# Patient Record
Sex: MALE | Race: WHITE | NOT HISPANIC OR LATINO | Employment: OTHER | URBAN - METROPOLITAN AREA
[De-identification: names, ages, dates, MRNs, and addresses within clinical notes are randomized per-mention and may not be internally consistent; named-entity substitution may affect disease eponyms.]

---

## 2023-10-28 RX ORDER — OMEGA-3-ACID ETHYL ESTERS 1 G/1
1 CAPSULE, LIQUID FILLED ORAL 2 TIMES DAILY
Status: ON HOLD | COMMUNITY
End: 2023-11-30 | Stop reason: ALTCHOICE

## 2023-10-28 RX ORDER — CYCLOBENZAPRINE HCL 10 MG
10 TABLET ORAL 3 TIMES DAILY PRN
COMMUNITY

## 2023-10-28 RX ORDER — ALBUTEROL SULFATE 90 UG/1
2 AEROSOL, METERED RESPIRATORY (INHALATION) EVERY 4 HOURS PRN
COMMUNITY

## 2023-10-28 RX ORDER — METOPROLOL TARTRATE 50 MG/1
2 TABLET ORAL 2 TIMES DAILY
COMMUNITY

## 2023-10-28 RX ORDER — CLOPIDOGREL BISULFATE 75 MG/1
1 TABLET ORAL DAILY
COMMUNITY

## 2023-10-28 RX ORDER — AZELASTINE 1 MG/ML
1 SPRAY, METERED NASAL 2 TIMES DAILY
COMMUNITY

## 2023-10-28 RX ORDER — AMLODIPINE BESYLATE 10 MG/1
1 TABLET ORAL DAILY
Status: ON HOLD | COMMUNITY
End: 2023-11-30 | Stop reason: ALTCHOICE

## 2023-10-28 RX ORDER — METFORMIN HYDROCHLORIDE 1000 MG/1
1 TABLET ORAL 2 TIMES DAILY
Status: ON HOLD | COMMUNITY
End: 2023-11-30 | Stop reason: ALTCHOICE

## 2023-10-28 RX ORDER — RANOLAZINE 1000 MG/1
1 TABLET, EXTENDED RELEASE ORAL EVERY 12 HOURS
COMMUNITY

## 2023-10-28 RX ORDER — HYDROXYZINE HYDROCHLORIDE 25 MG/1
25 TABLET, FILM COATED ORAL 4 TIMES DAILY PRN
Status: ON HOLD | COMMUNITY
End: 2023-11-30 | Stop reason: ALTCHOICE

## 2023-10-28 RX ORDER — DULOXETIN HYDROCHLORIDE 60 MG/1
1 CAPSULE, DELAYED RELEASE ORAL DAILY
COMMUNITY

## 2023-10-28 RX ORDER — ASPIRIN 325 MG
1 TABLET ORAL 2 TIMES DAILY
Status: ON HOLD | COMMUNITY
End: 2023-11-30 | Stop reason: ALTCHOICE

## 2023-10-28 RX ORDER — LISINOPRIL 40 MG/1
1 TABLET ORAL DAILY
Status: ON HOLD | COMMUNITY
End: 2023-11-30 | Stop reason: ALTCHOICE

## 2023-10-28 RX ORDER — METFORMIN HYDROCHLORIDE 500 MG/1
TABLET ORAL 2 TIMES DAILY
Status: ON HOLD | COMMUNITY
End: 2023-11-30 | Stop reason: ALTCHOICE

## 2023-10-28 RX ORDER — NITROGLYCERIN 0.4 MG/1
TABLET SUBLINGUAL
COMMUNITY

## 2023-10-28 RX ORDER — LORATADINE 10 MG/1
1 TABLET ORAL DAILY
COMMUNITY

## 2023-10-28 RX ORDER — POLYETHYLENE GLYCOL 3350 17 G/17G
POWDER, FOR SOLUTION ORAL
COMMUNITY

## 2023-10-30 ENCOUNTER — HOSPITAL ENCOUNTER (OUTPATIENT)
Dept: RADIOLOGY | Facility: HOSPITAL | Age: 58
Discharge: HOME | End: 2023-10-30
Payer: MEDICARE

## 2023-10-30 ENCOUNTER — APPOINTMENT (OUTPATIENT)
Dept: RADIOLOGY | Facility: HOSPITAL | Age: 58
End: 2023-10-30
Payer: MEDICARE

## 2023-10-30 ENCOUNTER — OFFICE VISIT (OUTPATIENT)
Dept: ORTHOPEDIC SURGERY | Facility: CLINIC | Age: 58
End: 2023-10-30
Payer: MEDICARE

## 2023-10-30 VITALS — BODY MASS INDEX: 37.56 KG/M2 | HEIGHT: 64 IN | WEIGHT: 220 LBS

## 2023-10-30 DIAGNOSIS — M25.551 RIGHT HIP PAIN: ICD-10-CM

## 2023-10-30 DIAGNOSIS — M54.41 ACUTE RIGHT-SIDED LOW BACK PAIN WITH RIGHT-SIDED SCIATICA: Primary | ICD-10-CM

## 2023-10-30 DIAGNOSIS — M25.551 PAIN IN RIGHT HIP: ICD-10-CM

## 2023-10-30 PROCEDURE — 72170 X-RAY EXAM OF PELVIS: CPT | Performed by: RADIOLOGY

## 2023-10-30 PROCEDURE — 99204 OFFICE O/P NEW MOD 45 MIN: CPT | Performed by: ORTHOPAEDIC SURGERY

## 2023-10-30 PROCEDURE — 72170 X-RAY EXAM OF PELVIS: CPT | Mod: FY

## 2023-10-30 RX ORDER — METHYLPREDNISOLONE 4 MG/1
TABLET ORAL
Qty: 21 TABLET | Refills: 0 | Status: SHIPPED | OUTPATIENT
Start: 2023-10-30 | End: 2023-12-11 | Stop reason: HOSPADM

## 2023-10-30 ASSESSMENT — PAIN SCALES - GENERAL: PAINLEVEL_OUTOF10: 3

## 2023-10-30 ASSESSMENT — PAIN - FUNCTIONAL ASSESSMENT: PAIN_FUNCTIONAL_ASSESSMENT: 0-10

## 2023-10-30 NOTE — PROGRESS NOTES
PRIMARY CARE PHYSICIAN: Ivy Pollock MD  REFERRING PROVIDER: Ivy Pollock MD     CONSULT ORTHOPAEDIC: Hip Evaluation        ASSESSMENT & PLAN    IMPRESSION:  1.  Right-sided low back pain with radiculopathy  2.  Normal right hip exam    PLAN:  Discussed with patient findings above.  Reviewed x-rays with him in the office today.  He has minimal arthritic changes on his right hip and most of symptoms seem to be related to a problem with his low back.  He does have some radicular symptoms and pain on examination that could potentially be related to low back pathology.  Recommended that he follow-up with one of our nonoperative spine physicians and pain management for additional evaluation and treatment recommendations.  We additionally discussed that given that he cannot take anti-inflammatories due to being on Eliquis trying Tylenol for pain control we will additionally start him on a Medrol Dosepak to see if this helps alleviate some of his current symptom presentation.      SUBJECTIVE  CHIEF COMPLAINT:   Chief Complaint   Patient presents with    Right Hip - Pain        HPI: Philip Barfield is a 58 y.o. patient. Philip Barfield has had progressive problems with the right hip over the past 3 months. They do report any trauma. He fell when he was trying to push a riding mower and landed on his right side.  They report any constant or progressive numbness or tingling in their legs on the right side of the thigh. Their symptoms are interfering with activities which include pain that starts in his butt and lateral side of his hip and down to his knee.     FUNCTIONAL STATUS: occasionally limited.  AMBULATORY STATUS: Independent community ambulation with canes/crutches  PREVIOUS TREATMENTS: None, unable to take anti-inflammatories due to being on Eliquis  HISTORY OF SURGERY ON AFFECTED HIP(S): No  He did have a prior right knee arthroscopic surgery in 1982  BACK PAIN REPORTED: Yes       REVIEW OF  SYSTEMS  Constitutional: See HPI for pain assessment, No significant weight loss, recent trauma  Cardiovascular: No chest pain, shortness of breath  Respiratory: No difficulty breathing, cough  Gastrointestinal: No nausea, vomiting, diarrhea, constipation  Musculoskeletal: Noted in HPI, positive for pain, restricted motion, stiffness  Integumentary: No rashes, easy bruising, redness   Neurological: no numbness or tingling in extremities, no gait disturbances   Psychiatric: No mood changes, memory changes, social issues  Heme/Lymph: no excessive swelling, easy bruising, excessive bleeding  ENT: No hearing changes  Eyes: No vision changes    Past Medical History:   Diagnosis Date    Old myocardial infarction     History of myocardial infarction    Personal history of other diseases of the circulatory system     History of hypertension    Personal history of other diseases of the musculoskeletal system and connective tissue     History of gout    Personal history of other diseases of the nervous system and sense organs     History of sleep apnea    Personal history of other endocrine, nutritional and metabolic disease     History of diabetes mellitus    Personal history of other endocrine, nutritional and metabolic disease     History of hyperlipidemia        Allergies   Allergen Reactions    Penicillins Rash and Shortness of breath    Tetanus Toxoid Rash and Shortness of breath    Hydrocodone-Acetaminophen Unknown    Niacin Unknown    Sulfa (Sulfonamide Antibiotics) Unknown        Past Surgical History:   Procedure Laterality Date    OTHER SURGICAL HISTORY  12/11/2018    Tonsillectomy    OTHER SURGICAL HISTORY  12/11/2018    Knee arthroscopy    OTHER SURGICAL HISTORY  12/11/2018    Cardiac catheterization    OTHER SURGICAL HISTORY  12/11/2018    Coronary artery bypass graft    OTHER SURGICAL HISTORY  12/11/2018    Nasal septoplasty    OTHER SURGICAL HISTORY  12/11/2018    Arterial stent placement        Family History    Problem Relation Name Age of Onset    Hypertension Mother      Cancer Father      Diabetes Brother      Diabetes Maternal Grandmother      Colon cancer Maternal Grandfather          Social History     Socioeconomic History    Marital status:      Spouse name: Not on file    Number of children: Not on file    Years of education: Not on file    Highest education level: Not on file   Occupational History    Not on file   Tobacco Use    Smoking status: Not on file    Smokeless tobacco: Not on file   Substance and Sexual Activity    Alcohol use: Not on file    Drug use: Not on file    Sexual activity: Not on file   Other Topics Concern    Not on file   Social History Narrative    Not on file     Social Determinants of Health     Financial Resource Strain: Not on file   Food Insecurity: Not on file   Transportation Needs: Not on file   Physical Activity: Not on file   Stress: Not on file   Social Connections: Not on file   Intimate Partner Violence: Not on file   Housing Stability: Not on file        CURRENT MEDICATIONS:   Current Outpatient Medications   Medication Sig Dispense Refill    albuterol 90 mcg/actuation inhaler Inhale 2 puffs every 4 hours if needed.      amLODIPine (Norvasc) 10 mg tablet Take 1 tablet (10 mg) by mouth once daily.      aspirin 325 mg tablet Take 1 tablet (325 mg) by mouth 2 times a day.      azelastine (Astelin) 137 mcg (0.1 %) nasal spray Administer 1 spray into affected nostril(s) 2 times a day.      clopidogrel (Plavix) 75 mg tablet Take 1 tablet (75 mg) by mouth once daily.      cyclobenzaprine (Flexeril) 10 mg tablet Take by mouth.      DULoxetine (Cymbalta) 60 mg DR capsule Take 1 capsule (60 mg) by mouth once daily.      hydrOXYzine HCL (Atarax) 25 mg tablet Take 1 tablet (25 mg) by mouth 4 times a day as needed for allergies.      lisinopril 40 mg tablet Take 1 tablet (40 mg) by mouth once daily.      loratadine (Claritin) 10 mg tablet Take 1 tablet (10 mg) by mouth once  daily.      metFORMIN (Glucophage) 1,000 mg tablet Take 1 tablet (1,000 mg) by mouth 2 times a day.      metFORMIN (Glucophage) 500 mg tablet Take by mouth twice a day.      metoprolol tartrate (Lopressor) 50 mg tablet Take 1 tablet by mouth 2 times a day.      nitroglycerin (Nitrostat) 0.4 mg SL tablet Place under the tongue.  TAKE AS DIRECTED.      omega-3 acid ethyl esters (Lovaza) 1 gram capsule Take 1 capsule (1 g) by mouth 2 times a day.      polyethylene glycol (Miralax) 17 gram/dose powder MIX 1 CAPFUL IN 8 OUNCES OF WATER AND DRINK DAILY AS DIRECTED.      ranolazine (Ranexa) 500 mg 12 hr tablet Take 1 tablet (500 mg) by mouth every 12 hours.       No current facility-administered medications for this visit.        OBJECTIVE    PHYSICAL EXAM       No data to display               Body mass index is 37.76 kg/m².    GENERAL: A/Ox3, NAD. Appears healthy, well nourished  PSYCHIATRIC: Mood stable, appropriate memory recall  EYES: EOM intact, no scleral icterus  CARDIAC: regular rate  LUNGS: Breathing non-labored  SKIN: no erythema, rashes, or ecchymoses     MUSCULOSKELETAL:  Laterality: right Hip Exam  - ROM, Extension: Full, no flexion contracture  - Strength: Abduction 5/5, Flexion 5/5  - Palpation: Nontender along hip, does have tenderness noted along his right-sided paraspinal musculature in the lumbar spine and right-sided SI joint  - Log roll/IR exam: Good motion, nonpainful  - Straight leg raise: Does reproduce some of his pain and cause aggravation of his symptoms with a seated straight leg localizes to the right-sided buttock area and lumbar spine  - EHL/PF/DF motor intact  - Gait: Using no assistive device  - Special Tests: None performed    NEUROVASCULAR:  - Neurovascular Status: sensation intact to light touch distally  - Capillary refill brisk at extremities, Bilateral dorsalis pedis pulse 2+      IMAGING:  Multiple views of the affected right hip(s) demonstrate: No acute fracture, dislocation,  deformity.  Minimal arthritic changes appreciated..   X-rays were personally reviewed and interpreted by me.  Radiology reports were reviewed by me as well, if readily available at the time.        Karon Mcleod DO  Attending Surgeon  [facility] Adult [facility]ive Surgery  [place], [place]

## 2023-11-28 ENCOUNTER — HOSPITAL ENCOUNTER (EMERGENCY)
Facility: HOSPITAL | Age: 58
Discharge: OTHER INSTITUTION | End: 2023-11-29
Attending: STUDENT IN AN ORGANIZED HEALTH CARE EDUCATION/TRAINING PROGRAM
Payer: MEDICARE

## 2023-11-28 ENCOUNTER — APPOINTMENT (OUTPATIENT)
Dept: RADIOLOGY | Facility: HOSPITAL | Age: 58
End: 2023-11-28
Payer: MEDICARE

## 2023-11-28 DIAGNOSIS — M48.02 CERVICAL STENOSIS OF SPINAL CANAL: ICD-10-CM

## 2023-11-28 DIAGNOSIS — R29.898 WEAKNESS OF BOTH LOWER EXTREMITIES: Primary | ICD-10-CM

## 2023-11-28 PROCEDURE — 99285 EMERGENCY DEPT VISIT HI MDM: CPT | Performed by: STUDENT IN AN ORGANIZED HEALTH CARE EDUCATION/TRAINING PROGRAM

## 2023-11-28 PROCEDURE — 70450 CT HEAD/BRAIN W/O DYE: CPT

## 2023-11-28 PROCEDURE — 94760 N-INVAS EAR/PLS OXIMETRY 1: CPT

## 2023-11-28 PROCEDURE — 72131 CT LUMBAR SPINE W/O DYE: CPT

## 2023-11-28 ASSESSMENT — PAIN SCALES - GENERAL: PAINLEVEL_OUTOF10: 0 - NO PAIN

## 2023-11-28 ASSESSMENT — PAIN - FUNCTIONAL ASSESSMENT: PAIN_FUNCTIONAL_ASSESSMENT: 0-10

## 2023-11-28 ASSESSMENT — COLUMBIA-SUICIDE SEVERITY RATING SCALE - C-SSRS
1. IN THE PAST MONTH, HAVE YOU WISHED YOU WERE DEAD OR WISHED YOU COULD GO TO SLEEP AND NOT WAKE UP?: NO
2. HAVE YOU ACTUALLY HAD ANY THOUGHTS OF KILLING YOURSELF?: NO
6. HAVE YOU EVER DONE ANYTHING, STARTED TO DO ANYTHING, OR PREPARED TO DO ANYTHING TO END YOUR LIFE?: NO

## 2023-11-29 ENCOUNTER — APPOINTMENT (OUTPATIENT)
Dept: RADIOLOGY | Facility: HOSPITAL | Age: 58
End: 2023-11-29
Payer: MEDICARE

## 2023-11-29 ENCOUNTER — HOSPITAL ENCOUNTER (INPATIENT)
Facility: HOSPITAL | Age: 58
LOS: 12 days | Discharge: HOME HEALTH | DRG: 471 | End: 2023-12-11
Attending: ORTHOPAEDIC SURGERY | Admitting: ORTHOPAEDIC SURGERY
Payer: MEDICARE

## 2023-11-29 ENCOUNTER — APPOINTMENT (OUTPATIENT)
Dept: RADIOLOGY | Facility: HOSPITAL | Age: 58
DRG: 471 | End: 2023-11-29
Payer: MEDICARE

## 2023-11-29 VITALS
OXYGEN SATURATION: 98 % | WEIGHT: 220 LBS | DIASTOLIC BLOOD PRESSURE: 87 MMHG | RESPIRATION RATE: 14 BRPM | HEART RATE: 70 BPM | HEIGHT: 64 IN | SYSTOLIC BLOOD PRESSURE: 146 MMHG | TEMPERATURE: 98.4 F | BODY MASS INDEX: 37.56 KG/M2

## 2023-11-29 DIAGNOSIS — N40.1 BENIGN PROSTATIC HYPERPLASIA WITH LOWER URINARY TRACT SYMPTOMS, SYMPTOM DETAILS UNSPECIFIED: ICD-10-CM

## 2023-11-29 DIAGNOSIS — G47.00 INSOMNIA, UNSPECIFIED TYPE: ICD-10-CM

## 2023-11-29 DIAGNOSIS — I25.112 CORONARY ARTERY DISEASE INVOLVING NATIVE CORONARY ARTERY OF NATIVE HEART WITH REFRACTORY ANGINA PECTORIS (CMS-HCC): ICD-10-CM

## 2023-11-29 DIAGNOSIS — I24.0 CORONARY ARTERY THROMBOSIS (MULTI): ICD-10-CM

## 2023-11-29 DIAGNOSIS — I21.4 NON-ST ELEVATION (NSTEMI) MYOCARDIAL INFARCTION (MULTI): ICD-10-CM

## 2023-11-29 DIAGNOSIS — E03.9 HYPOTHYROIDISM, UNSPECIFIED TYPE: ICD-10-CM

## 2023-11-29 DIAGNOSIS — K21.9 GASTROESOPHAGEAL REFLUX DISEASE WITHOUT ESOPHAGITIS: ICD-10-CM

## 2023-11-29 DIAGNOSIS — G89.18 POSTOPERATIVE PAIN: ICD-10-CM

## 2023-11-29 DIAGNOSIS — G95.20 CERVICAL SPINAL CORD COMPRESSION (MULTI): Primary | ICD-10-CM

## 2023-11-29 DIAGNOSIS — Z95.5 S/P DRUG ELUTING CORONARY STENT PLACEMENT: ICD-10-CM

## 2023-11-29 DIAGNOSIS — Z95.5 STENTED CORONARY ARTERY: ICD-10-CM

## 2023-11-29 LAB
ALBUMIN SERPL BCP-MCNC: 4.2 G/DL (ref 3.4–5)
ALP SERPL-CCNC: 107 U/L (ref 33–120)
ALT SERPL W P-5'-P-CCNC: 17 U/L (ref 10–52)
ANION GAP SERPL CALC-SCNC: 12 MMOL/L (ref 10–20)
APPEARANCE UR: CLEAR
AST SERPL W P-5'-P-CCNC: 21 U/L (ref 9–39)
BASOPHILS # BLD AUTO: 0.08 X10*3/UL (ref 0–0.1)
BASOPHILS NFR BLD AUTO: 0.7 %
BILIRUB SERPL-MCNC: 0.4 MG/DL (ref 0–1.2)
BILIRUB UR STRIP.AUTO-MCNC: NEGATIVE MG/DL
BUN SERPL-MCNC: 14 MG/DL (ref 6–23)
CA-I BLD-SCNC: 1.16 MMOL/L (ref 1.1–1.33)
CALCIUM SERPL-MCNC: 9.3 MG/DL (ref 8.6–10.3)
CARDIAC TROPONIN I PNL SERPL HS: 7 NG/L (ref 0–20)
CHLORIDE SERPL-SCNC: 103 MMOL/L (ref 98–107)
CO2 SERPL-SCNC: 27 MMOL/L (ref 21–32)
COLOR UR: YELLOW
CREAT SERPL-MCNC: 0.89 MG/DL (ref 0.5–1.3)
EOSINOPHIL # BLD AUTO: 0.65 X10*3/UL (ref 0–0.7)
EOSINOPHIL NFR BLD AUTO: 5.8 %
ERYTHROCYTE [DISTWIDTH] IN BLOOD BY AUTOMATED COUNT: 13.3 % (ref 11.5–14.5)
FLUAV RNA RESP QL NAA+PROBE: NOT DETECTED
FLUBV RNA RESP QL NAA+PROBE: NOT DETECTED
GFR SERPL CREATININE-BSD FRML MDRD: >90 ML/MIN/1.73M*2
GLUCOSE SERPL-MCNC: 110 MG/DL (ref 74–99)
GLUCOSE UR STRIP.AUTO-MCNC: NEGATIVE MG/DL
HCT VFR BLD AUTO: 42.2 % (ref 41–52)
HGB BLD-MCNC: 14 G/DL (ref 13.5–17.5)
IMM GRANULOCYTES # BLD AUTO: 0.08 X10*3/UL (ref 0–0.7)
IMM GRANULOCYTES NFR BLD AUTO: 0.7 % (ref 0–0.9)
KETONES UR STRIP.AUTO-MCNC: NEGATIVE MG/DL
LACTATE SERPL-SCNC: 0.9 MMOL/L (ref 0.4–2)
LEUKOCYTE ESTERASE UR QL STRIP.AUTO: NEGATIVE
LYMPHOCYTES # BLD AUTO: 2.69 X10*3/UL (ref 1.2–4.8)
LYMPHOCYTES NFR BLD AUTO: 24.1 %
MAGNESIUM SERPL-MCNC: 1.62 MG/DL (ref 1.6–2.4)
MCH RBC QN AUTO: 28.9 PG (ref 26–34)
MCHC RBC AUTO-ENTMCNC: 33.2 G/DL (ref 32–36)
MCV RBC AUTO: 87 FL (ref 80–100)
MONOCYTES # BLD AUTO: 0.94 X10*3/UL (ref 0.1–1)
MONOCYTES NFR BLD AUTO: 8.4 %
NEUTROPHILS # BLD AUTO: 6.72 X10*3/UL (ref 1.2–7.7)
NEUTROPHILS NFR BLD AUTO: 60.3 %
NITRITE UR QL STRIP.AUTO: NEGATIVE
NRBC BLD-RTO: 0 /100 WBCS (ref 0–0)
PH UR STRIP.AUTO: 5 [PH]
PHOSPHATE SERPL-MCNC: 4.2 MG/DL (ref 2.5–4.9)
PLATELET # BLD AUTO: 390 X10*3/UL (ref 150–450)
POTASSIUM SERPL-SCNC: 4.3 MMOL/L (ref 3.5–5.3)
PROT SERPL-MCNC: 7.1 G/DL (ref 6.4–8.2)
PROT UR STRIP.AUTO-MCNC: NEGATIVE MG/DL
RBC # BLD AUTO: 4.84 X10*6/UL (ref 4.5–5.9)
RBC # UR STRIP.AUTO: NEGATIVE /UL
SARS-COV-2 RNA RESP QL NAA+PROBE: NOT DETECTED
SODIUM SERPL-SCNC: 138 MMOL/L (ref 136–145)
SP GR UR STRIP.AUTO: 1.02
T4 FREE SERPL-MCNC: 1.63 NG/DL (ref 0.61–1.12)
TSH SERPL-ACNC: <0.01 MIU/L (ref 0.44–3.98)
UROBILINOGEN UR STRIP.AUTO-MCNC: <2 MG/DL
WBC # BLD AUTO: 11.2 X10*3/UL (ref 4.4–11.3)

## 2023-11-29 PROCEDURE — 94760 N-INVAS EAR/PLS OXIMETRY 1: CPT

## 2023-11-29 PROCEDURE — 80053 COMPREHEN METABOLIC PANEL: CPT | Performed by: STUDENT IN AN ORGANIZED HEALTH CARE EDUCATION/TRAINING PROGRAM

## 2023-11-29 PROCEDURE — 72131 CT LUMBAR SPINE W/O DYE: CPT | Performed by: STUDENT IN AN ORGANIZED HEALTH CARE EDUCATION/TRAINING PROGRAM

## 2023-11-29 PROCEDURE — 84439 ASSAY OF FREE THYROXINE: CPT | Performed by: STUDENT IN AN ORGANIZED HEALTH CARE EDUCATION/TRAINING PROGRAM

## 2023-11-29 PROCEDURE — 71045 X-RAY EXAM CHEST 1 VIEW: CPT | Performed by: RADIOLOGY

## 2023-11-29 PROCEDURE — 84100 ASSAY OF PHOSPHORUS: CPT | Performed by: STUDENT IN AN ORGANIZED HEALTH CARE EDUCATION/TRAINING PROGRAM

## 2023-11-29 PROCEDURE — 2500000004 HC RX 250 GENERAL PHARMACY W/ HCPCS (ALT 636 FOR OP/ED): Performed by: STUDENT IN AN ORGANIZED HEALTH CARE EDUCATION/TRAINING PROGRAM

## 2023-11-29 PROCEDURE — 36415 COLL VENOUS BLD VENIPUNCTURE: CPT | Performed by: STUDENT IN AN ORGANIZED HEALTH CARE EDUCATION/TRAINING PROGRAM

## 2023-11-29 PROCEDURE — 70450 CT HEAD/BRAIN W/O DYE: CPT | Performed by: STUDENT IN AN ORGANIZED HEALTH CARE EDUCATION/TRAINING PROGRAM

## 2023-11-29 PROCEDURE — 85025 COMPLETE CBC W/AUTO DIFF WBC: CPT | Performed by: STUDENT IN AN ORGANIZED HEALTH CARE EDUCATION/TRAINING PROGRAM

## 2023-11-29 PROCEDURE — 72157 MRI CHEST SPINE W/O & W/DYE: CPT

## 2023-11-29 PROCEDURE — 84443 ASSAY THYROID STIM HORMONE: CPT | Performed by: STUDENT IN AN ORGANIZED HEALTH CARE EDUCATION/TRAINING PROGRAM

## 2023-11-29 PROCEDURE — 62270 DX LMBR SPI PNXR: CPT | Performed by: STUDENT IN AN ORGANIZED HEALTH CARE EDUCATION/TRAINING PROGRAM

## 2023-11-29 PROCEDURE — A9575 INJ GADOTERATE MEGLUMI 0.1ML: HCPCS | Performed by: EMERGENCY MEDICINE

## 2023-11-29 PROCEDURE — 99223 1ST HOSP IP/OBS HIGH 75: CPT | Performed by: PSYCHIATRY & NEUROLOGY

## 2023-11-29 PROCEDURE — 87636 SARSCOV2 & INF A&B AMP PRB: CPT | Performed by: STUDENT IN AN ORGANIZED HEALTH CARE EDUCATION/TRAINING PROGRAM

## 2023-11-29 PROCEDURE — 72148 MRI LUMBAR SPINE W/O DYE: CPT | Performed by: STUDENT IN AN ORGANIZED HEALTH CARE EDUCATION/TRAINING PROGRAM

## 2023-11-29 PROCEDURE — 83735 ASSAY OF MAGNESIUM: CPT | Performed by: STUDENT IN AN ORGANIZED HEALTH CARE EDUCATION/TRAINING PROGRAM

## 2023-11-29 PROCEDURE — 84484 ASSAY OF TROPONIN QUANT: CPT | Performed by: STUDENT IN AN ORGANIZED HEALTH CARE EDUCATION/TRAINING PROGRAM

## 2023-11-29 PROCEDURE — 1100000001 HC PRIVATE ROOM DAILY

## 2023-11-29 PROCEDURE — 82330 ASSAY OF CALCIUM: CPT | Performed by: STUDENT IN AN ORGANIZED HEALTH CARE EDUCATION/TRAINING PROGRAM

## 2023-11-29 PROCEDURE — 2500000001 HC RX 250 WO HCPCS SELF ADMINISTERED DRUGS (ALT 637 FOR MEDICARE OP)

## 2023-11-29 PROCEDURE — 71045 X-RAY EXAM CHEST 1 VIEW: CPT

## 2023-11-29 PROCEDURE — 83605 ASSAY OF LACTIC ACID: CPT | Performed by: STUDENT IN AN ORGANIZED HEALTH CARE EDUCATION/TRAINING PROGRAM

## 2023-11-29 PROCEDURE — 70553 MRI BRAIN STEM W/O & W/DYE: CPT | Performed by: STUDENT IN AN ORGANIZED HEALTH CARE EDUCATION/TRAINING PROGRAM

## 2023-11-29 PROCEDURE — 70553 MRI BRAIN STEM W/O & W/DYE: CPT

## 2023-11-29 PROCEDURE — 96374 THER/PROPH/DIAG INJ IV PUSH: CPT

## 2023-11-29 PROCEDURE — 2500000004 HC RX 250 GENERAL PHARMACY W/ HCPCS (ALT 636 FOR OP/ED)

## 2023-11-29 PROCEDURE — 81003 URINALYSIS AUTO W/O SCOPE: CPT | Performed by: STUDENT IN AN ORGANIZED HEALTH CARE EDUCATION/TRAINING PROGRAM

## 2023-11-29 PROCEDURE — 2550000001 HC RX 255 CONTRASTS: Performed by: EMERGENCY MEDICINE

## 2023-11-29 PROCEDURE — 72156 MRI NECK SPINE W/O & W/DYE: CPT | Performed by: STUDENT IN AN ORGANIZED HEALTH CARE EDUCATION/TRAINING PROGRAM

## 2023-11-29 PROCEDURE — 96375 TX/PRO/DX INJ NEW DRUG ADDON: CPT

## 2023-11-29 PROCEDURE — 72157 MRI CHEST SPINE W/O & W/DYE: CPT | Performed by: STUDENT IN AN ORGANIZED HEALTH CARE EDUCATION/TRAINING PROGRAM

## 2023-11-29 PROCEDURE — 72148 MRI LUMBAR SPINE W/O DYE: CPT

## 2023-11-29 PROCEDURE — 72156 MRI NECK SPINE W/O & W/DYE: CPT

## 2023-11-29 RX ORDER — POLYETHYLENE GLYCOL 3350 17 G/17G
17 POWDER, FOR SOLUTION ORAL DAILY
Status: DISCONTINUED | OUTPATIENT
Start: 2023-11-30 | End: 2023-12-06

## 2023-11-29 RX ORDER — NALOXONE HYDROCHLORIDE 0.4 MG/ML
0.2 INJECTION, SOLUTION INTRAMUSCULAR; INTRAVENOUS; SUBCUTANEOUS EVERY 5 MIN PRN
Status: DISCONTINUED | OUTPATIENT
Start: 2023-11-29 | End: 2023-11-29

## 2023-11-29 RX ORDER — NALOXONE HYDROCHLORIDE 0.4 MG/ML
0.2 INJECTION, SOLUTION INTRAMUSCULAR; INTRAVENOUS; SUBCUTANEOUS EVERY 5 MIN PRN
Status: DISCONTINUED | OUTPATIENT
Start: 2023-11-29 | End: 2023-12-06

## 2023-11-29 RX ORDER — ONDANSETRON 4 MG/1
4 TABLET, FILM COATED ORAL EVERY 8 HOURS PRN
Status: DISCONTINUED | OUTPATIENT
Start: 2023-11-29 | End: 2023-12-06

## 2023-11-29 RX ORDER — MORPHINE SULFATE 4 MG/ML
4 INJECTION, SOLUTION INTRAMUSCULAR; INTRAVENOUS ONCE
Status: COMPLETED | OUTPATIENT
Start: 2023-11-29 | End: 2023-11-29

## 2023-11-29 RX ORDER — SODIUM CHLORIDE, SODIUM LACTATE, POTASSIUM CHLORIDE, CALCIUM CHLORIDE 600; 310; 30; 20 MG/100ML; MG/100ML; MG/100ML; MG/100ML
125 INJECTION, SOLUTION INTRAVENOUS CONTINUOUS
Status: DISCONTINUED | OUTPATIENT
Start: 2023-11-29 | End: 2023-12-06

## 2023-11-29 RX ORDER — ADHESIVE BANDAGE
30 BANDAGE TOPICAL DAILY PRN
Status: DISCONTINUED | OUTPATIENT
Start: 2023-11-29 | End: 2023-12-06

## 2023-11-29 RX ORDER — PROCHLORPERAZINE 25 MG/1
25 SUPPOSITORY RECTAL EVERY 12 HOURS PRN
Status: DISCONTINUED | OUTPATIENT
Start: 2023-11-29 | End: 2023-12-06

## 2023-11-29 RX ORDER — GADOTERATE MEGLUMINE 376.9 MG/ML
0.2 INJECTION INTRAVENOUS
Status: COMPLETED | OUTPATIENT
Start: 2023-11-29 | End: 2023-11-29

## 2023-11-29 RX ORDER — AMOXICILLIN 250 MG
2 CAPSULE ORAL 2 TIMES DAILY
Status: DISCONTINUED | OUTPATIENT
Start: 2023-11-29 | End: 2023-12-06

## 2023-11-29 RX ORDER — OXYCODONE HYDROCHLORIDE 5 MG/1
5 TABLET ORAL EVERY 4 HOURS PRN
Status: DISCONTINUED | OUTPATIENT
Start: 2023-11-29 | End: 2023-12-06

## 2023-11-29 RX ORDER — BISACODYL 10 MG/1
10 SUPPOSITORY RECTAL DAILY PRN
Status: DISCONTINUED | OUTPATIENT
Start: 2023-11-29 | End: 2023-12-06

## 2023-11-29 RX ORDER — MIDAZOLAM HYDROCHLORIDE 1 MG/ML
2 INJECTION, SOLUTION INTRAMUSCULAR; INTRAVENOUS ONCE
Status: COMPLETED | OUTPATIENT
Start: 2023-11-29 | End: 2023-11-29

## 2023-11-29 RX ORDER — OXYCODONE HYDROCHLORIDE 5 MG/1
10 TABLET ORAL EVERY 4 HOURS PRN
Status: DISCONTINUED | OUTPATIENT
Start: 2023-11-29 | End: 2023-12-06

## 2023-11-29 RX ORDER — ONDANSETRON HYDROCHLORIDE 2 MG/ML
4 INJECTION, SOLUTION INTRAVENOUS EVERY 8 HOURS PRN
Status: DISCONTINUED | OUTPATIENT
Start: 2023-11-29 | End: 2023-12-06

## 2023-11-29 RX ORDER — CYCLOBENZAPRINE HCL 10 MG
10 TABLET ORAL 3 TIMES DAILY PRN
Status: DISCONTINUED | OUTPATIENT
Start: 2023-11-29 | End: 2023-12-05

## 2023-11-29 RX ORDER — PROCHLORPERAZINE MALEATE 10 MG
10 TABLET ORAL EVERY 6 HOURS PRN
Status: DISCONTINUED | OUTPATIENT
Start: 2023-11-29 | End: 2023-12-06

## 2023-11-29 RX ORDER — MIDAZOLAM HYDROCHLORIDE 1 MG/ML
INJECTION, SOLUTION INTRAMUSCULAR; INTRAVENOUS
Status: COMPLETED
Start: 2023-11-29 | End: 2023-11-29

## 2023-11-29 RX ORDER — HYDROMORPHONE HYDROCHLORIDE 1 MG/ML
0.5 INJECTION, SOLUTION INTRAMUSCULAR; INTRAVENOUS; SUBCUTANEOUS EVERY 4 HOURS PRN
Status: DISCONTINUED | OUTPATIENT
Start: 2023-11-29 | End: 2023-12-05

## 2023-11-29 RX ORDER — ACETAMINOPHEN 325 MG/1
650 TABLET ORAL EVERY 6 HOURS SCHEDULED
Status: DISCONTINUED | OUTPATIENT
Start: 2023-11-30 | End: 2023-12-06

## 2023-11-29 RX ORDER — PROCHLORPERAZINE EDISYLATE 5 MG/ML
10 INJECTION INTRAMUSCULAR; INTRAVENOUS EVERY 6 HOURS PRN
Status: DISCONTINUED | OUTPATIENT
Start: 2023-11-29 | End: 2023-12-06

## 2023-11-29 RX ADMIN — MIDAZOLAM 2 MG: 1 INJECTION INTRAMUSCULAR; INTRAVENOUS at 04:11

## 2023-11-29 RX ADMIN — MIDAZOLAM HYDROCHLORIDE 2 MG: 1 INJECTION, SOLUTION INTRAMUSCULAR; INTRAVENOUS at 04:11

## 2023-11-29 RX ADMIN — MIDAZOLAM 2 MG: 1 INJECTION INTRAMUSCULAR; INTRAVENOUS at 03:46

## 2023-11-29 RX ADMIN — Medication: at 22:45

## 2023-11-29 RX ADMIN — MORPHINE SULFATE 4 MG: 4 INJECTION, SOLUTION INTRAMUSCULAR; INTRAVENOUS at 17:29

## 2023-11-29 RX ADMIN — GADOTERATE MEGLUMINE 20 ML: 376.9 INJECTION INTRAVENOUS at 16:08

## 2023-11-29 SDOH — SOCIAL STABILITY: SOCIAL INSECURITY: ARE THERE ANY APPARENT SIGNS OF INJURIES/BEHAVIORS THAT COULD BE RELATED TO ABUSE/NEGLECT?: NO

## 2023-11-29 SDOH — ECONOMIC STABILITY: HOUSING INSECURITY
IN THE LAST 12 MONTHS, WAS THERE A TIME WHEN YOU DID NOT HAVE A STEADY PLACE TO SLEEP OR SLEPT IN A SHELTER (INCLUDING NOW)?: NO

## 2023-11-29 SDOH — ECONOMIC STABILITY: HOUSING INSECURITY: IN THE LAST 12 MONTHS, HOW MANY PLACES HAVE YOU LIVED?: 1

## 2023-11-29 SDOH — SOCIAL STABILITY: SOCIAL INSECURITY: WERE YOU ABLE TO COMPLETE ALL THE BEHAVIORAL HEALTH SCREENINGS?: YES

## 2023-11-29 SDOH — SOCIAL STABILITY: SOCIAL INSECURITY: DO YOU FEEL ANYONE HAS EXPLOITED OR TAKEN ADVANTAGE OF YOU FINANCIALLY OR OF YOUR PERSONAL PROPERTY?: NO

## 2023-11-29 SDOH — ECONOMIC STABILITY: TRANSPORTATION INSECURITY
IN THE PAST 12 MONTHS, HAS THE LACK OF TRANSPORTATION KEPT YOU FROM MEDICAL APPOINTMENTS OR FROM GETTING MEDICATIONS?: NO

## 2023-11-29 SDOH — ECONOMIC STABILITY: INCOME INSECURITY: HOW HARD IS IT FOR YOU TO PAY FOR THE VERY BASICS LIKE FOOD, HOUSING, MEDICAL CARE, AND HEATING?: NOT HARD AT ALL

## 2023-11-29 SDOH — SOCIAL STABILITY: SOCIAL INSECURITY: DOES ANYONE TRY TO KEEP YOU FROM HAVING/CONTACTING OTHER FRIENDS OR DOING THINGS OUTSIDE YOUR HOME?: NO

## 2023-11-29 SDOH — ECONOMIC STABILITY: INCOME INSECURITY: IN THE LAST 12 MONTHS, WAS THERE A TIME WHEN YOU WERE NOT ABLE TO PAY THE MORTGAGE OR RENT ON TIME?: NO

## 2023-11-29 SDOH — SOCIAL STABILITY: SOCIAL INSECURITY: HAS ANYONE EVER THREATENED TO HURT YOUR FAMILY OR YOUR PETS?: NO

## 2023-11-29 SDOH — SOCIAL STABILITY: SOCIAL INSECURITY: DO YOU FEEL UNSAFE GOING BACK TO THE PLACE WHERE YOU ARE LIVING?: NO

## 2023-11-29 SDOH — SOCIAL STABILITY: SOCIAL INSECURITY: ARE YOU OR HAVE YOU BEEN THREATENED OR ABUSED PHYSICALLY, EMOTIONALLY, OR SEXUALLY BY ANYONE?: NO

## 2023-11-29 SDOH — SOCIAL STABILITY: SOCIAL INSECURITY: HAVE YOU HAD THOUGHTS OF HARMING ANYONE ELSE?: YES

## 2023-11-29 SDOH — ECONOMIC STABILITY: TRANSPORTATION INSECURITY
IN THE PAST 12 MONTHS, HAS LACK OF TRANSPORTATION KEPT YOU FROM MEETINGS, WORK, OR FROM GETTING THINGS NEEDED FOR DAILY LIVING?: NO

## 2023-11-29 SDOH — SOCIAL STABILITY: SOCIAL INSECURITY: ABUSE: ADULT

## 2023-11-29 ASSESSMENT — ACTIVITIES OF DAILY LIVING (ADL)
FEEDING YOURSELF: INDEPENDENT
BATHING: INDEPENDENT
ASSISTIVE_DEVICE: WHEELCHAIR
HEARING - LEFT EAR: FUNCTIONAL
DRESSING YOURSELF: INDEPENDENT
TOILETING: INDEPENDENT
BATHING: INDEPENDENT
FEEDING YOURSELF: INDEPENDENT
WALKS IN HOME: INDEPENDENT
PATIENT'S MEMORY ADEQUATE TO SAFELY COMPLETE DAILY ACTIVITIES?: YES
GROOMING: INDEPENDENT
WALKS IN HOME: NEEDS ASSISTANCE
DRESSING YOURSELF: INDEPENDENT
HEARING - RIGHT EAR: FUNCTIONAL
HEARING - RIGHT EAR: FUNCTIONAL
GROOMING: INDEPENDENT
TOILETING: INDEPENDENT
HEARING - LEFT EAR: FUNCTIONAL
PATIENT'S MEMORY ADEQUATE TO SAFELY COMPLETE DAILY ACTIVITIES?: YES
JUDGMENT_ADEQUATE_SAFELY_COMPLETE_DAILY_ACTIVITIES: YES
JUDGMENT_ADEQUATE_SAFELY_COMPLETE_DAILY_ACTIVITIES: YES
ADEQUATE_TO_COMPLETE_ADL: YES
ADEQUATE_TO_COMPLETE_ADL: YES

## 2023-11-29 ASSESSMENT — LIFESTYLE VARIABLES
HAVE PEOPLE ANNOYED YOU BY CRITICIZING YOUR DRINKING: NO
HAVE YOU EVER FELT YOU SHOULD CUT DOWN ON YOUR DRINKING: NO
AUDIT-C TOTAL SCORE: 0
HOW OFTEN DO YOU HAVE A DRINK CONTAINING ALCOHOL: NEVER
HOW OFTEN DO YOU HAVE 6 OR MORE DRINKS ON ONE OCCASION: NEVER
REASON UNABLE TO ASSESS: NO
EVER HAD A DRINK FIRST THING IN THE MORNING TO STEADY YOUR NERVES TO GET RID OF A HANGOVER: NO
EVER FELT BAD OR GUILTY ABOUT YOUR DRINKING: NO
AUDIT-C TOTAL SCORE: 0
SKIP TO QUESTIONS 9-10: 1
HOW MANY STANDARD DRINKS CONTAINING ALCOHOL DO YOU HAVE ON A TYPICAL DAY: PATIENT DOES NOT DRINK

## 2023-11-29 ASSESSMENT — COGNITIVE AND FUNCTIONAL STATUS - GENERAL
DAILY ACTIVITIY SCORE: 24
PATIENT BASELINE BEDBOUND: NO
WALKING IN HOSPITAL ROOM: A LITTLE
CLIMB 3 TO 5 STEPS WITH RAILING: A LOT
MOBILITY SCORE: 21

## 2023-11-29 ASSESSMENT — PAIN - FUNCTIONAL ASSESSMENT
PAIN_FUNCTIONAL_ASSESSMENT: 0-10
PAIN_FUNCTIONAL_ASSESSMENT: 0-10

## 2023-11-29 ASSESSMENT — VISUAL ACUITY: VA_NORMAL: 1

## 2023-11-29 ASSESSMENT — PATIENT HEALTH QUESTIONNAIRE - PHQ9
SUM OF ALL RESPONSES TO PHQ9 QUESTIONS 1 & 2: 0
1. LITTLE INTEREST OR PLEASURE IN DOING THINGS: NOT AT ALL
2. FEELING DOWN, DEPRESSED OR HOPELESS: NOT AT ALL

## 2023-11-29 ASSESSMENT — COLUMBIA-SUICIDE SEVERITY RATING SCALE - C-SSRS
6. HAVE YOU EVER DONE ANYTHING, STARTED TO DO ANYTHING, OR PREPARED TO DO ANYTHING TO END YOUR LIFE?: NO
2. HAVE YOU ACTUALLY HAD ANY THOUGHTS OF KILLING YOURSELF?: NO
1. IN THE PAST MONTH, HAVE YOU WISHED YOU WERE DEAD OR WISHED YOU COULD GO TO SLEEP AND NOT WAKE UP?: NO

## 2023-11-29 ASSESSMENT — ENCOUNTER SYMPTOMS
NUMBNESS: 1
BACK PAIN: 1
WEAKNESS: 1

## 2023-11-29 ASSESSMENT — PAIN SCALES - GENERAL
PAINLEVEL_OUTOF10: 2
PAINLEVEL_OUTOF10: 8

## 2023-11-29 ASSESSMENT — PAIN DESCRIPTION - LOCATION: LOCATION: BACK

## 2023-11-29 NOTE — Clinical Note
Angioplasty of the middle right coronary artery lesion. Inflation 1: Pressure = 20 bud; Duration = 60 sec.

## 2023-11-29 NOTE — ED PROCEDURE NOTE
Procedure  Lumbar Puncture    Performed by: Juliette Crisostomo MD  Authorized by: Juliette Crisostomo MD    Consent:     Consent obtained:  Written    Consent given by:  Patient    Risks discussed:  Bleeding, infection, pain, headache, nerve damage and repeat procedure  Universal protocol:     Procedure explained and questions answered to patient or proxy's satisfaction: yes      Patient identity confirmed:  Verbally with patient  Pre-procedure details:     Procedure purpose:  Diagnostic    Preparation: Patient was prepped and draped in usual sterile fashion    Sedation:     Sedation type:  Anxiolysis  Anesthesia:     Anesthesia method:  None  Procedure details:     Lumbar space:  L4-L5 interspace    Patient position:  R lateral decubitus    Needle gauge:  22    Needle length (in):  3.5    Ultrasound guidance: no      Number of attempts:  3  Post-procedure details:     Puncture site:  Adhesive bandage applied    Procedure completion:  Tolerated well, no immediate complications  Comments:      Unable to obtain any CSF               Juliette Crisostomo MD  11/29/23 0515

## 2023-11-29 NOTE — Clinical Note
Angioplasty of the distal right coronary artery lesion. Inflation 1: Pressure = 12 bud; Duration = 30 sec. Inflation 2: Pressure = 18 bud; Duration = 20 sec. Inflation 3: Pressure = 18 bud; Duration = 15 sec.

## 2023-11-29 NOTE — ED PROVIDER NOTES
HPI   Chief Complaint   Patient presents with    Numbness    Weakness, Gen     Patient states that he has numbness in all extremities with shooting pain to the lower extremities.       This is a 58-year-old male with past medical history of coronary artery disease status post CABG x 3, multiple PCI's, paroxysmal atrial fibrillation, diabetes, hyperlipidemia, hypertension, ARISTEO who presents to the emergency department for acute onset of bilateral leg weakness and numbness.  He states that his legs have been grossly getting worse over the past 2 months but today he noticed that he could not feel his legs and was having more issues with walking.  He states he has numbness all over his body.  He is complaining of pain in his right lower back.  Intermittently he will get burning into his legs bilaterally.  No issues with urination or bowel movements.  Denies saddle anesthesia.                          Gloria Coma Scale Score: 15                  Patient History   Past Medical History:   Diagnosis Date    Old myocardial infarction     History of myocardial infarction    Personal history of other diseases of the circulatory system     History of hypertension    Personal history of other diseases of the musculoskeletal system and connective tissue     History of gout    Personal history of other diseases of the nervous system and sense organs     History of sleep apnea    Personal history of other endocrine, nutritional and metabolic disease     History of diabetes mellitus    Personal history of other endocrine, nutritional and metabolic disease     History of hyperlipidemia     Past Surgical History:   Procedure Laterality Date    OTHER SURGICAL HISTORY  12/11/2018    Tonsillectomy    OTHER SURGICAL HISTORY  12/11/2018    Knee arthroscopy    OTHER SURGICAL HISTORY  12/11/2018    Cardiac catheterization    OTHER SURGICAL HISTORY  12/11/2018    Coronary artery bypass graft    OTHER SURGICAL HISTORY  12/11/2018    Nasal  septoplasty    OTHER SURGICAL HISTORY  12/11/2018    Arterial stent placement     Family History   Problem Relation Name Age of Onset    Hypertension Mother      Cancer Father      Diabetes Brother      Diabetes Maternal Grandmother      Colon cancer Maternal Grandfather       Social History     Tobacco Use    Smoking status: Not on file    Smokeless tobacco: Not on file   Substance Use Topics    Alcohol use: Not on file    Drug use: Not on file       Physical Exam   ED Triage Vitals [11/28/23 2234]   Temp Heart Rate Resp BP   36.9 °C (98.4 °F) 56 18 114/75      SpO2 Temp Source Heart Rate Source Patient Position   98 % Tympanic Monitor Sitting      BP Location FiO2 (%)     Left arm --       Physical Exam  Constitutional:       General: He is not in acute distress.  HENT:      Head: Normocephalic and atraumatic.      Right Ear: Tympanic membrane normal.      Left Ear: Tympanic membrane normal.      Mouth/Throat:      Mouth: Mucous membranes are moist.   Eyes:      Extraocular Movements: Extraocular movements intact.      Conjunctiva/sclera: Conjunctivae normal.      Pupils: Pupils are equal, round, and reactive to light.   Cardiovascular:      Rate and Rhythm: Normal rate and regular rhythm.      Heart sounds: No murmur heard.  Pulmonary:      Effort: Pulmonary effort is normal. No respiratory distress.      Breath sounds: Normal breath sounds. No stridor. No wheezing or rales.   Abdominal:      General: Bowel sounds are normal. There is no distension.      Tenderness: There is no abdominal tenderness. There is no guarding or rebound.   Musculoskeletal:         General: No swelling, tenderness or deformity. Normal range of motion.   Skin:     General: Skin is warm and dry.      Coloration: Skin is not jaundiced.      Findings: No bruising or lesion.   Neurological:      Mental Status: He is alert and oriented to person, place, and time.      Cranial Nerves: No cranial nerve deficit.      Comments: Numbness on  bilateral legs.  2+ distal pulses in all extremities.  Patient has mild weakness on exam on knee flexion bilaterally.  When ambulating patient does have a shuffle and has issues with balance.   Psychiatric:         Mood and Affect: Mood normal.       Labs Reviewed - No data to display  MR lumbar spine wo IV contrast    (Results Pending)   CT lumbar spine wo IV contrast    (Results Pending)   CT head wo IV contrast    (Results Pending)       ED Course & MDM   ED Course as of 12/19/23 0341 Wed Nov 29, 2023   0037 IMPRESSION:  1. No acute intracranial abnormality.      2. Chronic lacunar infarct involving the head of the right caudate  nucleus is adjacent periventricular white matter.      3. Mild generalized mucosal thickening of the maxillary and ethmoid  sinuses without evidence of acute sinusitis.       [CF]   0040 Multilevel lumbar spondylosis without high-grade central canal  stenosis. This is most pronounced at L3-L4, L4-L5 and L5-S1. Please  correlate these abnormalities, detailed above, to patient's  symptomatology.   [CF]      ED Course User Index  [CF] Fang Jane MD         Diagnoses as of 12/19/23 0341   Weakness of both lower extremities   Cervical stenosis of spinal canal       Medical Decision Making  58-year-old male with multiple cardiac risk factors presents emergency department for sudden numbness mostly in his legs and weakness in his legs that started today.  He does appear to have more weakness with walking and has a shuffle.  Will obtain a CT to assess for any intracranial normality.  Since MRI is currently not here will obtain a CT of his lumbar spine to assess for any obvious deformities.  Given patient's history and weakness will obtain EKG and troponin.  Will assess for any electrolyte derangements or any signs of anemia.  Also obtain COVID and flu.  He does not appear to have symptoms of Guillain-Barré given that most of his symptoms are numbness as opposed to paralysis.  CT  showed pronounced lesion in L5-L4.  I spoke with Dr. Henderson with radiology given concern for possible cauda equina and MRI was ordered stat and they are calling in the tech.  Patient signed out to Dr. Crisostomo pending MRI    EKG on my read shows sinus rhythm at a rate of 58 bpm no change or elevation nonischemic EKG.  There is a lot of wander but interpret otherwise normal intervals.    At this time patient is signed out to Dr. Crisostomo pending final evaluation        Procedure  Procedures     Fang Jane MD  11/29/23 0101       Fang Jane MD  12/19/23 0342

## 2023-11-29 NOTE — Clinical Note
Angioplasty of the proximal right coronary artery lesion. Inflation 1: Pressure = 20 bud; Duration = 15 sec.

## 2023-11-29 NOTE — PROGRESS NOTES
This patient was seen by the offgoing provider.  Please see their note for full history and physical exam.    Briefly, this is a 58 y.o. male presenting to the ED with progressive difficulty ambulating over the past few months as well as sudden onset paresthesias of the lower extremities that have now progressed to the upper extremities that started today.  Patient had lab work ordered that was unremarkable.  Prior team was concerned for possible cauda equina given his difficulty ambulating and pain radiating down both his legs and MRI of the L-spine was ordered.  At the time of signout, patient is pending MRI imaging.    On my evaluation, patient is resting comfortably and hemodynamically stable. MRI of the L-spine showed multilevel spondylitic changes due to advanced disc disease, facet arthropathy, epidural lipomatosis as well as compression of the left T11 nerve root.  It also showed mild bone marrow edema like signal at T11-T12 and periarticular bone marrow edema at the right L3-L4 facet.  These findings do not explain the patient's current symptoms.  I took an additional history and physical exam of the patient.  He does have significant difficulty ambulating with short steps and swaying left and right.  He did fall towards the left while trying to ambulate.  He has decreased sensation in his legs and his arms and his reflexes appear to be increased.  Patient's progressive difficulty ambulating and paresthesias could be concern for chronic inflammatory demyelinating polyneuropathy.  Decree suspicion for GBS given the prolonged symptoms however the paresthesias are new.  The unclear cause of patient's symptoms was discussed extensively with patient and patient family.  Patient would likely benefit from an LP which was discussed with the patient and he was agreeable.  Patient was consented and LP was attempted however was unsuccessful.  Patient tolerated the procedure well.  Neurology was consulted and   Parish from neurology recommended MRI imaging of the brain, C-spine, and T-spine which were ordered for the morning.  Patient remained otherwise stable.  His care was signed out to the oncoming provider in stable condition pending MRI imaging and neurology recommendations.      Juliette Crisostomo MD  Emergency Medicine

## 2023-11-29 NOTE — Clinical Note
Multiple views of the saphenous vein graft to the diagonal obtained using power injection. Radial graft to Diagonal

## 2023-11-29 NOTE — PROGRESS NOTES
I have accept care of this patient in signout.    In summary:  I received this patient in signout in summary this is a 58-year-old male with significant cardiac risk factors presents emergency department for numbness and weakness in his legs and intermittent numbness in his arms.  He did have a MRI of his lumbar spine which showed no abnormality.  Patient did undergo an LP that was not successful.  Neurology was then consulted for his symptoms and recommended MRI of his head, cervical and thoracic spine as well.  MRI did show a protrusion from her cervical spine given the patient is myelopathic neurology recommend the patient get transferred.  I spoke with Dr. Ruelas who accepted patient at [facility].  Patient has not taken any of his medications since 3 PM  on 11/28 including his Eliquis.      Labs Reviewed   COMPREHENSIVE METABOLIC PANEL - Abnormal       Result Value    Glucose 110 (*)     Sodium 138      Potassium 4.3      Chloride 103      Bicarbonate 27      Anion Gap 12      Urea Nitrogen 14      Creatinine 0.89      eGFR >90      Calcium 9.3      Albumin 4.2      Alkaline Phosphatase 107      Total Protein 7.1      AST 21      Bilirubin, Total 0.4      ALT 17     TSH WITH REFLEX TO FREE T4 IF ABNORMAL - Abnormal    Thyroid Stimulating Hormone <0.01 (*)     Narrative:     TSH testing is performed using different testing methodology at [facility] than at other system [facility]. Direct result comparisons should only be made within the same method.     THYROXINE, FREE - Abnormal    Thyroxine, Free 1.63 (*)     Narrative:     Thyroxine Free testing is performed using different testing methodology at [facility] than at other [facility]. Direct result comparisons should only be made within the same method.    Biotin can cause falsely elevated free T4 results. Patients taking a Biotin dose of up to 10 mg/day should refrain from taking Biotin for 24 hours before sample collection.  Patient taking a Biotin dose of >10 mg/day should consult with their physician or the laboratory before the blood draw.   MAGNESIUM - Normal    Magnesium 1.62     PHOSPHORUS - Normal    Phosphorus 4.2     LACTATE - Normal    Lactate 0.9      Narrative:     Venipuncture immediately after or during the administration of Metamizole may lead to falsely low results. Testing should be performed immediately  prior to Metamizole dosing.   INFLUENZA A AND B PCR - Normal    Flu A Result Not Detected      Flu B Result Not Detected      Narrative:     This assay is an in vitro diagnostic multiplex nucleic acid amplification test for the detection and discrimination of Influenza A & B from nasopharyngeal specimens, and has been validated for use at [facility]. Negative results do not preclude Influenza A/B infections, and should not be used as the sole basis for diagnosis, treatment, or other management decisions. If Influenza A/B and RSV PCR results are negative, testing for Parainfluenza virus, Adenovirus and Metapneumovirus is routinely performed for [facility] [place] inpatients, and is available on other patients by placing an add-on request.   SARS-COV-2 PCR, SYMPTOMATIC - Normal    Coronavirus 2019, PCR Not Detected      Narrative:     This assay has received FDA Emergency Use Authorization (EUA) and is only authorized for the duration of time that circumstances exist to justify the authorization of the emergency use of in vitro diagnostic tests for the detection of SARS-CoV-2 virus and/or diagnosis of COVID-19 infection under section 564(b)(1) of the Act, 21 U.S.C. 360bbb-3(b)(1). This assay is an in vitro diagnostic nucleic acid amplification test for the qualitative detection of SARS-CoV-2 from nasopharyngeal specimens and has been validated for use at [facility]. Negative results do not preclude COVID-19 infections and should not be used as the  sole basis for diagnosis, treatment, or other management decisions.     CALCIUM, IONIZED - Normal    POCT Calcium, Ionized 1.16     URINALYSIS WITH REFLEX MICROSCOPIC AND CULTURE - Normal    Color, Urine Yellow      Appearance, Urine Clear      Specific Gravity, Urine 1.018      pH, Urine 5.0      Protein, Urine NEGATIVE      Glucose, Urine NEGATIVE      Blood, Urine NEGATIVE      Ketones, Urine NEGATIVE      Bilirubin, Urine NEGATIVE      Urobilinogen, Urine <2.0      Nitrite, Urine NEGATIVE      Leukocyte Esterase, Urine NEGATIVE     TROPONIN I, HIGH SENSITIVITY - Normal    Troponin I, High Sensitivity 7      Narrative:     Less than 99th percentile of normal range cutoff-  Female and children under 18 years old <14 ng/L; Male <21 ng/L: Negative  Repeat testing should be performed if clinically indicated.     Female and children under 18 years old 14-50 ng/L; Male 21-50 ng/L:  Consistent with possible cardiac damage and possible increased clinical   risk. Serial measurements may help to assess extent of myocardial damage.     >50 ng/L: Consistent with cardiac damage, increased clinical risk and  myocardial infarction. Serial measurements may help assess extent of   myocardial damage.      NOTE: Children less than 1 year old may have higher baseline troponin   levels and results should be interpreted in conjunction with the overall   clinical context.     NOTE: Troponin I testing is performed using a different   testing methodology at [facility] than at other   [facility]. Direct result comparisons should only   be made within the same method.   CSF CULTURE/SMEAR   CBC WITH AUTO DIFFERENTIAL    WBC 11.2      nRBC 0.0      RBC 4.84      Hemoglobin 14.0      Hematocrit 42.2      MCV 87      MCH 28.9      MCHC 33.2      RDW 13.3      Platelets 390      Neutrophils % 60.3      Immature Granulocytes %, Automated 0.7      Lymphocytes % 24.1      Monocytes % 8.4      Eosinophils % 5.8      Basophils %  0.7      Neutrophils Absolute 6.72      Immature Granulocytes Absolute, Automated 0.08      Lymphocytes Absolute 2.69      Monocytes Absolute 0.94      Eosinophils Absolute 0.65      Basophils Absolute 0.08     URINALYSIS WITH REFLEX MICROSCOPIC AND CULTURE    Narrative:     The following orders were created for panel order Urinalysis with Reflex Microscopic and Culture.  Procedure                               Abnormality         Status                     ---------                               -----------         ------                     Urinalysis with Reflex M...[[id number]]  Normal              Final result               Extra Urine Gray Tube[[id number]]                                                         Please view results for these tests on the individual orders.   EXTRA URINE GRAY TUBE   GLUCOSE, CSF   PROTEIN, TOTAL CSF   CSF CELL COUNT WITH DIFFERENTIAL   CSF CELL COUNT WITH DIFFERENTIAL     MR brain w and wo IV contrast   Final Result   Brain:   No acute intracranial abnormality; no acute infarct intracranial   hemorrhage or extra-axial collection. No suspicious intracranial   mass, parenchymal or leptomeningeal enhancement. Chronic   microvascular ischemia involutional changes.        Cervical spine:   No acute fracture or traumatic malalignment.   Multilevel degenerative changes with up to severe canal stenosis and   moderate neural foraminal narrowing most prominent at C5-C6. No cord   signal abnormality, suspicious parenchymal or leptomeningeal   enhancement.        Thoracic spine:   No acute fracture or traumatic malalignment.   Degenerative changes without high-grade canal stenosis or neural   foraminal narrowing. No cord signal abnormality, suspicious   parenchymal or leptomeningeal enhancement.        Signed by: Terry Dueñas 11/29/2023 5:16 PM   Dictation workstation:   [id number]      MR thoracic spine w and wo IV contrast   Final Result   Brain:   No acute intracranial abnormality;  no acute infarct intracranial   hemorrhage or extra-axial collection. No suspicious intracranial   mass, parenchymal or leptomeningeal enhancement. Chronic   microvascular ischemia involutional changes.        Cervical spine:   No acute fracture or traumatic malalignment.   Multilevel degenerative changes with up to severe canal stenosis and   moderate neural foraminal narrowing most prominent at C5-C6. No cord   signal abnormality, suspicious parenchymal or leptomeningeal   enhancement.        Thoracic spine:   No acute fracture or traumatic malalignment.   Degenerative changes without high-grade canal stenosis or neural   foraminal narrowing. No cord signal abnormality, suspicious   parenchymal or leptomeningeal enhancement.        Signed by: Terry Dueñas 11/29/2023 5:16 PM   Dictation workstation:   [id number]      MR cervical spine w and wo IV contrast   Final Result   Brain:   No acute intracranial abnormality; no acute infarct intracranial   hemorrhage or extra-axial collection. No suspicious intracranial   mass, parenchymal or leptomeningeal enhancement. Chronic   microvascular ischemia involutional changes.        Cervical spine:   No acute fracture or traumatic malalignment.   Multilevel degenerative changes with up to severe canal stenosis and   moderate neural foraminal narrowing most prominent at C5-C6. No cord   signal abnormality, suspicious parenchymal or leptomeningeal   enhancement.        Thoracic spine:   No acute fracture or traumatic malalignment.   Degenerative changes without high-grade canal stenosis or neural   foraminal narrowing. No cord signal abnormality, suspicious   parenchymal or leptomeningeal enhancement.        Signed by: Terry Dueñas 11/29/2023 5:16 PM   Dictation workstation:   [id number]      MR lumbar spine wo IV contrast   Final Result   Multilevel spondylotic changes of the lumbar spine, most advanced at   T11-T12, L3-L4, L4-L5, and L5-S1, due to combination of  disc disease,   facet arthropathy, epidural lipomatosis. Please correlate the   radicular distribution of patient's symptoms based on the above   described locations of nerve root involvement. Notably, there is   compression of the left T11 nerve root, and potential for compression   of the intraforaminal bilateral L3 nerve roots, and bilateral L4   nerve roots (left > right).        Mild bone marrow edema-like signal at T11-T12 and periarticular bone   marrow edema at the right L3-L4 facet, which can contribute to   localized low back pain.             MACRO:   None.        Signed by: Maged Cortes 11/29/2023 2:50 AM   Dictation workstation:   [id number]      CT lumbar spine wo IV contrast   Final Result   No acute fracture or traumatic malalignment of the lumbar spine.        Multilevel lumbar spondylosis without high-grade central canal   stenosis. This is most pronounced at L3-L4, L4-L5 and L5-S1. Please   correlate these abnormalities, detailed above, to patient's   symptomatology.        MACRO:   None.        Signed by: Maged Cortes 11/29/2023 12:37 AM   Dictation workstation:   [id number]      CT head wo IV contrast   Final Result   1. No acute intracranial abnormality.        2. Chronic lacunar infarct involving the head of the right caudate   nucleus is adjacent periventricular white matter.        3. Mild generalized mucosal thickening of the maxillary and ethmoid   sinuses without evidence of acute sinusitis.        MACRO:   None.        Signed by: Maged Cortes 11/29/2023 12:29 AM   Dictation workstation:   [id number]

## 2023-11-29 NOTE — CONSULTS
Inpatient consult to Neurology  Consult performed by: Luly Mohan, ANTONIO-CNP  Consult ordered by: Juliette Crisostomo MD          History Of Present Illness  Philip Barfield is a 58 y.o. male whom neurology is consulted for difficulty ambulating, weakness and parasthesias.     Per H&P, PMH of coronary artery disease status post CABG x 3, multiple PCI's, paroxysmal atrial fibrillation, diabetes, hyperlipidemia, hypertension, and ARISTEO who presents to the emergency department for acute onset of bilateral leg weakness and numbness.  He states that his legs have been grossly getting worse over the past 2 months but today he noticed that he could not feel his legs and was having more issues with walking.  He states he has numbness all over his body.  He is complaining of pain in his right lower back.  Intermittently he will get burning into his legs bilaterally.  No issues with urination or bowel movements.  Denies saddle anesthesia.     On arrival, BP was 114/75 and he was afebrile.  LP was attempted in ED unsuccessfully.   HCT without acute findings, shows chronic lacunar infarct to right caudate head.   TSH very low.  CT L-spine with multilevel spondylosis without high-grade central canal stenosis, no acute fractures.   MRI L-spine with compression of left T11 nerve root and possible nerve root compression B L3 and B L4. Also concern for mild bone marrow edema at T11-T12 and periarticular bone marrow edema at R L3-L4 facet.     Pt states he has been doing PT for his neck since August. [facility] he had a significant fall where he was on a video call with a friend in [place] and he felt dizzy, next thing he remembers was waking up on his back on the floor looking at the ceiling. [facility] he has had progressing symptoms since then and that was in October. [facility] most recently fell again last week and had significantly worsened weakness and symptoms since then with difficulty walking as well.     Pt seen after finished with MRI  studies of Brain, C-Spine and T-spine. Pt able to ambulate with spastic gait. Pt c/o decreased sensation to B hands and burning parasthesias to B feet. Examination is myelopathic. MRI images reviewed briefly by myself and Dr. Lugo. Significant disc protrusion onto spinal cord in c-spine noted.     Past Medical History  Past Medical History:   Diagnosis Date    Old myocardial infarction     History of myocardial infarction    Personal history of other diseases of the circulatory system     History of hypertension    Personal history of other diseases of the musculoskeletal system and connective tissue     History of gout    Personal history of other diseases of the nervous system and sense organs     History of sleep apnea    Personal history of other endocrine, nutritional and metabolic disease     History of diabetes mellitus    Personal history of other endocrine, nutritional and metabolic disease     History of hyperlipidemia     Surgical History  Past Surgical History:   Procedure Laterality Date    OTHER SURGICAL HISTORY  12/11/2018    Tonsillectomy    OTHER SURGICAL HISTORY  12/11/2018    Knee arthroscopy    OTHER SURGICAL HISTORY  12/11/2018    Cardiac catheterization    OTHER SURGICAL HISTORY  12/11/2018    Coronary artery bypass graft    OTHER SURGICAL HISTORY  12/11/2018    Nasal septoplasty    OTHER SURGICAL HISTORY  12/11/2018    Arterial stent placement       Allergies  Penicillins, Tetanus toxoid, Hydrocodone-acetaminophen, Niacin, and Sulfa (sulfonamide antibiotics)  (Not in a hospital admission)      Review of Systems   Musculoskeletal:  Positive for back pain and gait problem.   Neurological:  Positive for weakness and numbness.   All other systems reviewed and are negative.    Neurological Exam  Mental Status  Awake, alert and oriented to person, place and time. Speech is normal. Language is fluent with no aphasia.    Cranial Nerves  CN II: Visual acuity is normal. Visual fields full to  confrontation.  CN III, IV, VI: Extraocular movements intact bilaterally. Normal lids and orbits bilaterally. Pupils equal round and reactive to light bilaterally.  CN V: Facial sensation is normal.  CN VII: Full and symmetric facial movement.  CN VIII: Hearing is normal.  CN IX, X: Palate elevates symmetrically. Normal gag reflex.  CN XI: Shoulder shrug strength is normal.  CN XII: Tongue midline without atrophy or fasciculations.    Motor  Normal muscle bulk throughout. No fasciculations present. Normal muscle tone. No abnormal involuntary movements.  Strength 5/5 to BUE and 5-/5 to BLE.    Sensory  Light touch is normal in upper and lower extremities.     Reflexes                                            Right                      Left  Brachioradialis                    4+                         4+  Biceps                                 4+                         4+  Triceps                                4+                         4+  Patellar                                4+                         4+  Achilles                                3+                         3+    Right pathological reflexes: Humera's present. Ankle clonus present. Non sustained.  Left pathological reflexes: Humera's present. Ankle clonus present. Non sustained.    Coordination  Right: Finger-to-nose normal.Left: Finger-to-nose normal.    Gait  Casual gait: Wide stance. Reduced stride length. Spastic gait.    Physical Exam  Eyes:      General: Lids are normal.      Extraocular Movements: Extraocular movements intact.      Pupils: Pupils are equal, round, and reactive to light.   Neurological:      Deep Tendon Reflexes:      Reflex Scores:       Tricep reflexes are 4+ on the right side and 4+ on the left side.       Bicep reflexes are 4+ on the right side and 4+ on the left side.       Brachioradialis reflexes are 4+ on the right side and 4+ on the left side.       Patellar reflexes are 4+ on the right side and 4+ on the left  "side.       Achilles reflexes are 3+ on the right side and 3+ on the left side.  Psychiatric:         Speech: Speech normal.         Last Recorded Vitals  Blood pressure 106/63, pulse 55, temperature 36.9 °C (98.4 °F), temperature source Tympanic, resp. rate 16, height 1.626 m (5' 4\"), weight 99.8 kg (220 lb), SpO2 94 %.    Relevant Results    Scheduled medications    Continuous medications    PRN medications    Results for orders placed or performed during the hospital encounter of 11/28/23 (from the past 24 hour(s))   CBC and Auto Differential   Result Value Ref Range    WBC 11.2 4.4 - 11.3 x10*3/uL    nRBC 0.0 0.0 - 0.0 /100 WBCs    RBC 4.84 4.50 - 5.90 x10*6/uL    Hemoglobin 14.0 13.5 - 17.5 g/dL    Hematocrit 42.2 41.0 - 52.0 %    MCV 87 80 - 100 fL    MCH 28.9 26.0 - 34.0 pg    MCHC 33.2 32.0 - 36.0 g/dL    RDW 13.3 11.5 - 14.5 %    Platelets 390 150 - 450 x10*3/uL    Neutrophils % 60.3 40.0 - 80.0 %    Immature Granulocytes %, Automated 0.7 0.0 - 0.9 %    Lymphocytes % 24.1 13.0 - 44.0 %    Monocytes % 8.4 2.0 - 10.0 %    Eosinophils % 5.8 0.0 - 6.0 %    Basophils % 0.7 0.0 - 2.0 %    Neutrophils Absolute 6.72 1.20 - 7.70 x10*3/uL    Immature Granulocytes Absolute, Automated 0.08 0.00 - 0.70 x10*3/uL    Lymphocytes Absolute 2.69 1.20 - 4.80 x10*3/uL    Monocytes Absolute 0.94 0.10 - 1.00 x10*3/uL    Eosinophils Absolute 0.65 0.00 - 0.70 x10*3/uL    Basophils Absolute 0.08 0.00 - 0.10 x10*3/uL   Comprehensive metabolic panel   Result Value Ref Range    Glucose 110 (H) 74 - 99 mg/dL    Sodium 138 136 - 145 mmol/L    Potassium 4.3 3.5 - 5.3 mmol/L    Chloride 103 98 - 107 mmol/L    Bicarbonate 27 21 - 32 mmol/L    Anion Gap 12 10 - 20 mmol/L    Urea Nitrogen 14 6 - 23 mg/dL    Creatinine 0.89 0.50 - 1.30 mg/dL    eGFR >90 >60 mL/min/1.73m*2    Calcium 9.3 8.6 - 10.3 mg/dL    Albumin 4.2 3.4 - 5.0 g/dL    Alkaline Phosphatase 107 33 - 120 U/L    Total Protein 7.1 6.4 - 8.2 g/dL    AST 21 9 - 39 U/L    Bilirubin, " Total 0.4 0.0 - 1.2 mg/dL    ALT 17 10 - 52 U/L   Magnesium   Result Value Ref Range    Magnesium 1.62 1.60 - 2.40 mg/dL   Phosphorus   Result Value Ref Range    Phosphorus 4.2 2.5 - 4.9 mg/dL   Lactate   Result Value Ref Range    Lactate 0.9 0.4 - 2.0 mmol/L   Influenza A, and B PCR   Result Value Ref Range    Flu A Result Not Detected Not Detected    Flu B Result Not Detected Not Detected   SARS-CoV-2 RT PCR   Result Value Ref Range    Coronavirus 2019, PCR Not Detected Not Detected   Troponin I, High Sensitivity   Result Value Ref Range    Troponin I, High Sensitivity 7 0 - 20 ng/L   TSH with reflex to Free T4 if abnormal   Result Value Ref Range    Thyroid Stimulating Hormone <0.01 (L) 0.44 - 3.98 mIU/L   Thyroxine, Free   Result Value Ref Range    Thyroxine, Free 1.63 (H) 0.61 - 1.12 ng/dL   Urinalysis with Reflex Microscopic and Culture   Result Value Ref Range    Color, Urine Yellow Straw, Yellow    Appearance, Urine Clear Clear    Specific Gravity, Urine 1.018 1.005 - 1.035    pH, Urine 5.0 5.0, 5.5, 6.0, 6.5, 7.0, 7.5, 8.0    Protein, Urine NEGATIVE NEGATIVE mg/dL    Glucose, Urine NEGATIVE NEGATIVE mg/dL    Blood, Urine NEGATIVE NEGATIVE    Ketones, Urine NEGATIVE NEGATIVE mg/dL    Bilirubin, Urine NEGATIVE NEGATIVE    Urobilinogen, Urine <2.0 <2.0 mg/dL    Nitrite, Urine NEGATIVE NEGATIVE    Leukocyte Esterase, Urine NEGATIVE NEGATIVE   Calcium, Ionized   Result Value Ref Range    POCT Calcium, Ionized 1.16 1.1 - 1.33 mmol/L                         [place] Coma Scale  Best Eye Response: Spontaneous  Best Verbal Response: Oriented  Best Motor Response: Follows commands  [place] Coma Scale Score: 15                 I have personally reviewed the following imaging results MR lumbar spine wo IV contrast    Result Date: 11/29/2023  Interpreted By:  Maged Cortes, STUDY: MR LUMBAR SPINE WO IV CONTRAST;  11/29/2023 2:25 am   INDICATION: Signs/Symptoms:weakness and numbness in both legs.   COMPARISON: CT  lumbar spine 11/28/2023.   ACCESSION NUMBER(S): [id number]   ORDERING CLINICIAN: ALOK KERR   TECHNIQUE: Multiplanar, multisequence images of the lumbar spine were obtained without contrast.   FINDINGS: VISUALIZED ABDOMEN: Mild aortic atherosclerosis   PARASPINAL SOFT TISSUES: No significant abnormality.   BONE MARROW/VERTEBRAL BODIES: There is mild Modic type 1 endplate signal changes at the right upper subendplate region T12 vertebral body. There is mild bone marrow edema-like signal within the right superior articulating process of L4, likely degenerative nature. Otherwise, the overall bone marrow signal is within normal limits. Vertebral body heights are maintained. No acute fracture.   ALIGNMENT: No spondylolisthesis.   SPINAL CORD/CONUS: The conus medullaris terminates at L1-L2.   SPINAL CANAL, INTERVERTEBRAL DISCS, AND NEURAL FORAMINA:   T11-T12: There is severe left and moderate right T11-T12 neural foraminal stenosis due to facet arthropathy, likely compressing the intraforaminal left T11 nerve root. There is no significant disc bulge or significant canal stenosis.   T12-L1: Mild disc height loss and a Schmorl's node. No significant disc bulge or canal stenosis. However, there is mild right neural foraminal stenosis due to facet arthropathy. There is mild dorsal epidural lipomatosis causing mild dorsal effacement of the thecal sac.   L1-L2: There is minimal, relative right foraminal stenosis compared to the left. There is no significant disc bulge or canal stenosis. There is mild degenerative facet hypertrophy. There is mild dorsal epidural lipomatosis causing mild effacement of the dorsal thecal sac.   L2-L3: There is mild degenerative facet hypertrophy. There is no significant disc bulge or significant canal stenosis. There is no significant neural foraminal stenosis.   L3-L4: There is a moderate diffuse disc bulge and dorsal epidural lipomatosis contributing to mild canal stenosis in  conjunction with mild ligamentum flavum thickening and moderate degenerative facet hypertrophy. There is no compression of descending cauda equina nerve roots; however, there is mild-moderate right foraminal stenosis and moderate left foraminal stenosis with slight abutment of the intraforaminal bilateral L3 nerve roots (left > right) by hypertrophic facets. There is periarticular bone marrow edema involving the right L3-L4 facet.   L4-L5: There is moderate-severe degenerative facet hypertrophy. There are bilateral intraforaminal disc protrusions (left > right). There is mild compression of the intraforaminal left L4 nerve root between disc spur complex and facet osteophyte (sagittal image 8/28, series 301). There is mild right foraminal stenosis with slight abutment of the intraforaminal right L4 nerve root by hypertrophic facet. Due to combination of disc disease, epidural lipomatosis, and moderate degenerative hypertrophy there is an overall mild circumferential effacement of the thecal sac.   L5-S1: Moderate degenerative facet hypertrophy. Small broad-based disc protrusion and annular fissure. There is mild left foraminal stenosis without significant canal stenosis or right foraminal stenosis.       Multilevel spondylotic changes of the lumbar spine, most advanced at T11-T12, L3-L4, L4-L5, and L5-S1, due to combination of disc disease, facet arthropathy, epidural lipomatosis. Please correlate the radicular distribution of patient's symptoms based on the above described locations of nerve root involvement. Notably, there is compression of the left T11 nerve root, and potential for compression of the intraforaminal bilateral L3 nerve roots, and bilateral L4 nerve roots (left > right).   Mild bone marrow edema-like signal at T11-T12 and periarticular bone marrow edema at the right L3-L4 facet, which can contribute to localized low back pain.     MACRO: None.   Signed by: Maged Cortes 11/29/2023 2:50 AM  Dictation workstation:   [id number]    CT lumbar spine wo IV contrast    Result Date: 11/29/2023  Interpreted By:  Maged Cortes, STUDY: CT LUMBAR SPINE WO IV CONTRAST;  11/29/2023 12:05 am   INDICATION: Signs/Symptoms:back pain.   COMPARISON: None.   ACCESSION NUMBER(S): [id number]   ORDERING CLINICIAN: ALOK KERR   TECHNIQUE: Axial noncontrast images of the lumbar spine with coronal and sagittal reconstructed images.   FINDINGS:     ALIGNMENT:  No traumatic spondylolisthesis or traumatic facet widening.   VERTEBRAE: Osteopenic bone. No acute fracture. Vertebral body heights are maintained.   SPINAL CANAL/INTERVERTEBRAL DISCS/NEURAL FORAMINA:   T11-T12: There is severe left and mild right osteophytic neural foraminal stenosis due to facet arthropathy and disc spur complex, likely sufficient to compress the intraforaminal left T11 nerve root (axial image 5/114, series 5; sagittal image 45/90, series 7). No significant canal stenosis.   T12-L1: Mild right neural foraminal stenosis. No significant canal stenosis.   L1-L2: No significant central canal stenosis or disc bulge. Mild right neural foraminal stenosis.   L2-L3: Minimal diffuse disc bulge. No canal stenosis or significant foraminal stenosis. Mild degenerative facet hypertrophy.   L3-L4: Mild is mild degenerative facet hypertrophy. Moderate diffuse disc bulge results in mild canal stenosis and contributes to mild bilateral foraminal stenosis (right > left). There is a possible inferior disc extrusion component that is difficult to characterize.   L4-L5: Moderate degenerative facet hypertrophy. 0.4 cm right subligamentous synovial cyst. Minimal diffuse disc bulge with asymmetric large left intraforaminal disc spur complex component that contributes to moderate foraminal stenosis and probable compression/mass effect on the distal intraforaminal/proximal extraforaminal left L2 nerve root. There is mild right neural foraminal stenosis.   L5-S1:  There is a diffuse disc bulge with asymmetric left paracentral disc protrusion/extrusion that may abut the descending left S1 nerve root. In conjunction with moderate hypertrophic facet arthropathy, there is mild right and mild-moderate left foraminal stenosis.   PARASPINAL SOFT TISSUES: No significant abnormality.   VISUALIZED ABDOMEN: Cholelithiasis. Mild aortic atherosclerosis without AAA.       No acute fracture or traumatic malalignment of the lumbar spine.   Multilevel lumbar spondylosis without high-grade central canal stenosis. This is most pronounced at L3-L4, L4-L5 and L5-S1. Please correlate these abnormalities, detailed above, to patient's symptomatology.   MACRO: None.   Signed by: Maged Cortes 11/29/2023 12:37 AM Dictation workstation:   [id number]    CT head wo IV contrast    Result Date: 11/29/2023  Interpreted By:  Maged Cortes, STUDY: CT HEAD WO IV CONTRAST;  11/29/2023 12:05 am   INDICATION: Signs/Symptoms:headache.   COMPARISON: None.   ACCESSION NUMBER(S): [id number]   ORDERING CLINICIAN: ALOK KERR   TECHNIQUE: Noncontrast axial CT images of head were obtained with coronal and sagittal reconstructed images.   FINDINGS: BRAIN PARENCHYMA: There is a chronic lacunar infarct involving the head of right caudate nucleus and adjacent periventricular white matter along the frontal horn of the right lateral ventricle. No acute intraparenchymal hemorrhage or parenchymal evidence of acute large territory ischemic infarct. No mass-effect. Gray-white matter distinction is preserved.   VENTRICLES and EXTRA-AXIAL SPACES:  No acute extra-axial or intraventricular hemorrhage. No effacement of cerebral sulci. Ventricles and sulci are age-concordant.   PARANASAL SINUSES/MASTOIDS: There is mild generalized mucosal thickening of the maxillary and ethmoid sinuses. The frontal sinuses are hypoplastic. There is minimal partial opacification of the bilateral mastoid air cells.   CALVARIUM/ORBITS:   No skull fracture.  The orbits and globes are intact to the extent visualized.   EXTRACRANIAL SOFT TISSUES: No discernible abnormality.       1. No acute intracranial abnormality.   2. Chronic lacunar infarct involving the head of the right caudate nucleus is adjacent periventricular white matter.   3. Mild generalized mucosal thickening of the maxillary and ethmoid sinuses without evidence of acute sinusitis.   MACRO: None.   Signed by: Maged Cortes 11/29/2023 12:29 AM Dictation workstation:   [id number]       Assessment/Plan   Active Problems:  There are no active Hospital Problems.      IMPRESSION:  58 year old male with history of CAD s/p CABG, a fib, DM, HLD, HTN and ARISTEO presenting for complaints of difficulty walking, weakness and numbness to BLE.  LP was attempted and unsuccessful in ED.   HCT without acute findings, shows chronic lacunar infarct to right caudate head.   TSH very low.  CT L-spine with multilevel spondylosis without high-grade central canal stenosis, no acute fractures.   MRI L-spine with compression of left T11 nerve root and possible nerve root compression B L3 and B L4. Also concern for mild bone marrow edema at T11-T12 and periarticular bone marrow edema at R L3-L4 facet.     Examination as above, consistent with myelopathy. + hyperreflexia and B hoffmans sign.     RECOMMENDATIONS:  MRI brain, C-spine and T-spine wwo contrast completed and official reports pending, reviewed images briefly in MRI department and concerning for c-spine disc protrusion on spinal cord anteriorly.  Defer LP.  Will need PT/OT evaluations  Pt needs evaluation by neuro surgery which is not available here. Discussed with ED physician that transfer center will need notified and pt will likely need transferred to [facility] for [place]surgery evaluation after reviewing his images. Pt updated on this as well.     Pt seen and case managed with Dr. Lugo.   Discussed with patient. All questions answered.   Discussed  with ED physician.        I spent >75 minutes in the professional and overall care of this patient including examination, patient/family discussion, result and image review, and treatment planning.      TERRY Miller    I agree with the above.  I saw and examined the patient with the NP and was present for the entirety of the clinical encounter.  The note above has been edited as appropriate.  The plan of care was mostly mine with input from the NP.    The patient is a 50-year-old male who has had progressive lower extremity weakness and difficulties with gait over the last 2 months.  He states that he has had multiple falls.  He also has some numbness in his hands bilaterally.  He is currently being treated for neck pain.  The patient was admitted through the ER and had a CT scan of the brain done that was negative for any acute process.    The patient's neurological examination is abnormal and shows bilateral Humera signs, increased reflexes in the lower extremities greater than upper extremities.  The patient has 4+ reflexes at the knees and 4+ reflexes at the ankles with bilateral nonsustained clonus at the ankles.  The patient's gait is unsteady and mildly spastic.    I did review the images of the MRI of the cervical and thoracic spines.    Impression: The patient is a 58-year-old male with progressive lower extremity weakness and difficulties with ambulation.  The MRI of the cervical spine shows severe cervical stenosis.    Plan: The patient needs to be transferred for a cervical decompression to [facility].  The patient is clearly at an increased risk for further neurological worsening in terms of upper and lower extremity weakness and difficulties with ambulation.  The patient should continue all of his medicines and stroke risk factor modification.  I discussed all these issues in detail with the patient and answered all of his questions.  I will sign off for now.  The patient can follow-up with the  neuro NP within 1 month of discharge from [facility].

## 2023-11-29 NOTE — PROGRESS NOTES
Emergency Medicine Transition of Care Note.    I received Philip Barfield in signout from Dr. Crisostomo.  Please see the previous ED provider note for all HPI, PE and MDM up to the time of signout at 0700. This is in addition to the primary record.    In brief Philip Barfield is an 58 y.o. male presenting for   Chief Complaint   Patient presents with    Numbness    Weakness, Gen     Patient states that he has numbness in all extremities with shooting pain to the lower extremities.     At the time of signout we were awaiting: MRI results.    ED Course as of 11/29/23 1541   Wed Nov 29, 2023   0037 IMPRESSION:  1. No acute intracranial abnormality.      2. Chronic lacunar infarct involving the head of the right caudate  nucleus is adjacent periventricular white matter.      3. Mild generalized mucosal thickening of the maxillary and ethmoid  sinuses without evidence of acute sinusitis.       [CF]   0040 Multilevel lumbar spondylosis without high-grade central canal  stenosis. This is most pronounced at L3-L4, L4-L5 and L5-S1. Please  correlate these abnormalities, detailed above, to patient's  symptomatology.   [CF]      ED Course User Index  [CF] Fang Jane MD         Diagnoses as of 11/29/23 1541   Weakness of both lower extremities       Medical Decision Making    Patient just went to MRI.  MRI results are pending.  Patient be signed out to the oncoming provider and disposition will be pending results.  Final diagnoses:   None           Procedure  Procedures    Celeste Garcia MD

## 2023-11-29 NOTE — Clinical Note
Angioplasty of the middle right coronary artery lesion. Inflation 1: Pressure = 20 bud; Duration = 20 sec. Inflation 2: Pressure = 20 bud; Duration = 20 sec.

## 2023-11-29 NOTE — Clinical Note
Angioplasty of the ostium right coronary artery lesion. Inflation 1: Pressure = 20 bud; Duration = 15 sec.

## 2023-11-30 ENCOUNTER — ANESTHESIA EVENT (OUTPATIENT)
Dept: OPERATING ROOM | Facility: HOSPITAL | Age: 58
End: 2023-11-30

## 2023-11-30 ENCOUNTER — ANESTHESIA (OUTPATIENT)
Dept: OPERATING ROOM | Facility: HOSPITAL | Age: 58
End: 2023-11-30

## 2023-11-30 LAB
ABO GROUP (TYPE) IN BLOOD: NORMAL
ANTIBODY SCREEN: NORMAL
APTT PPP: 38 SECONDS (ref 27–38)
APTT PPP: 41 SECONDS (ref 27–38)
INR PPP: 1.2 (ref 0.9–1.1)
INR PPP: 1.4 (ref 0.9–1.1)
PROTHROMBIN TIME: 13.3 SECONDS (ref 9.8–12.8)
PROTHROMBIN TIME: 16.2 SECONDS (ref 9.8–12.8)
RH FACTOR (ANTIGEN D): NORMAL

## 2023-11-30 PROCEDURE — 2500000004 HC RX 250 GENERAL PHARMACY W/ HCPCS (ALT 636 FOR OP/ED)

## 2023-11-30 PROCEDURE — 2500000001 HC RX 250 WO HCPCS SELF ADMINISTERED DRUGS (ALT 637 FOR MEDICARE OP)

## 2023-11-30 PROCEDURE — 86901 BLOOD TYPING SEROLOGIC RH(D): CPT | Performed by: ORTHOPAEDIC SURGERY

## 2023-11-30 PROCEDURE — 85610 PROTHROMBIN TIME: CPT

## 2023-11-30 PROCEDURE — 94660 CPAP INITIATION&MGMT: CPT

## 2023-11-30 PROCEDURE — 85610 PROTHROMBIN TIME: CPT | Performed by: STUDENT IN AN ORGANIZED HEALTH CARE EDUCATION/TRAINING PROGRAM

## 2023-11-30 PROCEDURE — 36415 COLL VENOUS BLD VENIPUNCTURE: CPT | Performed by: STUDENT IN AN ORGANIZED HEALTH CARE EDUCATION/TRAINING PROGRAM

## 2023-11-30 PROCEDURE — 1100000001 HC PRIVATE ROOM DAILY

## 2023-11-30 PROCEDURE — 2500000002 HC RX 250 W HCPCS SELF ADMINISTERED DRUGS (ALT 637 FOR MEDICARE OP, ALT 636 FOR OP/ED)

## 2023-11-30 PROCEDURE — 99222 1ST HOSP IP/OBS MODERATE 55: CPT | Performed by: ORTHOPAEDIC SURGERY

## 2023-11-30 PROCEDURE — 36415 COLL VENOUS BLD VENIPUNCTURE: CPT

## 2023-11-30 RX ORDER — AZELASTINE 1 MG/ML
1 SPRAY, METERED NASAL 2 TIMES DAILY
Status: DISCONTINUED | OUTPATIENT
Start: 2023-11-30 | End: 2023-12-06

## 2023-11-30 RX ORDER — PANTOPRAZOLE SODIUM 40 MG/10ML
40 INJECTION, POWDER, LYOPHILIZED, FOR SOLUTION INTRAVENOUS
Status: DISCONTINUED | OUTPATIENT
Start: 2023-12-01 | End: 2023-12-06

## 2023-11-30 RX ORDER — METOPROLOL TARTRATE 100 MG/1
100 TABLET ORAL 2 TIMES DAILY
Status: DISCONTINUED | OUTPATIENT
Start: 2023-11-30 | End: 2023-12-06

## 2023-11-30 RX ORDER — PANTOPRAZOLE SODIUM 40 MG/1
40 TABLET, DELAYED RELEASE ORAL
Status: DISCONTINUED | OUTPATIENT
Start: 2023-12-01 | End: 2023-12-06

## 2023-11-30 RX ORDER — ATORVASTATIN CALCIUM 80 MG/1
80 TABLET, FILM COATED ORAL DAILY
Status: ON HOLD | COMMUNITY
End: 2023-12-11 | Stop reason: SDUPTHER

## 2023-11-30 RX ORDER — ALBUTEROL SULFATE 90 UG/1
2 AEROSOL, METERED RESPIRATORY (INHALATION) EVERY 6 HOURS PRN
Status: DISCONTINUED | OUTPATIENT
Start: 2023-11-30 | End: 2023-12-04 | Stop reason: SDUPTHER

## 2023-11-30 RX ORDER — PANTOPRAZOLE SODIUM 40 MG/1
40 TABLET, DELAYED RELEASE ORAL
COMMUNITY

## 2023-11-30 RX ORDER — LEVOTHYROXINE SODIUM 200 UG/1
200 TABLET ORAL
Status: ON HOLD | COMMUNITY
End: 2023-12-11 | Stop reason: SDUPTHER

## 2023-11-30 RX ORDER — ATORVASTATIN CALCIUM 80 MG/1
80 TABLET, FILM COATED ORAL NIGHTLY
Status: DISCONTINUED | OUTPATIENT
Start: 2023-11-30 | End: 2023-12-06

## 2023-11-30 RX ORDER — FLUTICASONE FUROATE AND VILANTEROL 200; 25 UG/1; UG/1
1 POWDER RESPIRATORY (INHALATION) DAILY
COMMUNITY

## 2023-11-30 RX ORDER — LEVOTHYROXINE SODIUM 200 UG/1
200 TABLET ORAL EVERY MORNING
Status: DISCONTINUED | OUTPATIENT
Start: 2023-12-01 | End: 2023-12-06

## 2023-11-30 RX ORDER — LORATADINE 10 MG/1
10 TABLET ORAL DAILY
Status: DISCONTINUED | OUTPATIENT
Start: 2023-11-30 | End: 2023-12-06

## 2023-11-30 RX ORDER — GABAPENTIN 300 MG/1
300 CAPSULE ORAL ONCE
Status: CANCELLED | OUTPATIENT
Start: 2023-11-30 | End: 2023-11-30

## 2023-11-30 RX ORDER — DULOXETIN HYDROCHLORIDE 60 MG/1
60 CAPSULE, DELAYED RELEASE ORAL DAILY
Status: DISCONTINUED | OUTPATIENT
Start: 2023-11-30 | End: 2023-12-06

## 2023-11-30 RX ORDER — FLUTICASONE FUROATE AND VILANTEROL 200; 25 UG/1; UG/1
1 POWDER RESPIRATORY (INHALATION) DAILY
Status: DISCONTINUED | OUTPATIENT
Start: 2023-11-30 | End: 2023-12-06

## 2023-11-30 RX ORDER — RANOLAZINE 500 MG/1
1000 TABLET, EXTENDED RELEASE ORAL 2 TIMES DAILY
Status: DISCONTINUED | OUTPATIENT
Start: 2023-11-30 | End: 2023-12-06

## 2023-11-30 RX ADMIN — OXYCODONE HYDROCHLORIDE 10 MG: 5 TABLET ORAL at 06:27

## 2023-11-30 RX ADMIN — ACETAMINOPHEN 650 MG: 325 TABLET ORAL at 11:40

## 2023-11-30 RX ADMIN — SODIUM CHLORIDE, POTASSIUM CHLORIDE, SODIUM LACTATE AND CALCIUM CHLORIDE 125 ML/HR: 600; 310; 30; 20 INJECTION, SOLUTION INTRAVENOUS at 13:27

## 2023-11-30 RX ADMIN — LORATADINE 10 MG: 10 TABLET ORAL at 22:28

## 2023-11-30 RX ADMIN — ACETAMINOPHEN 650 MG: 325 TABLET ORAL at 18:31

## 2023-11-30 RX ADMIN — SODIUM CHLORIDE, POTASSIUM CHLORIDE, SODIUM LACTATE AND CALCIUM CHLORIDE 125 ML/HR: 600; 310; 30; 20 INJECTION, SOLUTION INTRAVENOUS at 06:28

## 2023-11-30 RX ADMIN — DULOXETINE HYDROCHLORIDE 60 MG: 60 CAPSULE, DELAYED RELEASE ORAL at 22:28

## 2023-11-30 RX ADMIN — ACETAMINOPHEN 650 MG: 325 TABLET ORAL at 06:26

## 2023-11-30 RX ADMIN — CYCLOBENZAPRINE 10 MG: 10 TABLET, FILM COATED ORAL at 22:29

## 2023-11-30 RX ADMIN — OXYCODONE HYDROCHLORIDE 10 MG: 5 TABLET ORAL at 14:35

## 2023-11-30 RX ADMIN — ATORVASTATIN CALCIUM 80 MG: 80 TABLET, FILM COATED ORAL at 22:29

## 2023-11-30 RX ADMIN — ACETAMINOPHEN 650 MG: 325 TABLET ORAL at 00:08

## 2023-11-30 RX ADMIN — SENNOSIDES AND DOCUSATE SODIUM 2 TABLET: 8.6; 5 TABLET ORAL at 20:00

## 2023-11-30 RX ADMIN — OXYCODONE HYDROCHLORIDE 10 MG: 5 TABLET ORAL at 22:30

## 2023-11-30 RX ADMIN — RANOLAZINE 1000 MG: 500 TABLET, EXTENDED RELEASE ORAL at 22:29

## 2023-11-30 RX ADMIN — OXYCODONE HYDROCHLORIDE 10 MG: 5 TABLET ORAL at 18:31

## 2023-11-30 RX ADMIN — METOPROLOL TARTRATE 100 MG: 50 TABLET, FILM COATED ORAL at 22:29

## 2023-11-30 RX ADMIN — OXYCODONE HYDROCHLORIDE 10 MG: 5 TABLET ORAL at 00:08

## 2023-11-30 RX ADMIN — OXYCODONE HYDROCHLORIDE 10 MG: 5 TABLET ORAL at 10:34

## 2023-11-30 ASSESSMENT — PAIN SCALES - GENERAL
PAINLEVEL_OUTOF10: 5 - MODERATE PAIN
PAINLEVEL_OUTOF10: 7
PAINLEVEL_OUTOF10: 6
PAINLEVEL_OUTOF10: 3
PAINLEVEL_OUTOF10: 7
PAINLEVEL_OUTOF10: 7
PAINLEVEL_OUTOF10: 8
PAINLEVEL_OUTOF10: 5 - MODERATE PAIN

## 2023-11-30 ASSESSMENT — PAIN DESCRIPTION - LOCATION
LOCATION: BACK

## 2023-11-30 ASSESSMENT — PAIN - FUNCTIONAL ASSESSMENT
PAIN_FUNCTIONAL_ASSESSMENT: 0-10

## 2023-11-30 ASSESSMENT — ACTIVITIES OF DAILY LIVING (ADL)
LACK_OF_TRANSPORTATION: NO
LACK_OF_TRANSPORTATION: NO

## 2023-11-30 ASSESSMENT — PAIN DESCRIPTION - ORIENTATION: ORIENTATION: RIGHT

## 2023-11-30 ASSESSMENT — PAIN SCALES - WONG BAKER: WONGBAKER_NUMERICALRESPONSE: HURTS WHOLE LOT

## 2023-11-30 NOTE — H&P
H&P reviewed. The patient was examined and there are no changes to the H&P. Patient electing to proceed with surgery. Patient marked and consented.      Clint Ross MD  PGY-2 Orthopedic Surgery  [facility]  [employer] Message Preferred  Pager: [phone]

## 2023-11-30 NOTE — DISCHARGE INSTRUCTIONS
**Please call Leda Ackerman RN (at [phone]) for any          postoperative questions or concerns.     Home care and physical and occupational therapy has been ordered    Keep neck incision clean and dry;  wear soft collar except when changing and bathing chin/around incision    Take medication as instructed.  Note that new medications  Ticagrelor and aspirin will only be taken through 1/3;   then will restart Clopidogrel.

## 2023-11-30 NOTE — PROGRESS NOTES
I met with Philip at the bedside regarding discharge planning and home going needs. Patient lives at home with his son and he is usually independent with ADL's he does have a walker in the home. Patient is not medically cleared for discharge pending surgery. I will continue to follow with a safe discharge plan.

## 2023-11-30 NOTE — CARE PLAN
The patient's goals for the shift include patient will have managed pain during shift    The clinical goals for the shift include patient will remain free from falls and injuries

## 2023-11-30 NOTE — H&P
Orthopaedic Surgery Consult H&P    HPI:   Orthopaedic Problems/Injuries: cervical cord compression w myelopathy  Other Injuries: None    58M (T2DM, multiple MIs s/p stents most recently Jan 2023 on plavix last dose 11/28, Afib on eliquis last dose 11/28, COPD (nonsmoker), asthma, TIAs, vestibular neuritis, vertigo, BMI 38 s/p recent 120 lbs weight loss) direct admit from [place] pw bilateral UE/LE weakness, numbness, and paresthesias starting in September after he sustained a syncopal fall striking his neck. Multiple nonsyncopal falls since then. Baseline ambulatory without assistive devices, currently minimally ambulatory. Denies B/B incontinence or saddle anesthesia.     PMH: per above/EMR  PSH: per above/EMR  SocHx:      -  Denies tobacco use, CPOD 2/2 secondhand smoke and work inhalation      -  Denies EtOH use      -  Denies other drug use  FamHx:  Non-contributory to this patient's acute orthopaedic problem.   Allergies: Reviewed in EMR  Meds: Reviewed in EMR    ROS      - 14 point ROS negative except as above    Physical Exam:  Gen: AOx3, NAD  HEENT: normocephalic atraumatic  Psych: appropriate mood and affect  Resp: nonlabored breathing  Cardiac: Extremities WWP, RRR to peripheral palpation  Neuro: CN 2-12 grossly intact  Skin: no rashes    Spine Exam:    C5: SILT   Deltoid 5/5 Left; 5/5 Right  C6: SILT   Wrist Ext: 5/5 Left; 5/5 Right  C7: SILT   Triceps: 5/5 Left; 5/5 Right  C8: SILT   Finger flexion: 4+/5 Left; 4+/5 Right  T1: SILT    Interossei: 4+/5 Left; 4+/5 Right    Bicep Reflex 3+   Bilaterally  Thomas: Positive bilaterally    L1: SILT       L2: SILT      Hip flexors 5/5 Left; 5/5 Right  L3: SILT      Knee extension 5/5 Left; 5/5 Right  L4: SILT      Tib Ant. (Dorsiflexion) 5/5 Left; 5/5 Right  L5: SILT      EHL 5/5 Left; 5/5 Right  S1: SILT      Plantarflexion 5/5 Left; 5/5 Right    Patellar reflex: 3+   Bilaterally    Babinkski: Intact  2-3 beats clonus  Diminished rectal tone  eh-anal  sensation intact    A full secondary exam was performed and all relevant findings discussed and noted above.    Results for orders placed or performed during the hospital encounter of 11/28/23 (from the past 24 hour(s))   Calcium, Ionized   Result Value Ref Range    POCT Calcium, Ionized 1.16 1.1 - 1.33 mmol/L       XR chest 1 view             Assessment:  Orthopaedic Problems/Injuries: cervical cord compression w myelopathy  Other Injuries: None    58M (T2DM, multiple MIs s/p stents most recently Jan 2023 on plavix last dose 11/28, Afib on eliquis last dose 11/28, COPD (nonsmoker), asthma, TIAs, vestibular neuritis, vertigo, BMI 38 s/p recent 120 lbs weight loss) direct admit from [place] pw bilateral UE/LE weakness, numbness, and paresthesias starting in September after he sustained a syncopal fall striking his neck. Multiple nonsyncopal falls since then. Baseline ambulatory without assistive devices, currently minimally ambulatory. Denies B/B incontinence or saddle anesthesia. On exam, TTP cervical and lumbar spine. 4+/5 bilateral handgrip and IO. Otherwise 5/5 UE/LE bilaterally. Decreased sensation to light touch in all extremities. +Thomas bilaterally, BR/WI 4+ bilaterally. 2-3 beats clonus, neg babinski. Hyperalgesic to touch distal LEs. Minimal rectal tone, intact perineal sensation. MRI C/T/L spine w severe C5/C6 canal stenosis, no T/L canal stenosis.    Plan:  - Consented and posted for C5/C6 ACDF w Dr Ruelas 11/30.   - WB: Bed rest  - Abx: periop ancef  - Diet: NPO since midnight, maintain for OR  - DVT: held home eliquis and plavix  - Please obtain all preop labs (CBC,BMP,EKG, CXR, Coags, Type and Screen)    - Dispo: OR tomorrow w Dr Jessenia Ross MD  On Call Resident - PGY-2 Orthopedic Surgery  [facility]  Epic Message Preferred  Pager: [phone]    This patient was seen and staffed within 30 minutes of initial  consult.  _________________________________________________________    This patient will be followed by the Ortho Spine Team while inpatient. See team members and contacts below:    First call: Jamie Harris, PGY-2   Second call: Laurent Abel, PGY-4    Orthopedic spine attending    I have personally evaluated the patient at bedside, taken a history, reviewed his imaging and medical records.    We have attempted to contact his son via telephone to engage him in the discussion but he is not available.    As noted, the patient presented to an outside emergency room with a several month history of numbness in both arms and both legs.  He notes weakness in his arms chronically.  He notes some difficulty with gait.    He had does recall remote evaluation of his cervical spine.    No recent treatment.    He does have significant medical issues including atrial fibrillation.  He is on both Brilinta and Plavix.    He has a history of TIAs, vestibular neuritis, coronary artery disease.    He is on Social Security disability he indicates because of his longstanding cardiac issues.    He recently saw his cardiologist several months ago and no intervention was necessary.    He lives at home with his family member.    As noted on exam above no change.    His cervical MRI shows severe spinal cord compression at C5-6 and C6-7.    He is admitted with a several month history of myeloradiculopathy.  He is not deteriorating neurologically.  However, given the severity of his spinal cord compression, transfer from an outside institution was recommended to evaluate and consider surgical intervention.  Given his recent anticoagulation use and his coronary disease, preoperative assessment will be necessary.  We will plan to keep him in the hospital to accomplish safe and appropriate preoperative evaluation and then we will proceed with surgery when appropriate and safe.    We discussed things quite at length.    He understands and agrees  with the plan.    ** Dictated with voice recognition software and not immediately reviewed for errors in grammar and/or spelling **

## 2023-11-30 NOTE — DISCHARGE INSTR - DIET
Resume a diabetic, carbohydrate counted diet.       -75g carbohydrate meal, 45g carbohydrate snack, 5752-6011 calories a day.     You may need to start of eating softer foods and advance to a regular consistency diet as        tolerated

## 2023-11-30 NOTE — PROGRESS NOTES
Orthopaedic Spine Surgery Progress Note    Subjective:  NAEON. Doing well. Continued numbness to BLUE    Objective:  Vitals:    11/30/23 1155   BP: 114/58   Pulse: 62   Resp: 12   Temp: 36.5 °C (97.7 °F)   SpO2: 90%       Physical Exam  - Constitutional: No acute distress, cooperative  - Eyes: anicteric  - Head/Neck: normocephalic atraumatic  - Respiratory/Thorax: NWOB  - Cardiovascular: RRR on peripheral palpation  - Gastrointestinal: Nondistended  - Psychological: Appropriate mood/behavior  - Skin: Warm and dry. Additional findings in musculoskeletal evaluation  - Musculoskeletal:  ---  Spine:  C5: SILT   Deltoid 5/5 Left; 5/5 Right  C6: SILT   Wrist Ext: 5/5 Left; 5/5 Right  C7: SILT   Triceps: 5/5 Left; 5/5 Right  C8: SILT   Finger flexion: 4+/5 Left; 4+/5 Right  T1: SILT    Interossei: 4+/5 Left; 4+/5 Right     Bicep Reflex 3+   Bilaterally  Thomas: Positive bilaterally     L1: SILT       L2: SILT      Hip flexors 5/5 Left; 5/5 Right  L3: SILT      Knee extension 5/5 Left; 5/5 Right  L4: SILT      Tib Ant. (Dorsiflexion) 5/5 Left; 5/5 Right  L5: SILT      EHL 5/5 Left; 5/5 Right  S1: SILT      Plantarflexion 5/5 Left; 5/5 Right     Patellar reflex: 3+   Bilaterally    Results for orders placed or performed during the hospital encounter of 11/29/23 (from the past 24 hour(s))   Type and Screen   Result Value Ref Range    ABO TYPE B     Rh TYPE POS     ANTIBODY SCREEN NEG    Coagulation Screen   Result Value Ref Range    Protime 16.2 (H) 9.8 - 12.8 seconds    INR 1.4 (H) 0.9 - 1.1    aPTT 41 (H) 27 - 38 seconds       XR chest 1 view   Final Result   No acute cardiopulmonary process        I personally reviewed the images/study and I agree with the findings   as stated above by resident physician, Dr. Dharmesh Potter. The   study was interpreted at [facility] in [facility].        Signed by: Arnaud Alarcon 11/30/2023 9:18 AM   Dictation workstation:   [id number]      [place]  less than 1 hour    (Results Pending)         Assessment:  58M with C5/6 cervical stenosis c/f myelopathy.     Detailed discussion regarding management options held with patient. Discussed surgery in the form of ACDF on an inpatient basis. Risks, benefits, and alternatives discussed. Patient electing to proceed with surgery.     Plan:  Plan:  - Weight bearing: WBAT, OOBAT  - DVT ppx: SCDs, holding home chemo ppx in setting of upcoming surgery  - Diet: Clear liquid diet. advanced as tolerated  - Pain: Tylenol, oxycodone 5/10, dilaudid PRN for pain management  - FEN: Continue NS at 100cc/hr; HLIV with good PO intake  - Bowel Regimen: Colace, senna, dulcolax   - PT/OT consult  - Pulm: Encourage IS  - cards consult for risk stratification  - C/P for ACDF with Dr. Ruelas pending cards workup and eliqios washout    Dispo: OR    Laurent Abel MD  Orthopedic Surgery, PGY4  Available on Epic Chat     This patient will be followed by the Orthopaedic Spine service. Please page or Epic Chat the corresponding residents below with questions or concerns.     Ortho Spine Service ([facility]AND[facility] Preferred)  First call: Jamie Harris MD PGY2  Second call: Laurent Abel MD PGY4    6pm-6am M-F, Holidays, and weekends page Ortho on-call @[phone] with urgent questions/concerns.

## 2023-11-30 NOTE — DISCHARGE INSTR - OTHER ORDERS
Wound Care  Neck (Anterior Cervical Spine) Incision  -Change your dressing daily until there is no drainage from your incision site for 24-48 hours.  The surgical site may be left open to air once drainage has stopped.   -Use a 4x4 gauze pad and paper tape for dressing changes  -If there are steri strips over your incision, do not remove it with your dressing changes.  The dressing will slowly lift away from the skin over time.   -No lotions, creams, or tub soaks.  -May use heat or ice to posterior neck and shoulders as needed for comfort.      -No NSAIDS (Advil, Ibuprofen, Aleve, etc.) for 3 months, they have a bad effect on the healing of fusions.

## 2023-11-30 NOTE — ED NOTES
Pharmacy Medication History Review    Philip Barfield is a 58 y.o. male admitted for Cervical spinal cord compression (CMS/[facility]). Pharmacy reviewed the patient's etxun-wt-qkafernir medications and allergies for accuracy.    The list below reflects the updated PTA list. Comments regarding how patient may be taking medications differently can be found in the Admit Orders Activity  Prior to Admission Medications   Prescriptions Last Dose Informant   DULoxetine (Cymbalta) 60 mg DR capsule     Sig: Take 1 capsule (60 mg) by mouth once daily.   albuterol 90 mcg/actuation inhaler     Sig: Inhale 2 puffs every 4 hours if needed.   apixaban (Eliquis) 5 mg tablet     Sig: Take 1 tablet (5 mg) by mouth 2 times a day.   atorvastatin (Lipitor) 80 mg tablet     Sig: Take 1 tablet (80 mg) by mouth once daily.   azelastine (Astelin) 137 mcg (0.1 %) nasal spray     Sig: Administer 1 spray into affected nostril(s) 2 times a day.   clopidogrel (Plavix) 75 mg tablet     Sig: Take 1 tablet (75 mg) by mouth once daily.   cyclobenzaprine (Flexeril) 10 mg tablet     Sig: Take 1 tablet (10 mg) by mouth 3 times a day as needed for muscle spasms.   evolocumab (Repatha SureClick) 140 mg/mL injection     Sig: Inject 1 mL (140 mg) under the skin every 14 (fourteen) days.   fluticasone furoate-vilanteroL (Breo Ellipta) 200-25 mcg/dose inhaler     Sig: Inhale 1 puff once daily.   levothyroxine (Synthroid, Levoxyl) 200 mcg tablet     Sig: Take 1 tablet (200 mcg) by mouth once daily in the morning. Take before meals.   loratadine (Claritin) 10 mg tablet     Sig: Take 1 tablet (10 mg) by mouth once daily.   methylPREDNISolone (Medrol Dospak) 4 mg tablets     Sig: Follow schedule on package instructions   metoprolol tartrate (Lopressor) 50 mg tablet     Sig: Take 2 tablets by mouth 2 times a day.   nitroglycerin (Nitrostat) 0.4 mg SL tablet     Sig: Place under the tongue.  TAKE AS DIRECTED.   pantoprazole (ProtoNix) 40 mg EC tablet     Sig: Take 1  tablet (40 mg) by mouth once daily in the morning. Take before meals. Do not crush, chew, or split.   polyethylene glycol (Miralax) 17 gram/dose powder     Sig: MIX 1 CAPFUL IN 8 OUNCES OF WATER AND DRINK DAILY AS DIRECTED.   ranolazine (Ranexa) 1,000 mg 12 hr tablet     Sig: Take 1 tablet (1,000 mg) by mouth every 12 hours.      Facility-Administered Medications: None        The list below reflects the updated allergy list. Please review each documented allergy for additional clarification and justification.  Allergies  Reviewed by Tammy Gallagher RN on 11/29/2023        Severity Reactions Comments    Penicillins High Rash, Shortness of breath     Tetanus Toxoid High Rash, Shortness of breath     Hydrocodone-acetaminophen Not Specified Unknown     Niacin Not Specified Unknown     Sulfa (sulfonamide Antibiotics) Not Specified Unknown             Patient accepts M2B at discharge. Pharmacy has been updated to [facility].    Sources used to complete the med history include medication dispense history, care everywhere, OARRS, [facility] visit from 9/25/23, and patient interview. Patient was a good historian of medications    Below are additional concerns with the patient's PTA list.  Patient reported stopping amlodipine and lisinopril.    Meek Garay PharmD   Meds PGY1 Pharmacy Resident    [facility]  Please reach out via [employer] Secure Chat for questions, or if no response call [employer] or [employer] “[facility]”

## 2023-11-30 NOTE — DISCHARGE INSTR - ACTIVITY
-Activity with assistance.  -Progressively walk 2 times a day with a wheeled walker.   -No pushing, pulling, or lifting objects greater than 10 pounds until follow up appointment.  -No heavy house or yard work.  -You may not drive until your follow up appointment, or while taking narcotic medication.  -You may not return to work until your follow appointment.  -Wear your cervical neck brace at all times. It may be taken off for dressing changes and hygiene/ showering. Weaning out of your neck brace will be discussed at your postoperative appointment.

## 2023-11-30 NOTE — H&P (VIEW-ONLY)
Inpatient Cardiology Consult Note    Reason for consult: Pre-Op Evaluation    HPI:   Philip Barfield is a 58 years old male with extensive CAD history with CABG x3 in 2003 with subsequent PCI (most recent) of distal LAD via LIMA graft (2 overlapped JACE) a couple years ago followed by balloon angioplasty of diffuse in-stent restenosis of distal LAD via LIMA graft in January 2023 in the setting of recurrent angina and small non-STEMI with concomitant new onset atrial fibrillation with rapid rates at that time. Shortly afterwards,  he was readmitted with recurrent chest discomfort and underwent repeat cardiac cath on 2/1/2023 which showed recently angioplasty distal LAD was widely patent without angiographically significant restenosis.   His other medical history includes paroxysmal Afib on eliquis, Diabetes, HLD, HTN, ARISTEO, TIA, COPD, vertigo, and vestibular neuritis. Patient recently had a syncopal fall in September striking his neck. Since then, he has been having progressing upper and lower extremity symptoms (numbness, paraesthesia). He has had multiple non syncopal falls due lower extremity symptoms. He presented to the ED for bilateral leg weakness and numbness. MRI showed C5/6 cervical stenosis c/f myelopathy and plans for anterior cervical discectomy and fusion (ACDF). Cardiology was consulted for preoperative evaluation.     Today patient was resting in bed. He was alert and cooperative. He reports no chest pain, shortness of breath, palpitation, dizziness and orthopnea. He is able to ambulate to the bathroom with assistance. He lives with his son and was functioning independently before his fall in September. He reports that since his last cath, he has been doing well and has not had angina or shortness of breath. He reports intentional 120 lbs weight loss.         All system reviewed and negative unless mentioned above.     Cardiac studies:     Echo: TTE (01/23/2023)  Interpretation Summary  -Left Ventricle:  Left ventricle size is normal. Normal wall thickness. Low normal left ventricular systolic function. The EF by visual approximation is 50%. Normal wall motion. Subtle wall motion abnormalities may not be recognized.  -Right Ventricle: Right ventricle size is normal. Normal systolic function.  -No significant valvular abnormalities.  -Technically difficult study. The valvular structures were poorly visualize   Stress: EXERCISE (2023)  Interpretation Summary  -Stress EC.0 mm of ST depression downsloping (II, III and aVF) were noted. Arrhythmias during recovery: frequent PVCs, non-sustained VT. Conclusion: The stress test is abnormal due to arrhythmia and ST depression.  -Stress Test: A Francisco protocol stress test was performed. Hemodynamics are inadequate for diagnosis. Blood pressure demonstrated a normal response and heart rate demonstrated a blunted response to stress. The patient reported chest pain during the stress test. Chest pain was characterized as sharp.  -Resting ECG: The ECG shows normal sinus rhythm. Resting ECG shows no ST-segment deviation.   Cath: (23)  -Stable diffuse multivessel CAD with no significant interval angiographic changes compared to cardiac cath on 2023.  -Widely patent LIMA graft to LAD with widely patent overlapped stents of the distal LAD beyond the graft which were angioplastied last week for diffuse in-stent restenosis. No angiographic evidence of recurrent ISR. No additional PCI needed.  Widely patent radial artery graft to diagonal branch with good runoff.  -Chronic total occlusion of ostial SVG to OM branch.  -Widely patent dominant RCA with multiple overlap stents throughout the course of the vessel with stable mild diffuse in-stent restenosis. ISR appears nonflow limiting based on IFR of 0.96, and IVUS examination showing no severe stenoses and well-expanded stents.  -Native left main not engaged on today's cath. Refer to report from 23 (severe diffuse  Sepsis disease noted).       Current Facility-Administered Medications:     acetaminophen (Tylenol) tablet 650 mg, 650 mg, oral, q6h SEBLE, Clint Rsos MD, 650 mg at 11/30/23 1140    bisacodyl (Dulcolax) suppository 10 mg, 10 mg, rectal, Daily PRN, Clint Ross MD    cyclobenzaprine (Flexeril) tablet 10 mg, 10 mg, oral, TID PRN, Clint Ross MD    HYDROmorphone (Dilaudid) injection 0.5 mg, 0.5 mg, intravenous, q4h PRN, Clint Ross MD    lactated Ringer's infusion, 125 mL/hr, intravenous, Continuous, Clint Ross MD, Last Rate: 125 mL/hr at 11/30/23 1327, 125 mL/hr at 11/30/23 1327    magnesium hydroxide (Milk of Magnesia) 400 mg/5 mL suspension 30 mL, 30 mL, oral, Daily PRN, Clint Ross MD    naloxone (Narcan) injection 0.2 mg, 0.2 mg, intravenous, q5 min PRN, Clint Ross MD    ondansetron (Zofran) tablet 4 mg, 4 mg, oral, q8h PRN **OR** ondansetron (Zofran) injection 4 mg, 4 mg, intravenous, q8h PRN, Clint Ross MD    oxyCODONE (Roxicodone) immediate release tablet 10 mg, 10 mg, oral, q4h PRN, Clint Ross MD, 10 mg at 11/30/23 1435    oxyCODONE (Roxicodone) immediate release tablet 5 mg, 5 mg, oral, q4h PRN, Clint Ross MD    oxygen (O2) therapy, 2 L/min, inhalation, Continuous, Clint Ross MD    polyethylene glycol (Glycolax, Miralax) packet 17 g, 17 g, oral, Daily, Clint Ross MD    prochlorperazine (Compazine) tablet 10 mg, 10 mg, oral, q6h PRN **OR** prochlorperazine (Compazine) injection 10 mg, 10 mg, intravenous, q6h PRN **OR** prochlorperazine (Compazine) suppository 25 mg, 25 mg, rectal, q12h PRN, Clint Ross MD    sennosides-docusate sodium (Poonam-Colace) 8.6-50 mg per tablet 2 tablet, 2 tablet, oral, BID, Clint Ross MD    Objective:    Vitals:    11/30/23 1313   BP:    Pulse:    Resp: 15   Temp:    SpO2:        Exam:  General - well appearing, not in acute distress  Neck - no JVD   Chest - CTAB   Cardiac - S1, S2, no murmur heard   Lower ext - No LE edema      Labs/Imaging:     Results from last 72 hours   Lab Units 11/29/23  0015   TROPHS ng/L 7   SODIUM mmol/L 138   POTASSIUM mmol/L 4.3   CO2 mmol/L 27   BUN mg/dL 14   CREATININE mg/dL 0.89   MAGNESIUM mg/dL 1.62   PHOSPHORUS mg/dL 4.2      Results from last 72 hours   Lab Units 11/29/23  0015   WBC AUTO x10*3/uL 11.2   HEMOGLOBIN g/dL 14.0   PLATELETS AUTO x10*3/uL 390        Assessment and Plan:   Philip Barfield is a 58 years old male with extensive CAD history with CABG x3 in 2003 with subsequent PCI (most recent) of distal LAD via LIMA graft (2 overlapped JACE) a couple years ago followed by balloon angioplasty of diffuse in-stent restenosis of distal LAD via LIMA graft in January 2023 in the setting of recurrent angina and small non-STEMI with concomitant new onset atrial fibrillation with rapid rates at that time. Shortly afterwards,  he was readmitted with recurrent chest discomfort and underwent repeat cardiac cath on 2/1/2023 which showed recently angioplasty distal LAD was widely patent without angiographically significant restenosis.   His other medical history includes paroxysmal Afib on eliquis, Diabetes, HLD, HTN, ARISTEO, TIA, COPD, vertigo, and vestibular neuritis. Patient recently had a syncopal fall in September striking his neck. Since then, he has been having progressing upper and lower extremity symptoms (numbness, paraesthesia). He has had multiple non syncopal falls due lower extremity symptoms. He presented to the ED for bilateral leg weakness and numbness. MRI showed C5/6 cervical stenosis c/f myelopathy and plans for anterior cervical discectomy and fusion (ACDF). Cardiology was consulted for preoperative evaluation.     #Pre-Op Evaluation  -Revised Cardiac Risk Index (RCRI)=2 points = Class III risk. This equates to 10.1% 30 days risk of death, MI, or cardiac arrest.   -It is difficult to assess the functional capacity of this patient at this time due to his injury. However, he was functioning  independently before his fall. Patient's most recent cardiac cath (2/1/2023) showed patent LAD and no flow limiting lesions. From cardiac perspective, he may proceed to surgery without further testing.       Recommendations are preliminary until note is co-signed by an attending.     Jelani Javier, MS4        Acute respiratory failure with hypoxia Sepsis Acute respiratory failure with hypoxia Acute respiratory failure with hypoxia Acute respiratory failure with hypoxia CAD (coronary artery disease) Sepsis Sepsis Anxiety Sepsis PNA (pneumonia) Sepsis

## 2023-11-30 NOTE — CONSULTS
Inpatient Cardiology Consult Note    Reason for consult: Pre-Op Evaluation    HPI:   Philip Barfield is a 58 years old male with extensive CAD history with CABG x3 in 2003 with subsequent PCI (most recent) of distal LAD via LIMA graft (2 overlapped JACE) a couple years ago followed by balloon angioplasty of diffuse in-stent restenosis of distal LAD via LIMA graft in January 2023 in the setting of recurrent angina and small non-STEMI with concomitant new onset atrial fibrillation with rapid rates at that time. Shortly afterwards,  he was readmitted with recurrent chest discomfort and underwent repeat cardiac cath on 2/1/2023 which showed recently angioplasty distal LAD was widely patent without angiographically significant restenosis.   His other medical history includes paroxysmal Afib on eliquis, Diabetes, HLD, HTN, ARISTEO, TIA, COPD, vertigo, and vestibular neuritis. Patient recently had a syncopal fall in September striking his neck. Since then, he has been having progressing upper and lower extremity symptoms (numbness, paraesthesia). He has had multiple non syncopal falls due lower extremity symptoms. He presented to the ED for bilateral leg weakness and numbness. MRI showed C5/6 cervical stenosis c/f myelopathy and plans for anterior cervical discectomy and fusion (ACDF). Cardiology was consulted for preoperative evaluation.     Today patient was resting in bed. He was alert and cooperative. He reports no chest pain, shortness of breath, palpitation, dizziness and orthopnea. He is able to ambulate to the bathroom with assistance. He lives with his son and was functioning independently before his fall in September. He reports that since his last cath, he has been doing well and has not had angina or shortness of breath. He reports intentional 120 lbs weight loss.         All system reviewed and negative unless mentioned above.     Cardiac studies:     Echo: TTE (01/23/2023)  Interpretation Summary  -Left Ventricle:  Left ventricle size is normal. Normal wall thickness. Low normal left ventricular systolic function. The EF by visual approximation is 50%. Normal wall motion. Subtle wall motion abnormalities may not be recognized.  -Right Ventricle: Right ventricle size is normal. Normal systolic function.  -No significant valvular abnormalities.  -Technically difficult study. The valvular structures were poorly visualize   Stress: EXERCISE (2023)  Interpretation Summary  -Stress EC.0 mm of ST depression downsloping (II, III and aVF) were noted. Arrhythmias during recovery: frequent PVCs, non-sustained VT. Conclusion: The stress test is abnormal due to arrhythmia and ST depression.  -Stress Test: A Francisco protocol stress test was performed. Hemodynamics are inadequate for diagnosis. Blood pressure demonstrated a normal response and heart rate demonstrated a blunted response to stress. The patient reported chest pain during the stress test. Chest pain was characterized as sharp.  -Resting ECG: The ECG shows normal sinus rhythm. Resting ECG shows no ST-segment deviation.   Cath: (23)  -Stable diffuse multivessel CAD with no significant interval angiographic changes compared to cardiac cath on 2023.  -Widely patent LIMA graft to LAD with widely patent overlapped stents of the distal LAD beyond the graft which were angioplastied last week for diffuse in-stent restenosis. No angiographic evidence of recurrent ISR. No additional PCI needed.  Widely patent radial artery graft to diagonal branch with good runoff.  -Chronic total occlusion of ostial SVG to OM branch.  -Widely patent dominant RCA with multiple overlap stents throughout the course of the vessel with stable mild diffuse in-stent restenosis. ISR appears nonflow limiting based on IFR of 0.96, and IVUS examination showing no severe stenoses and well-expanded stents.  -Native left main not engaged on today's cath. Refer to report from 23 (severe diffuse  disease noted).       Current Facility-Administered Medications:     acetaminophen (Tylenol) tablet 650 mg, 650 mg, oral, q6h SEBLE, Clint Ross MD, 650 mg at 11/30/23 1140    bisacodyl (Dulcolax) suppository 10 mg, 10 mg, rectal, Daily PRN, Clint Ross MD    cyclobenzaprine (Flexeril) tablet 10 mg, 10 mg, oral, TID PRN, Clint Ross MD    HYDROmorphone (Dilaudid) injection 0.5 mg, 0.5 mg, intravenous, q4h PRN, lCint Ross MD    lactated Ringer's infusion, 125 mL/hr, intravenous, Continuous, Clint Ross MD, Last Rate: 125 mL/hr at 11/30/23 1327, 125 mL/hr at 11/30/23 1327    magnesium hydroxide (Milk of Magnesia) 400 mg/5 mL suspension 30 mL, 30 mL, oral, Daily PRN, Clint Ross MD    naloxone (Narcan) injection 0.2 mg, 0.2 mg, intravenous, q5 min PRN, Clint Ross MD    ondansetron (Zofran) tablet 4 mg, 4 mg, oral, q8h PRN **OR** ondansetron (Zofran) injection 4 mg, 4 mg, intravenous, q8h PRN, Clint Ross MD    oxyCODONE (Roxicodone) immediate release tablet 10 mg, 10 mg, oral, q4h PRN, Clint Ross MD, 10 mg at 11/30/23 1435    oxyCODONE (Roxicodone) immediate release tablet 5 mg, 5 mg, oral, q4h PRN, Clint Ross MD    oxygen (O2) therapy, 2 L/min, inhalation, Continuous, Clint Ross MD    polyethylene glycol (Glycolax, Miralax) packet 17 g, 17 g, oral, Daily, Clint Ross MD    prochlorperazine (Compazine) tablet 10 mg, 10 mg, oral, q6h PRN **OR** prochlorperazine (Compazine) injection 10 mg, 10 mg, intravenous, q6h PRN **OR** prochlorperazine (Compazine) suppository 25 mg, 25 mg, rectal, q12h PRN, Clint Ross MD    sennosides-docusate sodium (Poonam-Colace) 8.6-50 mg per tablet 2 tablet, 2 tablet, oral, BID, Clint Ross MD    Objective:    Vitals:    11/30/23 1313   BP:    Pulse:    Resp: 15   Temp:    SpO2:        Exam:  General - well appearing, not in acute distress  Neck - no JVD   Chest - CTAB   Cardiac - S1, S2, no murmur heard   Lower ext - No LE edema      Labs/Imaging:     Results from last 72 hours   Lab Units 11/29/23  0015   TROPHS ng/L 7   SODIUM mmol/L 138   POTASSIUM mmol/L 4.3   CO2 mmol/L 27   BUN mg/dL 14   CREATININE mg/dL 0.89   MAGNESIUM mg/dL 1.62   PHOSPHORUS mg/dL 4.2      Results from last 72 hours   Lab Units 11/29/23  0015   WBC AUTO x10*3/uL 11.2   HEMOGLOBIN g/dL 14.0   PLATELETS AUTO x10*3/uL 390        Assessment and Plan:   Philip Barfield is a 58 years old male with extensive CAD history with CABG x3 in 2003 with subsequent PCI (most recent) of distal LAD via LIMA graft (2 overlapped JACE) a couple years ago followed by balloon angioplasty of diffuse in-stent restenosis of distal LAD via LIMA graft in January 2023 in the setting of recurrent angina and small non-STEMI with concomitant new onset atrial fibrillation with rapid rates at that time. Shortly afterwards,  he was readmitted with recurrent chest discomfort and underwent repeat cardiac cath on 2/1/2023 which showed recently angioplasty distal LAD was widely patent without angiographically significant restenosis.   His other medical history includes paroxysmal Afib on eliquis, Diabetes, HLD, HTN, ARISTEO, TIA, COPD, vertigo, and vestibular neuritis. Patient recently had a syncopal fall in September striking his neck. Since then, he has been having progressing upper and lower extremity symptoms (numbness, paraesthesia). He has had multiple non syncopal falls due lower extremity symptoms. He presented to the ED for bilateral leg weakness and numbness. MRI showed C5/6 cervical stenosis c/f myelopathy and plans for anterior cervical discectomy and fusion (ACDF). Cardiology was consulted for preoperative evaluation.     #Pre-Op Evaluation  -Revised Cardiac Risk Index (RCRI)=2 points = Class III risk. This equates to 10.1% 30 days risk of death, MI, or cardiac arrest.   -It is difficult to assess the functional capacity of this patient at this time due to his injury. However, he was functioning  independently before his fall. Patient's most recent cardiac cath (2/1/2023) showed patent LAD and no flow limiting lesions. From cardiac perspective, he may proceed to surgery without further testing.       Recommendations are preliminary until note is co-signed by an attending.     Jelani Javier, MS4

## 2023-11-30 NOTE — DISCHARGE INSTR - AVS FIRST PAGE
You are breathing faster than normal.  Fever of 100.4 F or higher.  Chills  Urinating less than 4 times per day.  Acting very sleepy and difficult to awaken.  Vomiting and not able to eat or drink for 12 hours  3 or more loose, watery bowel movements in 24 hours (Diarrhea)  Concerns over the appearance of your incision.    Return immediately to the ER:  Persistent bilateral arm weakness and or numbness >24 hours.  Short of breath.  Unable to swallow.

## 2023-12-01 PROCEDURE — 2500000001 HC RX 250 WO HCPCS SELF ADMINISTERED DRUGS (ALT 637 FOR MEDICARE OP)

## 2023-12-01 PROCEDURE — 2500000004 HC RX 250 GENERAL PHARMACY W/ HCPCS (ALT 636 FOR OP/ED)

## 2023-12-01 PROCEDURE — 2500000002 HC RX 250 W HCPCS SELF ADMINISTERED DRUGS (ALT 637 FOR MEDICARE OP, ALT 636 FOR OP/ED)

## 2023-12-01 PROCEDURE — 99233 SBSQ HOSP IP/OBS HIGH 50: CPT | Performed by: INTERNAL MEDICINE

## 2023-12-01 PROCEDURE — 1100000001 HC PRIVATE ROOM DAILY

## 2023-12-01 PROCEDURE — 94660 CPAP INITIATION&MGMT: CPT

## 2023-12-01 RX ADMIN — OXYCODONE HYDROCHLORIDE 10 MG: 5 TABLET ORAL at 18:46

## 2023-12-01 RX ADMIN — LEVOTHYROXINE SODIUM 200 MCG: 100 TABLET ORAL at 09:42

## 2023-12-01 RX ADMIN — OXYCODONE HYDROCHLORIDE 10 MG: 5 TABLET ORAL at 06:05

## 2023-12-01 RX ADMIN — OXYCODONE HYDROCHLORIDE 10 MG: 5 TABLET ORAL at 23:07

## 2023-12-01 RX ADMIN — DULOXETINE HYDROCHLORIDE 60 MG: 60 CAPSULE, DELAYED RELEASE ORAL at 09:42

## 2023-12-01 RX ADMIN — ATORVASTATIN CALCIUM 80 MG: 80 TABLET, FILM COATED ORAL at 20:14

## 2023-12-01 RX ADMIN — SENNOSIDES AND DOCUSATE SODIUM 2 TABLET: 8.6; 5 TABLET ORAL at 20:14

## 2023-12-01 RX ADMIN — PANTOPRAZOLE SODIUM 40 MG: 40 TABLET, DELAYED RELEASE ORAL at 06:04

## 2023-12-01 RX ADMIN — METOPROLOL TARTRATE 100 MG: 50 TABLET, FILM COATED ORAL at 09:42

## 2023-12-01 RX ADMIN — RANOLAZINE 1000 MG: 500 TABLET, EXTENDED RELEASE ORAL at 09:00

## 2023-12-01 RX ADMIN — OXYCODONE HYDROCHLORIDE 10 MG: 5 TABLET ORAL at 14:50

## 2023-12-01 RX ADMIN — RANOLAZINE 1000 MG: 500 TABLET, EXTENDED RELEASE ORAL at 20:14

## 2023-12-01 RX ADMIN — ACETAMINOPHEN 650 MG: 325 TABLET ORAL at 06:05

## 2023-12-01 RX ADMIN — OXYCODONE HYDROCHLORIDE 10 MG: 5 TABLET ORAL at 02:49

## 2023-12-01 RX ADMIN — ACETAMINOPHEN 650 MG: 325 TABLET ORAL at 23:07

## 2023-12-01 RX ADMIN — HYDROMORPHONE HYDROCHLORIDE 0.5 MG: 1 INJECTION, SOLUTION INTRAMUSCULAR; INTRAVENOUS; SUBCUTANEOUS at 21:57

## 2023-12-01 RX ADMIN — ACETAMINOPHEN 650 MG: 325 TABLET ORAL at 02:49

## 2023-12-01 RX ADMIN — OXYCODONE HYDROCHLORIDE 10 MG: 5 TABLET ORAL at 10:07

## 2023-12-01 RX ADMIN — ACETAMINOPHEN 650 MG: 325 TABLET ORAL at 18:46

## 2023-12-01 RX ADMIN — AZELASTINE HYDROCHLORIDE 1 SPRAY: 137 SPRAY, METERED NASAL at 20:14

## 2023-12-01 RX ADMIN — ACETAMINOPHEN 650 MG: 325 TABLET ORAL at 12:16

## 2023-12-01 ASSESSMENT — PAIN SCALES - GENERAL
PAINLEVEL_OUTOF10: 7
PAINLEVEL_OUTOF10: 5 - MODERATE PAIN
PAINLEVEL_OUTOF10: 7
PAINLEVEL_OUTOF10: 8
PAINLEVEL_OUTOF10: 5 - MODERATE PAIN
PAINLEVEL_OUTOF10: 8
PAINLEVEL_OUTOF10: 7
PAINLEVEL_OUTOF10: 6
PAINLEVEL_OUTOF10: 7
PAINLEVEL_OUTOF10: 3

## 2023-12-01 ASSESSMENT — PAIN - FUNCTIONAL ASSESSMENT
PAIN_FUNCTIONAL_ASSESSMENT: 0-10

## 2023-12-01 ASSESSMENT — PAIN DESCRIPTION - LOCATION
LOCATION: BACK
LOCATION: BACK

## 2023-12-01 NOTE — PROGRESS NOTES
Subjective Data:  No events overnight, patient feeling well, planning for OR Monday after eliquis washout     Overnight Events:         Objective Data:  Last Recorded Vitals:  Vitals:    11/30/23 2300 11/30/23 2350 12/01/23 0411 12/01/23 0801   BP: 117/75   111/70   BP Location: Right arm   Right arm   Patient Position:    Lying   Pulse: 60   66   Resp:  15 16 17   Temp: 36.1 °C (97 °F)   35.9 °C (96.6 °F)   TempSrc: Temporal   Temporal   SpO2: 92%   98%   Weight:       Height:           Last Labs:  CBC - 11/29/2023: 12:15 AM  11.2 14.0 390    42.2      CMP - 11/29/2023: 12:15 AM  9.3 7.1 21 --- 0.4   4.2 4.2 17 107      PTT - 11/30/2023:  7:29 PM  1.2   13.3 38     TROPHS   Date/Time Value Ref Range Status   11/29/2023 12:15 AM 7 0 - 20 ng/L Final     HGBA1C   Date/Time Value Ref Range Status   01/22/2023 01:20 AM 6.0 <5.7 % Final     Comment:     Normal less than 5.7%  Prediabetes 5.7% to 6.4%  Diabetes 6.5% or higher      --HgbA1C levels may not be accurate in patients who have renal disease, received recent blood transfusions, are anemic, or who have dyshemoglobinemia.   03/28/2022 11:49 AM 6.5 % Final     Comment:     Normal less than 5.7%  Prediabetes 5.7% to 6.4%  Diabetes 6.5% or higher      --HgbA1C levels may not be accurate in patients who have  renal disease, received recent blood transfusions, are anemic,  or who have dyshemoglobinemia.   07/22/2021 11:12 AM 6.8 % Final     Comment:          Diagnosis of Diabetes-Adults   Non-Diabetic: < or = 5.6%   Increased risk for developing diabetes: 5.7-6.4%   Diagnostic of diabetes: > or = 6.5%  .       Monitoring of Diabetes                Age (y)     Therapeutic Goal (%)   Adults:          >18           <7.0   Pediatrics:    13-18           <7.5                   7-12           <8.0                   0- 6            7.5-8.5   American Diabetes Association. Diabetes Care 33(S1), Jan 2010.     08/03/2018 07:20 AM 8.8 % Final     Comment:          Diagnosis of  "Diabetes-Adults   Non-Diabetic: < or = 5.6%   Increased risk for developing diabetes: 5.7-6.4%   Diagnostic of diabetes: > or = 6.5%  .       Monitoring of Diabetes                Age (y)     Therapeutic Goal (%)   Adults:          >18           <7.0   Pediatrics:    13-18           <7.5                   7-12           <8.0                   0- 6            7.5-8.5   American Diabetes Association. Diabetes Care 33(S1), Jan 2010.       VLDL   Date/Time Value Ref Range Status   07/22/2021 11:12 AM 49 0 - 40 mg/dL Final      Last I/O:  No intake/output data recorded.    Past Cardiology Tests (Last 3 Years):  EKG:  No results found for this or any previous visit from the past 1095 days.    Echo:  No results found for this or any previous visit from the past 1095 days.    Ejection Fractions:  No results found for: \"EF\"  Cath:  No results found for this or any previous visit from the past 1095 days.    Stress Test:  No results found for this or any previous visit from the past 1095 days.    Cardiac Imaging:  No results found for this or any previous visit from the past 1095 days.      Inpatient Medications:  Scheduled medications   Medication Dose Route Frequency    acetaminophen  650 mg oral q6h SEBLE    atorvastatin  80 mg oral Nightly    azelastine  1 spray Each Nostril BID    DULoxetine  60 mg oral Daily    fluticasone furoate-vilanteroL  1 puff inhalation Daily    levothyroxine  200 mcg oral q AM    loratadine  10 mg oral Daily    metoprolol tartrate  100 mg oral BID    pantoprazole  40 mg oral Daily before breakfast    Or    pantoprazole  40 mg intravenous Daily before breakfast    polyethylene glycol  17 g oral Daily    ranolazine  1,000 mg oral BID    sennosides-docusate sodium  2 tablet oral BID     PRN medications   Medication    albuterol    bisacodyl    cyclobenzaprine    HYDROmorphone    magnesium hydroxide    naloxone    ondansetron    Or    ondansetron    oxyCODONE    oxyCODONE    prochlorperazine    Or    " prochlorperazine    Or    prochlorperazine     Continuous Medications   Medication Dose Last Rate    lactated Ringer's  125 mL/hr 125 mL/hr (11/30/23 1327)    oxygen  2 L/min         Physical Exam:  Physical Exam  Constitutional:       Appearance: Normal appearance.   Cardiovascular:      Rate and Rhythm: Normal rate and regular rhythm.      Pulses: Normal pulses.   Pulmonary:      Effort: Pulmonary effort is normal.      Breath sounds: Normal breath sounds.   Abdominal:      General: Bowel sounds are normal.      Palpations: Abdomen is soft.   Musculoskeletal:         General: Normal range of motion.   Skin:     General: Skin is warm and dry.      Capillary Refill: Capillary refill takes less than 2 seconds.   Neurological:      Mental Status: He is alert and oriented to person, place, and time.   Psychiatric:         Mood and Affect: Mood normal.           Assessment/Plan   Philip Barfield is a 58 years old male with extensive CAD history with CABG x3 in 2003 with subsequent PCI (most recent) of distal LAD via LIMA graft (2 overlapped JACE) a couple years ago followed by balloon angioplasty of diffuse in-stent restenosis of distal LAD via LIMA graft in January 2023 in the setting of recurrent angina and small non-STEMI with concomitant new onset atrial fibrillation with rapid rates at that time. Shortly afterwards,  he was readmitted with recurrent chest discomfort and underwent repeat cardiac cath on 2/1/2023 which showed recently angioplasty distal LAD was widely patent without angiographically significant restenosis.   His other medical history includes paroxysmal Afib on eliquis, Diabetes, HLD, HTN, ARISTEO, TIA, COPD, vertigo, and vestibular neuritis. Patient recently had a syncopal fall in September striking his neck. Since then, he has been having progressing upper and lower extremity symptoms (numbness, paraesthesia). He has had multiple non syncopal falls due lower extremity symptoms. He presented to the ED for  bilateral leg weakness and numbness. MRI showed C5/6 cervical stenosis c/f myelopathy and plans for anterior cervical discectomy and fusion (ACDF). Cardiology was consulted for preoperative evaluation.      #Pre-Op Evaluation  -Revised Cardiac Risk Index (RCRI)=2 points = Class III risk. This equates to 10.1% 30 days risk of death, MI, or cardiac arrest.   -It is difficult to assess the functional capacity of this patient at this time due to his injury. However, he was functioning independently before his fall. Patient's most recent cardiac cath (2/1/2023) showed patent LAD and no flow limiting lesions. From cardiac perspective, he may proceed to surgery without further testing.   - C/P for ACDF/C6 corpectomy with Dr. Ruelas pending washout of eliquis, anticipate OR on 12/4   - Eliquis held prior to surgery will need to resume post op when appropriate    Cardiology will follow,    Discussed with Dr. Devon Soto [facility], APRN-CNP  Cardiology Consults    Please call with any questions  Pager [phone] M-F 8a-5p; Saturday 8a-2p  Pager [phone] all other times       Code Status:  Full Code    This is a shared visit. I have reviewed the Advanced Practice Provider's encounter note, approve the Advanced Practice Provider's documentation, and provide the following additional information from my personal encounter. - C/P for ACDF/C6 corpectomy with Dr. Ruelas pending washout of eliquis, anticipate OR on 12/4   - Eliquis held prior to surgery will need to resume post op when appropriate    Tamia Osorio MD MPH

## 2023-12-01 NOTE — PROGRESS NOTES
Orthopaedic Spine Surgery Progress Note    Subjective:  NAEON. Doing well. Evaluated by cards, cleared for surgery    Objective:  Vitals:    12/01/23 0801   BP: 111/70   Pulse: 66   Resp: 17   Temp: 35.9 °C (96.6 °F)   SpO2: 98%       Physical Exam  - Constitutional: No acute distress, cooperative  - Eyes: anicteric  - Head/Neck: normocephalic atraumatic  - Respiratory/Thorax: NWOB  - Cardiovascular: RRR on peripheral palpation  - Gastrointestinal: Nondistended  - Psychological: Appropriate mood/behavior  - Skin: Warm and dry. Additional findings in musculoskeletal evaluation  - Musculoskeletal:  ---  Spine:  C5: SILT   Deltoid 5/5 Left; 5/5 Right  C6: SILT   Wrist Ext: 5/5 Left; 5/5 Right  C7: SILT   Triceps: 5/5 Left; 5/5 Right  C8: SILT   Finger flexion: 4+/5 Left; 4+/5 Right  T1: SILT    Interossei: 4+/5 Left; 4+/5 Right     Bicep Reflex 3+   Bilaterally  Thomas: Positive bilaterally     L1: SILT       L2: SILT      Hip flexors 5/5 Left; 5/5 Right  L3: SILT      Knee extension 5/5 Left; 5/5 Right  L4: SILT      Tib Ant. (Dorsiflexion) 5/5 Left; 5/5 Right  L5: SILT      EHL 5/5 Left; 5/5 Right  S1: SILT      Plantarflexion 5/5 Left; 5/5 Right     Patellar reflex: 3+   Bilaterally    Results for orders placed or performed during the hospital encounter of 11/29/23 (from the past 24 hour(s))   Coagulation Screen   Result Value Ref Range    Protime 13.3 (H) 9.8 - 12.8 seconds    INR 1.2 (H) 0.9 - 1.1    aPTT 38 27 - 38 seconds       XR chest 1 view   Final Result   No acute cardiopulmonary process        I personally reviewed the images/study and I agree with the findings   as stated above by resident physician, Dr. Dharmesh Potter. The   study was interpreted at [facility] in [facility].        Signed by: Arnaud Alarcon 11/30/2023 9:18 AM   Dictation workstation:   [id number]      FL less than 1 hour    (Results Pending)         Assessment:  58M with C5/6 cervical  stenosis c/f myelopathy    Plan:  Plan:  - Weight bearing: WBAT, OOBAT  - DVT ppx: SCDs, holding home chemo ppx in setting of upcoming surgery  - Diet: Clear liquid diet. advanced as tolerated  - Pain: Tylenol, oxycodone 5/10, dilaudid PRN for pain management  - FEN: Continue NS at 100cc/hr; HLIV with good PO intake  - Bowel Regimen: Colace, senna, dulcolax   - PT/OT consult  - Pulm: Encourage IS  - cards consult for risk stratification  - C/P for ACDF/C6 corpectomy with Dr. Ruelas pending washout of eliquis, anticipate OR on 12/4  - appreciate cards recs    Dispo: OR Pending    Laurent Abel MD  Orthopedic Surgery, PGY4  Available on Epic Chat     This patient will be followed by the Orthopaedic Spine service. Please page or Epic Chat the corresponding residents below with questions or concerns.     Ortho Spine Service ([employer] Preferred)  First call: Jamie Harris MD PGY2  Second call: Laurent Abel MD PGY4    6pm-6am M-F, Holidays, and weekends page Ortho on-call @[phone] with urgent questions/concerns.

## 2023-12-02 ENCOUNTER — ANESTHESIA EVENT (OUTPATIENT)
Dept: OPERATING ROOM | Facility: HOSPITAL | Age: 58
DRG: 471 | End: 2023-12-02
Payer: MEDICARE

## 2023-12-02 LAB
ABO GROUP (TYPE) IN BLOOD: NORMAL
ANION GAP SERPL CALC-SCNC: 13 MMOL/L (ref 10–20)
ANTIBODY SCREEN: NORMAL
APTT PPP: 38 SECONDS (ref 27–38)
BUN SERPL-MCNC: 16 MG/DL (ref 6–23)
CALCIUM SERPL-MCNC: 9.3 MG/DL (ref 8.6–10.6)
CHLORIDE SERPL-SCNC: 102 MMOL/L (ref 98–107)
CO2 SERPL-SCNC: 28 MMOL/L (ref 21–32)
CREAT SERPL-MCNC: 1.02 MG/DL (ref 0.5–1.3)
ERYTHROCYTE [DISTWIDTH] IN BLOOD BY AUTOMATED COUNT: 12.8 % (ref 11.5–14.5)
GFR SERPL CREATININE-BSD FRML MDRD: 85 ML/MIN/1.73M*2
GLUCOSE SERPL-MCNC: 120 MG/DL (ref 74–99)
HCT VFR BLD AUTO: 42.4 % (ref 41–52)
HGB BLD-MCNC: 13.7 G/DL (ref 13.5–17.5)
INR PPP: 1.2 (ref 0.9–1.1)
MCH RBC QN AUTO: 28.2 PG (ref 26–34)
MCHC RBC AUTO-ENTMCNC: 32.3 G/DL (ref 32–36)
MCV RBC AUTO: 87 FL (ref 80–100)
NRBC BLD-RTO: 0 /100 WBCS (ref 0–0)
PLATELET # BLD AUTO: 319 X10*3/UL (ref 150–450)
POTASSIUM SERPL-SCNC: 4.5 MMOL/L (ref 3.5–5.3)
PROTHROMBIN TIME: 13 SECONDS (ref 9.8–12.8)
RBC # BLD AUTO: 4.85 X10*6/UL (ref 4.5–5.9)
RH FACTOR (ANTIGEN D): NORMAL
SODIUM SERPL-SCNC: 138 MMOL/L (ref 136–145)
WBC # BLD AUTO: 10 X10*3/UL (ref 4.4–11.3)

## 2023-12-02 PROCEDURE — 86901 BLOOD TYPING SEROLOGIC RH(D): CPT | Performed by: STUDENT IN AN ORGANIZED HEALTH CARE EDUCATION/TRAINING PROGRAM

## 2023-12-02 PROCEDURE — 36415 COLL VENOUS BLD VENIPUNCTURE: CPT | Performed by: STUDENT IN AN ORGANIZED HEALTH CARE EDUCATION/TRAINING PROGRAM

## 2023-12-02 PROCEDURE — 2500000004 HC RX 250 GENERAL PHARMACY W/ HCPCS (ALT 636 FOR OP/ED)

## 2023-12-02 PROCEDURE — 2500000002 HC RX 250 W HCPCS SELF ADMINISTERED DRUGS (ALT 637 FOR MEDICARE OP, ALT 636 FOR OP/ED)

## 2023-12-02 PROCEDURE — 80048 BASIC METABOLIC PNL TOTAL CA: CPT | Performed by: STUDENT IN AN ORGANIZED HEALTH CARE EDUCATION/TRAINING PROGRAM

## 2023-12-02 PROCEDURE — 85610 PROTHROMBIN TIME: CPT | Performed by: STUDENT IN AN ORGANIZED HEALTH CARE EDUCATION/TRAINING PROGRAM

## 2023-12-02 PROCEDURE — 86900 BLOOD TYPING SEROLOGIC ABO: CPT | Performed by: STUDENT IN AN ORGANIZED HEALTH CARE EDUCATION/TRAINING PROGRAM

## 2023-12-02 PROCEDURE — 1100000001 HC PRIVATE ROOM DAILY

## 2023-12-02 PROCEDURE — 94640 AIRWAY INHALATION TREATMENT: CPT

## 2023-12-02 PROCEDURE — 94660 CPAP INITIATION&MGMT: CPT

## 2023-12-02 PROCEDURE — 85027 COMPLETE CBC AUTOMATED: CPT | Performed by: STUDENT IN AN ORGANIZED HEALTH CARE EDUCATION/TRAINING PROGRAM

## 2023-12-02 PROCEDURE — 2500000001 HC RX 250 WO HCPCS SELF ADMINISTERED DRUGS (ALT 637 FOR MEDICARE OP)

## 2023-12-02 RX ADMIN — ACETAMINOPHEN 650 MG: 325 TABLET ORAL at 13:33

## 2023-12-02 RX ADMIN — SENNOSIDES AND DOCUSATE SODIUM 2 TABLET: 8.6; 5 TABLET ORAL at 09:26

## 2023-12-02 RX ADMIN — LEVOTHYROXINE SODIUM 200 MCG: 100 TABLET ORAL at 09:26

## 2023-12-02 RX ADMIN — AZELASTINE HYDROCHLORIDE 1 SPRAY: 137 SPRAY, METERED NASAL at 09:27

## 2023-12-02 RX ADMIN — RANOLAZINE 1000 MG: 500 TABLET, EXTENDED RELEASE ORAL at 20:13

## 2023-12-02 RX ADMIN — LORATADINE 10 MG: 10 TABLET ORAL at 09:26

## 2023-12-02 RX ADMIN — OXYCODONE HYDROCHLORIDE 10 MG: 5 TABLET ORAL at 09:26

## 2023-12-02 RX ADMIN — RANOLAZINE 1000 MG: 500 TABLET, EXTENDED RELEASE ORAL at 09:26

## 2023-12-02 RX ADMIN — PANTOPRAZOLE SODIUM 40 MG: 40 TABLET, DELAYED RELEASE ORAL at 06:04

## 2023-12-02 RX ADMIN — FLUTICASONE FUROATE AND VILANTEROL TRIFENATATE 1 PUFF: 200; 25 POWDER RESPIRATORY (INHALATION) at 08:29

## 2023-12-02 RX ADMIN — OXYCODONE HYDROCHLORIDE 10 MG: 5 TABLET ORAL at 13:33

## 2023-12-02 RX ADMIN — METOPROLOL TARTRATE 100 MG: 50 TABLET, FILM COATED ORAL at 20:13

## 2023-12-02 RX ADMIN — ATORVASTATIN CALCIUM 80 MG: 80 TABLET, FILM COATED ORAL at 20:12

## 2023-12-02 RX ADMIN — OXYCODONE HYDROCHLORIDE 10 MG: 5 TABLET ORAL at 17:39

## 2023-12-02 RX ADMIN — DULOXETINE HYDROCHLORIDE 60 MG: 60 CAPSULE, DELAYED RELEASE ORAL at 09:25

## 2023-12-02 RX ADMIN — HYDROMORPHONE HYDROCHLORIDE 0.5 MG: 1 INJECTION, SOLUTION INTRAMUSCULAR; INTRAVENOUS; SUBCUTANEOUS at 20:16

## 2023-12-02 RX ADMIN — ACETAMINOPHEN 650 MG: 325 TABLET ORAL at 17:39

## 2023-12-02 RX ADMIN — SENNOSIDES AND DOCUSATE SODIUM 2 TABLET: 8.6; 5 TABLET ORAL at 20:12

## 2023-12-02 RX ADMIN — OXYCODONE HYDROCHLORIDE 10 MG: 5 TABLET ORAL at 04:19

## 2023-12-02 RX ADMIN — OXYCODONE HYDROCHLORIDE 10 MG: 5 TABLET ORAL at 21:33

## 2023-12-02 RX ADMIN — ACETAMINOPHEN 650 MG: 325 TABLET ORAL at 06:04

## 2023-12-02 RX ADMIN — METOPROLOL TARTRATE 100 MG: 50 TABLET, FILM COATED ORAL at 09:26

## 2023-12-02 RX ADMIN — AZELASTINE HYDROCHLORIDE 1 SPRAY: 137 SPRAY, METERED NASAL at 21:37

## 2023-12-02 ASSESSMENT — PAIN - FUNCTIONAL ASSESSMENT
PAIN_FUNCTIONAL_ASSESSMENT: 0-10

## 2023-12-02 ASSESSMENT — PAIN DESCRIPTION - LOCATION
LOCATION: NECK

## 2023-12-02 ASSESSMENT — PAIN SCALES - GENERAL
PAINLEVEL_OUTOF10: 7
PAINLEVEL_OUTOF10: 9
PAINLEVEL_OUTOF10: 5 - MODERATE PAIN
PAINLEVEL_OUTOF10: 7
PAINLEVEL_OUTOF10: 6
PAINLEVEL_OUTOF10: 7
PAINLEVEL_OUTOF10: 7
PAINLEVEL_OUTOF10: 4
PAINLEVEL_OUTOF10: 3
PAINLEVEL_OUTOF10: 7
PAINLEVEL_OUTOF10: 5 - MODERATE PAIN
PAINLEVEL_OUTOF10: 5 - MODERATE PAIN
PAINLEVEL_OUTOF10: 7

## 2023-12-02 ASSESSMENT — PAIN DESCRIPTION - ORIENTATION: ORIENTATION: MID

## 2023-12-02 NOTE — CARE PLAN
The patient's goals for the shift include patient will have managed pain during shift    Problem: Fall/Injury  Goal: Not fall by end of shift  Outcome: Met  Goal: Be free from injury by end of the shift  Outcome: Met  Goal: Verbalize understanding of personal risk factors for fall in the hospital  Outcome: Met  Goal: Verbalize understanding of risk factor reduction measures to prevent injury from fall in the home  Outcome: Met  Goal: Use assistive devices by end of the shift  Outcome: Met  Goal: Pace activities to prevent fatigue by end of the shift  Outcome: Met       The clinical goals for the shift include pt will be able to rate pain of 5/10 or less by end of shift

## 2023-12-02 NOTE — PROGRESS NOTES
Orthopaedic Spine Surgery Progress Note    Subjective:  NAEON. Doing well.    Objective:  Vitals:    12/02/23 0751   BP: 111/75   Pulse: 65   Resp: 16   Temp: 36.4 °C (97.5 °F)   SpO2: 97%       Physical Exam  - Constitutional: No acute distress, cooperative  - Eyes: anicteric  - Head/Neck: normocephalic atraumatic  - Respiratory/Thorax: NWOB  - Cardiovascular: RRR on peripheral palpation  - Gastrointestinal: Nondistended  - Psychological: Appropriate mood/behavior  - Skin: Warm and dry. Additional findings in musculoskeletal evaluation  - Musculoskeletal:  ---  Spine:  C5: SILT   Deltoid 5/5 Left; 5/5 Right  C6: SILT   Wrist Ext: 5/5 Left; 5/5 Right  C7: SILT   Triceps: 5/5 Left; 5/5 Right  C8: SILT   Finger flexion: 4+/5 Left; 4+/5 Right  T1: SILT    Interossei: 4+/5 Left; 4+/5 Right     Bicep Reflex 3+   Bilaterally  Thomas: Positive bilaterally     L1: SILT       L2: SILT      Hip flexors 5/5 Left; 5/5 Right  L3: SILT      Knee extension 5/5 Left; 5/5 Right  L4: SILT      Tib Ant. (Dorsiflexion) 5/5 Left; 5/5 Right  L5: SILT      EHL 5/5 Left; 5/5 Right  S1: SILT      Plantarflexion 5/5 Left; 5/5 Right     Patellar reflex: 3+   Bilaterally    No results found for this or any previous visit (from the past 24 hour(s)).      XR chest 1 view   Final Result   No acute cardiopulmonary process        I personally reviewed the images/study and I agree with the findings   as stated above by resident physician, Dr. Dharmesh Potter. The   study was interpreted at [facility] in [facility].        Signed by: Arnaud Alarcon 11/30/2023 9:18 AM   Dictation workstation:   [id number]      FL less than 1 hour    (Results Pending)         Assessment:  58M with C5/6 cervical stenosis c/f myelopathy    Plan:  Plan:  - Weight bearing: WBAT, OOBAT  - DVT ppx: SCDs, holding home chemo ppx in setting of upcoming surgery  - Diet: Clear liquid diet. advanced as tolerated  - Pain: Tylenol,  oxycodone 5/10, dilaudid PRN for pain management  - FEN: Continue NS at 100cc/hr; HLIV with good PO intake  - Bowel Regimen: Colace, senna, dulcolax   - PT/OT consult  - Pulm: Encourage IS  - cards consult for risk stratification  - C/P for ACDF/C6 corpectomy with Dr. Ruelas pending washout of eliquis, anticipate OR on 12/4  - appreciate cards recs  - preop labs ordered    Dispo: OR Pending    Laurent Abel MD  Orthopedic Surgery, PGY4  Available on Epic Chat     This patient will be followed by the Orthopaedic Spine service. Please page or Epic Chat the corresponding residents below with questions or concerns.     Ortho Spine Service ([employer] Preferred)  First call: Jamie Harris MD PGY2  Second call: Laurent Abel MD PGY4    6pm-6am M-F, Holidays, and weekends page Ortho on-call [phone] with urgent questions/concerns.

## 2023-12-02 NOTE — CARE PLAN
The patient's goals for the shift include patient will have managed pain during shift    The clinical goals for the shift include pt will remain free from injury throughout entire shift      Problem: Fall/Injury  Goal: Not fall by end of shift  Outcome: Progressing  Goal: Be free from injury by end of the shift  Outcome: Progressing  Goal: Verbalize understanding of personal risk factors for fall in the hospital  Outcome: Progressing  Goal: Verbalize understanding of risk factor reduction measures to prevent injury from fall in the home  Outcome: Progressing  Goal: Use assistive devices by end of the shift  Outcome: Progressing  Goal: Pace activities to prevent fatigue by end of the shift  Outcome: Progressing

## 2023-12-03 PROCEDURE — 94640 AIRWAY INHALATION TREATMENT: CPT

## 2023-12-03 PROCEDURE — 2500000002 HC RX 250 W HCPCS SELF ADMINISTERED DRUGS (ALT 637 FOR MEDICARE OP, ALT 636 FOR OP/ED)

## 2023-12-03 PROCEDURE — 2500000004 HC RX 250 GENERAL PHARMACY W/ HCPCS (ALT 636 FOR OP/ED)

## 2023-12-03 PROCEDURE — 1100000001 HC PRIVATE ROOM DAILY

## 2023-12-03 PROCEDURE — 2500000001 HC RX 250 WO HCPCS SELF ADMINISTERED DRUGS (ALT 637 FOR MEDICARE OP)

## 2023-12-03 RX ADMIN — LORATADINE 10 MG: 10 TABLET ORAL at 08:17

## 2023-12-03 RX ADMIN — RANOLAZINE 1000 MG: 500 TABLET, EXTENDED RELEASE ORAL at 20:39

## 2023-12-03 RX ADMIN — OXYCODONE HYDROCHLORIDE 10 MG: 5 TABLET ORAL at 22:43

## 2023-12-03 RX ADMIN — OXYCODONE HYDROCHLORIDE 10 MG: 5 TABLET ORAL at 18:25

## 2023-12-03 RX ADMIN — SENNOSIDES AND DOCUSATE SODIUM 2 TABLET: 8.6; 5 TABLET ORAL at 20:38

## 2023-12-03 RX ADMIN — ACETAMINOPHEN 650 MG: 325 TABLET ORAL at 06:02

## 2023-12-03 RX ADMIN — POLYETHYLENE GLYCOL 3350 17 G: 17 POWDER, FOR SOLUTION ORAL at 08:17

## 2023-12-03 RX ADMIN — OXYCODONE HYDROCHLORIDE 10 MG: 5 TABLET ORAL at 09:59

## 2023-12-03 RX ADMIN — RANOLAZINE 1000 MG: 500 TABLET, EXTENDED RELEASE ORAL at 08:17

## 2023-12-03 RX ADMIN — METOPROLOL TARTRATE 100 MG: 50 TABLET, FILM COATED ORAL at 08:18

## 2023-12-03 RX ADMIN — FLUTICASONE FUROATE AND VILANTEROL TRIFENATATE 1 PUFF: 200; 25 POWDER RESPIRATORY (INHALATION) at 10:03

## 2023-12-03 RX ADMIN — OXYCODONE HYDROCHLORIDE 10 MG: 5 TABLET ORAL at 06:03

## 2023-12-03 RX ADMIN — LEVOTHYROXINE SODIUM 200 MCG: 100 TABLET ORAL at 08:18

## 2023-12-03 RX ADMIN — DULOXETINE HYDROCHLORIDE 60 MG: 60 CAPSULE, DELAYED RELEASE ORAL at 08:18

## 2023-12-03 RX ADMIN — HYDROMORPHONE HYDROCHLORIDE 0.5 MG: 1 INJECTION, SOLUTION INTRAMUSCULAR; INTRAVENOUS; SUBCUTANEOUS at 05:01

## 2023-12-03 RX ADMIN — HYDROMORPHONE HYDROCHLORIDE 0.5 MG: 1 INJECTION, SOLUTION INTRAMUSCULAR; INTRAVENOUS; SUBCUTANEOUS at 16:43

## 2023-12-03 RX ADMIN — OXYCODONE HYDROCHLORIDE 10 MG: 5 TABLET ORAL at 01:34

## 2023-12-03 RX ADMIN — ATORVASTATIN CALCIUM 80 MG: 80 TABLET, FILM COATED ORAL at 20:39

## 2023-12-03 RX ADMIN — SENNOSIDES AND DOCUSATE SODIUM 2 TABLET: 8.6; 5 TABLET ORAL at 08:18

## 2023-12-03 RX ADMIN — METOPROLOL TARTRATE 100 MG: 50 TABLET, FILM COATED ORAL at 20:39

## 2023-12-03 RX ADMIN — ACETAMINOPHEN 650 MG: 325 TABLET ORAL at 12:42

## 2023-12-03 RX ADMIN — ACETAMINOPHEN 650 MG: 325 TABLET ORAL at 18:25

## 2023-12-03 RX ADMIN — OXYCODONE HYDROCHLORIDE 10 MG: 5 TABLET ORAL at 14:22

## 2023-12-03 RX ADMIN — PANTOPRAZOLE SODIUM 40 MG: 40 TABLET, DELAYED RELEASE ORAL at 06:02

## 2023-12-03 RX ADMIN — ACETAMINOPHEN 650 MG: 325 TABLET ORAL at 01:34

## 2023-12-03 ASSESSMENT — PAIN SCALES - GENERAL
PAINLEVEL_OUTOF10: 9
PAINLEVEL_OUTOF10: 7
PAINLEVEL_OUTOF10: 8
PAINLEVEL_OUTOF10: 8
PAINLEVEL_OUTOF10: 7
PAINLEVEL_OUTOF10: 8
PAINLEVEL_OUTOF10: 4
PAINLEVEL_OUTOF10: 6
PAINLEVEL_OUTOF10: 5 - MODERATE PAIN
PAINLEVEL_OUTOF10: 8
PAINLEVEL_OUTOF10: 3
PAINLEVEL_OUTOF10: 8

## 2023-12-03 ASSESSMENT — COGNITIVE AND FUNCTIONAL STATUS - GENERAL
PERSONAL GROOMING: A LITTLE
DRESSING REGULAR UPPER BODY CLOTHING: A LITTLE
MOBILITY SCORE: 18
WALKING IN HOSPITAL ROOM: A LITTLE
STANDING UP FROM CHAIR USING ARMS: A LITTLE
HELP NEEDED FOR BATHING: A LITTLE
DAILY ACTIVITIY SCORE: 18
MOVING TO AND FROM BED TO CHAIR: A LITTLE
TOILETING: A LITTLE
MOVING FROM LYING ON BACK TO SITTING ON SIDE OF FLAT BED WITH BEDRAILS: A LITTLE
DRESSING REGULAR LOWER BODY CLOTHING: A LITTLE
CLIMB 3 TO 5 STEPS WITH RAILING: A LITTLE
EATING MEALS: A LITTLE
TURNING FROM BACK TO SIDE WHILE IN FLAT BAD: A LITTLE

## 2023-12-03 ASSESSMENT — PAIN - FUNCTIONAL ASSESSMENT
PAIN_FUNCTIONAL_ASSESSMENT: 0-10

## 2023-12-03 ASSESSMENT — PAIN DESCRIPTION - LOCATION
LOCATION: NECK

## 2023-12-03 NOTE — PROGRESS NOTES
Orthopaedic Spine Surgery Progress Note    Subjective:  NAEON. Doing well. Ready for OR tomorrow    Objective:  Vitals:    12/03/23 0827   BP: 107/59   Pulse: 67   Resp: 16   Temp: 36.2 °C (97.1 °F)   SpO2: 94%       Physical Exam  - Constitutional: No acute distress, cooperative  - Eyes: anicteric  - Head/Neck: normocephalic atraumatic  - Respiratory/Thorax: NWOB  - Cardiovascular: RRR on peripheral palpation  - Gastrointestinal: Nondistended  - Psychological: Appropriate mood/behavior  - Skin: Warm and dry. Additional findings in musculoskeletal evaluation  - Musculoskeletal:  ---  Spine:  C5: SILT   Deltoid 5/5 Left; 5/5 Right  C6: SILT   Wrist Ext: 5/5 Left; 5/5 Right  C7: SILT   Triceps: 5/5 Left; 5/5 Right  C8: SILT   Finger flexion: 4+/5 Left; 4+/5 Right  T1: SILT    Interossei: 4+/5 Left; 4+/5 Right     Bicep Reflex 3+   Bilaterally  Thomas: Positive bilaterally     L1: SILT       L2: SILT      Hip flexors 5/5 Left; 5/5 Right  L3: SILT      Knee extension 5/5 Left; 5/5 Right  L4: SILT      Tib Ant. (Dorsiflexion) 5/5 Left; 5/5 Right  L5: SILT      EHL 5/5 Left; 5/5 Right  S1: SILT      Plantarflexion 5/5 Left; 5/5 Right     Patellar reflex: 3+   Bilaterally    Results for orders placed or performed during the hospital encounter of 11/29/23 (from the past 24 hour(s))   CBC   Result Value Ref Range    WBC 10.0 4.4 - 11.3 x10*3/uL    nRBC 0.0 0.0 - 0.0 /100 WBCs    RBC 4.85 4.50 - 5.90 x10*6/uL    Hemoglobin 13.7 13.5 - 17.5 g/dL    Hematocrit 42.4 41.0 - 52.0 %    MCV 87 80 - 100 fL    MCH 28.2 26.0 - 34.0 pg    MCHC 32.3 32.0 - 36.0 g/dL    RDW 12.8 11.5 - 14.5 %    Platelets 319 150 - 450 x10*3/uL   Basic metabolic panel   Result Value Ref Range    Glucose 120 (H) 74 - 99 mg/dL    Sodium 138 136 - 145 mmol/L    Potassium 4.5 3.5 - 5.3 mmol/L    Chloride 102 98 - 107 mmol/L    Bicarbonate 28 21 - 32 mmol/L    Anion Gap 13 10 - 20 mmol/L    Urea Nitrogen 16 6 - 23 mg/dL    Creatinine 1.02 0.50 - 1.30 mg/dL     eGFR 85 >60 mL/min/1.73m*2    Calcium 9.3 8.6 - 10.6 mg/dL   Type and Screen   Result Value Ref Range    ABO TYPE B     Rh TYPE POS     ANTIBODY SCREEN NEG    Coagulation Screen   Result Value Ref Range    Protime 13.0 (H) 9.8 - 12.8 seconds    INR 1.2 (H) 0.9 - 1.1    aPTT 38 27 - 38 seconds         XR chest 1 view   Final Result   No acute cardiopulmonary process        I personally reviewed the images/study and I agree with the findings   as stated above by resident physician, Dr. Dharmesh Potter. The   study was interpreted at [facility] in [facility].        Signed by: Arnaud Alarcon 11/30/2023 9:18 AM   Dictation workstation:   [id number]      [place] less than 1 hour    (Results Pending)         Assessment:  58M with C5/6 cervical stenosis c/f myelopathy    Plan:  Plan:  - Weight bearing: WBAT, OOBAT  - DVT ppx: SCDs, holding home chemo ppx in setting of upcoming surgery  - Diet: NPO after MN for OR tomorrow  - Pain: Tylenol, oxycodone 5/10, dilaudid PRN for pain management  - FEN: Continue NS at 100cc/hr; HLIV with good PO intake  - Bowel Regimen: Colace, senna, dulcolax   - PT/OT consult  - Pulm: Encourage IS  - cards consult for risk stratification  - C/P for ACDF/C6 corpectomy with Dr. Ruelas pending washout of eliquis, anticipate OR on 12/4  - appreciate cards recs  - preop labs completed    Dispo: OR Pending    Laurent Abel MD  Orthopedic Surgery, PGY4  Available on Epic Chat     This patient will be followed by the Orthopaedic Spine service. Please page or Epic Chat the corresponding residents below with questions or concerns.     Ortho Spine Service ([employer] Chat Preferred)  First call: Jamie Harris MD PGY2  Second call: Laurent Abel MD PGY4    6pm-6am M-F, Holidays, and weekends page Ortho on-call [phone] with urgent questions/concerns.

## 2023-12-04 ENCOUNTER — ANESTHESIA (OUTPATIENT)
Dept: OPERATING ROOM | Facility: HOSPITAL | Age: 58
DRG: 471 | End: 2023-12-04
Payer: MEDICARE

## 2023-12-04 ENCOUNTER — APPOINTMENT (OUTPATIENT)
Dept: RADIOLOGY | Facility: HOSPITAL | Age: 58
DRG: 471 | End: 2023-12-04
Payer: MEDICARE

## 2023-12-04 ENCOUNTER — HOSPITAL ENCOUNTER (INPATIENT)
Dept: NEUROLOGY | Facility: HOSPITAL | Age: 58
Discharge: HOME | DRG: 471 | End: 2023-12-04
Payer: MEDICARE

## 2023-12-04 PROBLEM — J45.909 MILD ASTHMA (HHS-HCC): Status: ACTIVE | Noted: 2023-12-04

## 2023-12-04 PROBLEM — G47.33 OSA (OBSTRUCTIVE SLEEP APNEA): Status: ACTIVE | Noted: 2023-12-04

## 2023-12-04 PROBLEM — Z98.890 HISTORY OF GENERAL ANESTHESIA: Status: ACTIVE | Noted: 2023-12-04

## 2023-12-04 PROBLEM — E03.9 ACQUIRED HYPOTHYROIDISM: Status: ACTIVE | Noted: 2023-12-04

## 2023-12-04 PROBLEM — K21.9 GASTROESOPHAGEAL REFLUX DISEASE WITHOUT ESOPHAGITIS: Status: ACTIVE | Noted: 2023-12-04

## 2023-12-04 PROBLEM — I25.10 CORONARY ARTERY DISEASE INVOLVING NATIVE CORONARY ARTERY: Status: ACTIVE | Noted: 2023-12-04

## 2023-12-04 PROBLEM — E11.9 TYPE 2 DIABETES MELLITUS, WITHOUT LONG-TERM CURRENT USE OF INSULIN (MULTI): Status: ACTIVE | Noted: 2023-12-04

## 2023-12-04 PROBLEM — I48.91 ATRIAL FIBRILLATION (MULTI): Status: ACTIVE | Noted: 2023-12-04

## 2023-12-04 PROBLEM — Z95.5 STENTED CORONARY ARTERY: Status: ACTIVE | Noted: 2023-12-04

## 2023-12-04 PROBLEM — J42 CHRONIC BRONCHITIS (MULTI): Status: ACTIVE | Noted: 2023-12-04

## 2023-12-04 LAB
GLUCOSE BLD MANUAL STRIP-MCNC: 123 MG/DL (ref 74–99)
GLUCOSE BLD MANUAL STRIP-MCNC: 173 MG/DL (ref 74–99)

## 2023-12-04 PROCEDURE — 72020 X-RAY EXAM OF SPINE 1 VIEW: CPT | Mod: FY,RSC

## 2023-12-04 PROCEDURE — 3600000004 HC OR TIME - INITIAL BASE CHARGE - PROCEDURE LEVEL FOUR: Performed by: ORTHOPAEDIC SURGERY

## 2023-12-04 PROCEDURE — 0RG20K0 FUSION OF 2 OR MORE CERVICAL VERTEBRAL JOINTS WITH NONAUTOLOGOUS TISSUE SUBSTITUTE, ANTERIOR APPROACH, ANTERIOR COLUMN, OPEN APPROACH: ICD-10-PCS | Performed by: ORTHOPAEDIC SURGERY

## 2023-12-04 PROCEDURE — 2500000004 HC RX 250 GENERAL PHARMACY W/ HCPCS (ALT 636 FOR OP/ED): Performed by: STUDENT IN AN ORGANIZED HEALTH CARE EDUCATION/TRAINING PROGRAM

## 2023-12-04 PROCEDURE — 7100000001 HC RECOVERY ROOM TIME - INITIAL BASE CHARGE: Performed by: ORTHOPAEDIC SURGERY

## 2023-12-04 PROCEDURE — 2500000005 HC RX 250 GENERAL PHARMACY W/O HCPCS

## 2023-12-04 PROCEDURE — 63081 REMOVE VERT BODY DCMPRN CRVL: CPT | Performed by: ORTHOPAEDIC SURGERY

## 2023-12-04 PROCEDURE — 7100000002 HC RECOVERY ROOM TIME - EACH INCREMENTAL 1 MINUTE: Performed by: ORTHOPAEDIC SURGERY

## 2023-12-04 PROCEDURE — G0453 CONT INTRAOP NEURO MONITOR: HCPCS | Performed by: PSYCHIATRY & NEUROLOGY

## 2023-12-04 PROCEDURE — 2500000001 HC RX 250 WO HCPCS SELF ADMINISTERED DRUGS (ALT 637 FOR MEDICARE OP): Performed by: ORTHOPAEDIC SURGERY

## 2023-12-04 PROCEDURE — 95938 SOMATOSENSORY TESTING: CPT | Performed by: PSYCHIATRY & NEUROLOGY

## 2023-12-04 PROCEDURE — 2500000005 HC RX 250 GENERAL PHARMACY W/O HCPCS: Performed by: ORTHOPAEDIC SURGERY

## 2023-12-04 PROCEDURE — 22845 INSERT SPINE FIXATION DEVICE: CPT | Performed by: ORTHOPAEDIC SURGERY

## 2023-12-04 PROCEDURE — 95955 EEG DURING SURGERY: CPT | Performed by: PSYCHIATRY & NEUROLOGY

## 2023-12-04 PROCEDURE — 2500000004 HC RX 250 GENERAL PHARMACY W/ HCPCS (ALT 636 FOR OP/ED): Performed by: ORTHOPAEDIC SURGERY

## 2023-12-04 PROCEDURE — 2500000004 HC RX 250 GENERAL PHARMACY W/ HCPCS (ALT 636 FOR OP/ED): Performed by: NURSE ANESTHETIST, CERTIFIED REGISTERED

## 2023-12-04 PROCEDURE — 2500000002 HC RX 250 W HCPCS SELF ADMINISTERED DRUGS (ALT 637 FOR MEDICARE OP, ALT 636 FOR OP/ED)

## 2023-12-04 PROCEDURE — 00NW0ZZ RELEASE CERVICAL SPINAL CORD, OPEN APPROACH: ICD-10-PCS | Performed by: ORTHOPAEDIC SURGERY

## 2023-12-04 PROCEDURE — 3600000009 HC OR TIME - EACH INCREMENTAL 1 MINUTE - PROCEDURE LEVEL FOUR: Performed by: ORTHOPAEDIC SURGERY

## 2023-12-04 PROCEDURE — 22554 ARTHRD ANT NTRBD MIN DSC CRV: CPT | Performed by: ORTHOPAEDIC SURGERY

## 2023-12-04 PROCEDURE — 2780000003 HC OR 278 NO HCPCS: Performed by: ORTHOPAEDIC SURGERY

## 2023-12-04 PROCEDURE — 2500000005 HC RX 250 GENERAL PHARMACY W/O HCPCS: Performed by: NURSE ANESTHETIST, CERTIFIED REGISTERED

## 2023-12-04 PROCEDURE — A4217 STERILE WATER/SALINE, 500 ML: HCPCS | Performed by: ORTHOPAEDIC SURGERY

## 2023-12-04 PROCEDURE — 0RT30ZZ RESECTION OF CERVICAL VERTEBRAL DISC, OPEN APPROACH: ICD-10-PCS | Performed by: ORTHOPAEDIC SURGERY

## 2023-12-04 PROCEDURE — C1713 ANCHOR/SCREW BN/BN,TIS/BN: HCPCS | Performed by: ORTHOPAEDIC SURGERY

## 2023-12-04 PROCEDURE — 94760 N-INVAS EAR/PLS OXIMETRY 1: CPT

## 2023-12-04 PROCEDURE — 82947 ASSAY GLUCOSE BLOOD QUANT: CPT

## 2023-12-04 PROCEDURE — C1776 JOINT DEVICE (IMPLANTABLE): HCPCS | Performed by: ORTHOPAEDIC SURGERY

## 2023-12-04 PROCEDURE — 22585 ARTHRD ANT NTRBD MIN DSC EA: CPT | Performed by: ORTHOPAEDIC SURGERY

## 2023-12-04 PROCEDURE — 2720000007 HC OR 272 NO HCPCS: Performed by: ORTHOPAEDIC SURGERY

## 2023-12-04 PROCEDURE — 99232 SBSQ HOSP IP/OBS MODERATE 35: CPT | Performed by: NURSE PRACTITIONER

## 2023-12-04 PROCEDURE — 1100000001 HC PRIVATE ROOM DAILY

## 2023-12-04 PROCEDURE — 3700000001 HC GENERAL ANESTHESIA TIME - INITIAL BASE CHARGE: Performed by: ORTHOPAEDIC SURGERY

## 2023-12-04 PROCEDURE — 2500000004 HC RX 250 GENERAL PHARMACY W/ HCPCS (ALT 636 FOR OP/ED)

## 2023-12-04 PROCEDURE — A63081 PR REMV VERT BODY,CERV,ONE SGMT: Performed by: NURSE ANESTHETIST, CERTIFIED REGISTERED

## 2023-12-04 PROCEDURE — 2500000001 HC RX 250 WO HCPCS SELF ADMINISTERED DRUGS (ALT 637 FOR MEDICARE OP): Performed by: STUDENT IN AN ORGANIZED HEALTH CARE EDUCATION/TRAINING PROGRAM

## 2023-12-04 PROCEDURE — 22854 INSJ BIOMECHANICAL DEVICE: CPT | Performed by: ORTHOPAEDIC SURGERY

## 2023-12-04 PROCEDURE — C1821 INTERSPINOUS IMPLANT: HCPCS | Performed by: ORTHOPAEDIC SURGERY

## 2023-12-04 PROCEDURE — 95955 EEG DURING SURGERY: CPT

## 2023-12-04 PROCEDURE — 72020 X-RAY EXAM OF SPINE 1 VIEW: CPT | Mod: FY

## 2023-12-04 PROCEDURE — 3700000002 HC GENERAL ANESTHESIA TIME - EACH INCREMENTAL 1 MINUTE: Performed by: ORTHOPAEDIC SURGERY

## 2023-12-04 PROCEDURE — A63081 PR REMV VERT BODY,CERV,ONE SGMT: Performed by: STUDENT IN AN ORGANIZED HEALTH CARE EDUCATION/TRAINING PROGRAM

## 2023-12-04 PROCEDURE — 2500000001 HC RX 250 WO HCPCS SELF ADMINISTERED DRUGS (ALT 637 FOR MEDICARE OP)

## 2023-12-04 PROCEDURE — 94640 AIRWAY INHALATION TREATMENT: CPT

## 2023-12-04 DEVICE — SPACER 6277006 PSR STRUT 6 X 14 X 11MM
Type: IMPLANTABLE DEVICE | Site: SPINE CERVICAL | Status: FUNCTIONAL
Brand: VERTE-STACK® SPINAL SYSTEM

## 2023-12-04 DEVICE — SCREW, ZEVO, VAR SD, 3.5 X 15MM: Type: IMPLANTABLE DEVICE | Site: SPINE CERVICAL | Status: FUNCTIONAL

## 2023-12-04 DEVICE — PIN, DISTRACTION, CERVICAL, 14 MM, STAINLESS STEEL, DISPOSABLE, STERILE: Type: IMPLANTABLE DEVICE | Site: SPINE CERVICAL | Status: NON-FUNCTIONAL

## 2023-12-04 DEVICE — DBX PUTTY, 1CC
Type: IMPLANTABLE DEVICE | Site: SPINE CERVICAL | Status: FUNCTIONAL
Brand: DBX®

## 2023-12-04 DEVICE — IMPLANTABLE DEVICE: Type: IMPLANTABLE DEVICE | Site: SPINE CERVICAL | Status: FUNCTIONAL

## 2023-12-04 RX ORDER — ALBUTEROL SULFATE 0.83 MG/ML
2.5 SOLUTION RESPIRATORY (INHALATION) ONCE AS NEEDED
Status: DISCONTINUED | OUTPATIENT
Start: 2023-12-04 | End: 2023-12-04 | Stop reason: HOSPADM

## 2023-12-04 RX ORDER — LIDOCAINE HYDROCHLORIDE 20 MG/ML
INJECTION, SOLUTION INFILTRATION; PERINEURAL AS NEEDED
Status: DISCONTINUED | OUTPATIENT
Start: 2023-12-04 | End: 2023-12-04

## 2023-12-04 RX ORDER — BACITRACIN 500 [USP'U]/G
OINTMENT TOPICAL AS NEEDED
Status: DISCONTINUED | OUTPATIENT
Start: 2023-12-04 | End: 2023-12-04 | Stop reason: HOSPADM

## 2023-12-04 RX ORDER — FLUTICASONE FUROATE AND VILANTEROL 200; 25 UG/1; UG/1
1 POWDER RESPIRATORY (INHALATION) DAILY
Status: DISCONTINUED | OUTPATIENT
Start: 2023-12-04 | End: 2023-12-04 | Stop reason: SDUPTHER

## 2023-12-04 RX ORDER — ONDANSETRON 4 MG/1
4 TABLET, ORALLY DISINTEGRATING ORAL EVERY 8 HOURS PRN
Status: DISCONTINUED | OUTPATIENT
Start: 2023-12-04 | End: 2023-12-04 | Stop reason: SDUPTHER

## 2023-12-04 RX ORDER — METOPROLOL TARTRATE 50 MG/1
100 TABLET ORAL 2 TIMES DAILY
Status: DISCONTINUED | OUTPATIENT
Start: 2023-12-04 | End: 2023-12-04

## 2023-12-04 RX ORDER — SODIUM CHLORIDE, SODIUM LACTATE, POTASSIUM CHLORIDE, CALCIUM CHLORIDE 600; 310; 30; 20 MG/100ML; MG/100ML; MG/100ML; MG/100ML
75 INJECTION, SOLUTION INTRAVENOUS CONTINUOUS
Status: DISCONTINUED | OUTPATIENT
Start: 2023-12-04 | End: 2023-12-04 | Stop reason: SDUPTHER

## 2023-12-04 RX ORDER — BISACODYL 10 MG/1
10 SUPPOSITORY RECTAL DAILY PRN
Status: DISCONTINUED | OUTPATIENT
Start: 2023-12-04 | End: 2023-12-04 | Stop reason: SDUPTHER

## 2023-12-04 RX ORDER — OXYCODONE HYDROCHLORIDE 5 MG/1
10 TABLET ORAL EVERY 4 HOURS PRN
Status: DISCONTINUED | OUTPATIENT
Start: 2023-12-04 | End: 2023-12-04 | Stop reason: SDUPTHER

## 2023-12-04 RX ORDER — OXYCODONE HYDROCHLORIDE 5 MG/1
10 TABLET ORAL EVERY 4 HOURS PRN
Status: DISCONTINUED | OUTPATIENT
Start: 2023-12-04 | End: 2023-12-04 | Stop reason: HOSPADM

## 2023-12-04 RX ORDER — PROPOFOL 10 MG/ML
INJECTION, EMULSION INTRAVENOUS AS NEEDED
Status: DISCONTINUED | OUTPATIENT
Start: 2023-12-04 | End: 2023-12-04

## 2023-12-04 RX ORDER — TAMSULOSIN HYDROCHLORIDE 0.4 MG/1
0.4 CAPSULE ORAL DAILY
Status: DISCONTINUED | OUTPATIENT
Start: 2023-12-04 | End: 2023-12-11 | Stop reason: HOSPADM

## 2023-12-04 RX ORDER — DULOXETIN HYDROCHLORIDE 60 MG/1
60 CAPSULE, DELAYED RELEASE ORAL DAILY
Status: DISCONTINUED | OUTPATIENT
Start: 2023-12-04 | End: 2023-12-04 | Stop reason: SDUPTHER

## 2023-12-04 RX ORDER — HYDROMORPHONE HYDROCHLORIDE 1 MG/ML
0.5 INJECTION, SOLUTION INTRAMUSCULAR; INTRAVENOUS; SUBCUTANEOUS EVERY 5 MIN PRN
Status: DISCONTINUED | OUTPATIENT
Start: 2023-12-04 | End: 2023-12-04 | Stop reason: HOSPADM

## 2023-12-04 RX ORDER — ACETAMINOPHEN 325 MG/1
975 TABLET ORAL EVERY 8 HOURS
Status: DISCONTINUED | OUTPATIENT
Start: 2023-12-04 | End: 2023-12-04 | Stop reason: SDUPTHER

## 2023-12-04 RX ORDER — ALBUTEROL SULFATE 90 UG/1
2 AEROSOL, METERED RESPIRATORY (INHALATION) EVERY 4 HOURS PRN
Status: DISCONTINUED | OUTPATIENT
Start: 2023-12-04 | End: 2023-12-11 | Stop reason: HOSPADM

## 2023-12-04 RX ORDER — POLYETHYLENE GLYCOL 3350 17 G/17G
17 POWDER, FOR SOLUTION ORAL DAILY
Status: DISCONTINUED | OUTPATIENT
Start: 2023-12-04 | End: 2023-12-04 | Stop reason: SDUPTHER

## 2023-12-04 RX ORDER — DEXAMETHASONE SODIUM PHOSPHATE 100 MG/10ML
INJECTION INTRAMUSCULAR; INTRAVENOUS AS NEEDED
Status: DISCONTINUED | OUTPATIENT
Start: 2023-12-04 | End: 2023-12-04

## 2023-12-04 RX ORDER — METHOCARBAMOL 100 MG/ML
1000 INJECTION, SOLUTION INTRAMUSCULAR; INTRAVENOUS ONCE
Status: DISCONTINUED | OUTPATIENT
Start: 2023-12-04 | End: 2023-12-04 | Stop reason: HOSPADM

## 2023-12-04 RX ORDER — ESMOLOL HYDROCHLORIDE 10 MG/ML
INJECTION INTRAVENOUS AS NEEDED
Status: DISCONTINUED | OUTPATIENT
Start: 2023-12-04 | End: 2023-12-04

## 2023-12-04 RX ORDER — AZELASTINE 1 MG/ML
2 SPRAY, METERED NASAL 2 TIMES DAILY
Status: DISCONTINUED | OUTPATIENT
Start: 2023-12-04 | End: 2023-12-04 | Stop reason: SDUPTHER

## 2023-12-04 RX ORDER — LIDOCAINE HYDROCHLORIDE 10 MG/ML
0.1 INJECTION INFILTRATION; PERINEURAL ONCE
Status: DISCONTINUED | OUTPATIENT
Start: 2023-12-04 | End: 2023-12-04 | Stop reason: HOSPADM

## 2023-12-04 RX ORDER — LORATADINE 10 MG/1
10 TABLET ORAL DAILY
Status: DISCONTINUED | OUTPATIENT
Start: 2023-12-04 | End: 2023-12-04 | Stop reason: SDUPTHER

## 2023-12-04 RX ORDER — FENTANYL CITRATE 50 UG/ML
INJECTION, SOLUTION INTRAMUSCULAR; INTRAVENOUS AS NEEDED
Status: DISCONTINUED | OUTPATIENT
Start: 2023-12-04 | End: 2023-12-04

## 2023-12-04 RX ORDER — PANTOPRAZOLE SODIUM 40 MG/1
40 TABLET, DELAYED RELEASE ORAL
Status: DISCONTINUED | OUTPATIENT
Start: 2023-12-05 | End: 2023-12-04 | Stop reason: SDUPTHER

## 2023-12-04 RX ORDER — CEFAZOLIN 1 G/1
INJECTION, POWDER, FOR SOLUTION INTRAVENOUS AS NEEDED
Status: DISCONTINUED | OUTPATIENT
Start: 2023-12-04 | End: 2023-12-04

## 2023-12-04 RX ORDER — SODIUM CHLORIDE, SODIUM LACTATE, POTASSIUM CHLORIDE, CALCIUM CHLORIDE 600; 310; 30; 20 MG/100ML; MG/100ML; MG/100ML; MG/100ML
100 INJECTION, SOLUTION INTRAVENOUS CONTINUOUS
Status: DISCONTINUED | OUTPATIENT
Start: 2023-12-04 | End: 2023-12-04 | Stop reason: HOSPADM

## 2023-12-04 RX ORDER — PHENYLEPHRINE HCL IN 0.9% NACL 0.4MG/10ML
SYRINGE (ML) INTRAVENOUS AS NEEDED
Status: DISCONTINUED | OUTPATIENT
Start: 2023-12-04 | End: 2023-12-04

## 2023-12-04 RX ORDER — LEVOTHYROXINE SODIUM 100 UG/1
200 TABLET ORAL
Status: DISCONTINUED | OUTPATIENT
Start: 2023-12-05 | End: 2023-12-04 | Stop reason: SDUPTHER

## 2023-12-04 RX ORDER — CEFAZOLIN SODIUM 2 G/100ML
2 INJECTION, SOLUTION INTRAVENOUS EVERY 8 HOURS
Status: COMPLETED | OUTPATIENT
Start: 2023-12-04 | End: 2023-12-05

## 2023-12-04 RX ORDER — ONDANSETRON HYDROCHLORIDE 2 MG/ML
4 INJECTION, SOLUTION INTRAVENOUS ONCE AS NEEDED
Status: DISCONTINUED | OUTPATIENT
Start: 2023-12-04 | End: 2023-12-04 | Stop reason: HOSPADM

## 2023-12-04 RX ORDER — MIDAZOLAM HYDROCHLORIDE 1 MG/ML
INJECTION, SOLUTION INTRAMUSCULAR; INTRAVENOUS AS NEEDED
Status: DISCONTINUED | OUTPATIENT
Start: 2023-12-04 | End: 2023-12-04

## 2023-12-04 RX ORDER — ATORVASTATIN CALCIUM 80 MG/1
80 TABLET, FILM COATED ORAL DAILY
Status: DISCONTINUED | OUTPATIENT
Start: 2023-12-04 | End: 2023-12-04 | Stop reason: SDUPTHER

## 2023-12-04 RX ORDER — METHOCARBAMOL 500 MG/1
750 TABLET, FILM COATED ORAL EVERY 8 HOURS PRN
Status: DISCONTINUED | OUTPATIENT
Start: 2023-12-04 | End: 2023-12-11 | Stop reason: HOSPADM

## 2023-12-04 RX ORDER — ONDANSETRON HYDROCHLORIDE 2 MG/ML
4 INJECTION, SOLUTION INTRAVENOUS EVERY 8 HOURS PRN
Status: DISCONTINUED | OUTPATIENT
Start: 2023-12-04 | End: 2023-12-04 | Stop reason: SDUPTHER

## 2023-12-04 RX ORDER — NALOXONE HYDROCHLORIDE 0.4 MG/ML
0.2 INJECTION, SOLUTION INTRAMUSCULAR; INTRAVENOUS; SUBCUTANEOUS EVERY 5 MIN PRN
Status: DISCONTINUED | OUTPATIENT
Start: 2023-12-04 | End: 2023-12-04 | Stop reason: SDUPTHER

## 2023-12-04 RX ORDER — OXYCODONE HYDROCHLORIDE 5 MG/1
5 TABLET ORAL EVERY 4 HOURS PRN
Status: DISCONTINUED | OUTPATIENT
Start: 2023-12-04 | End: 2023-12-04 | Stop reason: SDUPTHER

## 2023-12-04 RX ORDER — HYDROMORPHONE HYDROCHLORIDE 1 MG/ML
0.5 INJECTION, SOLUTION INTRAMUSCULAR; INTRAVENOUS; SUBCUTANEOUS EVERY 4 HOURS PRN
Status: DISCONTINUED | OUTPATIENT
Start: 2023-12-04 | End: 2023-12-04 | Stop reason: SDUPTHER

## 2023-12-04 RX ORDER — ROCURONIUM BROMIDE 10 MG/ML
INJECTION, SOLUTION INTRAVENOUS AS NEEDED
Status: DISCONTINUED | OUTPATIENT
Start: 2023-12-04 | End: 2023-12-04

## 2023-12-04 RX ORDER — OXYCODONE HYDROCHLORIDE 5 MG/1
5 TABLET ORAL EVERY 4 HOURS PRN
Status: DISCONTINUED | OUTPATIENT
Start: 2023-12-04 | End: 2023-12-04 | Stop reason: HOSPADM

## 2023-12-04 RX ORDER — BISACODYL 5 MG
10 TABLET, DELAYED RELEASE (ENTERIC COATED) ORAL DAILY PRN
Status: DISCONTINUED | OUTPATIENT
Start: 2023-12-04 | End: 2023-12-04 | Stop reason: SDUPTHER

## 2023-12-04 RX ORDER — PROPOFOL 10 MG/ML
INJECTION, EMULSION INTRAVENOUS CONTINUOUS PRN
Status: DISCONTINUED | OUTPATIENT
Start: 2023-12-04 | End: 2023-12-04

## 2023-12-04 RX ORDER — HYDROMORPHONE HYDROCHLORIDE 1 MG/ML
0.2 INJECTION, SOLUTION INTRAMUSCULAR; INTRAVENOUS; SUBCUTANEOUS EVERY 5 MIN PRN
Status: DISCONTINUED | OUTPATIENT
Start: 2023-12-04 | End: 2023-12-04 | Stop reason: HOSPADM

## 2023-12-04 RX ORDER — ACETAMINOPHEN 325 MG/1
650 TABLET ORAL EVERY 4 HOURS PRN
Status: DISCONTINUED | OUTPATIENT
Start: 2023-12-04 | End: 2023-12-04 | Stop reason: HOSPADM

## 2023-12-04 RX ADMIN — METOPROLOL TARTRATE 100 MG: 50 TABLET, FILM COATED ORAL at 22:27

## 2023-12-04 RX ADMIN — OXYCODONE HYDROCHLORIDE 10 MG: 5 TABLET ORAL at 02:49

## 2023-12-04 RX ADMIN — ROCURONIUM BROMIDE 40 MG: 10 INJECTION INTRAVENOUS at 15:23

## 2023-12-04 RX ADMIN — DULOXETINE HYDROCHLORIDE 60 MG: 60 CAPSULE, DELAYED RELEASE ORAL at 08:43

## 2023-12-04 RX ADMIN — ROCURONIUM BROMIDE 60 MG: 10 INJECTION INTRAVENOUS at 14:52

## 2023-12-04 RX ADMIN — HYDROMORPHONE HYDROCHLORIDE 0.6 MG: 1 INJECTION, SOLUTION INTRAMUSCULAR; INTRAVENOUS; SUBCUTANEOUS at 17:30

## 2023-12-04 RX ADMIN — PROPOFOL 50 MCG/KG/MIN: 10 INJECTION, EMULSION INTRAVENOUS at 15:13

## 2023-12-04 RX ADMIN — HYDROMORPHONE HYDROCHLORIDE 0.5 MG: 1 INJECTION, SOLUTION INTRAMUSCULAR; INTRAVENOUS; SUBCUTANEOUS at 22:24

## 2023-12-04 RX ADMIN — TAMSULOSIN HYDROCHLORIDE 0.4 MG: 0.4 CAPSULE ORAL at 22:26

## 2023-12-04 RX ADMIN — ESMOLOL HYDROCHLORIDE 20 MG: 100 INJECTION, SOLUTION INTRAVENOUS at 15:00

## 2023-12-04 RX ADMIN — ATORVASTATIN CALCIUM 80 MG: 80 TABLET, FILM COATED ORAL at 22:26

## 2023-12-04 RX ADMIN — LIDOCAINE HYDROCHLORIDE 100 MG: 20 INJECTION, SOLUTION INFILTRATION; PERINEURAL at 14:52

## 2023-12-04 RX ADMIN — RANOLAZINE 1000 MG: 500 TABLET, EXTENDED RELEASE ORAL at 08:43

## 2023-12-04 RX ADMIN — PROPOFOL 40 MG: 10 INJECTION, EMULSION INTRAVENOUS at 15:00

## 2023-12-04 RX ADMIN — HYDROMORPHONE HYDROCHLORIDE 0.4 MG: 1 INJECTION, SOLUTION INTRAMUSCULAR; INTRAVENOUS; SUBCUTANEOUS at 16:41

## 2023-12-04 RX ADMIN — SENNOSIDES AND DOCUSATE SODIUM 2 TABLET: 8.6; 5 TABLET ORAL at 08:43

## 2023-12-04 RX ADMIN — DEXAMETHASONE SODIUM PHOSPHATE 10 MG: 10 INJECTION INTRAMUSCULAR; INTRAVENOUS at 14:53

## 2023-12-04 RX ADMIN — MIDAZOLAM 2 MG: 1 INJECTION INTRAMUSCULAR; INTRAVENOUS at 14:51

## 2023-12-04 RX ADMIN — CEFAZOLIN SODIUM 2 G: 2 INJECTION, SOLUTION INTRAVENOUS at 23:23

## 2023-12-04 RX ADMIN — ESMOLOL HYDROCHLORIDE 20 MG: 100 INJECTION, SOLUTION INTRAVENOUS at 17:30

## 2023-12-04 RX ADMIN — HYDROMORPHONE HYDROCHLORIDE 0.5 MG: 1 INJECTION, SOLUTION INTRAMUSCULAR; INTRAVENOUS; SUBCUTANEOUS at 07:21

## 2023-12-04 RX ADMIN — SODIUM CHLORIDE, SODIUM LACTATE, POTASSIUM CHLORIDE, AND CALCIUM CHLORIDE: 600; 310; 30; 20 INJECTION, SOLUTION INTRAVENOUS at 14:19

## 2023-12-04 RX ADMIN — SUGAMMADEX 200 MG: 100 INJECTION, SOLUTION INTRAVENOUS at 17:11

## 2023-12-04 RX ADMIN — PROPOFOL 160 MG: 10 INJECTION, EMULSION INTRAVENOUS at 14:52

## 2023-12-04 RX ADMIN — LEVOTHYROXINE SODIUM 200 MCG: 100 TABLET ORAL at 08:43

## 2023-12-04 RX ADMIN — LORATADINE 10 MG: 10 TABLET ORAL at 08:43

## 2023-12-04 RX ADMIN — AZELASTINE HYDROCHLORIDE 1 SPRAY: 137 SPRAY, METERED NASAL at 08:45

## 2023-12-04 RX ADMIN — Medication 120 MCG: at 16:34

## 2023-12-04 RX ADMIN — FLUTICASONE FUROATE AND VILANTEROL TRIFENATATE 1 PUFF: 200; 25 POWDER RESPIRATORY (INHALATION) at 08:18

## 2023-12-04 RX ADMIN — RANOLAZINE 1000 MG: 500 TABLET, EXTENDED RELEASE ORAL at 22:27

## 2023-12-04 RX ADMIN — PANTOPRAZOLE SODIUM 40 MG: 40 TABLET, DELAYED RELEASE ORAL at 08:45

## 2023-12-04 RX ADMIN — ACETAMINOPHEN 650 MG: 325 TABLET ORAL at 22:26

## 2023-12-04 RX ADMIN — FENTANYL CITRATE 50 MCG: 50 INJECTION, SOLUTION INTRAMUSCULAR; INTRAVENOUS at 14:52

## 2023-12-04 RX ADMIN — CEFAZOLIN 2 G: 330 INJECTION, POWDER, FOR SOLUTION INTRAMUSCULAR; INTRAVENOUS at 15:06

## 2023-12-04 RX ADMIN — OXYCODONE HYDROCHLORIDE 10 MG: 5 TABLET ORAL at 23:23

## 2023-12-04 RX ADMIN — FENTANYL CITRATE 50 MCG: 50 INJECTION, SOLUTION INTRAMUSCULAR; INTRAVENOUS at 17:25

## 2023-12-04 RX ADMIN — ONDANSETRON 4 MG: 2 INJECTION INTRAMUSCULAR; INTRAVENOUS at 16:58

## 2023-12-04 RX ADMIN — OXYCODONE HYDROCHLORIDE 5 MG: 5 TABLET ORAL at 18:59

## 2023-12-04 RX ADMIN — OXYCODONE HYDROCHLORIDE 10 MG: 5 TABLET ORAL at 09:30

## 2023-12-04 SDOH — HEALTH STABILITY: MENTAL HEALTH: CURRENT SMOKER: 0

## 2023-12-04 ASSESSMENT — PAIN SCALES - GENERAL
PAINLEVEL_OUTOF10: 5 - MODERATE PAIN
PAINLEVEL_OUTOF10: 7
PAINLEVEL_OUTOF10: 8
PAINLEVEL_OUTOF10: 4
PAINLEVEL_OUTOF10: 5 - MODERATE PAIN
PAINLEVEL_OUTOF10: 8
PAINLEVEL_OUTOF10: 4
PAINLEVEL_OUTOF10: 10 - WORST POSSIBLE PAIN
PAINLEVEL_OUTOF10: 7

## 2023-12-04 ASSESSMENT — PAIN - FUNCTIONAL ASSESSMENT
PAIN_FUNCTIONAL_ASSESSMENT: 0-10

## 2023-12-04 NOTE — PROGRESS NOTES
Subjective Data:  Planning for OR today after eliquis washout   Denies CP/ SOB/dizziness, palpitations         Overnight Events:         Objective Data:  Last Recorded Vitals:  Vitals:    12/03/23 1003 12/03/23 1553 12/04/23 0028 12/04/23 0700   BP:  112/70 107/66 99/67   BP Location:  Right arm     Patient Position:  Lying     Pulse:  58 58 57   Resp:  16 16 17   Temp:  36.3 °C (97.3 °F) 35.8 °C (96.4 °F) 36.6 °C (97.9 °F)   TempSrc:  Temporal  Temporal   SpO2: 95% 95% 92% 94%   Weight:       Height:           Last Labs:  CBC - 12/2/2023: 12:03 PM  10.0 13.7 319    42.4      CMP - 12/2/2023: 12:03 PM  9.3 7.1 21 --- 0.4   4.2 4.2 17 107      PTT - 12/2/2023: 12:03 PM  1.2   13.0 38     TROPHS   Date/Time Value Ref Range Status   11/29/2023 12:15 AM 7 0 - 20 ng/L Final     HGBA1C   Date/Time Value Ref Range Status   01/22/2023 01:20 AM 6.0 <5.7 % Final     Comment:     Normal less than 5.7%  Prediabetes 5.7% to 6.4%  Diabetes 6.5% or higher      --HgbA1C levels may not be accurate in patients who have renal disease, received recent blood transfusions, are anemic, or who have dyshemoglobinemia.   03/28/2022 11:49 AM 6.5 % Final     Comment:     Normal less than 5.7%  Prediabetes 5.7% to 6.4%  Diabetes 6.5% or higher      --HgbA1C levels may not be accurate in patients who have  renal disease, received recent blood transfusions, are anemic,  or who have dyshemoglobinemia.   07/22/2021 11:12 AM 6.8 % Final     Comment:          Diagnosis of Diabetes-Adults   Non-Diabetic: < or = 5.6%   Increased risk for developing diabetes: 5.7-6.4%   Diagnostic of diabetes: > or = 6.5%  .       Monitoring of Diabetes                Age (y)     Therapeutic Goal (%)   Adults:          >18           <7.0   Pediatrics:    13-18           <7.5                   7-12           <8.0                   0- 6            7.5-8.5   American Diabetes Association. Diabetes Care 33(S1), Jan 2010.     08/03/2018 07:20 AM 8.8 % Final     Comment:           Diagnosis of Diabetes-Adults   Non-Diabetic: < or = 5.6%   Increased risk for developing diabetes: 5.7-6.4%   Diagnostic of diabetes: > or = 6.5%  .       Monitoring of Diabetes                Age (y)     Therapeutic Goal (%)   Adults:          >18           <7.0   Pediatrics:    13-18           <7.5                   7-12           <8.0                   0- 6            7.5-8.5   American Diabetes Association. Diabetes Care 33(S1), Jan 2010.       VLDL   Date/Time Value Ref Range Status   07/22/2021 11:12 AM 49 0 - 40 mg/dL Final      Last I/O:  No intake/output data recorded.    Inpatient Medications:  Scheduled medications   Medication Dose Route Frequency    acetaminophen  650 mg oral q6h SEBLE    atorvastatin  80 mg oral Nightly    azelastine  1 spray Each Nostril BID    DULoxetine  60 mg oral Daily    fluticasone furoate-vilanteroL  1 puff inhalation Daily    levothyroxine  200 mcg oral q AM    loratadine  10 mg oral Daily    metoprolol tartrate  100 mg oral BID    pantoprazole  40 mg oral Daily before breakfast    Or    pantoprazole  40 mg intravenous Daily before breakfast    polyethylene glycol  17 g oral Daily    ranolazine  1,000 mg oral BID    sennosides-docusate sodium  2 tablet oral BID     PRN medications   Medication    albuterol    bisacodyl    cyclobenzaprine    HYDROmorphone    magnesium hydroxide    naloxone    ondansetron    Or    ondansetron    oxyCODONE    oxyCODONE    prochlorperazine    Or    prochlorperazine    Or    prochlorperazine     Continuous Medications   Medication Dose Last Rate    lactated Ringer's  125 mL/hr 125 mL/hr (11/30/23 1327)    oxygen  2 L/min         Physical Exam:  Physical Exam  Constitutional:       Appearance: Normal appearance.   Cardiovascular:      Rate and Rhythm: Normal rate and regular rhythm.      Pulses: Normal pulses.   Pulmonary:      Effort: Pulmonary effort is normal.      Breath sounds: Normal breath sounds.   Abdominal:      General: Bowel sounds are  normal.      Palpations: Abdomen is soft.   Musculoskeletal:         General: Normal range of motion.   Skin:     General: Skin is warm and dry.      Capillary Refill: Capillary refill takes less than 2 seconds.   Neurological:      Mental Status: He is alert and oriented to person, place, and time.   Psychiatric:         Mood and Affect: Mood normal.           Assessment/Plan   Philip Barfield is a 58 years old male with extensive CAD history with CABG x3 in 2003 with subsequent PCI (most recent) of distal LAD via LIMA graft (2 overlapped JACE) a couple years ago followed by balloon angioplasty of diffuse in-stent restenosis of distal LAD via LIMA graft in January 2023 in the setting of recurrent angina and small non-STEMI with concomitant new onset atrial fibrillation with rapid rates at that time. Shortly afterwards,  he was readmitted with recurrent chest discomfort and underwent repeat cardiac cath on 2/1/2023 which showed recently angioplasty distal LAD was widely patent without angiographically significant restenosis.   His other medical history includes paroxysmal Afib on eliquis, Diabetes, HLD, HTN, ARISTEO, TIA, COPD, vertigo, and vestibular neuritis. Patient recently had a syncopal fall in September striking his neck. Since then, he has been having progressing upper and lower extremity symptoms (numbness, paraesthesia). He has had multiple non syncopal falls due lower extremity symptoms. He presented to the ED for bilateral leg weakness and numbness. MRI showed C5/6 cervical stenosis c/f myelopathy and plans for anterior cervical discectomy and fusion (ACDF). Cardiology was consulted for preoperative evaluation.      #Pre-Op Evaluation  -Revised Cardiac Risk Index (RCRI)=2 points = Class III risk. This equates to 10.1% 30 days risk of death, MI, or cardiac arrest.   -It is difficult to assess the functional capacity of this patient at this time due to his injury. However, he was functioning independently before  his fall. Patient's most recent cardiac cath (2/1/2023) showed patent LAD and no flow limiting lesions. From cardiac perspective, he may proceed to surgery without further testing.   - C/P for ACDF/C6 corpectomy with Dr. Ruelas pending washout of eliquis, plan OR today   - Eliquis held prior to surgery will need to resume post op when appropriate    Cardiology will follow,    Discussed with Dr. Devon Dodd, APRN-CNP  Cardiology Consults    Please call with any questions  Pager [phone] M-F 8a-5p; Saturday 8a-2p  Pager [phone] all other times       Code Status:  Full Code          overweight/BMI = 29.7; no edema noted

## 2023-12-04 NOTE — ANESTHESIA PROCEDURE NOTES
Airway  Date/Time: 12/4/2023 2:56 PM  Urgency: elective    Airway not difficult    Staffing  Performed: KACIE   Authorized by: BRIAN Cruz    Performed by: BRIAN Valente  Patient location during procedure: OR    Indications and Patient Condition  Indications for airway management: anesthesia  Spontaneous Ventilation: absent  Sedation level: deep  Preoxygenated: yes  Patient position: sniffing  Mask difficulty assessment: 1 - vent by mask    Final Airway Details  Final airway type: endotracheal airway      Successful airway: ETT  Cuffed: yes   Successful intubation technique: video laryngoscopy  Facilitating devices/methods: intubating stylet  Endotracheal tube insertion site: oral  Blade: Gaby  Blade size: #4  ETT size (mm): 7.5  Cormack-Lehane Classification: grade I - full view of glottis  Placement verified by: capnometry   Measured from: lips  ETT to lips (cm): 22  Number of attempts at approach: 1  Number of other approaches attempted: 0

## 2023-12-04 NOTE — HOSPITAL COURSE
Philip Barfield is a 58 y.o. male w/ extensive CAD hx w/ CABG x 3 (LIMA-LAD, (left) Radial artery - diag, Vein graft-OM) in 2003 and subsequent PCI of distal LAD via LIMA graft (2 overlapped JACE), multiple RCA overlap stents, cervical myelopathy s/p C5 anterior cervical corpectomy and fusion at C4-6 on 12/4, paroxysmal Afib (on home eliquis), T2DM, HLD, HTN, COPD, asthma, TIAs, vestibular neuritis, vertigo, ARISTEO on CPAP who presents to CICU w/ NSTEMI s/p balloon angioplasty of in stent thrombosis of RCA.     Pt was originally a direct admit from [facility] (presented to [facility] ED on 11/28) on 11/30 after presenting w/  bilateral UE/LE weakness, numbness, and paresthesias starting in September after he sustained a syncopal fall striking his neck. Multiple nonsyncopal falls since then. MRI spine showed C5/C6 severe stenosis w/ c/f myelopathy. Home plavix and eliquis were stopped for 5 days for pre-surgery washout and pt was taken for C5 anterior cervical corpectomy and fusion at C4-6 with Dr. Ruelas on 12/04.     Following his procedure, a rapid response was called for agitation, AMS, and chest pains. There was no concern for ischemic changes on EKG and pt was vitally stable. Another RR was called on 12/5 w/ agitation/paranoia and chest pains again, labs showed elevated trops at 2,029, EKG showed NSR w/ no ischemic changes. Trops continued to uptrend overnight: 2803 > 7477 >14,000 > 16,470 (peak on 12/6 at 6 am) > 13,579 > 11,098. Heparin drip was started on 12/6 and pt was taken for LHC on 12/6 which showed patent LIMA-LAD, Radial artery - diag, and Vein graft-OM known occluded. RCA with prior chronic total occlusion PCI, now has new in stent thrombosis -> treated with penumbra thrombectomy and balloon angioplasty. Pt was given ASA, cangrelor drip, ticagrelor load in lab.     On arrival to CICU on 12/6 post-cath, pt was vitally stable. Pt found in bed comfortably on exam. He has some shortness of breath on exam, and  complains of neck soreness. He denies CP, abdominal pain, fevers/chills, dizziness.     Cangrelor drip was stopped 2 hours after ticacrelor load and heparin drip was continued w/ low intensity protocol. Heparin drip stopped 12/7 AM. 12/7 echo with abnormal septal motion c/w postoperative status, 45-50% EF, and abnormal basal and mid inferior septum and basal anteroseptal segment. Psychiatry consulted given agitation with paranoia, believed to be hyperactive delirium multifactorial in etiology however if patient continues to decline may consider further workup for psychosis. On melatonin and quetiapine. PT/OT recommending SNF. Stable for transfer to the floor.   He was transferred to the telemetry nursing floor.  He was seen by PT/OT who recommend home physical and occupational therapy.  Requested nursing for post op wound check and cardiopulmonary follow up.     For post cardiac procedure he will need to take triple therapy (aspirin, ticagrelor and apixiban) for 4 week 1/3/2024 then stop ticagrelor and aspirin; to restart home plavix and continue apixiban  starting 1/4/2024     An appointment with His cardiology Dr. Jamie Quiroz has been requested for 2-3 weeks    He has an appointment with spine surgeon Dr. Balaji Ruelas for 1/3/2024    He need to call PCP Dr. Rossy Valdez for appt in 1-2 weeks    His home CPAP machine is broken so an outpatient sleep medicine appointment with Dr. Ender Childs on 1/16/2024 has been scheduled.

## 2023-12-04 NOTE — PROGRESS NOTES
Orthopaedic Spine Surgery Progress Note    Subjective:  NAEON. Doing well. Ready for OR today    Objective:  Vitals:    12/04/23 0028   BP: 107/66   Pulse: 58   Resp: 16   Temp: 35.8 °C (96.4 °F)   SpO2: 92%       Physical Exam  - Constitutional: No acute distress, cooperative  - Eyes: anicteric  - Head/Neck: normocephalic atraumatic  - Respiratory/Thorax: NWOB  - Cardiovascular: RRR on peripheral palpation  - Gastrointestinal: Nondistended  - Psychological: Appropriate mood/behavior  - Skin: Warm and dry. Additional findings in musculoskeletal evaluation  - Musculoskeletal:  ---  Spine:  C5: SILT   Deltoid 5/5 Left; 5/5 Right  C6: SILT   Wrist Ext: 5/5 Left; 5/5 Right  C7: SILT   Triceps: 5/5 Left; 5/5 Right  C8: SILT   Finger flexion: 4+/5 Left; 4+/5 Right  T1: SILT    Interossei: 4+/5 Left; 4+/5 Right     Bicep Reflex 3+   Bilaterally  Thomas: Positive bilaterally     L1: SILT       L2: SILT      Hip flexors 5/5 Left; 5/5 Right  L3: SILT      Knee extension 5/5 Left; 5/5 Right  L4: SILT      Tib Ant. (Dorsiflexion) 5/5 Left; 5/5 Right  L5: SILT      EHL 5/5 Left; 5/5 Right  S1: SILT      Plantarflexion 5/5 Left; 5/5 Right     Patellar reflex: 3+   Bilaterally    No results found for this or any previous visit (from the past 24 hour(s)).        XR chest 1 view   Final Result   No acute cardiopulmonary process        I personally reviewed the images/study and I agree with the findings   as stated above by resident physician, Dr. Dharmesh Potter. The   study was interpreted at [facility] in [facility].        Signed by: Arnaud Alarcon 11/30/2023 9:18 AM   Dictation workstation:   [id number]      FL less than 1 hour    (Results Pending)         Assessment:  58M with C5/6 cervical stenosis c/f myelopathy    Plan:  Plan:  - Weight bearing: WBAT, OOBAT  - DVT ppx: SCDs, holding home chemo ppx in setting of upcoming surgery  - Diet: NPO for OR today  - Pain: Tylenol, oxycodone  5/10, dilaudid PRN for pain management  - FEN: Continue NS at 100cc/hr; HLIV with good PO intake  - Bowel Regimen: Colace, senna, dulcolax   - PT/OT consult  - Pulm: Encourage IS  - cards consult for risk stratification  - C/P for ACDF/C6 corpectomy with Dr. Ruelas today, 12/4  - preop labs completed    Dispo: OR Pending    Laurent Abel MD  Orthopedic Surgery, PGY4  Available on Epic Chat     This patient will be followed by the Orthopaedic Spine service. Please page or Epic Chat the corresponding residents below with questions or concerns.     Ortho Spine Service ([employer] Preferred)  First call: Jamie Harris MD PGY2  Second call: Laurent Abel MD PGY4    6pm-6am M-F, Holidays, and weekends page Ortho on-call @[phone] with urgent questions/concerns.

## 2023-12-04 NOTE — ANESTHESIA PREPROCEDURE EVALUATION
Patient: Philip Barfield    Procedure Information       Date/Time: 12/04/23 1200    Procedure: Corpectomy Cervical (Neck)    Location: [facility] OR 07 / [facility] OR    Surgeons: Balaji Ruelas MD            Relevant Problems   Anesthesia   (+) History of general anesthesia      Cardiovascular   (+) Atrial fibrillation (CMS/HCC)   (+) Coronary artery disease involving native coronary artery   (+) Stented coronary artery      Endocrine   (+) Acquired hypothyroidism   (+) Type 2 diabetes mellitus, without long-term current use of insulin (CMS/HCC)      GI  Well controlled with medications   (+) Gastroesophageal reflux disease without esophagitis      Pulmonary   (+) Chronic bronchitis (CMS/HCC)   (+) Mild asthma   (+) ARISTEO (obstructive sleep apnea)       Clinical information reviewed:   Tobacco  Allergies  Meds   Med Hx  Surg Hx   Fam Hx  Soc Hx        NPO Detail:  NPO/Void Status  Date of Last Liquid: 12/03/23  Time of Last Liquid: 0000  Date of Last Solid: 12/03/23  Time of Last Solid: 0000         Physical Exam    Airway  Mallampati: III  TM distance: >3 FB  Neck ROM: limited     Cardiovascular    Dental   Comments: Poor dentition   Pulmonary    Abdominal        Anesthesia Plan    ASA 3     general     The patient is not a current smoker.    intravenous induction   Postoperative administration of opioids is intended.  Trial extubation is planned.  Anesthetic plan and risks discussed with patient.  Use of blood products discussed with patient who consented to blood products.    Plan discussed with CAA.

## 2023-12-04 NOTE — NURSING NOTE
At this time patient request for pain medicine. This RN brought in available oxycodone. Patient was upset by this and refused the medication stating he is used to the pain he will just deal with it. Patient has not requested or complained about pain inbetween doses of oxycodone. This RN is unsure of new onset of agitation.

## 2023-12-04 NOTE — CARE PLAN
The clinical goals for the shift included pain management while patient awaits surgery 12/4.    Problem: Pain - Adult  Goal: Verbalizes/displays adequate comfort level or baseline comfort level  Outcome: Progressing     Problem: Safety - Adult  Goal: Free from fall injury  Outcome: Progressing     Problem: Chronic Conditions and Co-morbidities  Goal: Patient's chronic conditions and co-morbidity symptoms are monitored and maintained or improved  Outcome: Progressing     Problem: Pain  Goal: Takes deep breaths with improved pain control throughout the shift  Outcome: Progressing  Goal: Turns in bed with improved pain control throughout the shift  Outcome: Progressing  Goal: Walks with improved pain control throughout the shift  Outcome: Progressing  Goal: Performs ADL's with improved pain control throughout shift  Outcome: Progressing

## 2023-12-04 NOTE — PROGRESS NOTES
Orthopaedic Spine Surgery Progress Note    Subjective:  Post operative check performed with patient awake and orientated after anesthesia. Doing well. Pain controlled. Denies n/v/cp/sob. Denies numbness/tingling.     Objective:  Vitals:    12/04/23 1745   BP:    Pulse: 68   Resp: 14   Temp:    SpO2: 93%       Physical Exam  - Constitutional: No acute distress, cooperative  - Eyes: anicteric  - Head/Neck: normocephalic atraumatic  - Respiratory/Thorax: NWOB  - Cardiovascular: RRR on peripheral palpation  - Gastrointestinal: Nondistended  - Psychological: Appropriate mood/behavior  - Skin: Warm and dry. Additional findings in musculoskeletal evaluation  - Musculoskeletal:  ---  Spine:  Soft collar in place  Dressing c/d/i    C5: SILT   Deltoid 5/5 Left; 5/5 Right  C6: SILT   Wrist Ext: 5/5 Left; 5/5 Right  C7: SILT   Triceps: 5/5 Left; 5/5 Right  C8: SILT   Finger flexion: 5/5 Left; 5/5 Right  T1: SILT    Interossei: 5/5 Left; 5/5 Right     L1: SILT       L2: SILT      Hip flexors 5/5 Left; 5/5 Right  L3: SILT      Knee extension 5/5 Left; 5/5 Right  L4: SILT      Tib Ant. (Dorsiflexion) 5/5 Left; 5/5 Right  L5: SILT      EHL 5/5 Left; 5/5 Right  S1: SILT      Plantarflexion 5/5 Left; 5/5 Right         Results for orders placed or performed during the hospital encounter of 11/29/23 (from the past 24 hour(s))   POCT GLUCOSE   Result Value Ref Range    POCT Glucose 123 (H) 74 - 99 mg/dL           XR cervical spine 1 view   Final Result      XR chest 1 view   Final Result   No acute cardiopulmonary process        I personally reviewed the images/study and I agree with the findings   as stated above by resident physician, Dr. Dharmesh Potter. The   study was interpreted at [facility] in [facility].        Signed by: Arnaud Alarcon 11/30/2023 9:18 AM   Dictation workstation:   [id number]      XR cervical spine 1 view    (Results Pending)         Assessment:  58M with C5/6  cervical stenosis c/f myelopathy now s/p anterior cervical corpectomy and fusion at C5/6 with Dr. Ruelas on 12/04    Plan:  Plan:  - Weight bearing: WBAT, OOBAT, no HOB restrictions  - DVT ppx: SCDs, holding home chemo ppx   - Diet: CLD ADAT on POD1  - Pain: Tylenol, oxycodone 5/10, dilaudid PRN for pain management  - FEN: Continue NS at 100cc/hr; HLIV with good PO intake  - Bowel Regimen: Colace, senna, dulcolax   - PT/OT consult  - Pulm: Encourage IS  - appreciate cards recs  - dressing change in the AM  - PT/OT  - No bending, twisting, lifting > 10 lbs  - maintain soft collar    Dispo: PT/OT, Pain control    Laurent Abel MD  Orthopedic Surgery, PGY4  Available on Epic Chat     This patient will be followed by the Orthopaedic Spine service. Please page or Epic Chat the corresponding residents below with questions or concerns.     Ortho Spine Service ([employer] Chat Preferred)  First call: Jamie Harris MD PGY2  Second call: Laurent Abel MD PGY4    6pm-6am M-F, Holidays, and weekends page Ortho on-call [phone] with urgent questions/concerns.

## 2023-12-05 LAB
ALBUMIN SERPL BCP-MCNC: 3.9 G/DL (ref 3.4–5)
ALP SERPL-CCNC: 95 U/L (ref 33–120)
ALT SERPL W P-5'-P-CCNC: 14 U/L (ref 10–52)
ANION GAP SERPL CALC-SCNC: 15 MMOL/L (ref 10–20)
ANION GAP SERPL CALC-SCNC: 16 MMOL/L (ref 10–20)
ANION GAP SERPL CALC-SCNC: 17 MMOL/L (ref 10–20)
ANION GAP SERPL CALC-SCNC: 21 MMOL/L (ref 10–20)
APTT PPP: 34 SECONDS (ref 27–38)
AST SERPL W P-5'-P-CCNC: 32 U/L (ref 9–39)
BILIRUB DIRECT SERPL-MCNC: 0.1 MG/DL (ref 0–0.3)
BILIRUB SERPL-MCNC: 0.5 MG/DL (ref 0–1.2)
BUN SERPL-MCNC: 15 MG/DL (ref 6–23)
BUN SERPL-MCNC: 16 MG/DL (ref 6–23)
BUN SERPL-MCNC: 19 MG/DL (ref 6–23)
BUN SERPL-MCNC: 19 MG/DL (ref 6–23)
CALCIUM SERPL-MCNC: 10 MG/DL (ref 8.6–10.6)
CALCIUM SERPL-MCNC: 10 MG/DL (ref 8.6–10.6)
CALCIUM SERPL-MCNC: 10.1 MG/DL (ref 8.6–10.6)
CALCIUM SERPL-MCNC: 9.9 MG/DL (ref 8.6–10.6)
CARDIAC TROPONIN I PNL SERPL HS: 2803 NG/L (ref 0–53)
CHLORIDE SERPL-SCNC: 101 MMOL/L (ref 98–107)
CHLORIDE SERPL-SCNC: 101 MMOL/L (ref 98–107)
CHLORIDE SERPL-SCNC: 102 MMOL/L (ref 98–107)
CHLORIDE SERPL-SCNC: 102 MMOL/L (ref 98–107)
CO2 SERPL-SCNC: 21 MMOL/L (ref 21–32)
CO2 SERPL-SCNC: 23 MMOL/L (ref 21–32)
CO2 SERPL-SCNC: 24 MMOL/L (ref 21–32)
CO2 SERPL-SCNC: 24 MMOL/L (ref 21–32)
CREAT SERPL-MCNC: 1.05 MG/DL (ref 0.5–1.3)
CREAT SERPL-MCNC: 1.09 MG/DL (ref 0.5–1.3)
CREAT SERPL-MCNC: 1.11 MG/DL (ref 0.5–1.3)
CREAT SERPL-MCNC: 1.14 MG/DL (ref 0.5–1.3)
ERYTHROCYTE [DISTWIDTH] IN BLOOD BY AUTOMATED COUNT: 12.7 % (ref 11.5–14.5)
ERYTHROCYTE [DISTWIDTH] IN BLOOD BY AUTOMATED COUNT: 12.9 % (ref 11.5–14.5)
GFR SERPL CREATININE-BSD FRML MDRD: 75 ML/MIN/1.73M*2
GFR SERPL CREATININE-BSD FRML MDRD: 77 ML/MIN/1.73M*2
GFR SERPL CREATININE-BSD FRML MDRD: 79 ML/MIN/1.73M*2
GFR SERPL CREATININE-BSD FRML MDRD: 82 ML/MIN/1.73M*2
GLUCOSE SERPL-MCNC: 161 MG/DL (ref 74–99)
GLUCOSE SERPL-MCNC: 164 MG/DL (ref 74–99)
GLUCOSE SERPL-MCNC: 209 MG/DL (ref 74–99)
GLUCOSE SERPL-MCNC: 209 MG/DL (ref 74–99)
HCT VFR BLD AUTO: 41.2 % (ref 41–52)
HCT VFR BLD AUTO: 42.6 % (ref 41–52)
HGB BLD-MCNC: 13.5 G/DL (ref 13.5–17.5)
HGB BLD-MCNC: 14 G/DL (ref 13.5–17.5)
INR PPP: 1.2 (ref 0.9–1.1)
MAGNESIUM SERPL-MCNC: 1.61 MG/DL (ref 1.6–2.4)
MCH RBC QN AUTO: 28.2 PG (ref 26–34)
MCH RBC QN AUTO: 28.3 PG (ref 26–34)
MCHC RBC AUTO-ENTMCNC: 32.8 G/DL (ref 32–36)
MCHC RBC AUTO-ENTMCNC: 32.9 G/DL (ref 32–36)
MCV RBC AUTO: 86 FL (ref 80–100)
MCV RBC AUTO: 86 FL (ref 80–100)
NRBC BLD-RTO: 0 /100 WBCS (ref 0–0)
NRBC BLD-RTO: 0 /100 WBCS (ref 0–0)
PHOSPHATE SERPL-MCNC: 3.1 MG/DL (ref 2.5–4.9)
PLATELET # BLD AUTO: 391 X10*3/UL (ref 150–450)
PLATELET # BLD AUTO: 417 X10*3/UL (ref 150–450)
POTASSIUM SERPL-SCNC: 4.4 MMOL/L (ref 3.5–5.3)
POTASSIUM SERPL-SCNC: 4.6 MMOL/L (ref 3.5–5.3)
POTASSIUM SERPL-SCNC: 5 MMOL/L (ref 3.5–5.3)
POTASSIUM SERPL-SCNC: 5.7 MMOL/L (ref 3.5–5.3)
PROT SERPL-MCNC: 7.7 G/DL (ref 6.4–8.2)
PROTHROMBIN TIME: 13.4 SECONDS (ref 9.8–12.8)
RBC # BLD AUTO: 4.79 X10*6/UL (ref 4.5–5.9)
RBC # BLD AUTO: 4.94 X10*6/UL (ref 4.5–5.9)
SODIUM SERPL-SCNC: 136 MMOL/L (ref 136–145)
SODIUM SERPL-SCNC: 136 MMOL/L (ref 136–145)
SODIUM SERPL-SCNC: 137 MMOL/L (ref 136–145)
SODIUM SERPL-SCNC: 138 MMOL/L (ref 136–145)
TSH SERPL-ACNC: 0.01 MIU/L (ref 0.44–3.98)
WBC # BLD AUTO: 15 X10*3/UL (ref 4.4–11.3)
WBC # BLD AUTO: 19.4 X10*3/UL (ref 4.4–11.3)

## 2023-12-05 PROCEDURE — 97165 OT EVAL LOW COMPLEX 30 MIN: CPT | Mod: GO

## 2023-12-05 PROCEDURE — 97535 SELF CARE MNGMENT TRAINING: CPT | Mod: GO

## 2023-12-05 PROCEDURE — 84484 ASSAY OF TROPONIN QUANT: CPT

## 2023-12-05 PROCEDURE — 84100 ASSAY OF PHOSPHORUS: CPT

## 2023-12-05 PROCEDURE — 2500000004 HC RX 250 GENERAL PHARMACY W/ HCPCS (ALT 636 FOR OP/ED): Performed by: STUDENT IN AN ORGANIZED HEALTH CARE EDUCATION/TRAINING PROGRAM

## 2023-12-05 PROCEDURE — 94640 AIRWAY INHALATION TREATMENT: CPT

## 2023-12-05 PROCEDURE — 83735 ASSAY OF MAGNESIUM: CPT

## 2023-12-05 PROCEDURE — 85610 PROTHROMBIN TIME: CPT

## 2023-12-05 PROCEDURE — 80048 BASIC METABOLIC PNL TOTAL CA: CPT | Mod: CCI

## 2023-12-05 PROCEDURE — 2500000004 HC RX 250 GENERAL PHARMACY W/ HCPCS (ALT 636 FOR OP/ED)

## 2023-12-05 PROCEDURE — 85027 COMPLETE CBC AUTOMATED: CPT | Performed by: STUDENT IN AN ORGANIZED HEALTH CARE EDUCATION/TRAINING PROGRAM

## 2023-12-05 PROCEDURE — 93010 ELECTROCARDIOGRAM REPORT: CPT | Performed by: INTERNAL MEDICINE

## 2023-12-05 PROCEDURE — 1100000001 HC PRIVATE ROOM DAILY

## 2023-12-05 PROCEDURE — 2500000001 HC RX 250 WO HCPCS SELF ADMINISTERED DRUGS (ALT 637 FOR MEDICARE OP)

## 2023-12-05 PROCEDURE — 97162 PT EVAL MOD COMPLEX 30 MIN: CPT | Mod: GP,KX | Performed by: PHYSICAL THERAPIST

## 2023-12-05 PROCEDURE — 84443 ASSAY THYROID STIM HORMONE: CPT

## 2023-12-05 PROCEDURE — C9113 INJ PANTOPRAZOLE SODIUM, VIA: HCPCS

## 2023-12-05 PROCEDURE — 2500000002 HC RX 250 W HCPCS SELF ADMINISTERED DRUGS (ALT 637 FOR MEDICARE OP, ALT 636 FOR OP/ED)

## 2023-12-05 PROCEDURE — 80048 BASIC METABOLIC PNL TOTAL CA: CPT | Performed by: STUDENT IN AN ORGANIZED HEALTH CARE EDUCATION/TRAINING PROGRAM

## 2023-12-05 PROCEDURE — 80053 COMPREHEN METABOLIC PANEL: CPT | Performed by: STUDENT IN AN ORGANIZED HEALTH CARE EDUCATION/TRAINING PROGRAM

## 2023-12-05 PROCEDURE — 82248 BILIRUBIN DIRECT: CPT

## 2023-12-05 PROCEDURE — 36415 COLL VENOUS BLD VENIPUNCTURE: CPT | Performed by: STUDENT IN AN ORGANIZED HEALTH CARE EDUCATION/TRAINING PROGRAM

## 2023-12-05 PROCEDURE — 99233 SBSQ HOSP IP/OBS HIGH 50: CPT | Performed by: NURSE PRACTITIONER

## 2023-12-05 RX ADMIN — SENNOSIDES AND DOCUSATE SODIUM 2 TABLET: 8.6; 5 TABLET ORAL at 21:52

## 2023-12-05 RX ADMIN — TAMSULOSIN HYDROCHLORIDE 0.4 MG: 0.4 CAPSULE ORAL at 08:16

## 2023-12-05 RX ADMIN — SENNOSIDES AND DOCUSATE SODIUM 2 TABLET: 8.6; 5 TABLET ORAL at 08:16

## 2023-12-05 RX ADMIN — HYDROMORPHONE HYDROCHLORIDE 0.5 MG: 1 INJECTION, SOLUTION INTRAMUSCULAR; INTRAVENOUS; SUBCUTANEOUS at 10:15

## 2023-12-05 RX ADMIN — METOPROLOL TARTRATE 100 MG: 50 TABLET, FILM COATED ORAL at 21:51

## 2023-12-05 RX ADMIN — FLUTICASONE FUROATE AND VILANTEROL TRIFENATATE 1 PUFF: 200; 25 POWDER RESPIRATORY (INHALATION) at 08:32

## 2023-12-05 RX ADMIN — ACETAMINOPHEN 650 MG: 325 TABLET ORAL at 11:16

## 2023-12-05 RX ADMIN — DULOXETINE HYDROCHLORIDE 60 MG: 60 CAPSULE, DELAYED RELEASE ORAL at 08:16

## 2023-12-05 RX ADMIN — SODIUM CHLORIDE, POTASSIUM CHLORIDE, SODIUM LACTATE AND CALCIUM CHLORIDE 125 ML/HR: 600; 310; 30; 20 INJECTION, SOLUTION INTRAVENOUS at 12:17

## 2023-12-05 RX ADMIN — METOPROLOL TARTRATE 100 MG: 50 TABLET, FILM COATED ORAL at 08:16

## 2023-12-05 RX ADMIN — AZELASTINE HYDROCHLORIDE 1 SPRAY: 137 SPRAY, METERED NASAL at 08:16

## 2023-12-05 RX ADMIN — CYCLOBENZAPRINE 10 MG: 10 TABLET, FILM COATED ORAL at 11:17

## 2023-12-05 RX ADMIN — ACETAMINOPHEN 650 MG: 325 TABLET ORAL at 23:52

## 2023-12-05 RX ADMIN — LORATADINE 10 MG: 10 TABLET ORAL at 08:16

## 2023-12-05 RX ADMIN — ACETAMINOPHEN 650 MG: 325 TABLET ORAL at 06:31

## 2023-12-05 RX ADMIN — HYDROMORPHONE HYDROCHLORIDE 0.5 MG: 1 INJECTION, SOLUTION INTRAMUSCULAR; INTRAVENOUS; SUBCUTANEOUS at 14:15

## 2023-12-05 RX ADMIN — OXYCODONE HYDROCHLORIDE 10 MG: 5 TABLET ORAL at 12:17

## 2023-12-05 RX ADMIN — RANOLAZINE 1000 MG: 500 TABLET, EXTENDED RELEASE ORAL at 08:16

## 2023-12-05 RX ADMIN — PANTOPRAZOLE SODIUM 40 MG: 40 INJECTION, POWDER, FOR SOLUTION INTRAVENOUS at 06:31

## 2023-12-05 RX ADMIN — POLYETHYLENE GLYCOL 3350 17 G: 17 POWDER, FOR SOLUTION ORAL at 08:16

## 2023-12-05 RX ADMIN — LEVOTHYROXINE SODIUM 200 MCG: 100 TABLET ORAL at 08:16

## 2023-12-05 RX ADMIN — HYDROMORPHONE HYDROCHLORIDE 0.5 MG: 1 INJECTION, SOLUTION INTRAMUSCULAR; INTRAVENOUS; SUBCUTANEOUS at 06:35

## 2023-12-05 RX ADMIN — OXYCODONE HYDROCHLORIDE 10 MG: 5 TABLET ORAL at 08:18

## 2023-12-05 RX ADMIN — CEFAZOLIN SODIUM 2 G: 2 INJECTION, SOLUTION INTRAVENOUS at 06:31

## 2023-12-05 RX ADMIN — RANOLAZINE 1000 MG: 500 TABLET, EXTENDED RELEASE ORAL at 21:51

## 2023-12-05 RX ADMIN — OXYCODONE HYDROCHLORIDE 5 MG: 5 TABLET ORAL at 23:52

## 2023-12-05 RX ADMIN — ATORVASTATIN CALCIUM 80 MG: 80 TABLET, FILM COATED ORAL at 21:52

## 2023-12-05 RX ADMIN — AZELASTINE HYDROCHLORIDE 1 SPRAY: 137 SPRAY, METERED NASAL at 21:52

## 2023-12-05 RX ADMIN — OXYCODONE HYDROCHLORIDE 10 MG: 5 TABLET ORAL at 03:26

## 2023-12-05 ASSESSMENT — COGNITIVE AND FUNCTIONAL STATUS - GENERAL
WALKING IN HOSPITAL ROOM: A LITTLE
TURNING FROM BACK TO SIDE WHILE IN FLAT BAD: A LITTLE
STANDING UP FROM CHAIR USING ARMS: A LITTLE
MOVING FROM LYING ON BACK TO SITTING ON SIDE OF FLAT BED WITH BEDRAILS: A LITTLE
STANDING UP FROM CHAIR USING ARMS: A LITTLE
TOILETING: A LOT
CLIMB 3 TO 5 STEPS WITH RAILING: A LITTLE
MOVING TO AND FROM BED TO CHAIR: A LITTLE
CLIMB 3 TO 5 STEPS WITH RAILING: A LITTLE
MOBILITY SCORE: 18
DRESSING REGULAR LOWER BODY CLOTHING: A LITTLE
PERSONAL GROOMING: A LITTLE
EATING MEALS: A LITTLE
EATING MEALS: A LITTLE
MOVING FROM LYING ON BACK TO SITTING ON SIDE OF FLAT BED WITH BEDRAILS: A LITTLE
DAILY ACTIVITIY SCORE: 15
DRESSING REGULAR LOWER BODY CLOTHING: A LOT
TOILETING: A LITTLE
DAILY ACTIVITIY SCORE: 18
HELP NEEDED FOR BATHING: A LITTLE
WALKING IN HOSPITAL ROOM: A LITTLE
HELP NEEDED FOR BATHING: A LOT
MOBILITY SCORE: 18
TURNING FROM BACK TO SIDE WHILE IN FLAT BAD: A LITTLE
PERSONAL GROOMING: A LITTLE
DRESSING REGULAR UPPER BODY CLOTHING: A LITTLE
MOVING TO AND FROM BED TO CHAIR: A LITTLE
DRESSING REGULAR UPPER BODY CLOTHING: A LITTLE

## 2023-12-05 ASSESSMENT — PAIN SCALES - GENERAL
PAINLEVEL_OUTOF10: 6
PAINLEVEL_OUTOF10: 0 - NO PAIN
PAINLEVEL_OUTOF10: 2
PAINLEVEL_OUTOF10: 9
PAINLEVEL_OUTOF10: 7
PAINLEVEL_OUTOF10: 7
PAINLEVEL_OUTOF10: 8
PAINLEVEL_OUTOF10: 6
PAINLEVEL_OUTOF10: 9
PAINLEVEL_OUTOF10: 8
PAINLEVEL_OUTOF10: 7
PAINLEVEL_OUTOF10: 5 - MODERATE PAIN

## 2023-12-05 ASSESSMENT — ACTIVITIES OF DAILY LIVING (ADL): HOME_MANAGEMENT_TIME_ENTRY: 10

## 2023-12-05 ASSESSMENT — PAIN - FUNCTIONAL ASSESSMENT: PAIN_FUNCTIONAL_ASSESSMENT: 0-10

## 2023-12-05 NOTE — PROGRESS NOTES
"Occupational Therapy    Evaluation/Treatment    Patient Name: Philip Barfield  MRN: [id number]  : 1965  Today's Date: 23  Time Calculation  Start Time: 936  Stop Time: 100  Time Calculation (min): 25 min       Assessment:  OT Assessment:  (Pt would benefit from moderate intensity OT to address ADLs, mobility, and endurance.)  End of Session Communication: Bedside nurse  End of Session Patient Position: Bed, 3 rail up, Alarm on  OT Assessment Results: Decreased ADL status, Decreased endurance, Decreased IADLs  Plan:  Treatment Interventions: ADL retraining, UE strengthening/ROM, Endurance training  OT Frequency: 2 times per week  OT Discharge Recommendations: Moderate intensity level of continued care  OT - OK to Discharge: Yes  Treatment Interventions: ADL retraining, UE strengthening/ROM, Endurance training    Subjective       General:   OT Received On: 23  General  Reason for Referral: Admitted  from OSH with bilateral leg weakness and numbness after recent syncopal fall; dx: cervical cord compression with myelopathy;  s/p Anterior C5 Corpectomy and C4-6 Fusion with Structural Allograft and Anterior Plate  Past Medical History Relevant to Rehab: PMHx: MI, CAD s/p CABG x 3, multiple PCI's, paroxysmal atrial fibrillation, DM, Htn, HLD, ARISTEO, chronic Right caduate lacunar infarct, COPD, asthma, TIAs, vestibular neuritis, vertigo, GERD  Family/Caregiver Present: No  Prior to Session Communication: Bedside nurse  Patient Position Received: Up in chair, Alarm off, not on at start of session  Preferred Learning Style: visual, verbal  Precautions:  Medical Precautions: Fall precautions, Spinal precautions (soft collar)    Pain:  Pain Assessment  Pain Score: 2  Pain Type: Acute pain  Pain Location: Neck    Objective   Cognition:  Orientation Level:  (oriented to self, \"college place\", \"\")  Following Commands:  (follows 100% simple, one step commands)  Problem Solving: Assistance " required to identify errors made  Attention: Exceptions to WFL  Memory: Exceptions to WFL  Safety/Judgement: Exceptions to WFL  Complex Functional Tasks: Minimal  Impulsive: Mildly           Home Living:  Type of Home: House  Lives With:  (Son)  Home Layout: Two level  Home Living Comments:  (Son works at [employer] and is not always home, patient drives son to/from work)  Prior Function:  Level of [place]:  (IND with ADLs, +driving, cane for fx mobility)     ADL:  LE Dressing Assistance: Minimal  LE Dressing Deficit: Thread RLE into pants, Thread LLE into pants, Pull up over hips  Toileting Assistance with Device: Minimal  Toileting Deficit: Clothing management up, Clothing management down, Use of bedpan/urinal setup - pt stood at bedside requiring assist for gown management, pt able to raji/doff pants past hips and stabilize urinal     Bed Mobility/Transfers: Bed Mobility 1  Bed Mobility 1: Sitting to supine  Level of Assistance 1: Minimum assistance (x1)  Bed Mobility Comments 1:  (log roll)    Transfer 1  Technique 1: Sit to stand, Stand to sit  Transfer Device 1: Walker  Transfer Level of Assistance 1: Minimum assistance (x1)  Trials/Comments 1:  (pt performed x4 sit to stands requiring max cuing on reaching back before sitting)  Transfers 2  Transfer From 2: Chair with arms to  Transfer to 2: Bed  Transfer Device 2: Walker  Transfer Level of Assistance 2: Minimum assistance (x1)    Sitting Balance:  Static Sitting Balance  Static Sitting-Level of Assistance: Close supervision     Strength:  Strength Comments:  ((B) UE at least 4/5 in all planes, (L) hand with numbness in hand, (R) digits numbness in fingertips)    Outcome Measures: [facility] Daily Activity  Putting on and taking off regular lower body clothing: A lot  Bathing (including washing, rinsing, drying): A lot  Putting on and taking off regular upper body clothing: A little  Toileting, which includes using toilet, bedpan or urinal: A lot  Taking  care of personal grooming such as brushing teeth: A little  Eating Meals: A little  Daily Activity - Total Score: 15   and Brief Confusion Assessment Method (bCAM)  CAM Result: CAM -    Education Documentation  Precautions, taught by Celeste Miles OT at 12/5/2023 10:55 AM.  Learner: Patient  Readiness: Acceptance  Method: Explanation  Response: Verbalizes Understanding    Home Exercise Program, taught by Celeste Miles OT at 12/5/2023 10:55 AM.  Learner: Patient  Readiness: Acceptance  Method: Explanation  Response: Verbalizes Understanding    ADL Training, taught by Celeste Miles OT at 12/5/2023 10:55 AM.  Learner: Patient  Readiness: Acceptance  Method: Explanation  Response: Verbalizes Understanding    Education Comments  No comments found.           Goals:  Encounter Problems       Encounter Problems (Active)       ADLs       Pt will be mod (I) raji/doffing pants at chair level       Start:  12/05/23    Expected End:  12/19/23               BALANCE       Pt will be mod (I) standing at the sink and performing oral hygiene       Start:  12/05/23    Expected End:  12/19/23               General Goals       state/follow spine precautions without cues 100% of session (Progressing)       Start:  12/05/23    Expected End:  12/19/23            independent with sitting and supine HEP (Not Progressing)       Start:  12/05/23    Expected End:  12/19/23            supine to/from sit via logrolling (HOB flat, no rail) modified independent (Progressing)       Start:  12/05/23    Expected End:  12/19/23               MOBILITY       Pt will be mod (I) ambulating to/from bathroom w LRD to complete toileting ADLs       Start:  12/05/23    Expected End:  12/19/23               Mobility       sit to/from stand, bed to/from chair with WW with supervision, no LOB (Progressing)       Start:  12/05/23    Expected End:  12/19/23            ambulate 150' with WW with SBA, no LOB, stable BP pre/post (Progressing)       Start:  12/05/23     Expected End:  12/19/23            up/down 3 steps without rail with cane/min HHA, 1 step at a time, no LOB (Not Progressing)       Start:  12/05/23    Expected End:  12/19/23

## 2023-12-05 NOTE — OP NOTE
Corpectomy Cervical Operative Note     Date: 2023  OR Location: [facility] OR    Name: Philip Barfield, : 1965, Age: 58 y.o., MRN: [id number], Sex: male    Pre- and Post Op Diagnosis  Cervical Spondylotic Myelopathy      Procedures  Application [place]  Anterior C5 Corpectomy and C4-6 Fusion with Structural Allograft and Anterior Plate  Spinal Cord Monitoring    Surgeons      * Balaji Ruelas - Primary    Resident/Fellow/Other Assistant:  Surgeon(s) and Role:     * Laurent Abel MD - Resident - Assisting    Procedure Summary  Anesthesia: * No anesthesia type entered *  ASA: III  Anesthesia Staff: Anesthesiologist: Mandy Courtney MD; Subhash Gonzales DO; Hailey Page MD  CRNA: ANTONIO Cruz-CRNA; ANTONIO Murray-MICHEL  SRNA: BRIAN Valente  Estimated Blood Loss: 10 mL  Intra-op Medications:   Medication Name Total Dose   bacitracin ointment 1 Application              Anesthesia Record               Intraprocedure I/O Totals          Intake    .00 mL    Propofol Drip 0.00 mL    The total shown is the total volume documented since Anesthesia Start was filed.    Total Intake 900 mL       Output    Est. Blood Loss 5 mL    Total Output 5 mL       Net    Net Volume 895 mL          Specimen: No specimens collected     Staff:   Circulator: Alfred Serna RN; Luis Daniel Reina RN  Scrub Person: Louie Morelos         Drains and/or Catheters:   Open Drain Anterior Neck 0.64 cm (Active)       Tourniquet Times:         Implants:  Implants       Type Name Action Serial No.      Spinal Hardware PIN, DISTRACTION, CERVICAL, 14 MM, STAINLESS STEEL, DISPOSABLE, STERILE - [id number] Used, Not Implanted      Spinal Hardware PIN, DISTRACTION, CERVICAL, 14 MM, STAINLESS STEEL, DISPOSABLE, STERILE - [id number] Used, Not Implanted      Spinal Hardware STRUT, PSR 6 X 14 X 11 - [id number] Implanted      Graft BONE, PUTTY DBX  1CC DE-MINERAL - [id number] Implanted      Spinal Hardware  SPACER, PSR LAT PORTS 11 X 14 X 11 - SNONE - [id number] Implanted NONE     Spinal Hardware SPACER, PSR LAT PORTS 10 X 14 X 11 - SNONE - [id number] Implanted NONE     Spinal Hardware Plate, Zevo, 39MM Implanted NONE     Screw SCREW, ZEVO, MAGALIE SD, 3.5 X 15MM - SNONE - [id number] Implanted NONE            This 58-year-old man has been admitted to the hospital with a clinical history consistent with advanced myelopathy.  He is not acutely deteriorating but has presented with at least a several month history of gait disturbance and weakness in both arms and hands.  We have chose to admit the patient and evaluate him preoperatively and plans for prompt surgery.  His anticoagulation was held and when appropriate we are proceeding with surgery.    The rationale for surgery has been discussed at length with the patient.  The risks benefits expectations limitations alternatives and potential complications have been discussed at length.    He is noted to have severe spinal cord compression at C5-6 and large part due to a massive disc herniation and underlying spondylitic changes.  At C4-5 there is concentric narrowing with mild spinal cord compression due to spondylitic changes.  We will focus at the C 4 5 and C5-6 levels.  There is a mild right-sided disc herniation at C6-7 but no severe spinal cord compression.  Given the extent of the retrovertebral involvement behind the C5 body, a corpectomy will be performed at C5.    The patient understands and wishes to proceed.    The patient was brought to the operating room.  A huddle was held, he was identified, antibiotics administered, sequential compression devices placed.  A general anesthetic was secured.  Driver-Wells tong traction was placed.  Baseline somatosensory evoked potentials were obtained.  The anterior aspect of his neck was prepped and draped in a sterile fashion.  A standard anterior approach to the left side of his neck through a transverse incision was made.  It  was carried down to the platysma.  The intermuscular interval was identified and carefully dissected to the anterior aspect of the spine.  A probe was placed within the vertebral body and x-ray confirmed appropriate levels.  I performed discectomies at both C4-5 and C5-6.  I curetted out all disc material back to the posterior aspect of the disc space to identify the uncovertebral joints so to define the lateralmost extent for the corpectomy.  High-speed bur and a Leksell rongeur was used to complete a C5 corpectomy.  The posterior cortex was carefully removed with fine angled curettes.  At the C5-6 disc space, there was substantial extruded soft disc material through the posterior longitudinal ligament within the canal.  This was carefully teased free and removed.  The marginal osteophytes on the superior aspect of C6 and the inferior aspect of C4 were taken down to allow for complete decompression.  The bony endplates were decorticated.  The traction was increased to 20 pounds.  The interspace was measured and then a structural allograft filled with demineralized bone matrix was impacted into position with solid purchase.  The traction was let off.  The graft was quite stable.  A 39 mm plate from the Zevo kit was secured with screws into the body of C4 and C6 with excellent purchase.  The locking mechanism was tightened.  X-ray confirmed appropriate position.  The wound was copiously irrigated.  Very meticulous hemostasis was obtained.  A Penrose drain was placed deep in the wound.  The platysma was closed with interrupted 2-0 Vicryl, the subcutaneous tissue with interrupted 3-0 Vicryl and the skin with a 4-0 Vicryl in a running subicular fashion.  Steri-Strips and a dry sterile dressing and the patient's collar were placed.  He was transferred to his hospital bed.  He was awakened and extubated and transferred to the recovery room in stable condition.    There were no changes in the above potentials throughout  the case.    ** Dictated with voice recognition software and not immediately reviewed for errors in grammar and/or spelling **      Attending Attestation: I was present and scrubbed for the entire procedure.    Balaji Ruelas  Phone Number: [phone]

## 2023-12-05 NOTE — PROGRESS NOTES
Physical Therapy    Physical Therapy Evaluation    Patient Name: Philip Barfield  MRN: [id number]  Today's Date: 12/5/2023   Time Calculation  Start Time: 0841  Stop Time: 0909  Time Calculation (min): 28 min    Assessment/Plan   PT Assessment  PT Assessment Results: Decreased strength, Decreased endurance, Impaired balance, Decreased mobility, Decreased cognition, Impaired hearing, Decreased skin integrity, Orthopedic restrictions, Pain  Rehab Prognosis: Good  Evaluation/Treatment Tolerance: Patient tolerated treatment well  Medical Staff Made Aware: Yes (RN)  Strengths: Housing layout  End of Session Communication: Bedside nurse  Assessment Comment: 58 year old male with bilateral leg weakness and numbness after recent syncopal fall with cervical cord compression with myelopathy; 12/4 s/p Anterior C5 Corpectomy and C4-6 Fusion with Structural Allograft and Anterior Plate. Recommend moderate intensity therapy as pt has had 3 recent falls with injuries, needs PT & OT, is high fall risk, does not have consistent assist initially when at home and has stairs. Per pt report, he may refuse rehab in which case he will need assist initially for mobility and low intensity home therapy as well as issue of wheeled walker.  End of Session Patient Position: Up in chair, Alarm on  IP OR SWING BED PT PLAN  Inpatient or Swing Bed: Inpatient  PT Plan  Treatment/Interventions: Bed mobility, Transfer training, Gait training, Stair training, Balance training, Neuromuscular re-education, Endurance training, Therapeutic exercise, Therapeutic activity, Home exercise program, Positioning, Postural re-education  PT Plan: Skilled PT  PT Frequency: Daily  PT Discharge Recommendations: Moderate intensity level of continued care (pt may refuse as noted (see assessment))  Equipment Recommended upon Discharge: Wheeled walker (if pt refuses rehab)  PT Recommended Transfer Status: Assist x1, Assistive device (WW, CG/min A +1)  PT - OK to Discharge:  "Yes (PT evaluation completed and DC recs made)      Subjective   General Visit Information:  General  Reason for Referral: Admitted 11/28 from OSH with bilateral leg weakness and numbness after recent syncopal fall; dx: cervical cord compression with myelopathy; 12/4 s/p Anterior C5 Corpectomy and C4-6 Fusion with Structural Allograft and Anterior Plate  Past Medical History Relevant to Rehab: PMHx: MI, CAD s/p CABG x 3, multiple PCI's, paroxysmal atrial fibrillation, DM, Htn, HLD, ARISTEO, chronic Right caduate lacunar infarct, COPD, asthma, TIAs, vestibular neuritis, vertigo, GERD  Family/Caregiver Present: No  Prior to Session Communication: Bedside nurse  Patient Position Received: Bed, 2 rail up, Alarm off, not on at start of session  Preferred Learning Style: verbal, kinesthetic  General Comment: Pt supine alert, just starting breakfast. Cues and assist to mobilize as noted, pt fall risk. Pt unsure if anyone can assist him at home, reports 3 recent falls from syncopal events and numbness bilateral LEs and Left UE.  Home Living:  Home Living  Type of Home: House  Lives With:  (son (works full time))  Home Adaptive Equipment:  (shower grab bar and built-in seat)  Home Layout: Two level (bed/tub combo on 1st floor)  Home Living Comments: 3 FREIDA no rail (\"but there is a rail to the basement\")  Prior Level of Function:  Prior Function Per Pt/Caregiver Report  Level of [place]:  (retired, drives, ambulated with cane for last month (nothing prior), 3 syncopal falls in last few montsh with injury; independent shower/dress)  Precautions:  Precautions  Medical Precautions: Fall precautions, Cardiac precautions (DM, hx of syncopal falls)  Post-Surgical Precautions: Spinal precautions (C collar on at all times, no lift >10#)  Prosthesis/Orthosis Used:  (soft cervical collar on at all times)  Vital Signs:  Vital Signs  Heart Rate:  (sitting post gait: /78 HR 69 O2 94%)    Objective   Pain:  Pain Assessment  Pain " Assessment: 0-10  Pain Score: 0 - No pain  Pain Interventions: Repositioned, Ambulation/increased activity (already given pain meds prior to session)  Response to Interventions: comfortable/no pain sitting up starting breakfast at end of session  Cognition:  Cognition  Overall Cognitive Status: Impaired  Arousal/Alertness: Appropriate responses to stimuli  Orientation Level:  (oriented to self, day, hospital, situation, year (not date at all))  Following Commands:  (followed  100% of simple 1 step commands with repetition and additional cues)  Problem Solving: Assistance required to identify errors made  Cognition Comments: appears to have difficulty answering some questions at times (may be related to hearing)    General Assessments:  General Observation  General Observation: supine alert, may have hearing deficits leading to confusion at times             Activity Tolerance  Endurance: Tolerates 10 - 20 min exercise with multiple rests    Sensation  Light Touch:  (inconsistent with testing; reports spotty numbness bilateral LEs and Left UE)    Postural Control  Postural Control: Impaired  Trunk Control: impaired with dynamic gait as noted    Static Sitting Balance  Static Sitting-Balance Support: Feet supported, Bilateral upper extremity supported  Static Sitting-Level of Assistance: Contact guard    Static Standing Balance  Static Standing-Balance Support: Bilateral upper extremity supported (on WW)  Static Standing-Level of Assistance: Contact guard  Dynamic Standing Balance  Dynamic Standing-Balance Support: Bilateral upper extremity supported (on Ww)  Dynamic Standing-Balance:  (gait with turning)  Dynamic Standing-Comments: CG/min A and cues  Functional Assessments:  Bed Mobility  Bed Mobility: Yes  Bed Mobility 1  Bed Mobility 1: Rolling right  Level of Assistance 1: Contact guard, Minimal verbal cues, Minimal tactile cues  Bed Mobility Comments 1: hand over hand, use of rail  Bed Mobility 2  Bed Mobility   2: Side lying right to sit  Level of Assistance 2: Minimum assistance, Minimal verbal cues, Minimal tactile cues  Bed Mobility Comments 2: HOB elevated 45 degrees, use of rail    Transfers  Transfer: Yes  Transfer 1  Transfer From 1: Sit to, Stand to  Transfer to 1: Sit, Stand  Technique 1: Sit to stand, Stand to sit  Transfer Device 1: Walker (WW)  Transfer Level of Assistance 1: Contact guard, Minimal verbal cues, Minimal tactile cues  Transfers 2  Transfer From 2: Stand to  Transfer to 2: Chair with arms  Technique 2: Stand pivot  Transfer Device 2: Walker  Transfer Level of Assistance 2: Minimum assistance, Minimal verbal cues, Minimal tactile cues    Ambulation/Gait Training  Ambulation/Gait Training Performed: Yes  Ambulation/Gait Training 1  Surface 1: Level tile  Device 1: Rolling walker  Assistance 1: Minimum assistance, Minimal verbal cues, Minimal tactile cues (CG/min A)  Quality of Gait 1:  (wider CHECO, slow, small steps, cues/assist for body walker positioning at times,)  Comments/Distance (ft) 1: 20    Stairs  Stairs: No (pt wanted to finish his breakfast)  Extremity/Trunk Assessments:  RUE   RUE : Within Functional Limits (ROM)  LUE   LUE: Within Functional Limits (ROM)  RLE   RLE : Exceptions to WFL (hypersensitive to touch initially then wasn't; AROM grossly WFL; ankle DF and knee flex/ext >4+, hip flexion >3)  LLE   LLE : Within Functional Limits (ROM)  Outcome Measures:  [facility] Basic Mobility  Turning from your back to your side while in a flat bed without using bedrails: A little  Moving from lying on your back to sitting on the side of a flat bed without using bedrails: A little  Moving to and from bed to chair (including a wheelchair): A little  Standing up from a chair using your arms (e.g. wheelchair or bedside chair): A little  To walk in hospital room: A little  Climbing 3-5 steps with railing: A little  Basic Mobility - Total Score: 18    Encounter Problems       Encounter Problems (Active)        General Goals       state/follow spine precautions without cues 100% of session (Progressing)       Start:  12/05/23    Expected End:  12/19/23            independent with sitting and supine HEP (Not Progressing)       Start:  12/05/23    Expected End:  12/19/23            supine to/from sit via logrolling (HOB flat, no rail) modified independent (Progressing)       Start:  12/05/23    Expected End:  12/19/23               Mobility       sit to/from stand, bed to/from chair with WW with supervision, no LOB (Progressing)       Start:  12/05/23    Expected End:  12/19/23            ambulate 150' with WW with SBA, no LOB, stable BP pre/post (Progressing)       Start:  12/05/23    Expected End:  12/19/23            up/down 3 steps without rail with cane/min HHA, 1 step at a time, no LOB (Not Progressing)       Start:  12/05/23    Expected End:  12/19/23               Pain - Adult              Education Documentation  Precautions, taught by Lesly Trinh, PT at 12/5/2023  9:15 AM.  Learner: Patient  Readiness: Acceptance  Method: Explanation, Demonstration, Teach-back  Response: Needs Reinforcement  Comment: logrolling (verbal, demo, handout), no bending/lifting/twisting, no lift >10#, need for assist with all in-hospital mobility, DC recs    Body Mechanics, taught by Lesly Trinh, PT at 12/5/2023  9:15 AM.  Learner: Patient  Readiness: Acceptance  Method: Explanation, Demonstration, Teach-back  Response: Needs Reinforcement  Comment: logrolling (verbal, demo, handout), no bending/lifting/twisting, no lift >10#, need for assist with all in-hospital mobility, DC recs    Mobility Training, taught by Lesly Trinh, PT at 12/5/2023  9:15 AM.  Learner: Patient  Readiness: Acceptance  Method: Explanation, Demonstration, Teach-back  Response: Needs Reinforcement  Comment: logrolling (verbal, demo, handout), no bending/lifting/twisting, no lift >10#, need for assist with all in-hospital mobility, DC  recs    Education Comments  No comments found.

## 2023-12-05 NOTE — PROGRESS NOTES
Subjective Data:   s/p anterior cervical corpectomy and fusion at C5/6 with Dr. Ruelas on 12/04  Denies CP/ SOB/dizziness, palpitations       Objective Data:  Last Recorded Vitals:  Vitals:    12/04/23 2045 12/04/23 2300 12/05/23 0323 12/05/23 0840   BP: 139/82 126/65 125/75 137/78   BP Location:  Right arm Right arm Right arm   Patient Position:    Sitting   Pulse: 69 78 70 69   Resp: 12 15 15 16   Temp: 35.3 °C (95.5 °F) 35.8 °C (96.4 °F) 36 °C (96.8 °F) 36.6 °C (97.9 °F)   TempSrc: Temporal Temporal Temporal Temporal   SpO2: 94% 94% 96% 94%   Weight:       Height:           Last Labs:  CBC - 12/5/2023:  6:22 AM  15.0 14.0 391    42.6      CMP - 12/5/2023:  6:22 AM  10.0 7.1 21 --- 0.4   4.2 4.2 17 107      PTT - 12/2/2023: 12:03 PM  1.2   13.0 38          Last I/O:  I/O last 3 completed shifts:  In: 900 (8.9 mL/kg) [IV Piggyback:900]  Out: 1505 (14.9 mL/kg) [Urine:1500 (0.4 mL/kg/hr); Blood:5]  Weight: 101 kg     Inpatient Medications:  Scheduled medications   Medication Dose Route Frequency    acetaminophen  650 mg oral q6h SEBLE    atorvastatin  80 mg oral Nightly    azelastine  1 spray Each Nostril BID    DULoxetine  60 mg oral Daily    fluticasone furoate-vilanteroL  1 puff inhalation Daily    levothyroxine  200 mcg oral q AM    loratadine  10 mg oral Daily    metoprolol tartrate  100 mg oral BID    pantoprazole  40 mg oral Daily before breakfast    Or    pantoprazole  40 mg intravenous Daily before breakfast    polyethylene glycol  17 g oral Daily    ranolazine  1,000 mg oral BID    sennosides-docusate sodium  2 tablet oral BID    tamsulosin  0.4 mg oral Daily     PRN medications   Medication    albuterol    bisacodyl    cyclobenzaprine    HYDROmorphone    magnesium hydroxide    methocarbamol    naloxone    ondansetron    Or    ondansetron    oxyCODONE    oxyCODONE    oxygen    prochlorperazine    Or    prochlorperazine    Or    prochlorperazine     Continuous Medications   Medication Dose Last Rate    lactated  Ringer's  125 mL/hr 125 mL/hr (12/05/23 1217)    oxygen  2 L/min         Physical Exam:  Physical Exam  Constitutional:       Appearance: Normal appearance.   Cardiovascular:      Rate and Rhythm: Normal rate and regular rhythm.      Pulses: Normal pulses.   Pulmonary:      Effort: Pulmonary effort is normal.      Breath sounds: Normal breath sounds.   Abdominal:      General: Bowel sounds are normal.      Palpations: Abdomen is soft.   Musculoskeletal:         General: Normal range of motion.   Skin:     General: Skin is warm and dry.      Capillary Refill: Capillary refill takes less than 2 seconds.   Neurological:      Mental Status: He is alert and oriented to person, place, and time.   Psychiatric:         Mood and Affect: Mood normal.           Assessment/Plan   Philip Barfield is a 58 years old male with extensive CAD history with CABG x3 in 2003 with subsequent PCI (most recent) of distal LAD via LIMA graft (2 overlapped JACE) a couple years ago followed by balloon angioplasty of diffuse in-stent restenosis of distal LAD via LIMA graft in January 2023 in the setting of recurrent angina and small non-STEMI with concomitant new onset atrial fibrillation with rapid rates at that time. Shortly afterwards,  he was readmitted with recurrent chest discomfort and underwent repeat cardiac cath on 2/1/2023 which showed recently angioplasty distal LAD was widely patent without angiographically significant restenosis.   His other medical history includes paroxysmal Afib on eliquis, Diabetes, HLD, HTN, ARISTEO, TIA, COPD, vertigo, and vestibular neuritis. Patient recently had a syncopal fall in September striking his neck. Since then, he has been having progressing upper and lower extremity symptoms (numbness, paraesthesia). He has had multiple non syncopal falls due lower extremity symptoms. He presented to the ED for bilateral leg weakness and numbness. MRI showed C5/6 cervical stenosis c/f myelopathy and plans for anterior  cervical discectomy and fusion (ACDF). Cardiology was consulted for preoperative evaluation.      #Pre-Op Evaluation  - POD#1  - Eliquis held prior to surgery will need to resumed post op when appropriate  - Prior to discharge, please make sure he has a follow up scheduled with his primary cardiologist   Dr. Jamie Quiroz       Cardiology will sign off  Discussed with Dr. Devon Dodd, APRN-CNP  Cardiology Consults    Please call with any questions  Pager [phone] M-F 8a-5p; Saturday 8a-2p  Pager [phone] all other times       Code Status:  Full Code

## 2023-12-05 NOTE — CARE PLAN
The patient's goals for the shift include patient will have managed pain during shift    The clinical goals for the shift include Pt will remain safe and nerurovasculary intact during night.    Pt came back from OR and remained safe and free of injury during night. Pain controlled with oxycodone and dulaudid. Urinated well. Dressing intact with C-collar on. No other distress noted. Call light in reach. Resting quietly at this time.     Jammie Paredes RN

## 2023-12-05 NOTE — PROGRESS NOTES
Orthopaedic Spine Surgery Progress Note    Subjective:  Patient is doing well this morning. Reports that pain is well controlled. Is tolerating clears. No issues at this time. Patient denies chest pain, dyspnea, nausea, fever, chills.     Denies dysphagia    Objective:  Vitals:    12/05/23 0323   BP: 125/75   Pulse: 70   Resp: 15   Temp: 36 °C (96.8 °F)   SpO2: 96%       Physical Exam  - Constitutional: No acute distress, cooperative  - Eyes: anicteric  - Head/Neck: normocephalic atraumatic  - Respiratory/Thorax: NWOB  - Cardiovascular: RRR on peripheral palpation  - Gastrointestinal: Nondistended  - Psychological: Appropriate mood/behavior  - Skin: Warm and dry. Additional findings in musculoskeletal evaluation  - Musculoskeletal:  ---  Spine:  Soft collar in place  Dressing c/d/i    C5: SILT   Deltoid 5/5 Left; 5/5 Right  C6: SILT   Wrist Ext: 5/5 Left; 5/5 Right  C7: SILT   Triceps: 5/5 Left; 5/5 Right  C8: SILT   Finger flexion: 5/5 Left; 5/5 Right  T1: SILT    Interossei: 5/5 Left; 5/5 Right     L1: SILT       L2: SILT      Hip flexors 5/5 Left; 5/5 Right  L3: SILT      Knee extension 5/5 Left; 5/5 Right  L4: SILT      Tib Ant. (Dorsiflexion) 5/5 Left; 5/5 Right  L5: SILT      EHL 5/5 Left; 5/5 Right  S1: SILT      Plantarflexion 5/5 Left; 5/5 Right         Results for orders placed or performed during the hospital encounter of 11/29/23 (from the past 24 hour(s))   POCT GLUCOSE   Result Value Ref Range    POCT Glucose 123 (H) 74 - 99 mg/dL   POCT GLUCOSE   Result Value Ref Range    POCT Glucose 173 (H) 74 - 99 mg/dL           XR cervical spine 1 view   Final Result      XR chest 1 view   Final Result   No acute cardiopulmonary process        I personally reviewed the images/study and I agree with the findings   as stated above by resident physician, Dr. Dharmesh Potter. The   study was interpreted at [facility] in [facility].        Signed by: Arnaud Alarcon 11/30/2023  9:18 AM   Dictation workstation:   [id number]      XR cervical spine 1 view    (Results Pending)         Assessment:  58M with C5/6 cervical stenosis c/f myelopathy now s/p C5 anterior cervical corpectomy and fusion at C4-6 with Dr. Ruelas on 12/04    Plan:  Plan:  - Weight bearing: WBAT, OOBAT, no HOB restrictions  - DVT ppx: SCDs, holding home chemo ppx   - Diet: Maintain CLD today, will advance in PM  - Pain: Tylenol, oxycodone 5/10, dilaudid PRN for pain management  - FEN: Continue NS at 100cc/hr; HLIV with good PO intake  - Bowel Regimen: Colace, senna, dulcolax   - PT/OT consult  - Pulm: Encourage IS  - appreciate cards recs  - dressing change in the AM  - PT/OT  - No bending, twisting, lifting > 10 lbs  - maintain soft collar    Dispo: PT/OT, Pain control    Laurent Abel MD  Orthopedic Surgery, PGY4  Available on Epic Chat     This patient will be followed by the Orthopaedic Spine service. Please page or Epic Chat the corresponding residents below with questions or concerns.     Ortho Spine Service ([facility] Chat Preferred)  First call: Jamie Harris MD PGY2  Second call: Laurent Abel MD PGY4    6pm-6am M-F, Holidays, and weekends page Ortho on-call @[phone] with urgent questions/concerns.

## 2023-12-05 NOTE — ANESTHESIA POSTPROCEDURE EVALUATION
Patient: Philip Barfield    Procedure Summary       Date: 12/04/23 Room / Location: [facility] OR 07 / [facility]    Anesthesia Start: 1436 Anesthesia Stop: 1734    Procedure: Corpectomy Cervical (Neck) Diagnosis:       Cervical spinal cord compression (CMS/HCC)      (Cervical spinal cord compression (CMS/HCC) [G95.20])    Surgeons: Balaji Ruelas MD Responsible Provider: BRIAN Murray    Anesthesia Type: general ASA Status: 3            Anesthesia Type: general    Vitals Value Taken Time   /76 12/04/23 1900   Temp 36.4 °C (97.5 °F) 12/04/23 1720   Pulse 68 12/04/23 1914   Resp 8 12/04/23 1914   SpO2 92 % 12/04/23 1914   Vitals shown include unvalidated device data.    Anesthesia Post Evaluation    Patient participation: complete - patient participated  Level of consciousness: awake and alert  Pain management: adequate  Airway patency: patent  Cardiovascular status: acceptable  Respiratory status: acceptable  Hydration status: acceptable  Postoperative Nausea and Vomiting: none        No notable events documented.

## 2023-12-06 ENCOUNTER — APPOINTMENT (OUTPATIENT)
Dept: CARDIOLOGY | Facility: HOSPITAL | Age: 58
DRG: 471 | End: 2023-12-06
Payer: MEDICARE

## 2023-12-06 LAB
ALBUMIN SERPL BCP-MCNC: 3.7 G/DL (ref 3.4–5)
ANION GAP SERPL CALC-SCNC: 14 MMOL/L (ref 10–20)
APTT PPP: 33 SECONDS (ref 27–38)
APTT PPP: >200 SECONDS (ref 27–38)
BUN SERPL-MCNC: 17 MG/DL (ref 6–23)
CALCIUM SERPL-MCNC: 9 MG/DL (ref 8.6–10.6)
CARDIAC TROPONIN I PNL SERPL HS: 7477 NG/L (ref 0–53)
CARDIAC TROPONIN I PNL SERPL HS: 8473 NG/L (ref 0–53)
CARDIAC TROPONIN I PNL SERPL HS: ABNORMAL NG/L (ref 0–53)
CHLORIDE SERPL-SCNC: 105 MMOL/L (ref 98–107)
CO2 SERPL-SCNC: 24 MMOL/L (ref 21–32)
CREAT SERPL-MCNC: 0.96 MG/DL (ref 0.5–1.3)
ERYTHROCYTE [DISTWIDTH] IN BLOOD BY AUTOMATED COUNT: 13.1 % (ref 11.5–14.5)
ERYTHROCYTE [DISTWIDTH] IN BLOOD BY AUTOMATED COUNT: 13.3 % (ref 11.5–14.5)
GFR SERPL CREATININE-BSD FRML MDRD: >90 ML/MIN/1.73M*2
GLUCOSE SERPL-MCNC: 113 MG/DL (ref 74–99)
HCT VFR BLD AUTO: 36.7 % (ref 41–52)
HCT VFR BLD AUTO: 41.8 % (ref 41–52)
HGB BLD-MCNC: 12.2 G/DL (ref 13.5–17.5)
HGB BLD-MCNC: 13.8 G/DL (ref 13.5–17.5)
INR PPP: 1.2 (ref 0.9–1.1)
INR PPP: 1.4 (ref 0.9–1.1)
MAGNESIUM SERPL-MCNC: 1.62 MG/DL (ref 1.6–2.4)
MCH RBC QN AUTO: 28.2 PG (ref 26–34)
MCH RBC QN AUTO: 28.6 PG (ref 26–34)
MCHC RBC AUTO-ENTMCNC: 33 G/DL (ref 32–36)
MCHC RBC AUTO-ENTMCNC: 33.2 G/DL (ref 32–36)
MCV RBC AUTO: 85 FL (ref 80–100)
MCV RBC AUTO: 87 FL (ref 80–100)
NRBC BLD-RTO: 0 /100 WBCS (ref 0–0)
NRBC BLD-RTO: 0 /100 WBCS (ref 0–0)
PHOSPHATE SERPL-MCNC: 2.8 MG/DL (ref 2.5–4.9)
PLATELET # BLD AUTO: 355 X10*3/UL (ref 150–450)
PLATELET # BLD AUTO: 383 X10*3/UL (ref 150–450)
POTASSIUM SERPL-SCNC: 4.2 MMOL/L (ref 3.5–5.3)
PROTHROMBIN TIME: 13 SECONDS (ref 9.8–12.8)
PROTHROMBIN TIME: 15.9 SECONDS (ref 9.8–12.8)
RBC # BLD AUTO: 4.32 X10*6/UL (ref 4.5–5.9)
RBC # BLD AUTO: 4.82 X10*6/UL (ref 4.5–5.9)
SODIUM SERPL-SCNC: 139 MMOL/L (ref 136–145)
UFH PPP CHRO-ACNC: 0.2 IU/ML
UFH PPP CHRO-ACNC: 0.7 IU/ML
WBC # BLD AUTO: 15.5 X10*3/UL (ref 4.4–11.3)
WBC # BLD AUTO: 17.2 X10*3/UL (ref 4.4–11.3)

## 2023-12-06 PROCEDURE — 84484 ASSAY OF TROPONIN QUANT: CPT | Performed by: STUDENT IN AN ORGANIZED HEALTH CARE EDUCATION/TRAINING PROGRAM

## 2023-12-06 PROCEDURE — 2500000001 HC RX 250 WO HCPCS SELF ADMINISTERED DRUGS (ALT 637 FOR MEDICARE OP): Performed by: STUDENT IN AN ORGANIZED HEALTH CARE EDUCATION/TRAINING PROGRAM

## 2023-12-06 PROCEDURE — 93010 ELECTROCARDIOGRAM REPORT: CPT | Performed by: INTERNAL MEDICINE

## 2023-12-06 PROCEDURE — 99291 CRITICAL CARE FIRST HOUR: CPT | Performed by: INTERNAL MEDICINE

## 2023-12-06 PROCEDURE — C1753 CATH, INTRAVAS ULTRASOUND: HCPCS | Performed by: INTERNAL MEDICINE

## 2023-12-06 PROCEDURE — 99152 MOD SED SAME PHYS/QHP 5/>YRS: CPT | Performed by: INTERNAL MEDICINE

## 2023-12-06 PROCEDURE — 02703ZZ DILATION OF CORONARY ARTERY, ONE ARTERY, PERCUTANEOUS APPROACH: ICD-10-PCS | Performed by: INTERNAL MEDICINE

## 2023-12-06 PROCEDURE — 2500000004 HC RX 250 GENERAL PHARMACY W/ HCPCS (ALT 636 FOR OP/ED): Performed by: STUDENT IN AN ORGANIZED HEALTH CARE EDUCATION/TRAINING PROGRAM

## 2023-12-06 PROCEDURE — 94640 AIRWAY INHALATION TREATMENT: CPT

## 2023-12-06 PROCEDURE — 2720000007 HC OR 272 NO HCPCS: Performed by: INTERNAL MEDICINE

## 2023-12-06 PROCEDURE — 99153 MOD SED SAME PHYS/QHP EA: CPT | Performed by: INTERNAL MEDICINE

## 2023-12-06 PROCEDURE — 2500000002 HC RX 250 W HCPCS SELF ADMINISTERED DRUGS (ALT 637 FOR MEDICARE OP, ALT 636 FOR OP/ED)

## 2023-12-06 PROCEDURE — 93459 L HRT ART/GRFT ANGIO: CPT | Performed by: INTERNAL MEDICINE

## 2023-12-06 PROCEDURE — B2131ZZ FLUOROSCOPY OF MULTIPLE CORONARY ARTERY BYPASS GRAFTS USING LOW OSMOLAR CONTRAST: ICD-10-PCS | Performed by: INTERNAL MEDICINE

## 2023-12-06 PROCEDURE — 85027 COMPLETE CBC AUTOMATED: CPT | Performed by: STUDENT IN AN ORGANIZED HEALTH CARE EDUCATION/TRAINING PROGRAM

## 2023-12-06 PROCEDURE — 2550000001 HC RX 255 CONTRASTS: Performed by: INTERNAL MEDICINE

## 2023-12-06 PROCEDURE — 2500000004 HC RX 250 GENERAL PHARMACY W/ HCPCS (ALT 636 FOR OP/ED)

## 2023-12-06 PROCEDURE — 2500000001 HC RX 250 WO HCPCS SELF ADMINISTERED DRUGS (ALT 637 FOR MEDICARE OP): Performed by: INTERNAL MEDICINE

## 2023-12-06 PROCEDURE — 92920 PRQ TRLUML C ANGIOP 1ART&/BR: CPT | Performed by: INTERNAL MEDICINE

## 2023-12-06 PROCEDURE — C1769 GUIDE WIRE: HCPCS | Performed by: INTERNAL MEDICINE

## 2023-12-06 PROCEDURE — 85347 COAGULATION TIME ACTIVATED: CPT | Performed by: INTERNAL MEDICINE

## 2023-12-06 PROCEDURE — 93005 ELECTROCARDIOGRAM TRACING: CPT

## 2023-12-06 PROCEDURE — C1894 INTRO/SHEATH, NON-LASER: HCPCS | Performed by: INTERNAL MEDICINE

## 2023-12-06 PROCEDURE — 92978 ENDOLUMINL IVUS OCT C 1ST: CPT | Performed by: INTERNAL MEDICINE

## 2023-12-06 PROCEDURE — 36415 COLL VENOUS BLD VENIPUNCTURE: CPT | Performed by: STUDENT IN AN ORGANIZED HEALTH CARE EDUCATION/TRAINING PROGRAM

## 2023-12-06 PROCEDURE — B2111ZZ FLUOROSCOPY OF MULTIPLE CORONARY ARTERIES USING LOW OSMOLAR CONTRAST: ICD-10-PCS | Performed by: INTERNAL MEDICINE

## 2023-12-06 PROCEDURE — C1725 CATH, TRANSLUMIN NON-LASER: HCPCS | Performed by: INTERNAL MEDICINE

## 2023-12-06 PROCEDURE — 92928 PRQ TCAT PLMT NTRAC ST 1 LES: CPT | Performed by: INTERNAL MEDICINE

## 2023-12-06 PROCEDURE — 2780000003 HC OR 278 NO HCPCS: Performed by: INTERNAL MEDICINE

## 2023-12-06 PROCEDURE — B2151ZZ FLUOROSCOPY OF LEFT HEART USING LOW OSMOLAR CONTRAST: ICD-10-PCS | Performed by: INTERNAL MEDICINE

## 2023-12-06 PROCEDURE — 92978 ENDOLUMINL IVUS OCT C 1ST: CPT | Mod: RC | Performed by: INTERNAL MEDICINE

## 2023-12-06 PROCEDURE — 80069 RENAL FUNCTION PANEL: CPT

## 2023-12-06 PROCEDURE — 92973 PRQ TRLUML C MCHN ASP THRMBC: CPT | Performed by: INTERNAL MEDICINE

## 2023-12-06 PROCEDURE — C9460 INJECTION, CANGRELOR: HCPCS

## 2023-12-06 PROCEDURE — C1757 CATH, THROMBECTOMY/EMBOLECT: HCPCS | Performed by: INTERNAL MEDICINE

## 2023-12-06 PROCEDURE — 85610 PROTHROMBIN TIME: CPT | Performed by: STUDENT IN AN ORGANIZED HEALTH CARE EDUCATION/TRAINING PROGRAM

## 2023-12-06 PROCEDURE — 2500000004 HC RX 250 GENERAL PHARMACY W/ HCPCS (ALT 636 FOR OP/ED): Performed by: INTERNAL MEDICINE

## 2023-12-06 PROCEDURE — C9460 INJECTION, CANGRELOR: HCPCS | Performed by: INTERNAL MEDICINE

## 2023-12-06 PROCEDURE — 99233 SBSQ HOSP IP/OBS HIGH 50: CPT | Performed by: NURSE PRACTITIONER

## 2023-12-06 PROCEDURE — 85520 HEPARIN ASSAY: CPT | Performed by: STUDENT IN AN ORGANIZED HEALTH CARE EDUCATION/TRAINING PROGRAM

## 2023-12-06 PROCEDURE — 84484 ASSAY OF TROPONIN QUANT: CPT

## 2023-12-06 PROCEDURE — 85730 THROMBOPLASTIN TIME PARTIAL: CPT | Performed by: STUDENT IN AN ORGANIZED HEALTH CARE EDUCATION/TRAINING PROGRAM

## 2023-12-06 PROCEDURE — C1760 CLOSURE DEV, VASC: HCPCS | Performed by: INTERNAL MEDICINE

## 2023-12-06 PROCEDURE — 85347 COAGULATION TIME ACTIVATED: CPT

## 2023-12-06 PROCEDURE — 83735 ASSAY OF MAGNESIUM: CPT

## 2023-12-06 PROCEDURE — 2500000004 HC RX 250 GENERAL PHARMACY W/ HCPCS (ALT 636 FOR OP/ED): Performed by: PHYSICIAN ASSISTANT

## 2023-12-06 PROCEDURE — C1887 CATHETER, GUIDING: HCPCS | Performed by: INTERNAL MEDICINE

## 2023-12-06 PROCEDURE — 85027 COMPLETE CBC AUTOMATED: CPT

## 2023-12-06 PROCEDURE — 2020000001 HC ICU ROOM DAILY

## 2023-12-06 PROCEDURE — 93459 L HRT ART/GRFT ANGIO: CPT | Mod: 59 | Performed by: INTERNAL MEDICINE

## 2023-12-06 PROCEDURE — 2500000005 HC RX 250 GENERAL PHARMACY W/O HCPCS: Performed by: INTERNAL MEDICINE

## 2023-12-06 PROCEDURE — G0269 OCCLUSIVE DEVICE IN VEIN ART: HCPCS | Mod: TC,59 | Performed by: INTERNAL MEDICINE

## 2023-12-06 PROCEDURE — 4A023N7 MEASUREMENT OF CARDIAC SAMPLING AND PRESSURE, LEFT HEART, PERCUTANEOUS APPROACH: ICD-10-PCS | Performed by: INTERNAL MEDICINE

## 2023-12-06 PROCEDURE — 96373 THER/PROPH/DIAG INJ IA: CPT | Performed by: INTERNAL MEDICINE

## 2023-12-06 PROCEDURE — 2500000001 HC RX 250 WO HCPCS SELF ADMINISTERED DRUGS (ALT 637 FOR MEDICARE OP)

## 2023-12-06 PROCEDURE — 02C03ZZ EXTIRPATION OF MATTER FROM CORONARY ARTERY, ONE ARTERY, PERCUTANEOUS APPROACH: ICD-10-PCS | Performed by: INTERNAL MEDICINE

## 2023-12-06 PROCEDURE — 85730 THROMBOPLASTIN TIME PARTIAL: CPT

## 2023-12-06 RX ORDER — HEPARIN SODIUM 5000 [USP'U]/ML
2000-4000 INJECTION, SOLUTION INTRAVENOUS; SUBCUTANEOUS EVERY 4 HOURS PRN
Status: CANCELLED | OUTPATIENT
Start: 2023-12-06

## 2023-12-06 RX ORDER — HEPARIN SODIUM 5000 [USP'U]/ML
4000 INJECTION, SOLUTION INTRAVENOUS; SUBCUTANEOUS ONCE
Status: COMPLETED | OUTPATIENT
Start: 2023-12-06 | End: 2023-12-06

## 2023-12-06 RX ORDER — LEVOTHYROXINE SODIUM 75 UG/1
150 TABLET ORAL
Status: DISCONTINUED | OUTPATIENT
Start: 2023-12-07 | End: 2023-12-11 | Stop reason: HOSPADM

## 2023-12-06 RX ORDER — HEPARIN SODIUM 5000 [USP'U]/ML
2000-4000 INJECTION, SOLUTION INTRAVENOUS; SUBCUTANEOUS EVERY 4 HOURS PRN
Status: DISCONTINUED | OUTPATIENT
Start: 2023-12-06 | End: 2023-12-07

## 2023-12-06 RX ORDER — ACETAMINOPHEN 325 MG/1
975 TABLET ORAL EVERY 8 HOURS PRN
Status: DISCONTINUED | OUTPATIENT
Start: 2023-12-06 | End: 2023-12-11 | Stop reason: HOSPADM

## 2023-12-06 RX ORDER — QUETIAPINE FUMARATE 25 MG/1
12.5 TABLET, FILM COATED ORAL EVERY 8 HOURS PRN
Status: DISCONTINUED | OUTPATIENT
Start: 2023-12-07 | End: 2023-12-11 | Stop reason: HOSPADM

## 2023-12-06 RX ORDER — SODIUM CHLORIDE 9 MG/ML
1 INJECTION, SOLUTION INTRAVENOUS CONTINUOUS
Status: DISCONTINUED | OUTPATIENT
Start: 2023-12-06 | End: 2023-12-07

## 2023-12-06 RX ORDER — RANOLAZINE 500 MG/1
1000 TABLET, EXTENDED RELEASE ORAL 2 TIMES DAILY
Status: DISCONTINUED | OUTPATIENT
Start: 2023-12-07 | End: 2023-12-11 | Stop reason: HOSPADM

## 2023-12-06 RX ORDER — MIDAZOLAM HYDROCHLORIDE 1 MG/ML
INJECTION INTRAMUSCULAR; INTRAVENOUS AS NEEDED
Status: DISCONTINUED | OUTPATIENT
Start: 2023-12-06 | End: 2023-12-06 | Stop reason: HOSPADM

## 2023-12-06 RX ORDER — DULOXETIN HYDROCHLORIDE 30 MG/1
60 CAPSULE, DELAYED RELEASE ORAL DAILY
Status: DISCONTINUED | OUTPATIENT
Start: 2023-12-06 | End: 2023-12-11 | Stop reason: HOSPADM

## 2023-12-06 RX ORDER — OLANZAPINE 10 MG/2ML
2.5 INJECTION, POWDER, FOR SOLUTION INTRAMUSCULAR EVERY 8 HOURS PRN
Status: DISCONTINUED | OUTPATIENT
Start: 2023-12-06 | End: 2023-12-11 | Stop reason: HOSPADM

## 2023-12-06 RX ORDER — ATORVASTATIN CALCIUM 80 MG/1
80 TABLET, FILM COATED ORAL NIGHTLY
Status: DISCONTINUED | OUTPATIENT
Start: 2023-12-06 | End: 2023-12-11 | Stop reason: HOSPADM

## 2023-12-06 RX ORDER — LEVOTHYROXINE SODIUM 200 UG/1
200 TABLET ORAL
Status: DISCONTINUED | OUTPATIENT
Start: 2023-12-07 | End: 2023-12-06

## 2023-12-06 RX ORDER — FLUTICASONE FUROATE AND VILANTEROL 200; 25 UG/1; UG/1
1 POWDER RESPIRATORY (INHALATION) DAILY
Status: DISCONTINUED | OUTPATIENT
Start: 2023-12-06 | End: 2023-12-11 | Stop reason: HOSPADM

## 2023-12-06 RX ORDER — HEPARIN SODIUM 10000 [USP'U]/100ML
0-4000 INJECTION, SOLUTION INTRAVENOUS CONTINUOUS
Status: DISCONTINUED | OUTPATIENT
Start: 2023-12-06 | End: 2023-12-07

## 2023-12-06 RX ORDER — ASPIRIN 325 MG
TABLET ORAL AS NEEDED
Status: DISCONTINUED | OUTPATIENT
Start: 2023-12-06 | End: 2023-12-06 | Stop reason: HOSPADM

## 2023-12-06 RX ORDER — FENTANYL CITRATE 50 UG/ML
INJECTION, SOLUTION INTRAMUSCULAR; INTRAVENOUS AS NEEDED
Status: DISCONTINUED | OUTPATIENT
Start: 2023-12-06 | End: 2023-12-06 | Stop reason: HOSPADM

## 2023-12-06 RX ORDER — HEPARIN SODIUM 1000 [USP'U]/ML
INJECTION, SOLUTION INTRAVENOUS; SUBCUTANEOUS AS NEEDED
Status: DISCONTINUED | OUTPATIENT
Start: 2023-12-06 | End: 2023-12-06 | Stop reason: HOSPADM

## 2023-12-06 RX ORDER — VERAPAMIL HYDROCHLORIDE 2.5 MG/ML
INJECTION, SOLUTION INTRAVENOUS AS NEEDED
Status: DISCONTINUED | OUTPATIENT
Start: 2023-12-06 | End: 2023-12-06 | Stop reason: HOSPADM

## 2023-12-06 RX ORDER — ATROPINE SULFATE 0.1 MG/ML
INJECTION INTRAVENOUS AS NEEDED
Status: DISCONTINUED | OUTPATIENT
Start: 2023-12-06 | End: 2023-12-06 | Stop reason: HOSPADM

## 2023-12-06 RX ORDER — NAPROXEN SODIUM 220 MG/1
81 TABLET, FILM COATED ORAL DAILY
Status: DISCONTINUED | OUTPATIENT
Start: 2023-12-07 | End: 2023-12-11 | Stop reason: HOSPADM

## 2023-12-06 RX ORDER — HEPARIN SODIUM 10000 [USP'U]/100ML
0-4000 INJECTION, SOLUTION INTRAVENOUS CONTINUOUS
Status: CANCELLED | OUTPATIENT
Start: 2023-12-06

## 2023-12-06 RX ORDER — ASPIRIN 325 MG
325 TABLET ORAL ONCE
Status: DISCONTINUED | OUTPATIENT
Start: 2023-12-06 | End: 2023-12-06

## 2023-12-06 RX ORDER — OXYCODONE HYDROCHLORIDE 5 MG/1
5 TABLET ORAL EVERY 6 HOURS PRN
Status: DISCONTINUED | OUTPATIENT
Start: 2023-12-06 | End: 2023-12-11 | Stop reason: HOSPADM

## 2023-12-06 RX ORDER — PANTOPRAZOLE SODIUM 40 MG/1
40 TABLET, DELAYED RELEASE ORAL
Status: DISCONTINUED | OUTPATIENT
Start: 2023-12-07 | End: 2023-12-11 | Stop reason: HOSPADM

## 2023-12-06 RX ORDER — NAPROXEN SODIUM 220 MG/1
324 TABLET, FILM COATED ORAL DAILY
Status: DISCONTINUED | OUTPATIENT
Start: 2023-12-06 | End: 2023-12-06

## 2023-12-06 RX ORDER — QUETIAPINE FUMARATE 25 MG/1
12.5 TABLET, FILM COATED ORAL NIGHTLY
Status: DISCONTINUED | OUTPATIENT
Start: 2023-12-06 | End: 2023-12-11 | Stop reason: HOSPADM

## 2023-12-06 RX ORDER — PHENYLEPHRINE HCL IN 0.9% NACL 1 MG/10 ML
SYRINGE (ML) INTRAVENOUS AS NEEDED
Status: DISCONTINUED | OUTPATIENT
Start: 2023-12-06 | End: 2023-12-06 | Stop reason: HOSPADM

## 2023-12-06 RX ORDER — NITROGLYCERIN 40 MG/100ML
INJECTION INTRAVENOUS AS NEEDED
Status: DISCONTINUED | OUTPATIENT
Start: 2023-12-06 | End: 2023-12-06 | Stop reason: HOSPADM

## 2023-12-06 RX ORDER — LIDOCAINE HYDROCHLORIDE 20 MG/ML
INJECTION, SOLUTION INFILTRATION; PERINEURAL AS NEEDED
Status: DISCONTINUED | OUTPATIENT
Start: 2023-12-06 | End: 2023-12-06 | Stop reason: HOSPADM

## 2023-12-06 RX ORDER — SODIUM NITROPRUSSIDE 25 MG/ML
INJECTION INTRAVENOUS CONTINUOUS PRN
Status: COMPLETED | OUTPATIENT
Start: 2023-12-06 | End: 2023-12-06

## 2023-12-06 RX ORDER — METOPROLOL TARTRATE 50 MG/1
100 TABLET ORAL 2 TIMES DAILY
Status: DISCONTINUED | OUTPATIENT
Start: 2023-12-07 | End: 2023-12-11 | Stop reason: HOSPADM

## 2023-12-06 RX ADMIN — FLUTICASONE FUROATE AND VILANTEROL TRIFENATATE 1 PUFF: 200; 25 POWDER RESPIRATORY (INHALATION) at 09:50

## 2023-12-06 RX ADMIN — OXYCODONE HYDROCHLORIDE 10 MG: 5 TABLET ORAL at 10:26

## 2023-12-06 RX ADMIN — LORATADINE 10 MG: 10 TABLET ORAL at 09:23

## 2023-12-06 RX ADMIN — ACETAMINOPHEN 650 MG: 325 TABLET ORAL at 06:05

## 2023-12-06 RX ADMIN — HEPARIN SODIUM 1000 UNITS/HR: 10000 INJECTION, SOLUTION INTRAVENOUS at 19:06

## 2023-12-06 RX ADMIN — DULOXETINE HYDROCHLORIDE 60 MG: 60 CAPSULE, DELAYED RELEASE ORAL at 09:23

## 2023-12-06 RX ADMIN — HEPARIN SODIUM 4000 UNITS: 5000 INJECTION INTRAVENOUS; SUBCUTANEOUS at 06:49

## 2023-12-06 RX ADMIN — CANGRELOR 4 MCG/KG/MIN: 50 INJECTION, POWDER, LYOPHILIZED, FOR SOLUTION INTRAVENOUS at 17:20

## 2023-12-06 RX ADMIN — ACETAMINOPHEN 650 MG: 325 TABLET ORAL at 11:31

## 2023-12-06 RX ADMIN — FLUTICASONE FUROATE AND VILANTEROL TRIFENATATE 1 PUFF: 200; 25 POWDER RESPIRATORY (INHALATION) at 21:25

## 2023-12-06 RX ADMIN — HEPARIN SODIUM 1000 UNITS/HR: 10000 INJECTION, SOLUTION INTRAVENOUS at 06:49

## 2023-12-06 RX ADMIN — SODIUM CHLORIDE 1 ML/KG/HR: 9 INJECTION, SOLUTION INTRAVENOUS at 21:15

## 2023-12-06 RX ADMIN — OXYCODONE HYDROCHLORIDE 5 MG: 5 TABLET ORAL at 22:45

## 2023-12-06 RX ADMIN — ATORVASTATIN CALCIUM 80 MG: 80 TABLET, FILM COATED ORAL at 21:13

## 2023-12-06 RX ADMIN — QUETIAPINE 12.5 MG: 25 TABLET ORAL at 22:45

## 2023-12-06 RX ADMIN — TAMSULOSIN HYDROCHLORIDE 0.4 MG: 0.4 CAPSULE ORAL at 09:24

## 2023-12-06 RX ADMIN — LEVOTHYROXINE SODIUM 200 MCG: 100 TABLET ORAL at 09:23

## 2023-12-06 RX ADMIN — DULOXETINE HYDROCHLORIDE 60 MG: 60 CAPSULE, DELAYED RELEASE ORAL at 21:13

## 2023-12-06 RX ADMIN — SENNOSIDES AND DOCUSATE SODIUM 2 TABLET: 8.6; 5 TABLET ORAL at 09:24

## 2023-12-06 RX ADMIN — METOPROLOL TARTRATE 100 MG: 50 TABLET, FILM COATED ORAL at 09:24

## 2023-12-06 RX ADMIN — AZELASTINE HYDROCHLORIDE 1 SPRAY: 137 SPRAY, METERED NASAL at 09:00

## 2023-12-06 RX ADMIN — RANOLAZINE 1000 MG: 500 TABLET, EXTENDED RELEASE ORAL at 09:27

## 2023-12-06 RX ADMIN — PANTOPRAZOLE SODIUM 40 MG: 40 TABLET, DELAYED RELEASE ORAL at 06:05

## 2023-12-06 RX ADMIN — POLYETHYLENE GLYCOL 3350 17 G: 17 POWDER, FOR SOLUTION ORAL at 09:23

## 2023-12-06 ASSESSMENT — PAIN SCALES - GENERAL
PAINLEVEL_OUTOF10: 0 - NO PAIN
PAINLEVEL_OUTOF10: 8
PAINLEVEL_OUTOF10: 3
PAINLEVEL_OUTOF10: 5 - MODERATE PAIN

## 2023-12-06 ASSESSMENT — PAIN - FUNCTIONAL ASSESSMENT
PAIN_FUNCTIONAL_ASSESSMENT: 0-10

## 2023-12-06 NOTE — PROGRESS NOTES
Orthopaedic Spine Surgery Progress Note    Subjective:  Patient is doing well this morning. Reports that pain is well controlled. Is tolerating clears. No issues at this time. Patient denies chest pain, dyspnea, nausea, fever, chills.     Denies dysphagia, tolerating diet    Became acutely agitated yesterday evening with complaints of chest pain. Rapid called. Trops and EKG obtained. Trops elevating overnight    Objective:  Vitals:    12/06/23 0500   BP: 130/74   Pulse: 73   Resp: 18   Temp: 36.3 °C (97.3 °F)   SpO2: 95%       Physical Exam  - Constitutional: No acute distress, cooperative  - Eyes: anicteric  - Head/Neck: normocephalic atraumatic  - Respiratory/Thorax: NWOB  - Cardiovascular: RRR on peripheral palpation  - Gastrointestinal: Nondistended  - Psychological: Appropriate mood/behavior  - Skin: Warm and dry. Additional findings in musculoskeletal evaluation  - Musculoskeletal:  ---  Spine:  Soft collar in place  Dressing c/d/i    C5: SILT   Deltoid 5/5 Left; 5/5 Right  C6: SILT   Wrist Ext: 5/5 Left; 5/5 Right  C7: SILT   Triceps: 5/5 Left; 5/5 Right  C8: SILT   Finger flexion: 5/5 Left; 5/5 Right  T1: SILT    Interossei: 5/5 Left; 5/5 Right     L1: SILT       L2: SILT      Hip flexors 5/5 Left; 5/5 Right  L3: SILT      Knee extension 5/5 Left; 5/5 Right  L4: SILT      Tib Ant. (Dorsiflexion) 5/5 Left; 5/5 Right  L5: SILT      EHL 5/5 Left; 5/5 Right  S1: SILT      Plantarflexion 5/5 Left; 5/5 Right         Results for orders placed or performed during the hospital encounter of 11/29/23 (from the past 24 hour(s))   Basic metabolic panel   Result Value Ref Range    Glucose 164 (H) 74 - 99 mg/dL    Sodium 138 136 - 145 mmol/L    Potassium 5.0 3.5 - 5.3 mmol/L    Chloride 101 98 - 107 mmol/L    Bicarbonate 21 21 - 32 mmol/L    Anion Gap 21 (H) 10 - 20 mmol/L    Urea Nitrogen 16 6 - 23 mg/dL    Creatinine 1.11 0.50 - 1.30 mg/dL    eGFR 77 >60 mL/min/1.73m*2    Calcium 10.0 8.6 - 10.6 mg/dL   Troponin I, High  Sensitivity   Result Value Ref Range    Troponin I, High Sensitivity 2,803 (HH) 0 - 53 ng/L   Phosphorus   Result Value Ref Range    Phosphorus 3.1 2.5 - 4.9 mg/dL   Basic Metabolic Panel   Result Value Ref Range    Glucose 209 (H) 74 - 99 mg/dL    Sodium 137 136 - 145 mmol/L    Potassium 4.6 3.5 - 5.3 mmol/L    Chloride 102 98 - 107 mmol/L    Bicarbonate 24 21 - 32 mmol/L    Anion Gap 16 10 - 20 mmol/L    Urea Nitrogen 19 6 - 23 mg/dL    Creatinine 1.14 0.50 - 1.30 mg/dL    eGFR 75 >60 mL/min/1.73m*2    Calcium 9.9 8.6 - 10.6 mg/dL   CBC   Result Value Ref Range    WBC 19.4 (H) 4.4 - 11.3 x10*3/uL    nRBC 0.0 0.0 - 0.0 /100 WBCs    RBC 4.79 4.50 - 5.90 x10*6/uL    Hemoglobin 13.5 13.5 - 17.5 g/dL    Hematocrit 41.2 41.0 - 52.0 %    MCV 86 80 - 100 fL    MCH 28.2 26.0 - 34.0 pg    MCHC 32.8 32.0 - 36.0 g/dL    RDW 12.9 11.5 - 14.5 %    Platelets 417 150 - 450 x10*3/uL   Basic metabolic panel   Result Value Ref Range    Glucose 209 (H) 74 - 99 mg/dL    Sodium 136 136 - 145 mmol/L    Potassium 4.4 3.5 - 5.3 mmol/L    Chloride 102 98 - 107 mmol/L    Bicarbonate 23 21 - 32 mmol/L    Anion Gap 15 10 - 20 mmol/L    Urea Nitrogen 19 6 - 23 mg/dL    Creatinine 1.09 0.50 - 1.30 mg/dL    eGFR 79 >60 mL/min/1.73m*2    Calcium 10.1 8.6 - 10.6 mg/dL   Magnesium   Result Value Ref Range    Magnesium 1.61 1.60 - 2.40 mg/dL   Hepatic function panel   Result Value Ref Range    Albumin 3.9 3.4 - 5.0 g/dL    Bilirubin, Total 0.5 0.0 - 1.2 mg/dL    Bilirubin, Direct 0.1 0.0 - 0.3 mg/dL    Alkaline Phosphatase 95 33 - 120 U/L    ALT 14 10 - 52 U/L    AST 32 9 - 39 U/L    Total Protein 7.7 6.4 - 8.2 g/dL   Coagulation Screen   Result Value Ref Range    Protime 13.4 (H) 9.8 - 12.8 seconds    INR 1.2 (H) 0.9 - 1.1    aPTT 34 27 - 38 seconds   TSH   Result Value Ref Range    Thyroid Stimulating Hormone 0.01 (L) 0.44 - 3.98 mIU/L   Troponin I, High Sensitivity   Result Value Ref Range    Troponin I, High Sensitivity 7,477 (HH) 0 - 53 ng/L    Troponin I, High Sensitivity   Result Value Ref Range    Troponin I, High Sensitivity 14,132 (HH) 0 - 53 ng/L           XR cervical spine 1 view   Final Result   1. Partially imaged postsurgical changes of anterior C4-C6 cervical   fusion without hardware complication.        I personally reviewed the images/study and I agree with the findings   as stated by Dr. Guicho Nieto. This study was interpreted at [facility], [place], [place].        MACRO:   None.        Signed by: Carl Fan 12/5/2023 11:23 AM   Dictation workstation:   [id number]      XR cervical spine 1 view   Final Result      XR chest 1 view   Final Result   No acute cardiopulmonary process        I personally reviewed the images/study and I agree with the findings   as stated above by resident physician, Dr. Dharmesh Potter. The   study was interpreted at [facility] in [place] [facility].        Signed by: Arnaud Alarcon 11/30/2023 9:18 AM   Dictation workstation:   [id number]            Assessment:  58M with C5/6 cervical stenosis c/f myelopathy now s/p C5 anterior cervical corpectomy and fusion at C4-6 with Dr. Ruelas on 12/04    Plan:  Plan:  - Weight bearing: WBAT, OOBAT, no HOB restrictions  - DVT ppx: SCDs, holding home chemo ppx   - Diet: NPO for now  - Pain: Tylenol, oxycodone 5/10, dilaudid PRN for pain management  - FEN: Continue NS at 100cc/hr; HLIV with good PO intake  - Bowel Regimen: Colace, senna, dulcolax   - PT/OT consult  - Pulm: Encourage IS  - dressing change completed  - PT/OT  - No bending, twisting, lifting > 10 lbs  - maintain soft collar  - medicine and cards following for elevated trops, trops trending, recs pending  - ok to start hep gtt and ASA    Dispo: PT/OT, Pain control    Laurent Abel MD  Orthopedic Surgery, PGY4  Available on Epic Chat     This patient will be followed by the Orthopaedic Spine service. Please page or Epic Chat the corresponding  residents below with questions or concerns.     Ortho Spine Service (Epic Chat Preferred)  First call: Jamie Harris MD PGY2  Second call: Laurent Abel MD PGY4    6pm-6am M-F, Holidays, and weekends page Ortho on-call @[phone] with urgent questions/concerns.

## 2023-12-06 NOTE — SIGNIFICANT EVENT
Patient now s/p PCI for MI with known hx severe CAD    Transferred to CICU post-procedure, appreciate excellent and expedient care.    Ortho Spine Postop Plan/Recommendations    - Psych Consulted for paranoia/hallucinations, to evaluate this PM  - Daily PT/OT, was recommended moderate intensity rehabilitation while on ortho service     - Wound Care  Neck (Anterior Cervical Spine) Incision  -Change dressing daily   -Use a 4x4 gauze pad and paper tape for dressing changes  -If there are steri strips your incision, do not remove it with your dressing changes.  The dressing will slowly lift away from the skin over time.   -No lotions, creams, or tub soaks.  -May use heat or ice to posterior neck and shoulders as needed for comfort.       -No NSAIDS (Advil, Ibuprofen, Aleve, etc.) for 3 months, they have a bad effect on the healing of fusions.    Activity instructions  -Activity with assistance.  -Progressively walk 2 times a day with a wheeled walker.   -No pushing, pulling, or lifting objects greater than 10 pounds until follow up appointment.  -No heavy house or yard work.  -You may not drive until your follow up appointment, or while taking narcotic medication.  -You may not return to work until your follow appointment.  -Wear your cervical neck brace at all times. It may be taken off for dressing changes and hygiene/ showering. Weaning out of your neck brace will be discussed at your postoperative appointment.       Remainder of care per CICU    This patient will be followed by the Orthopaedic Spine service. Please page or Epic Chat the corresponding residents below with questions or concerns.     Ortho Trauma Service (Epic Chat Preferred)  First call: Jamie Harris MD PGY2  Second call: Laurent Abel MD PGY4    6pm-6am M-F, Holidays, and weekends page Ortho on-call @[phone] with urgent questions/concerns.     Jamie Harris MD  Orthopaedic Surgery PGY-2  On-call pager [phone]

## 2023-12-06 NOTE — SIGNIFICANT EVENT
RR DACR note.     Rapid called for agittation and AMS. On arrival, patient paranoid and concerned of that care team on floor is making fun of him and is giving him incorrect dosing. Patient stating he is going to evans the hospital. Patient with 147/82, 103 HR, 95% on RA, afebrile, saturating comfortably. Denies any alcohol use.     Reports some chest pain but possible oin relation to concerns. Assured patient that medicine takes his concerns seriously. Concern that patient is paranoid. Labs ordered including troponin and EKG, NACR to follow. Possible psych consult depending upon other reasons for metnation change. Recommend moving patient away from nursing station, limiting interaction with other patients and new nurse and aid tomorrow. Recommend keeping sleep wake cycle stable and derlium precautions and redirection. Currently not combative.

## 2023-12-06 NOTE — PROGRESS NOTES
"Physical Therapy                 Therapy Communication Note    Patient Name: Philip Barfield  MRN: [id number]  Today's Date: 12/6/2023     Discipline: Physical Therapy    Missed Visit Reason: Missed Visit Reason: Patient refused (and RN reports pt very confused last night this morning. Pt reporting that he kind of remembers therapist but doesn't want to do therapy today \"I had a heart attack.\")    Missed Time: Attempt    Comment:  08:10am  "

## 2023-12-06 NOTE — H&P
History Of Present Illness  Philip Barfield is a 58 y.o. male w/ extensive CAD hx w/ CABG x 3 (LIMA-LAD, (left) Radial artery - diag, Vein graft-OM) in 2003 and subsequent PCI of distal LAD via LIMA graft (2 overlapped JACE), multiple RCA overlap stents, paroxysmal Afib (on home eliquis), T2DM, HLD, HTN, COPD, asthma, TIAs, vestibular neuritis, vertigo, ARISTEO on CPAP who presents to CICU w/ NSTEMI s/p balloon angioplasty of in stent thrombosis of RCA.    Pt was originally a direct admit from [facility] (presented to [facility] on 11/28) on 11/30 after presenting w/  bilateral UE/LE weakness, numbness, and paresthesias starting in September after he sustained a syncopal fall striking his neck. Multiple nonsyncopal falls since then. MRI spine showed C5/C6 severe stenosis w/ c/f myelopathy and pt was taken for Home plavix and eliquis were stopped for 5 days for pre-surgery washout and pt was taken for C5 anterior cervical corpectomy and fusion at C4-6 with Dr. Ruelas on 12/04.    Following his procedure, a rapid response was called for agitation, AMS, and chest pains. There was no concern for ischemic changes on EKG and pt was vitally stable. Another RR was called on 12/5 w/ agitation/paranoia and chest pains again, labs showed elevated trops at 2,029, EKG showed NSR w/ no ischemic changes. Trops continued to uptrend overnight: 2803 > 7477 >14,000 > 16,470 (peak on 12/6 at 6 am) > 13,579 > 11,098. Heparin drip was started on 12/6 and pt was taken for LHC on 12/6 which showed patent LIMA-LAD, Radial artery - diag, and a known occluded Vein graft-OM. RCA with prior chronic total occlusion PCI, now has new in stent thrombosis -> treated with penumbra thrombectomy and balloon angioplasty. Pt was given ASA, cangrelor drip, ticagrelor load in lab.    On arrival to CICU on 12/6 post-cath, pt was vitally stable. Pt found in bed comfortably on exam. He has some shortness of breath on exam, and complains of neck soreness. He denies CP,  abdominal pain, fevers/chills, dizziness.    Cangrelor drip was stopped 2 hours after ticacrelor load and heparin drip was continued w/ low intensity protocol.     Recent Cardiac Hx:     Echo: TTE (2023)  Interpretation Summary  -Left Ventricle: Left ventricle size is normal. Normal wall thickness. Low normal left ventricular systolic function. The EF by visual approximation is 50%. Normal wall motion. Subtle wall motion abnormalities may not be recognized.  -Right Ventricle: Right ventricle size is normal. Normal systolic function.  -No significant valvular abnormalities.  -Technically difficult study. The valvular structures were poorly visualize   Stress: EXERCISE (2023)  Interpretation Summary  -Stress EC.0 mm of ST depression downsloping (II, III and aVF) were noted. Arrhythmias during recovery: frequent PVCs, non-sustained VT. Conclusion: The stress test is abnormal due to arrhythmia and ST depression.  -Stress Test: A Francisco protocol stress test was performed. Hemodynamics are inadequate for diagnosis. Blood pressure demonstrated a normal response and heart rate demonstrated a blunted response to stress. The patient reported chest pain during the stress test. Chest pain was characterized as sharp.  -Resting ECG: The ECG shows normal sinus rhythm. Resting ECG shows no ST-segment deviation.   Cath: (23)  -Stable diffuse multivessel CAD with no significant interval angiographic changes compared to cardiac cath on 2023.  -Widely patent LIMA graft to LAD with widely patent overlapped stents of the distal LAD beyond the graft which were angioplastied last week for diffuse in-stent restenosis. No angiographic evidence of recurrent ISR. No additional PCI needed.  Widely patent radial artery graft to diagonal branch with good runoff.  -Chronic total occlusion of ostial SVG to OM branch.  -Widely patent dominant RCA with multiple overlap stents throughout the course of the vessel with stable  mild diffuse in-stent restenosis. ISR appears nonflow limiting based on IFR of 0.96, and IVUS examination showing no severe stenoses and well-expanded stents.  -Native left main not engaged on today's cath. Refer to report from 1/23/23 (severe diffuse disease noted).          Past Medical History  Past Medical History:   Diagnosis Date    Old myocardial infarction     History of myocardial infarction    Personal history of other diseases of the circulatory system     History of hypertension    Personal history of other diseases of the musculoskeletal system and connective tissue     History of gout    Personal history of other diseases of the nervous system and sense organs     History of sleep apnea    Personal history of other endocrine, nutritional and metabolic disease     History of diabetes mellitus    Personal history of other endocrine, nutritional and metabolic disease     History of hyperlipidemia       Surgical History  Past Surgical History:   Procedure Laterality Date    OTHER SURGICAL HISTORY  12/11/2018    Tonsillectomy    OTHER SURGICAL HISTORY  12/11/2018    Knee arthroscopy    OTHER SURGICAL HISTORY  12/11/2018    Cardiac catheterization    OTHER SURGICAL HISTORY  12/11/2018    Coronary artery bypass graft    OTHER SURGICAL HISTORY  12/11/2018    Nasal septoplasty    OTHER SURGICAL HISTORY  12/11/2018    Arterial stent placement        Social History  He reports that he has never smoked. He has never used smokeless tobacco. He reports that he does not drink alcohol and does not use drugs.    Family History  Family History   Problem Relation Name Age of Onset    Hypertension Mother      Cancer Father      Diabetes Brother      Diabetes Maternal Grandmother      Colon cancer Maternal Grandfather       Fmhx:   Father - renal cancer  Brother - heart disease  Mat Grandfather - pancreatic cancer     Allergies  Penicillins, Tetanus toxoid, Hydrocodone-acetaminophen, Niacin, and Sulfa (sulfonamide  "antibiotics)     Physical Exam  Constitutional:       Appearance: He is obese.   HENT:      Head: Atraumatic.      Right Ear: External ear normal.      Left Ear: External ear normal.      Nose: Nose normal.      Mouth/Throat:      Mouth: Mucous membranes are moist.   Eyes:      Extraocular Movements: Extraocular movements intact.   Neck:      Comments: Neck collar, decreased ROM in neck  Cardiovascular:      Rate and Rhythm: Normal rate and regular rhythm.      Heart sounds: Normal heart sounds.   Pulmonary:      Effort: Pulmonary effort is normal.      Breath sounds: Normal breath sounds. No wheezing, rhonchi or rales.   Abdominal:      General: Abdomen is flat. Bowel sounds are normal.      Palpations: Abdomen is soft.      Tenderness: There is abdominal tenderness (LLQ abdominal tenderness). There is guarding. There is no rebound.   Musculoskeletal:      Right lower leg: No edema.      Left lower leg: No edema.   Skin:     General: Skin is warm.   Neurological:      General: No focal deficit present.      Mental Status: He is alert and oriented to person, place, and time.          Last Recorded Vitals  Blood pressure 137/83, pulse 78, temperature 36.1 °C (97 °F), resp. rate 13, height 1.626 m (5' 4\"), weight 101 kg (222 lb 10.6 oz), SpO2 99 %.    Relevant Results  Scheduled medications  [START ON 12/7/2023] aspirin, 81 mg, oral, Daily  [MAR Hold] aspirin, 325 mg, oral, Once  atorvastatin, 80 mg, oral, Nightly  tamsulosin, 0.4 mg, oral, Daily  ticagrelor, 90 mg, oral, BID      Continuous medications  heparin, 0-4,000 Units/hr, Last Rate: 1,000 Units/hr (12/06/23 1906)  sodium chloride 0.9%, 1 mL/kg/hr      PRN medications  PRN medications: albuterol, [MAR Hold] heparin, methocarbamol, oxygen    Results for orders placed or performed during the hospital encounter of 11/29/23 (from the past 24 hour(s))   Troponin I, High Sensitivity   Result Value Ref Range    Troponin I, High Sensitivity 2,803 (HH) 0 - 53 ng/L "   Phosphorus   Result Value Ref Range    Phosphorus 3.1 2.5 - 4.9 mg/dL   Basic Metabolic Panel   Result Value Ref Range    Glucose 209 (H) 74 - 99 mg/dL    Sodium 137 136 - 145 mmol/L    Potassium 4.6 3.5 - 5.3 mmol/L    Chloride 102 98 - 107 mmol/L    Bicarbonate 24 21 - 32 mmol/L    Anion Gap 16 10 - 20 mmol/L    Urea Nitrogen 19 6 - 23 mg/dL    Creatinine 1.14 0.50 - 1.30 mg/dL    eGFR 75 >60 mL/min/1.73m*2    Calcium 9.9 8.6 - 10.6 mg/dL   CBC   Result Value Ref Range    WBC 19.4 (H) 4.4 - 11.3 x10*3/uL    nRBC 0.0 0.0 - 0.0 /100 WBCs    RBC 4.79 4.50 - 5.90 x10*6/uL    Hemoglobin 13.5 13.5 - 17.5 g/dL    Hematocrit 41.2 41.0 - 52.0 %    MCV 86 80 - 100 fL    MCH 28.2 26.0 - 34.0 pg    MCHC 32.8 32.0 - 36.0 g/dL    RDW 12.9 11.5 - 14.5 %    Platelets 417 150 - 450 x10*3/uL   Basic metabolic panel   Result Value Ref Range    Glucose 209 (H) 74 - 99 mg/dL    Sodium 136 136 - 145 mmol/L    Potassium 4.4 3.5 - 5.3 mmol/L    Chloride 102 98 - 107 mmol/L    Bicarbonate 23 21 - 32 mmol/L    Anion Gap 15 10 - 20 mmol/L    Urea Nitrogen 19 6 - 23 mg/dL    Creatinine 1.09 0.50 - 1.30 mg/dL    eGFR 79 >60 mL/min/1.73m*2    Calcium 10.1 8.6 - 10.6 mg/dL   Magnesium   Result Value Ref Range    Magnesium 1.61 1.60 - 2.40 mg/dL   Hepatic function panel   Result Value Ref Range    Albumin 3.9 3.4 - 5.0 g/dL    Bilirubin, Total 0.5 0.0 - 1.2 mg/dL    Bilirubin, Direct 0.1 0.0 - 0.3 mg/dL    Alkaline Phosphatase 95 33 - 120 U/L    ALT 14 10 - 52 U/L    AST 32 9 - 39 U/L    Total Protein 7.7 6.4 - 8.2 g/dL   Coagulation Screen   Result Value Ref Range    Protime 13.4 (H) 9.8 - 12.8 seconds    INR 1.2 (H) 0.9 - 1.1    aPTT 34 27 - 38 seconds   TSH   Result Value Ref Range    Thyroid Stimulating Hormone 0.01 (L) 0.44 - 3.98 mIU/L   Troponin I, High Sensitivity   Result Value Ref Range    Troponin I, High Sensitivity 7,477 (HH) 0 - 53 ng/L   Troponin I, High Sensitivity   Result Value Ref Range    Troponin I, High Sensitivity 14,132  (HH) 0 - 53 ng/L   Troponin I, High Sensitivity   Result Value Ref Range    Troponin I, High Sensitivity 16,470 (HH) 0 - 53 ng/L   aPTT - baseline   Result Value Ref Range    aPTT 33 27 - 38 seconds   CBC   Result Value Ref Range    WBC 17.2 (H) 4.4 - 11.3 x10*3/uL    nRBC 0.0 0.0 - 0.0 /100 WBCs    RBC 4.82 4.50 - 5.90 x10*6/uL    Hemoglobin 13.8 13.5 - 17.5 g/dL    Hematocrit 41.8 41.0 - 52.0 %    MCV 87 80 - 100 fL    MCH 28.6 26.0 - 34.0 pg    MCHC 33.0 32.0 - 36.0 g/dL    RDW 13.3 11.5 - 14.5 %    Platelets 383 150 - 450 x10*3/uL   Protime-INR   Result Value Ref Range    Protime 13.0 (H) 9.8 - 12.8 seconds    INR 1.2 (H) 0.9 - 1.1   Electrocardiogram, 12-lead PRN ACS symptoms   Result Value Ref Range    Ventricular Rate 70 BPM    Atrial Rate 70 BPM    GA Interval 154 ms    QRS Duration 90 ms    QT Interval 396 ms    QTC Calculation(Bazett) 427 ms    P Axis 67 degrees    R Axis 55 degrees    T Axis 41 degrees    QRS Count 11 beats    Q Onset 223 ms    P Onset 146 ms    P Offset 193 ms    T Offset 421 ms    QTC Fredericia 417 ms   ECG 12 Lead   Result Value Ref Range    Ventricular Rate 75 BPM    Atrial Rate 75 BPM    GA Interval 160 ms    QRS Duration 96 ms    QT Interval 402 ms    QTC Calculation(Bazett) 448 ms    P Axis 77 degrees    R Axis 87 degrees    T Axis 68 degrees    QRS Count 13 beats    Q Onset 215 ms    P Onset 135 ms    P Offset 190 ms    T Offset 416 ms    QTC Fredericia 432 ms   ECG 12 Lead   Result Value Ref Range    Ventricular Rate 97 BPM    Atrial Rate 97 BPM    GA Interval 158 ms    QRS Duration 96 ms    QT Interval 358 ms    QTC Calculation(Bazett) 454 ms    P Axis 66 degrees    R Axis 75 degrees    T Axis 41 degrees    QRS Count 16 beats    Q Onset 215 ms    P Onset 136 ms    P Offset 190 ms    T Offset 394 ms    QTC Fredericia 420 ms   Troponin I, High Sensitivity   Result Value Ref Range    Troponin I, High Sensitivity 13,579 (HH) 0 - 53 ng/L   Troponin I, High Sensitivity   Result Value  Ref Range    Troponin I, High Sensitivity 11,098 (HH) 0 - 53 ng/L   Heparin Assay, UFH   Result Value Ref Range    Heparin Unfractionated 0.2 See Comment Below for Therapeutic Ranges IU/mL   Electrocardiogram 12 Lead   Result Value Ref Range    Ventricular Rate 79 BPM    Atrial Rate 79 BPM    CA Interval 176 ms    QRS Duration 88 ms    QT Interval 394 ms    QTC Calculation(Bazett) 451 ms    P Axis 64 degrees    R Axis 74 degrees    T Axis 69 degrees    QRS Count 13 beats    Q Onset 220 ms    P Onset 132 ms    P Offset 183 ms    T Offset 417 ms    QTC Fredericia 432 ms   Troponin I, High Sensitivity   Result Value Ref Range    Troponin I, High Sensitivity 8,473 (HH) 0 - 53 ng/L   CBC   Result Value Ref Range    WBC 15.5 (H) 4.4 - 11.3 x10*3/uL    nRBC 0.0 0.0 - 0.0 /100 WBCs    RBC 4.32 (L) 4.50 - 5.90 x10*6/uL    Hemoglobin 12.2 (L) 13.5 - 17.5 g/dL    Hematocrit 36.7 (L) 41.0 - 52.0 %    MCV 85 80 - 100 fL    MCH 28.2 26.0 - 34.0 pg    MCHC 33.2 32.0 - 36.0 g/dL    RDW 13.1 11.5 - 14.5 %    Platelets 355 150 - 450 x10*3/uL   Renal function panel   Result Value Ref Range    Glucose 113 (H) 74 - 99 mg/dL    Sodium 139 136 - 145 mmol/L    Potassium 4.2 3.5 - 5.3 mmol/L    Chloride 105 98 - 107 mmol/L    Bicarbonate 24 21 - 32 mmol/L    Anion Gap 14 10 - 20 mmol/L    Urea Nitrogen 17 6 - 23 mg/dL    Creatinine 0.96 0.50 - 1.30 mg/dL    eGFR >90 >60 mL/min/1.73m*2    Calcium 9.0 8.6 - 10.6 mg/dL    Phosphorus 2.8 2.5 - 4.9 mg/dL    Albumin 3.7 3.4 - 5.0 g/dL   Magnesium   Result Value Ref Range    Magnesium 1.62 1.60 - 2.40 mg/dL   Coagulation Screen   Result Value Ref Range    Protime 15.9 (H) 9.8 - 12.8 seconds    INR 1.4 (H) 0.9 - 1.1    aPTT >200 (HH) 27 - 38 seconds   Heparin Assay, UFH   Result Value Ref Range    Heparin Unfractionated 0.7 See Comment Below for Therapeutic Ranges IU/mL     Electrocardiogram 12 Lead    Result Date: 12/6/2023  Normal sinus rhythm Normal ECG When compared with ECG of 06-DEC-2023  07:18, No significant change was found    ECG 12 Lead    Result Date: 12/6/2023  Normal sinus rhythm Normal ECG When compared with ECG of 29-NOV-2023 00:31, PREVIOUS ECG IS PRESENT    ECG 12 Lead    Result Date: 12/6/2023  Normal sinus rhythm Normal ECG When compared with ECG of 05-DEC-2023 21:14, No significant change was found    Electrocardiogram, 12-lead PRN ACS symptoms    Result Date: 12/6/2023  Normal sinus rhythm Low voltage QRS Nonspecific ST abnormality Abnormal ECG When compared with ECG of 06-DEC-2023 07:18, Fusion complexes are no longer Present Premature ventricular complexes are no longer Present QT has shortened        Assessment/Plan   Principal Problem:    Cervical spinal cord compression (CMS/HCC)  Active Problems:    History of general anesthesia    Stented coronary artery    Coronary artery disease involving native coronary artery    Atrial fibrillation (CMS/HCC)    Acquired hypothyroidism    Type 2 diabetes mellitus, without long-term current use of insulin (CMS/HCC)    Gastroesophageal reflux disease without esophagitis    ARISTEO (obstructive sleep apnea)    Mild asthma    Chronic bronchitis (CMS/HCC)    Philip Barfield is a 58 y.o. male w/ extensive CAD hx w/ CABG x 3 (LIMA-LAD, (left) Radial artery - diag, Vein graft-OM) in 2003 and subsequent PCI of distal LAD via LIMA graft (2 overlapped JACE), multiple RCA overlap stents, paroxysmal Afib (on home eliquis), T2DM, HLD, HTN, COPD, asthma, TIAs, vestibular neuritis, vertigo, ARISTEO on CPAP who presents to CICU w/ NSTEMI s/p penumbra thrombectomy and balloon angioplasty of in stent thrombosis of RCA.    NEURO/PSYCH:  #Severe C5/6 cervical stenosis w/ concern for myelopathy on MRI  -C5 anterior cervical corpectomy and fusion at C4-6 with Dr. Ruelas on 12/04  Plan:  -can move his head. Our main restriction is no bending/lifting/twisting greater than 10lbs and no strenuous activity. We'd like him to work with PT/OT while in house and stay mobile   - Daily  "PT/OT, was recommended moderate intensity rehabilitation while on ortho service. Activity orders placed  -Change dressing daily. Wound care orders placed.    #Agitation w/ suspected hallucinations  -Per Cardiology notes on 12/6:  -Acutely paranoid and agitated when I saw him early this AM. Reported that \"the people put viruses on my phone and the are listening and watching everything\"  -\"They tortured that autistic girl and called her the devil child and made her listen to music when she begged them to stop they wouldn't\"  -\"I'm not crazy, these people are. You have no idea what happens here\"  Plan:  -Continue home Duloxetine DR 60 mg  -Psych consulted    CARDIO:  #Severe CAD  #CABG x 3  #Multiple PCIs  #NSTEMI s/p RCA balloon angioplasty on 12/6/23  -Had chest pains 12/5 and 12/6  -[facility] 12/6: patent LIMA-LAD, Radial artery - diag, and a known occluded Vein graft-OM. RCA with prior chronic total occlusion PCI, now has new in stent thrombosis - s/p penumbra thrombectomy and balloon angioplasty  -Trops on 12/6: 2803 > 7477 >14,000 > 16,470 (peak on 12/6 at 6 am) > 13,579 > 11,098 > 8k  -Last TTE 1/2023: Low normal left ventricular systolic function. The EF by visual approximation is 50%. Normal wall motion.  Plan:  -Order lipid profile  -Ordered TTE  - resume Metoprolol tartrate 100 BID tomorrow morning  - Anticoagulation: ticagrelor 90 BID starting tomorrow, ASA 81 mg, heparin drip  -transition to ASA/ticagrelor/apixaban prior to d/c, then in 4 weeks (January 4, 2024) on outpt basis switch to apixaban + clopidogrel  - Continue home atorvastatin 80 mg  -Continue home 24 hr ranolazine 1000 mg    #Paroxysmal Afib  -currently NSR  Plan:  - resume Metoprolol tartrate 100 BID tomorrow morning  -Anticoagulation: heparin drip while inpatient, transition to home apixaban prior to discharge    PULM:  #COPD  Plan:  -Continue breo ellipta 1 puff daily w/ albuterol PRN    #ARISTEO  -on home CPAP  Plan:  -CPAP at " night    GI:  #GERD  Plan:  -Continue home PO pantoprazole 40 mg daily    ENDO:  #Hypothyroidism  -Last TSH 12/5/23: 0.01 (very low)  -Last T4 11/29/23: 1.63 (high)  -On home levothyroxine 200 mcg daily  Plan:  -Decrease home levothyroxine to 150 mcg daily  - Outpt endocrine follow-up for TSH w/ T4 reflex check on new levothyroxine dose    Code: Full code  NOK: Gaurang Barfield (son) [phone]  Gab Adler - number not on file                 Frances Whitman MD

## 2023-12-06 NOTE — SIGNIFICANT EVENT
Rapid Response Note    A rapid response was paged by staff nurses for reported agitation by patient who was also stating  chest pain.  On arrival to the room, rapid response nurse Estephania Larose was present and spoken w/pt. Pt was calm but visibly agitated when speaking about precipitating factors and events that led him to have reported chest pain. SOLO Joshi also present and spoke with patient regarding drawing labwork and EKG. No c/o of chest pain at the time of assessment.  VS stable and unremarkable. Pt was willing to allow labwork to be drawn by rapid response team. EKG still pending.   On further discussion with patient, he was unhappy about his nursing care and the proximity to the nurses station due to noise. Patient also c/o not receiving any pain medication and that he was very anxious due to extensive factors.    After speaking with the nighttime charge nurse and the patient's primary nurse, the patient was agreeable to move to a quieter location on the unit to facilitate healing environment. Labwork collected, pending results,  and pt to receive prn pain medication.  Bedside nurse assisting with transfer to new room and aware of plan of care.   Patient's family to be notified of transfer by primary nurse.  .    @2100 ADRYAN José at  to see patient prior to room change.      @2029 elevated troponin results:  2,029      @2032 EKG reviewed by NACR - no acute interventions at this time       -trend troponin level at midnight    Patient stable and able to remain on the floor at this time.       Please page Rapid Response Team with any new concerns or patient decline.

## 2023-12-06 NOTE — PROGRESS NOTES
Physical Therapy                 Therapy Communication Note    Patient Name: Philip Barfield  MRN: [id number]  Today's Date: 12/6/2023     Discipline: Physical Therapy    Missed Visit Reason: Missed Visit Reason:  (per chart pt transferred to T9 and appears that he is currently in cardiac cath lab)    Missed Time: Attempt    Comment: 15:05pm

## 2023-12-06 NOTE — SIGNIFICANT EVENT
Was informed by outgoing DACR, Dr Joshi, that this patient had Rapid Response called on him earlier in the setting of agitation, paranoia and he had endorsed some chest pain. Labs including troponins were drawn prior to my arrival after much effort at convincing.    On evaluation, patient sitting at side of bed complaining of people talking about him and pain in his neck. Denied chest pain. Was willing to get an EKG after he changes rooms to be furthest away from nursing station.    EKG done and reviewed, NSR with no ischemic changes. Troponin came back elevated at 2803 around 8 pm.  Repeat troponin ordered for midnight.    0000 update, troponins returned at 7477.  Repeat EKG without any ischemic changes.  Patient remained asymptomatic and without chest pain.  Repeat troponins ordered for 2 AM.     5 AM update,  troponins returned elevated at 14,000.  Patient remained asymptomatic.  Concern for NSTEMI so will start heparin gtt with bolus. Primary team to check whether loading with aspirin would be okay in setting of recent surgery. Repeat troponin is ordered. Made NPO. Primary team to consult cardiology in am. Pending repeat Trops. Will sign out to incoming DACR am 8 am.    Rishabh José MD  NACR

## 2023-12-06 NOTE — CARE PLAN
The patient's goals for the shift include patient will have managed pain during shift    The clinical goals for the shift include pain control      Problem: Pain - Adult  Goal: Verbalizes/displays adequate comfort level or baseline comfort level  Outcome: Progressing     Problem: Safety - Adult  Goal: Free from fall injury  Outcome: Progressing     Problem: Discharge Planning  Goal: Discharge to home or other facility with appropriate resources  Outcome: Progressing     Problem: Chronic Conditions and Co-morbidities  Goal: Patient's chronic conditions and co-morbidity symptoms are monitored and maintained or improved  Outcome: Progressing     Problem: Pain  Goal: Takes deep breaths with improved pain control throughout the shift  Outcome: Progressing  Goal: Turns in bed with improved pain control throughout the shift  Outcome: Progressing  Goal: Walks with improved pain control throughout the shift  Outcome: Progressing  Goal: Performs ADL's with improved pain control throughout shift  Outcome: Progressing  Goal: Participates in PT with improved pain control throughout the shift  Outcome: Progressing

## 2023-12-06 NOTE — INTERVAL H&P NOTE
"H&P reviewed. The patient was examined, stated he still had Chest pain. He was very anxious regarding chest pain, he saind due to these people. Acutely paranoid with right leg shaking vigorously when seen.  Agree with Taylor Dodd's note of some paranoia. He stated,  \"the people put viruses on my phone and the are listening and watching everything\". \"They tortured that autistic girl and call d her the devil child and made her listen to music when she begged them to stop they wouldn't\"  \"I'm not crazy, these people are. You have no idea what happens here\"     Transferred to tower 9 and placed on telemetrythere are no changes to the H&P.  "
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
## 2023-12-06 NOTE — POST-PROCEDURE NOTE
Physician Transition of Care Summary  Invasive Cardiovascular Lab    Procedure Date: 12/6/2023  Attending:    * Dez Gonzales - Primary  Resident/Fellow/Other Assistant: Surgeon(s) and Role:     * Jeni Manzo MD - Fellow    Indications:   Pre-op Diagnosis     * Stented coronary artery [Z95.5]     * Coronary artery disease involving native coronary artery of native heart with refractory angina pectoris (CMS/HCC) [I25.112]    Post-procedure diagnosis:   Post-op Diagnosis     * Stented coronary artery [Z95.5]     * Coronary artery disease involving native coronary artery of native heart with refractory angina pectoris (CMS/HCC) [I25.112]    Procedure(s):     * Left Heart Cath      Procedure Findings:   Severe native CAD. patent LIMA-LAD, RA-diag. VG-OM known occluded. RCA with prior  PCI has new IST -> treated with penumbra thrombectomy and POBA.     Description of the Procedure:   6F RCFA -> angioseal.    Complications:   none    Stents/Implants:       Anticoagulation/Antiplatelet Plan:   Given ASA, cangrelor, ticagrelor in lab. stop cangrelor 2 hours after ticagrelor given (5:45 PM), continue triple therapy with ASA + ticagrelor + hep gtt for now, transition to ASA/ticagrelor/apixaban prior to d/c, and on outpt basis switch to apixaban + clopidogrel in 4 weeks     Estimated Blood Loss:   10 mL    Anesthesia: Moderate Sedation Anesthesia Staff: No anesthesia staff entered.    Any Specimen(s) Removed:   No specimens collected during this procedure.    Disposition:   CICU      Electronically signed by: Jeni Manzo MD, 12/6/2023 3:54 PM

## 2023-12-07 ENCOUNTER — APPOINTMENT (OUTPATIENT)
Dept: CARDIOLOGY | Facility: HOSPITAL | Age: 58
DRG: 471 | End: 2023-12-07
Payer: MEDICARE

## 2023-12-07 LAB
ALBUMIN SERPL BCP-MCNC: 3.7 G/DL (ref 3.4–5)
ALP SERPL-CCNC: 83 U/L (ref 33–120)
ALT SERPL W P-5'-P-CCNC: 14 U/L (ref 10–52)
ANION GAP SERPL CALC-SCNC: 15 MMOL/L (ref 10–20)
AORTIC VALVE PEAK VELOCITY: 1.32
AST SERPL W P-5'-P-CCNC: 42 U/L (ref 9–39)
ATRIAL RATE: 75 BPM
ATRIAL RATE: 97 BPM
AV PEAK GRADIENT: 7
AVA (PEAK VEL): 1.6
BILIRUB DIRECT SERPL-MCNC: 0.2 MG/DL (ref 0–0.3)
BILIRUB SERPL-MCNC: 0.8 MG/DL (ref 0–1.2)
BUN SERPL-MCNC: 17 MG/DL (ref 6–23)
CALCIUM SERPL-MCNC: 9.2 MG/DL (ref 8.6–10.6)
CHLORIDE SERPL-SCNC: 105 MMOL/L (ref 98–107)
CHOLEST SERPL-MCNC: 113 MG/DL (ref 0–199)
CHOLESTEROL/HDL RATIO: 3.2
CO2 SERPL-SCNC: 24 MMOL/L (ref 21–32)
CREAT SERPL-MCNC: 0.92 MG/DL (ref 0.5–1.3)
ERYTHROCYTE [DISTWIDTH] IN BLOOD BY AUTOMATED COUNT: 13.2 % (ref 11.5–14.5)
GFR SERPL CREATININE-BSD FRML MDRD: >90 ML/MIN/1.73M*2
GLUCOSE SERPL-MCNC: 116 MG/DL (ref 74–99)
HCT VFR BLD AUTO: 37.5 % (ref 41–52)
HDLC SERPL-MCNC: 35.1 MG/DL
HGB BLD-MCNC: 12.4 G/DL (ref 13.5–17.5)
LDLC SERPL CALC-MCNC: 55 MG/DL
LEFT VENTRICLE INTERNAL DIMENSION DIASTOLE: 5.4 (ref 3.5–6)
LEFT VENTRICULAR OUTFLOW TRACT DIAMETER: 2.1
MAGNESIUM SERPL-MCNC: 1.63 MG/DL (ref 1.6–2.4)
MCH RBC QN AUTO: 28 PG (ref 26–34)
MCHC RBC AUTO-ENTMCNC: 33.1 G/DL (ref 32–36)
MCV RBC AUTO: 85 FL (ref 80–100)
MITRAL VALVE E/A RATIO: 0.99
NON HDL CHOLESTEROL: 78 MG/DL (ref 0–149)
NRBC BLD-RTO: 0 /100 WBCS (ref 0–0)
P AXIS: 66 DEGREES
P AXIS: 77 DEGREES
P OFFSET: 190 MS
P OFFSET: 190 MS
P ONSET: 135 MS
P ONSET: 136 MS
PHOSPHATE SERPL-MCNC: 3 MG/DL (ref 2.5–4.9)
PLATELET # BLD AUTO: 349 X10*3/UL (ref 150–450)
POTASSIUM SERPL-SCNC: 3.8 MMOL/L (ref 3.5–5.3)
PR INTERVAL: 158 MS
PR INTERVAL: 160 MS
PROT SERPL-MCNC: 6.2 G/DL (ref 6.4–8.2)
Q ONSET: 215 MS
Q ONSET: 215 MS
QRS COUNT: 13 BEATS
QRS COUNT: 16 BEATS
QRS DURATION: 96 MS
QRS DURATION: 96 MS
QT INTERVAL: 358 MS
QT INTERVAL: 402 MS
QTC CALCULATION(BAZETT): 448 MS
QTC CALCULATION(BAZETT): 454 MS
QTC FREDERICIA: 420 MS
QTC FREDERICIA: 432 MS
R AXIS: 75 DEGREES
R AXIS: 87 DEGREES
RBC # BLD AUTO: 4.43 X10*6/UL (ref 4.5–5.9)
SODIUM SERPL-SCNC: 140 MMOL/L (ref 136–145)
T AXIS: 41 DEGREES
T AXIS: 68 DEGREES
T OFFSET: 394 MS
T OFFSET: 416 MS
TRIGL SERPL-MCNC: 117 MG/DL (ref 0–149)
UFH PPP CHRO-ACNC: 0.3 IU/ML
UFH PPP CHRO-ACNC: 0.3 IU/ML
VENTRICULAR RATE: 75 BPM
VENTRICULAR RATE: 97 BPM
VLDL: 23 MG/DL (ref 0–40)
WBC # BLD AUTO: 14.9 X10*3/UL (ref 4.4–11.3)

## 2023-12-07 PROCEDURE — 97164 PT RE-EVAL EST PLAN CARE: CPT | Mod: GP | Performed by: STUDENT IN AN ORGANIZED HEALTH CARE EDUCATION/TRAINING PROGRAM

## 2023-12-07 PROCEDURE — 2500000001 HC RX 250 WO HCPCS SELF ADMINISTERED DRUGS (ALT 637 FOR MEDICARE OP)

## 2023-12-07 PROCEDURE — 1200000002 HC GENERAL ROOM WITH TELEMETRY DAILY

## 2023-12-07 PROCEDURE — 83735 ASSAY OF MAGNESIUM: CPT

## 2023-12-07 PROCEDURE — 80053 COMPREHEN METABOLIC PANEL: CPT

## 2023-12-07 PROCEDURE — 97530 THERAPEUTIC ACTIVITIES: CPT | Mod: GP | Performed by: STUDENT IN AN ORGANIZED HEALTH CARE EDUCATION/TRAINING PROGRAM

## 2023-12-07 PROCEDURE — 99233 SBSQ HOSP IP/OBS HIGH 50: CPT | Performed by: INTERNAL MEDICINE

## 2023-12-07 PROCEDURE — 94762 N-INVAS EAR/PLS OXIMTRY CONT: CPT

## 2023-12-07 PROCEDURE — 2500000004 HC RX 250 GENERAL PHARMACY W/ HCPCS (ALT 636 FOR OP/ED): Performed by: STUDENT IN AN ORGANIZED HEALTH CARE EDUCATION/TRAINING PROGRAM

## 2023-12-07 PROCEDURE — 84100 ASSAY OF PHOSPHORUS: CPT

## 2023-12-07 PROCEDURE — 99291 CRITICAL CARE FIRST HOUR: CPT | Performed by: PHYSICIAN ASSISTANT

## 2023-12-07 PROCEDURE — 2500000004 HC RX 250 GENERAL PHARMACY W/ HCPCS (ALT 636 FOR OP/ED)

## 2023-12-07 PROCEDURE — 36415 COLL VENOUS BLD VENIPUNCTURE: CPT

## 2023-12-07 PROCEDURE — 36415 COLL VENOUS BLD VENIPUNCTURE: CPT | Performed by: STUDENT IN AN ORGANIZED HEALTH CARE EDUCATION/TRAINING PROGRAM

## 2023-12-07 PROCEDURE — 93306 TTE W/DOPPLER COMPLETE: CPT | Performed by: INTERNAL MEDICINE

## 2023-12-07 PROCEDURE — 2500000001 HC RX 250 WO HCPCS SELF ADMINISTERED DRUGS (ALT 637 FOR MEDICARE OP): Performed by: STUDENT IN AN ORGANIZED HEALTH CARE EDUCATION/TRAINING PROGRAM

## 2023-12-07 PROCEDURE — 80061 LIPID PANEL: CPT

## 2023-12-07 PROCEDURE — 93306 TTE W/DOPPLER COMPLETE: CPT

## 2023-12-07 PROCEDURE — 2500000002 HC RX 250 W HCPCS SELF ADMINISTERED DRUGS (ALT 637 FOR MEDICARE OP, ALT 636 FOR OP/ED)

## 2023-12-07 PROCEDURE — 85520 HEPARIN ASSAY: CPT | Performed by: STUDENT IN AN ORGANIZED HEALTH CARE EDUCATION/TRAINING PROGRAM

## 2023-12-07 PROCEDURE — 85027 COMPLETE CBC AUTOMATED: CPT | Performed by: STUDENT IN AN ORGANIZED HEALTH CARE EDUCATION/TRAINING PROGRAM

## 2023-12-07 RX ORDER — POTASSIUM CHLORIDE 750 MG/1
40 TABLET, FILM COATED, EXTENDED RELEASE ORAL ONCE
Status: COMPLETED | OUTPATIENT
Start: 2023-12-07 | End: 2023-12-07

## 2023-12-07 RX ORDER — TALC
3 POWDER (GRAM) TOPICAL NIGHTLY
Status: DISCONTINUED | OUTPATIENT
Start: 2023-12-07 | End: 2023-12-11 | Stop reason: HOSPADM

## 2023-12-07 RX ORDER — QUETIAPINE FUMARATE 25 MG/1
12.5 TABLET, FILM COATED ORAL 3 TIMES DAILY PRN
Status: DISCONTINUED | OUTPATIENT
Start: 2023-12-07 | End: 2023-12-07

## 2023-12-07 RX ORDER — POTASSIUM CHLORIDE 14.9 MG/ML
20 INJECTION INTRAVENOUS
Status: DISCONTINUED | OUTPATIENT
Start: 2023-12-07 | End: 2023-12-07

## 2023-12-07 RX ORDER — MAGNESIUM SULFATE HEPTAHYDRATE 40 MG/ML
4 INJECTION, SOLUTION INTRAVENOUS ONCE
Status: COMPLETED | OUTPATIENT
Start: 2023-12-07 | End: 2023-12-07

## 2023-12-07 RX ADMIN — POTASSIUM CHLORIDE 40 MEQ: 750 TABLET, EXTENDED RELEASE ORAL at 11:47

## 2023-12-07 RX ADMIN — ACETAMINOPHEN 975 MG: 325 TABLET ORAL at 20:20

## 2023-12-07 RX ADMIN — PANTOPRAZOLE SODIUM 40 MG: 40 TABLET, DELAYED RELEASE ORAL at 06:05

## 2023-12-07 RX ADMIN — LEVOTHYROXINE SODIUM 150 MCG: 150 TABLET ORAL at 06:05

## 2023-12-07 RX ADMIN — TICAGRELOR 90 MG: 90 TABLET ORAL at 21:16

## 2023-12-07 RX ADMIN — METOPROLOL TARTRATE 100 MG: 100 TABLET, FILM COATED ORAL at 09:44

## 2023-12-07 RX ADMIN — ASPIRIN 81 MG CHEWABLE TABLET 81 MG: 81 TABLET CHEWABLE at 09:13

## 2023-12-07 RX ADMIN — QUETIAPINE 12.5 MG: 25 TABLET ORAL at 21:16

## 2023-12-07 RX ADMIN — RANOLAZINE 1000 MG: 500 TABLET, EXTENDED RELEASE ORAL at 21:16

## 2023-12-07 RX ADMIN — ATORVASTATIN CALCIUM 80 MG: 80 TABLET, FILM COATED ORAL at 21:15

## 2023-12-07 RX ADMIN — OXYCODONE HYDROCHLORIDE 5 MG: 5 TABLET ORAL at 20:20

## 2023-12-07 RX ADMIN — MAGNESIUM SULFATE IN WATER 4 G: 40 INJECTION, SOLUTION INTRAVENOUS at 09:44

## 2023-12-07 RX ADMIN — PERFLUTREN 2 ML OF DILUTION: 6.52 INJECTION, SUSPENSION INTRAVENOUS at 08:37

## 2023-12-07 RX ADMIN — MELATONIN 3 MG: 3 TAB ORAL at 21:15

## 2023-12-07 RX ADMIN — TAMSULOSIN HYDROCHLORIDE 0.4 MG: 0.4 CAPSULE ORAL at 09:13

## 2023-12-07 RX ADMIN — TICAGRELOR 90 MG: 90 TABLET ORAL at 09:13

## 2023-12-07 RX ADMIN — METOPROLOL TARTRATE 100 MG: 100 TABLET, FILM COATED ORAL at 21:15

## 2023-12-07 RX ADMIN — RANOLAZINE 1000 MG: 500 TABLET, EXTENDED RELEASE ORAL at 09:45

## 2023-12-07 RX ADMIN — DULOXETINE HYDROCHLORIDE 60 MG: 60 CAPSULE, DELAYED RELEASE ORAL at 09:13

## 2023-12-07 RX ADMIN — FLUTICASONE FUROATE AND VILANTEROL TRIFENATATE 1 PUFF: 200; 25 POWDER RESPIRATORY (INHALATION) at 10:00

## 2023-12-07 ASSESSMENT — PAIN SCALES - GENERAL
PAINLEVEL_OUTOF10: 0 - NO PAIN
PAINLEVEL_OUTOF10: 7
PAINLEVEL_OUTOF10: 0 - NO PAIN
PAINLEVEL_OUTOF10: 0 - NO PAIN
PAINLEVEL_OUTOF10: 6
PAINLEVEL_OUTOF10: 0 - NO PAIN

## 2023-12-07 ASSESSMENT — COGNITIVE AND FUNCTIONAL STATUS - GENERAL
TURNING FROM BACK TO SIDE WHILE IN FLAT BAD: A LITTLE
MOVING TO AND FROM BED TO CHAIR: A LITTLE
CLIMB 3 TO 5 STEPS WITH RAILING: TOTAL
WALKING IN HOSPITAL ROOM: A LITTLE
STANDING UP FROM CHAIR USING ARMS: A LITTLE
MOVING FROM LYING ON BACK TO SITTING ON SIDE OF FLAT BED WITH BEDRAILS: A LITTLE
MOBILITY SCORE: 16

## 2023-12-07 ASSESSMENT — PAIN - FUNCTIONAL ASSESSMENT
PAIN_FUNCTIONAL_ASSESSMENT: 0-10

## 2023-12-07 ASSESSMENT — ACTIVITIES OF DAILY LIVING (ADL): LACK_OF_TRANSPORTATION: NO

## 2023-12-07 NOTE — PROGRESS NOTES
Physical Therapy    Physical Therapy Re-Evaluation    Patient Name: Philip Barfield  MRN: [id number]  Today's Date: 12/7/2023   Time Calculation  Start Time: 0905  Stop Time: 0935  Time Calculation (min): 30 min    Assessment/Plan   PT Assessment  PT Assessment Results: Decreased endurance, Impaired balance, Decreased mobility, Pain, Decreased safety awareness, Impaired judgement  Rehab Prognosis: Good  Evaluation/Treatment Tolerance: Patient tolerated treatment well  Medical Staff Made Aware: Yes (RN)  Strengths: Housing layout  End of Session Communication: Bedside nurse  Assessment Comment: 58 year old male with bilateral leg weakness and numbness after recent syncopal fall with cervical cord compression with myelopathy; 12/4 s/p Anterior C5 Corpectomy and C4-6 Fusion with Structural Allograft and Anterior Plate. Recommend moderate intensity therapy as pt has had 3 recent falls with injuries, needs PT & OT, is high fall risk, does not have consistent assist initially when at home and has stairs. Per pt report, he may refuse rehab in which case he will need assist initially for mobility and low intensity home therapy as well as issue of wheeled walker.  End of Session Patient Position: Up in chair  IP OR SWING BED PT PLAN  Inpatient or Swing Bed: Inpatient  PT Plan  Treatment/Interventions: Bed mobility, Transfer training, Gait training, Stair training, Balance training, Therapeutic exercise, Therapeutic activity  PT Plan: Skilled PT  PT Frequency: 5 times per week  PT Discharge Recommendations: Moderate intensity level of continued care  Equipment Recommended upon Discharge:  (Will continue to assess)  PT Recommended Transfer Status: Assist x1, Assistive device  PT - OK to Discharge: Yes    Subjective     General Visit Information:  Subjective: Patient is alert, agreeable to PT.  Frequently mentioning about previous therapy session and causing him to have a heart attack, previous issues with nursing and his phone  "being hacked.  Deferred interaction about complaints at this time and patient easily redirectable to tasks at hand.    Reason for Referral: Re-evaluation 2/2 NSTMI s/p C, stent thrombosis -> treated with penumbra thrombectomy and balloon angioplasty  Past Medical History Relevant to Rehab: PMHx: MI, CAD s/p CABG x 3, multiple PCI's, paroxysmal atrial fibrillation, DM, Htn, HLD, ARISTEO, chronic Right caduate lacunar infarct, COPD, asthma, TIAs, vestibular neuritis, vertigo, GERD  Prior to Session Communication: Bedside nurse  Patient Position Received: Bed, 3 rail up   Home Living:  Home Living  Type of Home: House  Lives With:  (son (works full time))  Home Adaptive Equipment:  (shower grab bar and built-in seat)  Home Layout: Two level (bed/tub combo on 1st floor)  Home Living Comments: 3 FREIDA no rail (\"but there is a rail to the basement\")  Prior Level of Function:  Prior Function Per Pt/Caregiver Report  Level of [place]:  (retired, drives, ambulated with cane for last month (nothing prior), 3 syncopal falls in last few montsh with injury; independent shower/dress)  Precautions:  Precautions  Medical Precautions: Fall precautions, Cardiac precautions  Post-Surgical Precautions: Spinal precautions  Prosthesis/Orthosis Used:  (soft cervical collar on at all times)  Precautions Comment: soft collar  Vital Signs:  Vital Signs  Heart Rate: 68 (post 80)  SpO2: 100 % (post 93)  BP:  (Start 121/70, sitting after transfer 82/60, recovery to 108/67, 70/65, recovery to 129/69 by end of session)  Medical Gas Therapy: None (Room air)    Lines/Tubes/Drains:  Open Drain Anterior Neck 0.64 cm (Active)   Number of days: 2     Continuous Medications/Drips:   Heparin drip    Objective   Pain:  Pain Assessment  Pain Score: 6 (post 4)    Cognition:  Cognition  Overall Cognitive Status: Impaired (Continues to express concerns about previous PT causing his heart attack and his phone being bugged/hacked by nursing " staff.)  Orientation Level: Oriented X4    General Assessments:  Extremity/Trunk Assessments:  Tone: No abnormalities noted  Sensation  Light Touch:  (decreased light touch BUE distally however improved from previous session, no numbness in feet)  Coordination  Movements are Fluid and Coordinated:  (targeting and opposition WFL  BUE)  Upper Extremity  ROM: WFL  Strength: BUE 4/5 equally  Lower Extremity  ROM: WFL  Strength: BLE 4/5 equally   Sitting Static Balance Not NormalUE Support Two UE support and Assist Level Contact Guard  Sitting Dynamic Balance Not NormalUE Support Two UE support and Assist Level Contact Guard  Standing Static Balance Not NormalUE Support Two UE support and Assist Level Contact Guard  Standing Dynamic Balance Not NormalUE Support Two UE support and Assist Level Min Assist    Functional Assessments:  Bed Mobility 1  Bed Mobility 1: Supine to sitting  Level of Assistance 1: Minimum assistance  Bed Mobility Comments 1: HOB 30, modified log roll    Transfers  Transfer: Yes  Transfer 1  Transfer From 1: Sit to  Transfer to 1: Stand  Transfer Device 1: Walker  Transfer Level of Assistance 1: Minimum assistance  Transfers 2  Transfer From 2: Stand to  Transfer to 2: Sit  Transfer Device 2: Walker  Transfer Level of Assistance 2: Contact guard  Transfers 3  Transfer From 3: Bed to  Transfer to 3: Chair with arms  Transfer Device 3: Walker  Transfer Level of Assistance 3: Minimum assistance    Ambulation/Gait Training 1  Surface 1: Level tile  Device 1: Rolling walker  Assistance 1: Minimum assistance  Comments/Distance (ft) 1: 40              Outcome Measures:  [facility] Basic Mobility  Turning from your back to your side while in a flat bed without using bedrails: A little  Moving from lying on your back to sitting on the side of a flat bed without using bedrails: A little  Moving to and from bed to chair (including a wheelchair): A little  Standing up from a chair using your arms (e.g. wheelchair or  bedside chair): A little  To walk in hospital room: A little  Climbing 3-5 steps with railing: Total  Basic Mobility - Total Score: 16           FSS-ICU  Ambulation: Walks <50 feet with any assistance x1 or walks any distance with assistance x2 people  Rolling: Minimal assistance (performs 75% or more of task)  Sitting: Minimal assistance (performs 75% or more of task)  Transfer Sit-to-Stand: Minimal assistance (performs 75% or more of task)  Transfer Supine-to-Sit: Minimal assistance (performs 75% or more of task)  Total Score: 17     [place]  Sitting Balance: Leans or slides in chair  Arises: Able, uses arms to help  Attempts to Arise: Able, requires more than one attempt  Immediate Standing Balance (First 5 Seconds): Unsteady (Swaggers, moves feet, trunk sway)  Standing Balance: Unsteady  Nudged: Begins to fall  Eyes Closed: Unsteady  Turned 360 Degrees: Steadiness: Unsteady (Grabs, staggers)  Turned 360 Degrees: Continuity of Steps: Discontinuous steps  Sitting Down: Uses arms or not a smooth motion  Balance Score: 3  Initiation of Gait: No hesitancy  Step Height: R Swing Foot: Right foot complete clears floor  Step Length: R Swing Foot: Passes left stance foot  Step Height: L Swing Foot: Left foot complete clears floor  Step Length: L Swing Foot: Passes right stance foot  Step Symmetry: Right and left step length not equal (Estimate)  Step Continuity: Stopping or discontinuity between steps  Path: Mild/moderate deviation or uses walking aid  Trunk: Marked sway or uses walking aid  Walking Time: Heels apart  Gait Score: 6  Total Score: 9          Encounter Problems       Encounter Problems (Active)       General Goals       state/follow spine precautions without cues 100% of session (Progressing)       Start:  12/05/23    Expected End:  12/21/23            independent with sitting and supine HEP (Not Progressing)       Start:  12/05/23    Expected End:  12/21/23            supine to/from sit via logrolling (HOB  flat, no rail) modified independent (Progressing)       Start:  12/05/23    Expected End:  12/21/23               Mobility       sit to/from stand, bed to/from chair with WW with supervision, no LOB (Progressing)       Start:  12/05/23    Expected End:  12/21/23            ambulate 150' with WW with SBA, no LOB, stable BP pre/post (Progressing)       Start:  12/05/23    Expected End:  12/21/23            up/down 3 steps without rail with cane/min HHA, 1 step at a time, no LOB (Not Progressing)       Start:  12/05/23    Expected End:  12/21/23               Pain - Adult            Assessment: Patient presents s/p [facility] 2/2 MI for re-evaluation.  Currently min A for mobility with balance, gait, positive orthostatics, and functional mobility deficits noted.  Patient would benefit from further therapy to increase functional independence and safety.  Will continue to follow during acute stay.    Education Documentation  Precautions, taught by Terrance David PT at 12/7/2023 11:23 AM.  Learner: Patient  Readiness: Acceptance  Method: Explanation  Response: Needs Reinforcement    Body Mechanics, taught by Terrance David PT at 12/7/2023 11:23 AM.  Learner: Patient  Readiness: Acceptance  Method: Explanation  Response: Needs Reinforcement    Mobility Training, taught by Terrance David PT at 12/7/2023 11:23 AM.  Learner: Patient  Readiness: Acceptance  Method: Explanation  Response: Needs Reinforcement    Education Comments  No comments found.      Treatment on Re-evaluation:  Sit-stand c RW CGA x 4  Stand-sit c RW CGA x 4  BUE unilateral GH flexion, shoulder elevation 5 x 1  Review and education of spinal precautions, patient unable to teach back at this date.    12/07/23 at 11:27 AM   Terrance David PT   Rehab Office: [phone]

## 2023-12-07 NOTE — PROGRESS NOTES
Philip Barfield is a 58 y.o. male on day 8 of admission presenting with Cervical spinal cord compression (CMS/HCC).    Subjective   This AM, having 7/10 neck pain. Posterior headache has resolved. Does have some dizziness with standing. Chest pain only with taking a deep breath and shortness of breath only with swallowing. Throat feels dry. Has tingling in fingertips. Otherwise denies symptoms.     Hospital course:  Philip Barfield is a 58 y.o. male w/ extensive CAD hx w/ CABG x 3 (LIMA-LAD, (left) Radial artery - diag, Vein graft-OM) in 2003 and subsequent PCI of distal LAD via LIMA graft (2 overlapped JACE), multiple RCA overlap stents, cervical myelopathy s/p C5 anterior cervical corpectomy and fusion at C4-6 on 12/4, paroxysmal Afib (on home eliquis), T2DM, HLD, HTN, COPD, asthma, TIAs, vestibular neuritis, vertigo, ARISTEO on CPAP who presents to CICU w/ NSTEMI s/p balloon angioplasty of in stent thrombosis of RCA.     Pt was originally a direct admit from [place] (presented to [place] [facility] on 11/28) on 11/30 after presenting w/  bilateral UE/LE weakness, numbness, and paresthesias starting in September after he sustained a syncopal fall striking his neck. Multiple nonsyncopal falls since then. MRI spine showed C5/C6 severe stenosis w/ c/f myelopathy. Home plavix and eliquis were stopped for 5 days for pre-surgery washout and pt was taken for C5 anterior cervical corpectomy and fusion at C4-6 with Dr. Ruelas on 12/04.     Following his procedure, a rapid response was called for agitation, AMS, and chest pains. There was no concern for ischemic changes on EKG and pt was vitally stable. Another RR was called on 12/5 w/ agitation/paranoia and chest pains again, labs showed elevated trops at 2,029, EKG showed NSR w/ no ischemic changes. Trops continued to uptrend overnight: 2803 > 7477 >14,000 > 16,470 (peak on 12/6 at 6 am) > 13,579 > 11,098. Heparin drip was started on 12/6 and pt was taken for LHC on 12/6 which showed  patent LIMA-LAD, Radial artery - diag, and Vein graft-OM known occluded. RCA with prior chronic total occlusion PCI, now has new in stent thrombosis -> treated with penumbra thrombectomy and balloon angioplasty. Pt was given ASA, cangrelor drip, ticagrelor load in lab.     On arrival to CICU on 12/6 post-cath, pt was vitally stable. Pt found in bed comfortably on exam. He has some shortness of breath on exam, and complains of neck soreness. He denies CP, abdominal pain, fevers/chills, dizziness.     Cangrelor drip was stopped 2 hours after ticacrelor load and heparin drip was continued w/ low intensity protocol. Heparin drip stopped 12/7 AM. 12/7 echo with abnormal septal motion c/w postoperative status, 45-50% EF, and abnormal basal and mid inferior septum and basal anteroseptal segment. Psychiatry consulted given agitation with paranoia, believed to be hyperactive delirium multifactorial in etiology however if patient continues to decline may consider further workup for psychosis. On melatonin and quetiapine. PT/OT recommending SNF. Stable for transfer to the floor.     Follow up:  [ ] monitor for incisional hematoma  [ ] maintain soft collar and restrictions (no bending, twisting, lifting >10lbs)  [ ] PT/OT recommended moderate intensity level of care  [ ] follow up orthopedic surgery and psychiatry recommendations  [ ] will need eliquis restarted prior to discharge  [ ] please call donnell Merlos [phone] when he gets to the floor (didn't answer earlier today so left voicemail to call)  [ ] if worsening agitation, may need full workup for psychosis  [ ] clarify transition to plavix (Di from cath lab reaching out to confirm the switch from ticagrelor to plavix in 4 weeks)       Objective     Physical Exam  Gen: alert, no acute distress, conversational, appropriate responses  Neck: wearing neck collar, deferred movement given recent surgery  Eyes: EOMI without nystagmus,   Cardiovascular: RRR, no  "murmurs  Lungs: CTAB, no crackles or wheezes  Abdominal: soft, nontender, nondistended, no guarding or rebound  MSK: no lower extremity edema  Skin: warm  Neuro: no focal deficit    Last Recorded Vitals  Blood pressure (!) 102/47, pulse 64, temperature 36.4 °C (97.5 °F), resp. rate 19, height 1.626 m (5' 4\"), weight 101 kg (222 lb 10.6 oz), SpO2 96 %.  Intake/Output last 3 Shifts:  I/O last 3 completed shifts:  In: 691.9 (6.9 mL/kg) [I.V.:441.9 (4.4 mL/kg); IV Piggyback:250]  Out: 2685 (26.6 mL/kg) [Urine:2675 (0.7 mL/kg/hr); Blood:10]  Weight: 101 kg     Scheduled medications  aspirin, 81 mg, oral, Daily  atorvastatin, 80 mg, oral, Nightly  DULoxetine, 60 mg, oral, Daily  fluticasone furoate-vilanteroL, 1 puff, inhalation, Daily  levothyroxine, 150 mcg, oral, Daily before breakfast  melatonin, 3 mg, oral, Nightly  metoprolol tartrate, 100 mg, oral, BID  pantoprazole, 40 mg, oral, Daily before breakfast  perflutren protein A microsphere, 0.5 mL, intravenous, Once in imaging  QUEtiapine, 12.5 mg, oral, Nightly  ranolazine, 1,000 mg, oral, BID  sulfur hexafluoride microsphr, 2 mL, intravenous, Once in imaging  tamsulosin, 0.4 mg, oral, Daily  ticagrelor, 90 mg, oral, BID      Continuous medications     PRN medications  PRN medications: acetaminophen, albuterol, methocarbamol, OLANZapine, oxyCODONE, oxygen, QUEtiapine    Relevant Results  Results for orders placed or performed during the hospital encounter of 11/29/23 (from the past 24 hour(s))   Electrocardiogram 12 Lead   Result Value Ref Range    Ventricular Rate 79 BPM    Atrial Rate 79 BPM    IN Interval 176 ms    QRS Duration 88 ms    QT Interval 394 ms    QTC Calculation(Bazett) 451 ms    P Axis 64 degrees    R Axis 74 degrees    T Axis 69 degrees    QRS Count 13 beats    Q Onset 220 ms    P Onset 132 ms    P Offset 183 ms    T Offset 417 ms    QTC Fredericia 432 ms   Troponin I, High Sensitivity   Result Value Ref Range    Troponin I, High Sensitivity 8,473 " (HH) 0 - 53 ng/L   CBC   Result Value Ref Range    WBC 15.5 (H) 4.4 - 11.3 x10*3/uL    nRBC 0.0 0.0 - 0.0 /100 WBCs    RBC 4.32 (L) 4.50 - 5.90 x10*6/uL    Hemoglobin 12.2 (L) 13.5 - 17.5 g/dL    Hematocrit 36.7 (L) 41.0 - 52.0 %    MCV 85 80 - 100 fL    MCH 28.2 26.0 - 34.0 pg    MCHC 33.2 32.0 - 36.0 g/dL    RDW 13.1 11.5 - 14.5 %    Platelets 355 150 - 450 x10*3/uL   Renal function panel   Result Value Ref Range    Glucose 113 (H) 74 - 99 mg/dL    Sodium 139 136 - 145 mmol/L    Potassium 4.2 3.5 - 5.3 mmol/L    Chloride 105 98 - 107 mmol/L    Bicarbonate 24 21 - 32 mmol/L    Anion Gap 14 10 - 20 mmol/L    Urea Nitrogen 17 6 - 23 mg/dL    Creatinine 0.96 0.50 - 1.30 mg/dL    eGFR >90 >60 mL/min/1.73m*2    Calcium 9.0 8.6 - 10.6 mg/dL    Phosphorus 2.8 2.5 - 4.9 mg/dL    Albumin 3.7 3.4 - 5.0 g/dL   Magnesium   Result Value Ref Range    Magnesium 1.62 1.60 - 2.40 mg/dL   Coagulation Screen   Result Value Ref Range    Protime 15.9 (H) 9.8 - 12.8 seconds    INR 1.4 (H) 0.9 - 1.1    aPTT >200 (HH) 27 - 38 seconds   Heparin Assay, UFH   Result Value Ref Range    Heparin Unfractionated 0.7 See Comment Below for Therapeutic Ranges IU/mL   CBC   Result Value Ref Range    WBC 14.9 (H) 4.4 - 11.3 x10*3/uL    nRBC 0.0 0.0 - 0.0 /100 WBCs    RBC 4.43 (L) 4.50 - 5.90 x10*6/uL    Hemoglobin 12.4 (L) 13.5 - 17.5 g/dL    Hematocrit 37.5 (L) 41.0 - 52.0 %    MCV 85 80 - 100 fL    MCH 28.0 26.0 - 34.0 pg    MCHC 33.1 32.0 - 36.0 g/dL    RDW 13.2 11.5 - 14.5 %    Platelets 349 150 - 450 x10*3/uL   Magnesium   Result Value Ref Range    Magnesium 1.63 1.60 - 2.40 mg/dL   Hepatic Function Panel   Result Value Ref Range    Albumin 3.7 3.4 - 5.0 g/dL    Bilirubin, Total 0.8 0.0 - 1.2 mg/dL    Bilirubin, Direct 0.2 0.0 - 0.3 mg/dL    Alkaline Phosphatase 83 33 - 120 U/L    ALT 14 10 - 52 U/L    AST 42 (H) 9 - 39 U/L    Total Protein 6.2 (L) 6.4 - 8.2 g/dL   Lipid panel   Result Value Ref Range    Cholesterol 113 0 - 199 mg/dL     HDL-Cholesterol 35.1 mg/dL    Cholesterol/HDL Ratio 3.2     LDL Calculated 55 <=99 mg/dL    VLDL 23 0 - 40 mg/dL    Triglycerides 117 0 - 149 mg/dL    Non HDL Cholesterol 78 0 - 149 mg/dL   Phosphorus   Result Value Ref Range    Phosphorus 3.0 2.5 - 4.9 mg/dL   Basic Metabolic Panel   Result Value Ref Range    Glucose 116 (H) 74 - 99 mg/dL    Sodium 140 136 - 145 mmol/L    Potassium 3.8 3.5 - 5.3 mmol/L    Chloride 105 98 - 107 mmol/L    Bicarbonate 24 21 - 32 mmol/L    Anion Gap 15 10 - 20 mmol/L    Urea Nitrogen 17 6 - 23 mg/dL    Creatinine 0.92 0.50 - 1.30 mg/dL    eGFR >90 >60 mL/min/1.73m*2    Calcium 9.2 8.6 - 10.6 mg/dL   Heparin Assay, UFH   Result Value Ref Range    Heparin Unfractionated 0.3 See Comment Below for Therapeutic Ranges IU/mL   Heparin Assay, UFH   Result Value Ref Range    Heparin Unfractionated 0.3 See Comment Below for Therapeutic Ranges IU/mL   Transthoracic Echo (TTE) Complete   Result Value Ref Range    AV pk trish 1.32     LVOT diam 2.10     MV E/A ratio 0.99     LVIDd 5.40     Aortic Valve Area by Continuity of Peak Velocity 1.60     AV pk grad 7.0         Assessment/Plan   Principal Problem:    Cervical spinal cord compression (CMS/HCC)  Active Problems:    History of general anesthesia    Stented coronary artery    Coronary artery disease involving native coronary artery    Atrial fibrillation (CMS/HCC)    Acquired hypothyroidism    Type 2 diabetes mellitus, without long-term current use of insulin (CMS/HCC)    Gastroesophageal reflux disease without esophagitis    ARISTEO (obstructive sleep apnea)    Mild asthma    Chronic bronchitis (CMS/HCC)    Philip Barfield is a 58 y.o. male w/ extensive CAD hx w/ CABG x 3 (LIMA-LAD, (left) Radial artery - diag, Vein graft-OM) in 2003 and subsequent PCI of distal LAD via LIMA graft (2 overlapped JACE), multiple RCA overlap stents, cervical myelopathy s/p C5 anterior cervical corpectomy and fusion at C4-6 on 12/4, paroxysmal Afib (on home eliquis),  "T2DM, HLD, HTN, COPD, asthma, TIAs, vestibular neuritis, vertigo, ARISTEO on CPAP who presents to CICU w/ NSTEMI s/p balloon angioplasty of in stent thrombosis of RCA.     Updates 12/7:  -psychiatry believes likely multifactorial hyperactive delirium, treat with scheduled evening quetiapine, quetiapine prn, and melatonin  -TTE 12/7/23: suboptimal image quality, abnormal septal motion c/w post operative status, LVEF 45-50%, abnormal basal and mid inferior septum and basal anteroseptal segment  -heparin discontinued given benefits/risk (on eliquis at home for atrial fibrillation)  -PT/OT recommending SNF  -stable for transfer to floor  -son Gaurang updated, he requests call from floor team when patient is transferred    NEURO/PSYCH:  #Severe C5/6 cervical stenosis w/ concern for myelopathy on MRI  -C5 anterior cervical corpectomy and fusion at C4-6 with Dr. Ruelas on 12/04  Plan:  -can move his head. Our main restriction is no bending/lifting/twisting greater than 10lbs and no strenuous activity. We'd like him to work with PT/OT while in house and stay mobile   - Daily PT/OT, was recommended moderate intensity rehabilitation while on ortho service. Activity orders placed  -Change dressing daily. Wound care orders placed.     #Agitation w/ suspected hallucinations  -Per Cardiology notes on 12/6: Acutely paranoid and agitated when I saw him early this AM. Reported that \"the people put viruses on my phone and the are listening and watching everything\" \"They tortured that autistic girl and called her the devil child and made her listen to music when she begged them to stop they wouldn't\" \"I'm not crazy, these people are. You have no idea what happens here\"  Plan:  -Continue home Duloxetine DR 60 mg  -Quetiapine 12.5mg qhs  -quetiapine 12.5mg q8hrs prn agitation (or olanzapine 2.5mg IM if unable to take po medication)  -Start melatonin 3mg   -Psych consulted     CARDIO:  #Severe CAD  #CABG x 3  #Multiple PCIs  #NSTEMI s/p RCA " balloon angioplasty on 12/6/23  -Had chest pains 12/5 and 12/6  -C 12/6: patent LIMA-LAD, Radial artery - diag, and Vein graft-OM known occluded. RCA with prior chronic total occlusion PCI, now has new in stent thrombosis - s/p penumbra thrombectomy and balloon angioplasty  -Trops on 12/6: 2803 > 7477 >14,000 > 16,470 (peak on 12/6 at 6 am) > 13,579 > 11,098 > 8k  -Last TTE 1/2023: Low normal left ventricular systolic function. The EF by visual approximation is 50%. Normal wall motion.  -TTE 12/7/23: suboptimal image quality, abnormal septal motion c/w post operative status, LVEF 45-50%, abnormal basal and mid inferior septum and basal anteroseptal segment  -cholesterol 113, LDL 55, , VLDL 23, HDL 35.1  Plan:  - resume Metoprolol tartrate 100 BID tomorrow morning  - Anticoagulation: ticagrelor 90 BID, ASA 81 mg  -transition to ASA/ticagrelor/apixaban prior to d/c, then in 4 weeks (January 4, 2024) on outpt basis switch to apixaban + clopidogrel  - Continue home atorvastatin 80 mg  -Continue home 24 hr ranolazine 1000 mg     #Paroxysmal Afib  -currently NSR  Plan:  - resume Metoprolol tartrate 100 BID tomorrow morning  -Anticoagulation: heparin drip while inpatient, transition to home apixaban prior to discharge     PULM:  #COPD  Plan:  -Continue breo ellipta 1 puff daily w/ albuterol PRN     #ARISTEO  -on home CPAP  Plan:  -CPAP at night     GI:  #GERD  Plan:  -Continue home PO pantoprazole 40 mg daily     ENDO:  #Hypothyroidism  -Last TSH 12/5/23: 0.01 (very low)  -Last T4 11/29/23: 1.63 (high)  -On home levothyroxine 200 mcg daily  Plan:  -Decrease home levothyroxine to 150 mcg daily  - Outpt endocrine follow-up for TSH w/ T4 reflex check on new levothyroxine dose     Diet: cardiac  Code: Full code  NOK: Gaurang Barfield (son) [phone]   Gab Hurt) - number not on file    Dispo: PT/OT recommending SNF      Denisse Bishop MD

## 2023-12-07 NOTE — PROGRESS NOTES
Subjective Data:  Patient is s/p PCI on 12/6. Severe native CAD, Patent LIMA- LAD, RA- diag. VG- OM known occluded. RCA with prior  PCI had new ISR -> treated with penumbra thrombectomy and POBA.      Patient seen this AM, he reports some tenderness in his right groin area to palpation. Small area of oozing noted to dressing. He is ambulatory with PT to the restroom this AM, and noted small area of drainage after ambulation.     He denies chest pain this morning. Education provided at the bedside regarding triple therapy plan. Overall plan will be conversation to ASA/ Ticagrelor/ Apixaban prior to discharge and maintain triple therapy for 4 weeks. Will drop ASA after 4 weeks and continue on DAPT.      Objective Data:  Last Recorded Vitals:  Vitals:    12/07/23 1200 12/07/23 1300 12/07/23 1303 12/07/23 1400   BP: 99/58  95/62 (!) 102/47   BP Location: Left arm      Patient Position: Lying      Pulse: 77 66 64 64   Resp: 23 20 21 19   Temp: 36.4 °C (97.5 °F)      TempSrc:       SpO2: 97% 95% 95% 96%   Weight:       Height:           Last Labs:  CBC - 12/7/2023: 12:15 AM  14.9 12.4 349    37.5      CMP - 12/7/2023: 12:15 AM  9.2 6.2 42 --- 0.8   3.0 3.7 14 83      PTT - 12/6/2023:  4:41 PM  1.4   15.9 >200   Last I/O:  I/O last 3 completed shifts:  In: 691.9 (6.9 mL/kg) [I.V.:441.9 (4.4 mL/kg); IV Piggyback:250]  Out: 2685 (26.6 mL/kg) [Urine:2675 (0.7 mL/kg/hr); Blood:10]  Weight: 101 kg     Inpatient Medications:  Scheduled medications   Medication Dose Route Frequency    aspirin  81 mg oral Daily    atorvastatin  80 mg oral Nightly    DULoxetine  60 mg oral Daily    fluticasone furoate-vilanteroL  1 puff inhalation Daily    levothyroxine  150 mcg oral Daily before breakfast    melatonin  3 mg oral Nightly    metoprolol tartrate  100 mg oral BID    pantoprazole  40 mg oral Daily before breakfast    perflutren protein A microsphere  0.5 mL intravenous Once in imaging    QUEtiapine  12.5 mg oral Nightly     ranolazine  1,000 mg oral BID    sulfur hexafluoride microsphr  2 mL intravenous Once in imaging    tamsulosin  0.4 mg oral Daily    ticagrelor  90 mg oral BID     PRN medications   Medication    acetaminophen    albuterol    methocarbamol    OLANZapine    oxyCODONE    oxygen    QUEtiapine    QUEtiapine     Physical Exam:  HEENT:    EOMI, clear sclera, MMM; cervical collar in place  RESP:       CTAB  CARDIO:  RRR, S1,S2  VASCULAR: 2+ DP, 2+ radial pulses  ABD:       Soft, nontender, + BS  EXT:        Right groin bandage over access site, no swelling or ecchymosis noted. Small sanguinous drainage noted on bandage.     Assessment/Plan   Philip Barfield is a 58 year old male who is s/p PCI on 12/6. Severe native CAD, Patent LIMA- LAD, RA- diag. VG- OM known occluded. RCA with prior  PCI had new ISR -> treated with penumbra thrombectomy and POBA.      RECs:    - continue with triple therapy, CICU team transitioning to Eliquis today from heparin  - monitor closely for signs of hematoma via cervical surgical incision or cath access site   - continue atorvastatin 80 mg nightly  - continue metoprolol tartrate 100 mg BID  - GDMT per primary team  - cardiac diet  - cardiac rehab recommended   - follow- up with cardiologist 1-3 weeks post- discharge    Code Status:  Full Code    I spent 35 minutes in the professional and overall care of this patient.         JAMAL Denis, PA-C     Associate Physician Assistant   Interventional Cardiology Service  [facility]     [address] # [phone]  Pager # [phone]  Mail Stop: [phone] IM  Email: [email or web address]

## 2023-12-07 NOTE — PROGRESS NOTES
"Patient recommended for SNF upon discharge due to weakness and frequent falls. However, patient has stated that he \"may refuse SNF\". In which case, he will require HHS.      12/07/23 1000   Discharge Planning   Living Arrangements Children   Support Systems Children   Type of Residence Private residence   Number of Stairs to Enter Residence 5   Number of Stairs Within Residence 0   Home or Post Acute Services Post acute facilities (Rehab/SNF/etc)   Type of Post Acute Facility Services Skilled nursing   Patient expects to be discharged to: home   Financial Resource Strain   How hard is it for you to pay for the very basics like food, housing, medical care, and heating? Not hard   Housing Stability   In the last 12 months, was there a time when you were not able to pay the mortgage or rent on time? N   In the last 12 months, how many places have you lived? 1   In the last 12 months, was there a time when you did not have a steady place to sleep or slept in a shelter (including now)? N   Transportation Needs   In the past 12 months, has lack of transportation kept you from medical appointments or from getting medications? no   In the past 12 months, has lack of transportation kept you from meetings, work, or from getting things needed for daily living? No       "

## 2023-12-07 NOTE — PROGRESS NOTES
Orthopaedic Spine Surgery Progress Note    Subjective:  Pt now in CICU s/p PCI w balloon angioplasty/thrombectomy with cards. Reports no difficulty breathing. Has some chest discomfort while swallowing at baseline since OR. Denies N/T or new weakness.     Objective:  Vitals:    12/07/23 0700   BP: 117/60   Pulse: 71   Resp:    Temp:    SpO2: 94%       Physical Exam  - Constitutional: No acute distress, cooperative  - Eyes: anicteric  - Head/Neck: normocephalic atraumatic  - Respiratory/Thorax: NWOB  - Cardiovascular: RRR on peripheral palpation  - Gastrointestinal: Nondistended  - Psychological: Paranoia   - Skin: Warm and dry. Additional findings in musculoskeletal evaluation  - Musculoskeletal:  ---  Spine:  Soft collar in place  Dressing c/d/i    C5: SILT   Deltoid 5/5 Left; 5/5 Right  C6: SILT   Wrist Ext: 5/5 Left; 5/5 Right  C7: SILT   Triceps: 5/5 Left; 5/5 Right  C8: SILT   Finger flexion: 5/5 Left; 5/5 Right  T1: SILT    Interossei: 5/5 Left; 5/5 Right     L1: SILT       L2: SILT      Hip flexors 5/5 Left; 5/5 Right  L3: SILT      Knee extension 5/5 Left; 5/5 Right  L4: SILT      Tib Ant. (Dorsiflexion) 5/5 Left; 5/5 Right  L5: SILT      EHL 5/5 Left; 5/5 Right  S1: SILT      Plantarflexion 5/5 Left; 5/5 Right     +Thomas     Results for orders placed or performed during the hospital encounter of 11/29/23 (from the past 24 hour(s))   Electrocardiogram, 12-lead PRN ACS symptoms   Result Value Ref Range    Ventricular Rate 70 BPM    Atrial Rate 70 BPM    WV Interval 154 ms    QRS Duration 90 ms    QT Interval 396 ms    QTC Calculation(Bazett) 427 ms    P Axis 67 degrees    R Axis 55 degrees    T Axis 41 degrees    QRS Count 11 beats    Q Onset 223 ms    P Onset 146 ms    P Offset 193 ms    T Offset 421 ms    QTC Fredericia 417 ms   ECG 12 Lead   Result Value Ref Range    Ventricular Rate 75 BPM    Atrial Rate 75 BPM    WV Interval 160 ms    QRS Duration 96 ms    QT Interval 402 ms    QTC Calculation(Bazett)  448 ms    P Axis 77 degrees    R Axis 87 degrees    T Axis 68 degrees    QRS Count 13 beats    Q Onset 215 ms    P Onset 135 ms    P Offset 190 ms    T Offset 416 ms    QTC Fredericia 432 ms   ECG 12 Lead   Result Value Ref Range    Ventricular Rate 97 BPM    Atrial Rate 97 BPM    LA Interval 158 ms    QRS Duration 96 ms    QT Interval 358 ms    QTC Calculation(Bazett) 454 ms    P Axis 66 degrees    R Axis 75 degrees    T Axis 41 degrees    QRS Count 16 beats    Q Onset 215 ms    P Onset 136 ms    P Offset 190 ms    T Offset 394 ms    QTC Fredericia 420 ms   Troponin I, High Sensitivity   Result Value Ref Range    Troponin I, High Sensitivity 13,579 (HH) 0 - 53 ng/L   Troponin I, High Sensitivity   Result Value Ref Range    Troponin I, High Sensitivity 11,098 (HH) 0 - 53 ng/L   Heparin Assay, UFH   Result Value Ref Range    Heparin Unfractionated 0.2 See Comment Below for Therapeutic Ranges IU/mL   Electrocardiogram 12 Lead   Result Value Ref Range    Ventricular Rate 79 BPM    Atrial Rate 79 BPM    LA Interval 176 ms    QRS Duration 88 ms    QT Interval 394 ms    QTC Calculation(Bazett) 451 ms    P Axis 64 degrees    R Axis 74 degrees    T Axis 69 degrees    QRS Count 13 beats    Q Onset 220 ms    P Onset 132 ms    P Offset 183 ms    T Offset 417 ms    QTC Fredericia 432 ms   Troponin I, High Sensitivity   Result Value Ref Range    Troponin I, High Sensitivity 8,473 (HH) 0 - 53 ng/L   CBC   Result Value Ref Range    WBC 15.5 (H) 4.4 - 11.3 x10*3/uL    nRBC 0.0 0.0 - 0.0 /100 WBCs    RBC 4.32 (L) 4.50 - 5.90 x10*6/uL    Hemoglobin 12.2 (L) 13.5 - 17.5 g/dL    Hematocrit 36.7 (L) 41.0 - 52.0 %    MCV 85 80 - 100 fL    MCH 28.2 26.0 - 34.0 pg    MCHC 33.2 32.0 - 36.0 g/dL    RDW 13.1 11.5 - 14.5 %    Platelets 355 150 - 450 x10*3/uL   Renal function panel   Result Value Ref Range    Glucose 113 (H) 74 - 99 mg/dL    Sodium 139 136 - 145 mmol/L    Potassium 4.2 3.5 - 5.3 mmol/L    Chloride 105 98 - 107 mmol/L     Bicarbonate 24 21 - 32 mmol/L    Anion Gap 14 10 - 20 mmol/L    Urea Nitrogen 17 6 - 23 mg/dL    Creatinine 0.96 0.50 - 1.30 mg/dL    eGFR >90 >60 mL/min/1.73m*2    Calcium 9.0 8.6 - 10.6 mg/dL    Phosphorus 2.8 2.5 - 4.9 mg/dL    Albumin 3.7 3.4 - 5.0 g/dL   Magnesium   Result Value Ref Range    Magnesium 1.62 1.60 - 2.40 mg/dL   Coagulation Screen   Result Value Ref Range    Protime 15.9 (H) 9.8 - 12.8 seconds    INR 1.4 (H) 0.9 - 1.1    aPTT >200 (HH) 27 - 38 seconds   Heparin Assay, UFH   Result Value Ref Range    Heparin Unfractionated 0.7 See Comment Below for Therapeutic Ranges IU/mL   CBC   Result Value Ref Range    WBC 14.9 (H) 4.4 - 11.3 x10*3/uL    nRBC 0.0 0.0 - 0.0 /100 WBCs    RBC 4.43 (L) 4.50 - 5.90 x10*6/uL    Hemoglobin 12.4 (L) 13.5 - 17.5 g/dL    Hematocrit 37.5 (L) 41.0 - 52.0 %    MCV 85 80 - 100 fL    MCH 28.0 26.0 - 34.0 pg    MCHC 33.1 32.0 - 36.0 g/dL    RDW 13.2 11.5 - 14.5 %    Platelets 349 150 - 450 x10*3/uL   Magnesium   Result Value Ref Range    Magnesium 1.63 1.60 - 2.40 mg/dL   Hepatic Function Panel   Result Value Ref Range    Albumin 3.7 3.4 - 5.0 g/dL    Bilirubin, Total 0.8 0.0 - 1.2 mg/dL    Bilirubin, Direct 0.2 0.0 - 0.3 mg/dL    Alkaline Phosphatase 83 33 - 120 U/L    ALT 14 10 - 52 U/L    AST 42 (H) 9 - 39 U/L    Total Protein 6.2 (L) 6.4 - 8.2 g/dL   Lipid panel   Result Value Ref Range    Cholesterol 113 0 - 199 mg/dL    HDL-Cholesterol 35.1 mg/dL    Cholesterol/HDL Ratio 3.2     LDL Calculated 55 <=99 mg/dL    VLDL 23 0 - 40 mg/dL    Triglycerides 117 0 - 149 mg/dL    Non HDL Cholesterol 78 0 - 149 mg/dL   Phosphorus   Result Value Ref Range    Phosphorus 3.0 2.5 - 4.9 mg/dL   Basic Metabolic Panel   Result Value Ref Range    Glucose 116 (H) 74 - 99 mg/dL    Sodium 140 136 - 145 mmol/L    Potassium 3.8 3.5 - 5.3 mmol/L    Chloride 105 98 - 107 mmol/L    Bicarbonate 24 21 - 32 mmol/L    Anion Gap 15 10 - 20 mmol/L    Urea Nitrogen 17 6 - 23 mg/dL    Creatinine 0.92 0.50 -  1.30 mg/dL    eGFR >90 >60 mL/min/1.73m*2    Calcium 9.2 8.6 - 10.6 mg/dL   Heparin Assay, UFH   Result Value Ref Range    Heparin Unfractionated 0.3 See Comment Below for Therapeutic Ranges IU/mL   Heparin Assay, UFH   Result Value Ref Range    Heparin Unfractionated 0.3 See Comment Below for Therapeutic Ranges IU/mL           Cardiac catheterization - coronary   Final Result      XR cervical spine 1 view   Final Result   1. Partially imaged postsurgical changes of anterior C4-C6 cervical   fusion without hardware complication.        I personally reviewed the images/study and I agree with the findings   as stated by Dr. Guicho Nieto. This study was interpreted at [place], [place].        MACRO:   None.        Signed by: Carl Fan 12/5/2023 11:23 AM   Dictation workstation:   [id number]      XR cervical spine 1 view   Final Result      XR chest 1 view   Final Result   No acute cardiopulmonary process        I personally reviewed the images/study and I agree with the findings   as stated above by resident physician, Dr. Dharmesh Potter. The   study was interpreted at [facility] in [facility].        Signed by: Arnaud Alarcon 11/30/2023 9:18 AM   Dictation workstation:   [id number]      FL less than 1 hour    (Results Pending)   XR cervical spine 1 view    (Results Pending)   Transthoracic Echo (TTE) Complete    (Results Pending)         Assessment:  58M with C5/6 cervical stenosis c/f myelopathy now s/p C5 anterior cervical corpectomy and fusion at C4-6 with Dr. Ruelas on 12/04    Plan:  Plan:  - Weight bearing: WBAT, OOBAT, no HOB restrictions  - DVT ppx: DAPT/Heparin with transition to Eliquis per CICU now s/p PCI. Imperative to monitor for incisional hematoma. Patient currently breathing, swallowing without difficulty    - Diet: Ok for diet from ortho standpoint  - Pain: Recommend scheduled tylenol, PRN oxycodone   - Bowel  Regimen  - PT/OT consult  - Pulm: Encourage IS  - dressing change completed  - No bending, twisting, lifting > 10 lbs  - maintain soft collar    D/w Dr. Ruelas     This patient will be followed by the Orthopaedic Spine service. Please page or Epic Chat the corresponding residents below with questions or concerns.     Ortho Trauma Service (Epic Chat Preferred)  First call: Jamie Harris MD PGY2  Second call: Laurent Abel MD PGY4    6pm-6am M-F, Holidays, and weekends page Ortho on-call @[phone] with urgent questions/concerns.     Jamie Harris MD  Orthopaedic Surgery PGY-2  On-call pager [phone]

## 2023-12-08 LAB
ACT BLD: 174 SEC (ref 89–169)
ALBUMIN SERPL BCP-MCNC: 3.4 G/DL (ref 3.4–5)
ALP SERPL-CCNC: 78 U/L (ref 33–120)
ALT SERPL W P-5'-P-CCNC: 12 U/L (ref 10–52)
ANION GAP SERPL CALC-SCNC: 15 MMOL/L (ref 10–20)
AST SERPL W P-5'-P-CCNC: 32 U/L (ref 9–39)
BILIRUB DIRECT SERPL-MCNC: 0.2 MG/DL (ref 0–0.3)
BILIRUB SERPL-MCNC: 0.7 MG/DL (ref 0–1.2)
BUN SERPL-MCNC: 19 MG/DL (ref 6–23)
CALCIUM SERPL-MCNC: 8.8 MG/DL (ref 8.6–10.6)
CHLORIDE SERPL-SCNC: 107 MMOL/L (ref 98–107)
CO2 SERPL-SCNC: 22 MMOL/L (ref 21–32)
CREAT SERPL-MCNC: 0.94 MG/DL (ref 0.5–1.3)
ERYTHROCYTE [DISTWIDTH] IN BLOOD BY AUTOMATED COUNT: 13.2 % (ref 11.5–14.5)
EST. AVERAGE GLUCOSE BLD GHB EST-MCNC: 126 MG/DL
GFR SERPL CREATININE-BSD FRML MDRD: >90 ML/MIN/1.73M*2
GLUCOSE SERPL-MCNC: 114 MG/DL (ref 74–99)
HBA1C MFR BLD: 6 %
HCT VFR BLD AUTO: 36.4 % (ref 41–52)
HGB BLD-MCNC: 11.5 G/DL (ref 13.5–17.5)
MAGNESIUM SERPL-MCNC: 2.15 MG/DL (ref 1.6–2.4)
MCH RBC QN AUTO: 28.6 PG (ref 26–34)
MCHC RBC AUTO-ENTMCNC: 31.6 G/DL (ref 32–36)
MCV RBC AUTO: 91 FL (ref 80–100)
NRBC BLD-RTO: 0.8 /100 WBCS (ref 0–0)
PHOSPHATE SERPL-MCNC: 3.6 MG/DL (ref 2.5–4.9)
PLATELET # BLD AUTO: 306 X10*3/UL (ref 150–450)
POTASSIUM SERPL-SCNC: 4 MMOL/L (ref 3.5–5.3)
PROT SERPL-MCNC: 5.6 G/DL (ref 6.4–8.2)
RBC # BLD AUTO: 4.02 X10*6/UL (ref 4.5–5.9)
SODIUM SERPL-SCNC: 140 MMOL/L (ref 136–145)
UFH PPP CHRO-ACNC: 1 IU/ML
WBC # BLD AUTO: 12 X10*3/UL (ref 4.4–11.3)

## 2023-12-08 PROCEDURE — 97116 GAIT TRAINING THERAPY: CPT | Mod: GP,CQ

## 2023-12-08 PROCEDURE — 84100 ASSAY OF PHOSPHORUS: CPT | Performed by: STUDENT IN AN ORGANIZED HEALTH CARE EDUCATION/TRAINING PROGRAM

## 2023-12-08 PROCEDURE — 2500000004 HC RX 250 GENERAL PHARMACY W/ HCPCS (ALT 636 FOR OP/ED): Performed by: STUDENT IN AN ORGANIZED HEALTH CARE EDUCATION/TRAINING PROGRAM

## 2023-12-08 PROCEDURE — 99233 SBSQ HOSP IP/OBS HIGH 50: CPT | Performed by: INTERNAL MEDICINE

## 2023-12-08 PROCEDURE — 2500000001 HC RX 250 WO HCPCS SELF ADMINISTERED DRUGS (ALT 637 FOR MEDICARE OP): Performed by: STUDENT IN AN ORGANIZED HEALTH CARE EDUCATION/TRAINING PROGRAM

## 2023-12-08 PROCEDURE — 36415 COLL VENOUS BLD VENIPUNCTURE: CPT | Performed by: STUDENT IN AN ORGANIZED HEALTH CARE EDUCATION/TRAINING PROGRAM

## 2023-12-08 PROCEDURE — 97530 THERAPEUTIC ACTIVITIES: CPT | Mod: GP,CQ

## 2023-12-08 PROCEDURE — 83036 HEMOGLOBIN GLYCOSYLATED A1C: CPT

## 2023-12-08 PROCEDURE — 2500000001 HC RX 250 WO HCPCS SELF ADMINISTERED DRUGS (ALT 637 FOR MEDICARE OP)

## 2023-12-08 PROCEDURE — 99231 SBSQ HOSP IP/OBS SF/LOW 25: CPT | Performed by: STUDENT IN AN ORGANIZED HEALTH CARE EDUCATION/TRAINING PROGRAM

## 2023-12-08 PROCEDURE — 80053 COMPREHEN METABOLIC PANEL: CPT | Performed by: STUDENT IN AN ORGANIZED HEALTH CARE EDUCATION/TRAINING PROGRAM

## 2023-12-08 PROCEDURE — 1200000002 HC GENERAL ROOM WITH TELEMETRY DAILY

## 2023-12-08 PROCEDURE — 83735 ASSAY OF MAGNESIUM: CPT | Performed by: STUDENT IN AN ORGANIZED HEALTH CARE EDUCATION/TRAINING PROGRAM

## 2023-12-08 PROCEDURE — 85027 COMPLETE CBC AUTOMATED: CPT | Performed by: STUDENT IN AN ORGANIZED HEALTH CARE EDUCATION/TRAINING PROGRAM

## 2023-12-08 PROCEDURE — 82248 BILIRUBIN DIRECT: CPT | Performed by: STUDENT IN AN ORGANIZED HEALTH CARE EDUCATION/TRAINING PROGRAM

## 2023-12-08 PROCEDURE — 85520 HEPARIN ASSAY: CPT | Performed by: STUDENT IN AN ORGANIZED HEALTH CARE EDUCATION/TRAINING PROGRAM

## 2023-12-08 PROCEDURE — 99232 SBSQ HOSP IP/OBS MODERATE 35: CPT | Performed by: PHYSICIAN ASSISTANT

## 2023-12-08 RX ORDER — OXYCODONE HYDROCHLORIDE 5 MG/1
5 TABLET ORAL ONCE
Status: COMPLETED | OUTPATIENT
Start: 2023-12-08 | End: 2023-12-08

## 2023-12-08 RX ORDER — OXYCODONE HYDROCHLORIDE 5 MG/1
5 TABLET ORAL ONCE AS NEEDED
Status: COMPLETED | OUTPATIENT
Start: 2023-12-08 | End: 2023-12-09

## 2023-12-08 RX ADMIN — METHOCARBAMOL TABLETS 750 MG: 500 TABLET, COATED ORAL at 21:04

## 2023-12-08 RX ADMIN — ATORVASTATIN CALCIUM 80 MG: 80 TABLET, FILM COATED ORAL at 20:27

## 2023-12-08 RX ADMIN — METOPROLOL TARTRATE 100 MG: 100 TABLET, FILM COATED ORAL at 08:28

## 2023-12-08 RX ADMIN — APIXABAN 5 MG: 5 TABLET, FILM COATED ORAL at 12:29

## 2023-12-08 RX ADMIN — TICAGRELOR 90 MG: 90 TABLET ORAL at 20:27

## 2023-12-08 RX ADMIN — ACETAMINOPHEN 975 MG: 325 TABLET ORAL at 16:55

## 2023-12-08 RX ADMIN — TAMSULOSIN HYDROCHLORIDE 0.4 MG: 0.4 CAPSULE ORAL at 08:29

## 2023-12-08 RX ADMIN — PANTOPRAZOLE SODIUM 40 MG: 40 TABLET, DELAYED RELEASE ORAL at 06:08

## 2023-12-08 RX ADMIN — RANOLAZINE 1000 MG: 500 TABLET, EXTENDED RELEASE ORAL at 20:26

## 2023-12-08 RX ADMIN — MELATONIN 3 MG: 3 TAB ORAL at 20:27

## 2023-12-08 RX ADMIN — DULOXETINE HYDROCHLORIDE 60 MG: 60 CAPSULE, DELAYED RELEASE ORAL at 08:27

## 2023-12-08 RX ADMIN — OXYCODONE HYDROCHLORIDE 5 MG: 5 TABLET ORAL at 21:04

## 2023-12-08 RX ADMIN — ASPIRIN 81 MG CHEWABLE TABLET 81 MG: 81 TABLET CHEWABLE at 08:29

## 2023-12-08 RX ADMIN — LEVOTHYROXINE SODIUM 150 MCG: 150 TABLET ORAL at 06:08

## 2023-12-08 RX ADMIN — APIXABAN 5 MG: 5 TABLET, FILM COATED ORAL at 20:27

## 2023-12-08 RX ADMIN — METOPROLOL TARTRATE 100 MG: 100 TABLET, FILM COATED ORAL at 20:27

## 2023-12-08 RX ADMIN — TICAGRELOR 90 MG: 90 TABLET ORAL at 08:28

## 2023-12-08 RX ADMIN — OXYCODONE HYDROCHLORIDE 5 MG: 5 TABLET ORAL at 17:10

## 2023-12-08 RX ADMIN — OXYCODONE HYDROCHLORIDE 5 MG: 5 TABLET ORAL at 12:42

## 2023-12-08 RX ADMIN — QUETIAPINE 12.5 MG: 25 TABLET ORAL at 20:27

## 2023-12-08 RX ADMIN — OXYCODONE HYDROCHLORIDE 5 MG: 5 TABLET ORAL at 03:28

## 2023-12-08 RX ADMIN — RANOLAZINE 1000 MG: 500 TABLET, EXTENDED RELEASE ORAL at 08:28

## 2023-12-08 ASSESSMENT — PAIN - FUNCTIONAL ASSESSMENT
PAIN_FUNCTIONAL_ASSESSMENT: 0-10

## 2023-12-08 ASSESSMENT — COGNITIVE AND FUNCTIONAL STATUS - GENERAL
STANDING UP FROM CHAIR USING ARMS: A LITTLE
WALKING IN HOSPITAL ROOM: A LITTLE
MOVING TO AND FROM BED TO CHAIR: A LITTLE
MOVING TO AND FROM BED TO CHAIR: A LITTLE
DRESSING REGULAR LOWER BODY CLOTHING: A LOT
TURNING FROM BACK TO SIDE WHILE IN FLAT BAD: A LITTLE
WALKING IN HOSPITAL ROOM: A LITTLE
DAILY ACTIVITIY SCORE: 17
STANDING UP FROM CHAIR USING ARMS: A LITTLE
STANDING UP FROM CHAIR USING ARMS: A LITTLE
MOVING FROM LYING ON BACK TO SITTING ON SIDE OF FLAT BED WITH BEDRAILS: A LITTLE
CLIMB 3 TO 5 STEPS WITH RAILING: A LOT
DRESSING REGULAR LOWER BODY CLOTHING: A LOT
TOILETING: A LOT
MOVING FROM LYING ON BACK TO SITTING ON SIDE OF FLAT BED WITH BEDRAILS: A LITTLE
CLIMB 3 TO 5 STEPS WITH RAILING: TOTAL
WALKING IN HOSPITAL ROOM: A LITTLE
CLIMB 3 TO 5 STEPS WITH RAILING: A LOT
HELP NEEDED FOR BATHING: A LOT
TURNING FROM BACK TO SIDE WHILE IN FLAT BAD: A LITTLE
TURNING FROM BACK TO SIDE WHILE IN FLAT BAD: A LITTLE
DAILY ACTIVITIY SCORE: 17
TOILETING: A LOT
HELP NEEDED FOR BATHING: A LOT
MOBILITY SCORE: 17
DRESSING REGULAR UPPER BODY CLOTHING: A LITTLE
MOVING FROM LYING ON BACK TO SITTING ON SIDE OF FLAT BED WITH BEDRAILS: A LITTLE
MOBILITY SCORE: 17
MOBILITY SCORE: 16
DRESSING REGULAR UPPER BODY CLOTHING: A LITTLE
MOVING TO AND FROM BED TO CHAIR: A LITTLE

## 2023-12-08 ASSESSMENT — PAIN DESCRIPTION - LOCATION: LOCATION: NECK

## 2023-12-08 ASSESSMENT — PAIN SCALES - GENERAL
PAINLEVEL_OUTOF10: 7
PAINLEVEL_OUTOF10: 7
PAINLEVEL_OUTOF10: 8
PAINLEVEL_OUTOF10: 8
PAINLEVEL_OUTOF10: 0 - NO PAIN
PAINLEVEL_OUTOF10: 7
PAINLEVEL_OUTOF10: 6

## 2023-12-08 ASSESSMENT — PAIN SCALES - WONG BAKER: WONGBAKER_NUMERICALRESPONSE: HURTS WHOLE LOT

## 2023-12-08 NOTE — PROGRESS NOTES
Orthopaedic Spine Surgery Progress Note    Subjective:  Pt transferred to [facility] from CICU. Doing well. Denies N/T or new weakness     Objective:  Vitals:    12/08/23 0314   BP: 105/63   Pulse: 62   Resp: 18   Temp: 36.2 °C (97.2 °F)   SpO2: 93%       Physical Exam  - Constitutional: No acute distress, cooperative  - Eyes: anicteric  - Head/Neck: normocephalic atraumatic  - Respiratory/Thorax: NWOB  - Cardiovascular: RRR on peripheral palpation  - Gastrointestinal: Nondistended  - Psychological: Paranoia   - Skin: Warm and dry. Additional findings in musculoskeletal evaluation  - Musculoskeletal:  ---  Spine:  Soft collar in place  Dressing c/d/i    C5: SILT   Deltoid 5/5 Left; 5/5 Right  C6: SILT   Wrist Ext: 5/5 Left; 5/5 Right  C7: SILT   Triceps: 5/5 Left; 5/5 Right  C8: SILT   Finger flexion: 5/5 Left; 5/5 Right  T1: SILT    Interossei: 5/5 Left; 5/5 Right     L1: SILT       L2: SILT      Hip flexors 5/5 Left; 5/5 Right  L3: SILT      Knee extension 5/5 Left; 5/5 Right  L4: SILT      Tib Ant. (Dorsiflexion) 5/5 Left; 5/5 Right  L5: SILT      EHL 5/5 Left; 5/5 Right  S1: SILT      Plantarflexion 5/5 Left; 5/5 Right      Results for orders placed or performed during the hospital encounter of 11/29/23 (from the past 24 hour(s))   Transthoracic Echo (TTE) Complete   Result Value Ref Range    AV pk trish 1.32     LVOT diam 2.10     MV E/A ratio 0.99     LVIDd 5.40     Aortic Valve Area by Continuity of Peak Velocity 1.60     AV pk grad 7.0            Transthoracic Echo (TTE) Complete   Final Result      Cardiac catheterization - coronary   Final Result      XR cervical spine 1 view   Final Result   1. Partially imaged postsurgical changes of anterior C4-C6 cervical   fusion without hardware complication.        I personally reviewed the images/study and I agree with the findings   as stated by Dr. Guicho Nieto. This study was interpreted at [facility], [place], [place].        MACRO:    None.        Signed by: Carl Fan 12/5/2023 11:23 AM   Dictation workstation:   [id number]      XR cervical spine 1 view   Final Result      XR chest 1 view   Final Result   No acute cardiopulmonary process        I personally reviewed the images/study and I agree with the findings   as stated above by resident physician, Dr. Dharmesh Potter. The   study was interpreted at [facility] in [facility].        Signed by: Arnaud Alarcon 11/30/2023 9:18 AM   Dictation workstation:   [id number]            Assessment:  58M with C5/6 cervical stenosis c/f myelopathy now s/p C5 anterior cervical corpectomy and fusion at C4-6 with Dr. Ruelas on 12/04    Plan:  - Weight bearing: WBAT, OOBAT, no HOB restrictions  - DVT ppx: DAPT with transition to Eliquis per CICU now s/p PCI. Imperative to monitor for incisional hematoma. Patient currently breathing, swallowing without difficulty    - Diet: Ok for diet from ortho standpoint  - Pain: Recommend scheduled tylenol, PRN oxycodone   - Bowel Regimen  - PT/OT consult  - Pulm: Encourage IS  - dressing change completed  - No bending, twisting, lifting > 10 lbs  - maintain soft collar    D/w Dr. Ruelas     This patient will be followed by the Orthopaedic Spine service. Please page or Epic Chat the corresponding residents below with questions or concerns.     Ortho Trauma Service (Epic Chat Preferred)  First call: Jamie Harris MD PGY2  Second call: Laurent Abel MD PGY4    6pm-6am M-F, Holidays, and weekends page Ortho on-call @[phone] with urgent questions/concerns.     Jamie Harris MD  Orthopaedic Surgery PGY-2  On-call pager [phone]

## 2023-12-08 NOTE — PROGRESS NOTES
Subjective Data:  Patient seen this AM, he is endorsing on and off chest pain that has occurred lasting anywhere from 5 minutes to 1 hour over the last few days.  He reports that he had an episode this morning that lasted 5 minutes and describes it as a mild squeezing. He reports that it is nothing close to the severity that he previously experienced prior to cath lab, but has noticed it off and on. Will obtain EKG if symptoms recur.     Otherwise, cervical collar is in place. H/H is 11.5/ 36.4 (prev. 12.4/ 37.5). Eliquis started today per interventional cardiology recommendations for triple therapy X1 month. Patient in endorsing a headache this evening, pain management currently with PRN oxycodone and tylenol.    - pt reported 1 episode of CP lasting 5 minutes this AM that resolved without intervention--> will repeat EKG, troponin if recurs  - cervical collar in place, pain management with PRN tylenol, PRN oxycodone   - triple therapy initiated with eliquis today (had been on heparin gtt in CICU) and aspirin and brilinta  - PT recs SNF, pt considering    Overnight Events:    Admitted to [facility]     Objective Data:  Last Recorded Vitals:  Vitals:    12/08/23 1148 12/08/23 1220 12/08/23 1603 12/08/23 1632   BP: 100/60 122/75 94/71 103/60   BP Location:  Right arm Right arm    Patient Position:  Sitting Lying    Pulse: 68 76 70    Resp: 18  18    Temp: 36.6 °C (97.9 °F)  36 °C (96.8 °F)    TempSrc: Temporal  Temporal    SpO2: 94%  94%    Weight:       Height:           Last Labs:  Results for orders placed or performed during the hospital encounter of 11/29/23 (from the past 24 hour(s))   Magnesium   Result Value Ref Range    Magnesium 2.15 1.60 - 2.40 mg/dL   Hepatic Function Panel   Result Value Ref Range    Albumin 3.4 3.4 - 5.0 g/dL    Bilirubin, Total 0.7 0.0 - 1.2 mg/dL    Bilirubin, Direct 0.2 0.0 - 0.3 mg/dL    Alkaline Phosphatase 78 33 - 120 U/L    ALT 12 10 - 52 U/L    AST 32 9 - 39 U/L    Total Protein 5.6 (L)  6.4 - 8.2 g/dL   CBC   Result Value Ref Range    WBC 12.0 (H) 4.4 - 11.3 x10*3/uL    nRBC 0.8 (H) 0.0 - 0.0 /100 WBCs    RBC 4.02 (L) 4.50 - 5.90 x10*6/uL    Hemoglobin 11.5 (L) 13.5 - 17.5 g/dL    Hematocrit 36.4 (L) 41.0 - 52.0 %    MCV 91 80 - 100 fL    MCH 28.6 26.0 - 34.0 pg    MCHC 31.6 (L) 32.0 - 36.0 g/dL    RDW 13.2 11.5 - 14.5 %    Platelets 306 150 - 450 x10*3/uL   Phosphorus   Result Value Ref Range    Phosphorus 3.6 2.5 - 4.9 mg/dL   Basic Metabolic Panel   Result Value Ref Range    Glucose 114 (H) 74 - 99 mg/dL    Sodium 140 136 - 145 mmol/L    Potassium 4.0 3.5 - 5.3 mmol/L    Chloride 107 98 - 107 mmol/L    Bicarbonate 22 21 - 32 mmol/L    Anion Gap 15 10 - 20 mmol/L    Urea Nitrogen 19 6 - 23 mg/dL    Creatinine 0.94 0.50 - 1.30 mg/dL    eGFR >90 >60 mL/min/1.73m*2    Calcium 8.8 8.6 - 10.6 mg/dL   Heparin Assay, UFH   Result Value Ref Range    Heparin Unfractionated 1.0 See Comment Below for Therapeutic Ranges IU/mL        TROPHS   Date/Time Value Ref Range Status   12/06/2023 04:41 PM 8,473 0 - 53 ng/L Final     Comment:     Previous result verified on 12/5/2023 2123 on specimen/case [id number] called with component TRPHS for procedure Troponin I, High Sensitivity with value 2,803 ng/L.   12/06/2023 11:36 AM 11,098 0 - 53 ng/L Final     Comment:     Previous result verified on 12/5/2023 2123 on specimen/case [id number] called with component TRPHS for procedure Troponin I, High Sensitivity with value 2,803 ng/L.   12/06/2023 09:37 AM 13,579 0 - 53 ng/L Final     Comment:     Previous result verified on 12/5/2023 2123 on specimen/case [id number] called with component [facility] for procedure Troponin I, High Sensitivity with value 2,803 ng/L.     HGBA1C   Date/Time Value Ref Range Status   01/22/2023 01:20 AM 6.0 <5.7 % Final     Comment:     Normal less than 5.7%  Prediabetes 5.7% to 6.4%  Diabetes 6.5% or higher      --HgbA1C levels may not be accurate in patients who have renal disease,  received recent blood transfusions, are anemic, or who have dyshemoglobinemia.   03/28/2022 11:49 AM 6.5 % Final     Comment:     Normal less than 5.7%  Prediabetes 5.7% to 6.4%  Diabetes 6.5% or higher      --HgbA1C levels may not be accurate in patients who have  renal disease, received recent blood transfusions, are anemic,  or who have dyshemoglobinemia.   07/22/2021 11:12 AM 6.8 % Final     Comment:          Diagnosis of Diabetes-Adults   Non-Diabetic: < or = 5.6%   Increased risk for developing diabetes: 5.7-6.4%   Diagnostic of diabetes: > or = 6.5%  .       Monitoring of Diabetes                Age (y)     Therapeutic Goal (%)   Adults:          >18           <7.0   Pediatrics:    13-18           <7.5                   7-12           <8.0                   0- 6            7.5-8.5   American Diabetes Association. Diabetes Care 33(S1), Jan 2010.     08/03/2018 07:20 AM 8.8 % Final     Comment:          Diagnosis of Diabetes-Adults   Non-Diabetic: < or = 5.6%   Increased risk for developing diabetes: 5.7-6.4%   Diagnostic of diabetes: > or = 6.5%  .       Monitoring of Diabetes                Age (y)     Therapeutic Goal (%)   Adults:          >18           <7.0   Pediatrics:    13-18           <7.5                   7-12           <8.0                   0- 6            7.5-8.5   American Diabetes Association. Diabetes Care 33(S1), Jan 2010.       LDLCALC   Date/Time Value Ref Range Status   12/07/2023 12:15 AM 55 <=99 mg/dL Final     Comment:                                 Near   Borderline      AGE      Desirable  Optimal    High     High     Very High     0-19 Y     0 - 109     ---    110-129   >/= 130     ----    20-24 Y     0 - 119     ---    120-159   >/= 160     ----      >24 Y     0 -  99   100-129  130-159   160-189     >/=190       VLDL   Date/Time Value Ref Range Status   12/07/2023 12:15 AM 23 0 - 40 mg/dL Final   07/22/2021 11:12 AM 49 0 - 40 mg/dL Final      Last I/O:  I/O last 3 completed  shifts:  In: 840.9 (8.3 mL/kg) [P.O.:340; I.V.:500.9 (4.9 mL/kg)]  Out: 1850 (18.2 mL/kg) [Urine:1850 (0.5 mL/kg/hr)]  Weight: 101.7 kg     Past Cardiology Tests (Last 3 Years):  EKG:  Electrocardiogram 12 Lead 12/06/2023 (Preliminary)  NSR rate 87    Echo:  Transthoracic Echo (TTE) Complete 12/07/2023  CONCLUSIONS:   1. Poorly visualized anatomical structures due to suboptimal image quality.   2. Abnormal septal motion consistent with post-operative status.   3. Left ventricular systolic function is mildly decreased with a 45-50% estimated ejection fraction.   4. Basal and mid inferior septum and basal anteroseptal segment are abnormal.    Cath:  Cardiac catheterization - coronary 12/06/2023  s/p PCI on 12/6. Severe native CAD, Patent LIMA- LAD, RA- diag. VG- OM known occluded. RCA with prior  PCI had new ISR -> treated with penumbra thrombectomy and POBA.       Inpatient Medications:  Scheduled medications   Medication Dose Route Frequency    apixaban  5 mg oral BID    aspirin  81 mg oral Daily    atorvastatin  80 mg oral Nightly    DULoxetine  60 mg oral Daily    fluticasone furoate-vilanteroL  1 puff inhalation Daily    levothyroxine  150 mcg oral Daily before breakfast    melatonin  3 mg oral Nightly    metoprolol tartrate  100 mg oral BID    oxyCODONE  5 mg oral Once    pantoprazole  40 mg oral Daily before breakfast    perflutren protein A microsphere  0.5 mL intravenous Once in imaging    QUEtiapine  12.5 mg oral Nightly    ranolazine  1,000 mg oral BID    sulfur hexafluoride microsphr  2 mL intravenous Once in imaging    tamsulosin  0.4 mg oral Daily    ticagrelor  90 mg oral BID     PRN medications   Medication    acetaminophen    albuterol    methocarbamol    OLANZapine    oxyCODONE    oxygen    QUEtiapine     Continuous Medications   Medication Dose Last Rate       Physical Exam:  GENERAL: alert and oriented X3, laying in bed, no acute distress  EYES: PERRL, EOMI  NECK: cervical collar in  place  LUNGS: lungs clear to auscultation, on room air  CARDIAC: regular rate and rhythm, no murmur, rub, or gallop  ABDOMEN: nondistended, nontender, +BS  : voids independently  EXTREMITIES: warm, no LE edema  NEURO: no focal deficits      Assessment/Plan   Philip Barfield is a 58 y.o. male w/ extensive CAD hx w/ CABG x 3 (LIMA-LAD, (left) Radial artery - diag, Vein graft-OM) in 2003 and subsequent PCI of distal LAD via LIMA graft (2 overlapped JACE), multiple RCA overlap stents, cervical myelopathy s/p C5 anterior cervical corpectomy and fusion at C4-6 on 12/4, paroxysmal Afib (on home eliquis), T2DM, HLD, HTN, COPD, asthma, TIAs, vestibular neuritis, vertigo, ARISTEO on CPAP w/ NSTEMI s/p balloon angioplasty of in stent thrombosis of RCA. Admitted to [facility] for further management.    Coronary Artery Disease/ NSTEMI  Hx CABG X3  Chronic combined systolic and diastolic HFmrEF, ICM  - TTE 12/7/23:  Left ventricular systolic function is mildly decreased with a 45-50% estimated ejection fraction.  - RR was called on 12/5 w/ agitation/paranoia and chest pains again, labs showed elevated trops at 2,029, EKG showed NSR w/ no ischemic changes. Trops continued to uptrend overnight: 2803 > 7477 >14,000 > 16,470 (peak on 12/6 at 6 am) > 13,579 > 11,098. Heparin drip was started on 12/6 and pt was taken for LHC on 12/6   - s/p PCI on 12/6. Severe native CAD, Patent LIMA- LAD, RA- diag. VG- OM known occluded. RCA with prior  PCI had new ISR -> treated with penumbra thrombectomy and POBA   - per interventional cardiology:  transition to ASA/ticagrelor/apixaban prior to d/c, and on outpt basis switch to apixaban + clopidogrel in 4 weeks   - heparin gtt in CICU dc'd--> started eliquis today as per IC recs  - ACS s/s: pt reported 5 minute episode of mild squeezing CP this AM, resolved spontaneously--> will continue to monitor and obtain EKG if recurs   - continue ASA 81 mg daily, brilinta 90 mg BID, eliquis 5 mg BID, metoprolol  tartrate 100 mg BID, ranolazine 1000 mg BID     Cervical myelopathy  s/p C5 anterior cervical corpectomy and fusion at C4-6 on 12/4  - orthopedics following daily-> imperative to monitor for incisional hematoma  - no bending, twisting, lifting >10 lbs   - maintain soft cervical collar    Hallucinations  - psych consulted on 12/6 for acute paranoia and hallucinations  - pt endorsed paranoid delusions regarding nursing staff but denied auditory or visual hallucinations  - experienced AMS at 2 different points in the last 3 days  - psych following, rec'd melatonin, quetipine, olanzapine    Paroxysmal atrial fibrillation  Hx TIAs  - EKG on admit: NSR rate 87  - continue Eliquis 5 mg BID     T2DM  - A1c pending   - does not appear to have a home DM regimen, will pending A1c results and determine need for SSI, etc    HLD  - lipid panel 12/7: cholesterol 113, HDL 35.1, LDL 55, triglycerides 117  - continue atorvastatin 80 mg daily    HTN  - admit /75  - last 24 hour systolic range: 94- 122  - additional anti- HTN as needed     COPD/ Asthma  ARISTEO on CPAP  - continue albuterol, breo ellipta  - CPAP at night    Hypothyroidism  -Last TSH 12/5/23: 0.01 (very low)  -Last T4 11/29/23: 1.63 (high)  -On home levothyroxine 200 mcg daily-> decreased to 150 mcg daily in CICU  - Outpt endocrine follow-up for TSH w/ T4 reflex check on new levothyroxine dose    DVT prophylaxis: eliquis    Dispo: monitor H/H for tolerance of triple therapy, recommended for SNF by PT    Code Status:  Full Code      Jammie Fierro, APRN-CNP    This is a shared visit. I have reviewed the Advanced Practice Provider's encounter note, approve the Advanced Practice Provider's documentation, and provide the following additional information from my personal encounter.     Tamia Osorio MD MPH

## 2023-12-08 NOTE — PROGRESS NOTES
Subjective Data:  Patient is s/p PCI on 12/6. Severe native CAD, Patent LIMA- LAD, RA- diag. VG- OM known occluded. RCA with prior  PCI had new ISR -> treated with penumbra thrombectomy and POBA.      Patient seen the day after his PCI and reported some tenderness at RcFA to palpation.  He ambulated with PT to the restroom and noted small area of drainage after ambulation.     12/08/23:   Pt was seen again today & he was in a good mood, understood importance of taking his medications, instructed him of some activities to avoid this week and f/up appointments needed.     He denies chest pain today. He is on triple therapy with plan to continue ASA/ Ticagrelor/ Apixaban for 4 weeks. Will drop ASA after 4 weeks and continue on DAPT.      Objective Data:  Last Recorded Vitals:  Vitals:    12/08/23 1148 12/08/23 1220 12/08/23 1603 12/08/23 1632   BP: 100/60 122/75 94/71 103/60   BP Location:  Right arm Right arm    Patient Position:  Sitting Lying    Pulse: 68 76 70    Resp: 18  18    Temp: 36.6 °C (97.9 °F)  36 °C (96.8 °F)    TempSrc: Temporal  Temporal    SpO2: 94%  94%    Weight:       Height:           Last Labs:  CBC - 12/8/2023:  7:12 AM  12.0 11.5 306    36.4      CMP - 12/8/2023:  7:12 AM  8.8 5.6 32 --- 0.7   3.6 3.4 12 78      PTT - 12/6/2023:  4:41 PM  1.4   15.9 >200   Last I/O:  I/O last 3 completed shifts:  In: 840.9 (8.3 mL/kg) [P.O.:340; I.V.:500.9 (4.9 mL/kg)]  Out: 1850 (18.2 mL/kg) [Urine:1850 (0.5 mL/kg/hr)]  Weight: 101.7 kg     Inpatient Medications:  Scheduled medications   Medication Dose Route Frequency    apixaban  5 mg oral BID    aspirin  81 mg oral Daily    atorvastatin  80 mg oral Nightly    DULoxetine  60 mg oral Daily    fluticasone furoate-vilanteroL  1 puff inhalation Daily    levothyroxine  150 mcg oral Daily before breakfast    melatonin  3 mg oral Nightly    metoprolol tartrate  100 mg oral BID    oxyCODONE  5 mg oral Once    pantoprazole  40 mg oral Daily before breakfast     perflutren protein A microsphere  0.5 mL intravenous Once in imaging    QUEtiapine  12.5 mg oral Nightly    ranolazine  1,000 mg oral BID    sulfur hexafluoride microsphr  2 mL intravenous Once in imaging    tamsulosin  0.4 mg oral Daily    ticagrelor  90 mg oral BID     PRN medications   Medication    acetaminophen    albuterol    methocarbamol    OLANZapine    oxyCODONE    oxygen    QUEtiapine     Physical Exam:  HEENT:    EOMI, clear sclera, MMM; cervical collar in place  RESP:       CTAB  CARDIO:  RRR, S1,S2  ABD:       Soft, nontender, + BS  EXT:        Right groin bandage over access site, no current bleeding, no swelling or ecchymosis noted. Old dry blood on dressing edges.  2+ DP, 2+ radial pulses    Code Status:  Full Code     Assessment/Plan   Philip Barfield is a 58 year old male who is s/p PCI on 12/6. Severe native CAD, Patent LIMA- LAD, RA- diag. VG- OM known occluded. RCA with prior  PCI had new ISR -> treated with penumbra thrombectomy and POBA.      RECs:  -Continue with triple therapy   -Monitor for signs of hematoma via cervical surgical incision or cath access site   -Continue atorvastatin 80 mg nightly  -Continue metoprolol tartrate 100 mg BID  -GDMT per primary team  -Cardiac diet  -Cardiac rehab recommended   -F/up with cardiologist 1-3 weeks post discharge    I spent 35 minutes in the professional and overall care of this patient.       JAMAL Denis, PA-C     Associate Physician Assistant   Interventional Cardiology Service  [facility]     [address] # [phone]  Pager # [phone]  Mail Stop: [phone]  Email: [email or web address]

## 2023-12-08 NOTE — PROGRESS NOTES
"SUBJECTIVE  Mr. Barfield is still endorsing concerning with his experience with staffing on LT 6. He is repeating that a \"virus\" was put in his phone, they were getting in his chart and sharing his medical information with others on the floor, and they were making fun of him. He does not endorse any active concerns and is much happier on LT 5.     OBJECTIVE    VITALS      12/7/2023     9:00 PM 12/7/2023     9:47 PM 12/8/2023     1:22 AM 12/8/2023     3:14 AM 12/8/2023     7:48 AM 12/8/2023     8:28 AM 12/8/2023    11:48 AM   Vitals   Systolic 110 101  105 97  100   Diastolic 60 65  63 67  60   Heart Rate 78 70  62 61 65 68   Temp  36.8 °C (98.2 °F)  36.2 °C (97.2 °F) 36.4 °C (97.5 °F)  36.6 °C (97.9 °F)   Resp 20 18  18 18  18   Weight (lb)   224.21       BMI   38.49 kg/m2       BSA (m2)   2.15 m2            MENTAL STATUS EXAM  Appearance:  male who appears stated age dressed in hospital attire, sitting upright in hospital bed.  Attitude: Calm, cooperative and engaged in conversation; intermittently appeared frustrated when discussing previous dealings with nursing staff on LT 6.  Behavior: Good eye contact, no agitated or aggressive behavior.  Motor Activity: No PMR.  Slight resting tremor noted in RUE.  Gait not observed.  Speech: Regular rate, quantity, and tone.  Spontaneous, coherent.  No pressured speech.  Mood: \"Alright\"  Affect: Mood congruent, appropriate with full range.  Thought Process: Linear, logical and goal directed.  No loose associations or gross thought disorganization.  Thought Content: Does not endorse suicidal or homicidal ideation.  Paranoid delusions regarding nursing staff elicited such as his phone being hacked.  Thought Perception: Does not endorse auditory or visual hallucinations.  Does not appear to be responding to hallucinatory stimuli.  Cognition: Alert and oriented to person, place, time, and circumstance.  No deficits in attention, concentration, or language noted.  Able to " correctly identify the day of the week and give the days of the week backwards starting from Wednesday.   Insight: Limited, able to report why he was hospitalized and procedure that he underwent today but is adamant that nursing staff were toying with him and hacked his phone.  Judgement: Fair      CURRENT MEDICATIONS  Scheduled medications  apixaban, 5 mg, oral, BID  aspirin, 81 mg, oral, Daily  atorvastatin, 80 mg, oral, Nightly  DULoxetine, 60 mg, oral, Daily  fluticasone furoate-vilanteroL, 1 puff, inhalation, Daily  levothyroxine, 150 mcg, oral, Daily before breakfast  melatonin, 3 mg, oral, Nightly  metoprolol tartrate, 100 mg, oral, BID  pantoprazole, 40 mg, oral, Daily before breakfast  perflutren protein A microsphere, 0.5 mL, intravenous, Once in imaging  QUEtiapine, 12.5 mg, oral, Nightly  ranolazine, 1,000 mg, oral, BID  sulfur hexafluoride microsphr, 2 mL, intravenous, Once in imaging  tamsulosin, 0.4 mg, oral, Daily  ticagrelor, 90 mg, oral, BID        PRN medications  PRN medications: acetaminophen, albuterol, methocarbamol, OLANZapine, oxyCODONE, oxygen, QUEtiapine     LABS  Results for orders placed or performed during the hospital encounter of 11/29/23 (from the past 24 hour(s))   Magnesium   Result Value Ref Range    Magnesium 2.15 1.60 - 2.40 mg/dL   Hepatic Function Panel   Result Value Ref Range    Albumin 3.4 3.4 - 5.0 g/dL    Bilirubin, Total 0.7 0.0 - 1.2 mg/dL    Bilirubin, Direct 0.2 0.0 - 0.3 mg/dL    Alkaline Phosphatase 78 33 - 120 U/L    ALT 12 10 - 52 U/L    AST 32 9 - 39 U/L    Total Protein 5.6 (L) 6.4 - 8.2 g/dL   CBC   Result Value Ref Range    WBC 12.0 (H) 4.4 - 11.3 x10*3/uL    nRBC 0.8 (H) 0.0 - 0.0 /100 WBCs    RBC 4.02 (L) 4.50 - 5.90 x10*6/uL    Hemoglobin 11.5 (L) 13.5 - 17.5 g/dL    Hematocrit 36.4 (L) 41.0 - 52.0 %    MCV 91 80 - 100 fL    MCH 28.6 26.0 - 34.0 pg    MCHC 31.6 (L) 32.0 - 36.0 g/dL    RDW 13.2 11.5 - 14.5 %    Platelets 306 150 - 450 x10*3/uL   Phosphorus    Result Value Ref Range    Phosphorus 3.6 2.5 - 4.9 mg/dL   Basic Metabolic Panel   Result Value Ref Range    Glucose 114 (H) 74 - 99 mg/dL    Sodium 140 136 - 145 mmol/L    Potassium 4.0 3.5 - 5.3 mmol/L    Chloride 107 98 - 107 mmol/L    Bicarbonate 22 21 - 32 mmol/L    Anion Gap 15 10 - 20 mmol/L    Urea Nitrogen 19 6 - 23 mg/dL    Creatinine 0.94 0.50 - 1.30 mg/dL    eGFR >90 >60 mL/min/1.73m*2    Calcium 8.8 8.6 - 10.6 mg/dL        IMAGING  Transthoracic Echo (TTE) Complete    Result Date: 12/7/2023   [facility], [address] [place] [address]                Tel [phone] and Fax [phone] TRANSTHORACIC ECHOCARDIOGRAM REPORT  Patient Name:     BRENDA Jeronimo Physician:  [id number] Monet MD Study Date:       12/7/2023           Ordering Provider:  [id number] KIKI BROWN MRN/PID:          [id number]            Fellow:             Matthew Kerns MD Accession#:       [id number]        Nurse: Date of           1965 / 58      Sonographer:        Aliya Hughes [facility], Birth/Age:        years                                   RVT Gender:           M                   Additional Staff: Height:           162.56 cm           Admit Date:         11/29/2023 Weight:           100.70 kg           Admission Status:   Inpatient - STAT BSA:              2.04 m2             Encounter#:         [id number]                                       [facility]                                       Location: Blood Pressure: 117 /60 mmHg Study Type:    TRANSTHORACIC ECHO (TTE) COMPLETE Diagnosis/ICD: Non ST elevation (NSTEMI) myocardial infarction-I21.4 Indication:    NSTEMI CPT Code:      Echo Complete w Full Doppler-24889 Patient History: Pertinent History: CABG x 3, 2003. PCI. Afib. DM. HLD. HTN. ARISTEO. TIA. COPD. Study Detail: The following Echo studies were performed: 2D, M-Mode, Doppler and                color flow. Technically challenging study due to poor acoustic               windows and body habitus. Definity used as a contrast agent for               endocardial border definition. Total contrast used for this               procedure was 4 mL via IV push. Unable to obtain suprasternal               notch view.  PHYSICIAN INTERPRETATION: Left Ventricle: The left ventricular systolic function is mildly decreased, with an estimated ejection fraction of 45-50%. Wall motion is abnormal. The left ventricular cavity size is normal. Abnormal (paradoxical) septal motion consistent with post-operative status. Left ventricular diastolic filling was not assessed. LV Wall Scoring: The basal and mid inferior septum and basal anteroseptal segment are akinetic. Left Atrium: The left atrium was not well visualized. Right Ventricle: The right ventricle was not well visualized. Unable to determine right ventricular systolic function. Right Atrium: The right atrium was not well visualized. Aortic Valve: The aortic valve is probably trileaflet. There is trivial aortic valve regurgitation. The peak instantaneous gradient of the aortic valve is 7.0 mmHg. Mitral Valve: The mitral valve is normal in structure. There is mild mitral annular calcification. There is no evidence of mitral valve regurgitation. Tricuspid Valve: The tricuspid valve was not well visualized. No evidence of tricuspid regurgitation. Pulmonic Valve: The pulmonic valve is structurally normal. There is no indication of pulmonic valve regurgitation. Pericardium: There is a trivial pericardial effusion. Aorta: The aortic root is normal. There is no dilatation of the ascending aorta. Systemic Veins: The inferior vena cava size appears small.  CONCLUSIONS:  1. Poorly visualized anatomical structures due to suboptimal image quality.  2. Abnormal septal motion consistent with post-operative status.  3. Left ventricular systolic function is mildly decreased with a 45-50%  Cont Imdur unstable angina  Patient w/ frequent admissions in the past for similar presentations, complaining of pain in head/chest/abdomen. HST in 400s, no significant delta change  c/o chest pain again today. EKG unchanged. Troponins pending. Doubt ACS, likely from persistent vomiting. estimated ejection fraction.  4. Basal and mid inferior septum and basal anteroseptal segment are abnormal. QUANTITATIVE DATA SUMMARY: 2D MEASUREMENTS:                           Normal Ranges: Ao Root d:     2.70 cm    (2.0-3.7cm) LAs:           4.00 cm    (2.7-4.0cm) IVSd:          1.20 cm    (0.6-1.1cm) LVPWd:         1.00 cm    (0.6-1.1cm) LVIDd:         5.40 cm    (3.9-5.9cm) LVIDs:         2.90 cm LV Mass Index: 114.8 g/m2 LV % FS        46.3 % AORTA MEASUREMENTS:                    Normal Ranges: Asc Ao, d: 2.80 cm (2.1-3.4cm) LV DIASTOLIC FUNCTION:                     Normal Ranges: MV Peak E: 1.09 m/s (0.7-1.2 m/s) MV Peak A: 1.10 m/s (0.42-0.7 m/s) E/A Ratio: 0.99     (1.0-2.2) MITRAL VALVE:                 Normal Ranges: MV DT: 227 msec (150-240msec) AORTIC VALVE:                         Normal Ranges: AoV Vmax:      1.32 m/s (<=1.7m/s) AoV Peak P.0 mmHg (<20mmHg) LVOT Max Simone:  0.61 m/s (<=1.1m/s) LVOT VTI:      13.00 cm LVOT Diameter: 2.10 cm  (1.8-2.4cm) AoV Area,Vmax: 1.60 cm2 (2.5-4.5cm2) TRICUSPID VALVE/RVSP:                   Normal Ranges: IVC Diam: 1.00 cm PULMONIC VALVE:                         Normal Ranges: PV Accel Time: 90 msec  (>120ms) PV Max Simone:    1.1 m/s  (0.6-0.9m/s) PV Max P.5 mmHg  [id number] Neto Monet MD Electronically signed on 2023 at 9:47:48 AM  Wall Scoring  ** Final **     Cardiac catheterization - coronary    Result Date: 2023   [facility], Cath Lab, [address] [place] [address] Cardiovascular Catheterization Report Patient Name:      BRENDA RENAE      Performing Physician:  [id number] Dez Gonzales MD Study Date:        2023           Verifying Physician:   [id number] Dez Gonzales MD MRN/PID:           [id number]            Cardiologist/Co-scrub: Accession#:        [id number]        Ordering Provider:     [id number]  MARIA ISABEL AUGIE WARE Date of Birth/Age: 1965 / 58      Fellow:                    years Gender:            M                   Fellow: Encounter#:        [id number]  Study: Left Heart Cath  Indications: BRENDA RENAE is a 59 year old male who presents with prior myocardial infarction, prior percutaneous coronary intervention, prior coronary artery bypass graft surgery, hypertension, dyslipidemia and a chest pain assessment of typical angina. NSTE - ACS. Medical History: Stress test performed: No. CTA performed: No. Agatston accessed: No. LVEF Assessed: Yes. Cardiac arrest: No. Cardiac surgical consult: No. Cardiovascular instability: No. Frailty status of patient entering lab: 5 = Mildly frail.  Procedure Description: After infiltration with 2% Lidocaine, the right femoral artery was cannulated with a modified Seldinger technique. Subsequently a 6 Pashto sheath was placed in the right femoral artery. Selective coronary catheterization was performed using a 6 Fr catheter(s) exchanged over a guide wire to cannulate the coronary arteries. A JL 4 tip catheter was used for left coronary injections. A JR 4 tip catheter was used for right coronary injections. Multiple injections of contrast were made into the left and right coronary arteries with angiograms recorded in multiple projections. After completion of the procedure, femoral artery angiography was performed. This demonstrated a common femoral artery puncture appropriate for closure. An Angio-Seal Evolution 6F ([employer]) vascular closure device was placed per protocol.  Coronary Angiography: The coronary circulation is right dominant.  Left Main Coronary Artery: The left main coronary artery is a normal caliber vessel. The left main arises normally from the left coronary sinus of Valsalva and bifurcates into the LAD and circumflex coronary arteries. The left main coronary artery showed no  significant disease or stenosis greater than 30%.  Left Anterior Descending Coronary Artery Distribution: The left anterior descending coronary artery is a normal caliber vessel. The LAD arises normally from the left main coronary artery. The LAD demonstrated chronic occlusion and total occlusion.  Circumflex Coronary Artery Distribution: The circumflex coronary artery is a normal caliber vessel. The circumflex arises normally from the left main coronary artery and terminates in the AV groove. The circumflex revealed severe atherosclerotic disease and diffuse atherosclerotic disease. The 2nd obtuse marginal branch is a normal caliber vessel. The 2nd obtuse marginal branch demonstrated significant in-stent restenosis and chronic occlusion.  Right Coronary Artery Distribution: The right coronary artery is a normal caliber vessel. The RCA arises normally from the right sinus of Valsalva. The RCA showed multiple previous stents, total thrombotic occlusion originating at the mid segment and total thrombotic occlusion originating at the mid to distal segment. This lesion was severely thrombotic.  Coronary Grafts:  LIMA Graft: Left internal mammary artery graft conduit, originating in situ and attached to the mid left anterior descending, is patent. Saphaneous Vein Graft(s): The saphenous vein graft, originating from the aorta and attached to the 2nd obtuse marginal, appeared totally occluded. Radial Graft: The radial graft, originating from the aorta and attached to the 1st diagonal, is patent.  Valve Findings: No aortic valve stenosis is visualized.  Coronary Interventions: Angiography reveals a 80% stenosis of the mid and mid to distal right coronary artery coronary artery. Pre-intervention BERTIN flow was 2. Intravascular Ultrasound (IVUS) was performed within the mid and mid to distal right coronary artery. . Underexpanded stent in mid to distal RCA with thrombus. Underexpanded stent in mid to distal RCA with thrombus.  Percutaneous coronary intervention was performed within the mid and mid to distal right coronary artery. The vessel was pre-dilated using a non-compliant balloon 3.5 mm x 15 mm at 22 CHELITA. The stenosis was successfully reduced from 80% to <10%. Post-intervention BERTIN flow was 3. Mechanical thrombectomy of RCA was done with Penumbra catheter witth IC injection of Verapamil and Nipride.  Coronary Lesion Summary: Vessel Vessel Segment RCA    mid to distal  Graft Stenosis Summary: Graft      Destination of Graft LIMA   mid left anterior descending SVG 1  2nd obtuse marginal Radial 1st diagonal  Hemo Personnel: +---------------+---------+ Name           Duty      +---------------+---------+ Dez Gonzales MD 1 +---------------+---------+  Hemodynamic Pressures:  +----+-------------------+---------+------------+-------------+------+---------+ Site     Date Time       Phase    Systolic    Diastolic    ED  Mean mmHg                          Name       mmHg        mmHg      mmHg           +----+-------------------+---------+------------+-------------+------+---------+   AO  12/6/2023 2:31:15     Rest         105           60             80                      PM                                                  +----+-------------------+---------+------------+-------------+------+---------+   AO  12/6/2023 2:33:23     Rest         105           61             83                      PM                                                  +----+-------------------+---------+------------+-------------+------+---------+   LV  12/6/2023 2:33:51     Rest         110           12    20                               PM                                                  +----+-------------------+---------+------------+-------------+------+---------+  LVp  12/6/2023 2:33:57     Rest         107            9    17                               PM                                                   +----+-------------------+---------+------------+-------------+------+---------+  AOp  12/6/2023 2:34:04     Rest         108           66             85                      PM                                                  +----+-------------------+---------+------------+-------------+------+---------+   AO  12/6/2023 2:41:02     Rest         108           63             85                      PM                                                  +----+-------------------+---------+------------+-------------+------+---------+   AO  12/6/2023 2:49:03     Rest         113           58             81                      PM                                                  +----+-------------------+---------+------------+-------------+------+---------+   AO  12/6/2023 3:02:47     Rest         102           61             79                      PM                                                  +----+-------------------+---------+------------+-------------+------+---------+   AO  12/6/2023 3:16:15     Rest          98           68             83                      PM                                                  +----+-------------------+---------+------------+-------------+------+---------+   AO  12/6/2023 3:26:09     Rest          79           53             65                      PM                                                  +----+-------------------+---------+------------+-------------+------+---------+   AO  12/6/2023 3:30:22     Rest          91           65             76                      PM                                                  +----+-------------------+---------+------------+-------------+------+---------+   AO  12/6/2023 3:30:54     Rest         115           76             94                      PM                                                   +----+-------------------+---------+------------+-------------+------+---------+  Complications: No in-lab complications observed.  Cardiac Cath Post Procedure Notes: Post Procedure Diagnosis: CAD. Blood Loss:               Estimated blood loss during the procedure was 0 mls. Specimens Removed:        Number of specimen(s) removed: none.  Recommendations: Maximize medical therapy. Agressive risk factor modification efforts. Triple therapy with Brilinta/ASA and Eliquis for 1 month and then Double therapy with Plavix and Eliquis for life. Consider referral to cardiac rehabilitation. ____________________________________________________________________________________ CONCLUSIONS:  1. Triple vessel disease.  2. The 2nd obtuse marginal branch demonstrated significant in-stent restenosis and chronic occlusion.  3. Culprit vessel(s): right coronary artery.  4. Patent LIMA to LAD with diffuse instent restenosis of distal LAD and patent Radial graft to Dg.  5. Thrombotic occlusion of RCA.  6. Successful mechanical thrombectomy and angioplasty of RCA.  7. Left Ventricular end-diastolic pressure = 17.  8. Normal LV filling pressures. ICD 10 Codes: Non ST elevation (NSTEMI) myocardial infarction-I21.4  CPT Codes: Left Heart Cath Bypass Graft w ventriculography and coronary angio(LHC)-77061; Thrombectomy, mechanical-17199; IVUS, Right Coronary initial Vessel (PCI)-57211.RC; Angioplasty, single Right Coronary major Artery/branch (PCI)-86489.RC; Moderate Sedation Services initial 15 minutes patient >5 years-55783; Moderate Sedation Services 1st additional 15 minutes patient >5 years-13534; Moderate Sedation Services 2nd additional 15 minutes patient >5 years-19460  [id number] Dez Gonzales MD Performing Physician Electronically signed by [id number] Dez Gonzales MD on 12/6/2023 at 7:42:07 PM  ** Final **     Electrocardiogram 12 Lead    Result Date: 12/6/2023  Normal sinus rhythm Normal ECG When compared  "with ECG of 06-DEC-2023 07:18, No significant change was found       PSYCHIATRIC RISK ASSESSMENT  Acute Risk of Harm to Others is Considered: Low  Acute Risk of Harm to Self is Considered: Low    ASSESSMENT AND PLAN  Philip Barfield is a 58 y.o. male with no formal past psychiatric history and a past medical history of CAD (extensive hx including CABGx3 with subsequent PCI), Afib on eliquis, T2DM, HTN, HLD, ARISTEO, TIA, COPD, vertigo, and vestibular neuritis who was admitted to [facility] on 11/29/23 for evaluation of bilateral leg weakness with MRI findings showing C5/6 cervical stenosis concerning for myelopathy now on postop day 2 s/p C5 anterior cervical corpectomy and fusion with orthopedics. Psychiatry was consulted on 12/6/23 for acute paranoia and hallucinations.      On initial assessment, patient was alert and oriented to person, place, time and circumstance.  Patient endorsed paranoid delusions regarding nursing staff but denied any auditory or visual hallucinations.  Patient denied any violent ideation and denied any significant past psychiatric history.  However, per chart review, patient has experienced altered mental status at 2 different points of the last 3 days which were documented.  Patient's presentation is likely secondary to delirium which is multifactorial in etiology.     12/8 update:  Mr. Barfield' appears to be improving in that he is no longer endorsing new, active delusions but still believes his prior delusions.    Recommendations detailed below.    EKG (12/6/23): NSR, HR 79, QTc of 451 ms (s/p PCI)     IMPRESSION  Delirium, hyperactive     RECOMMENDATIONS  - Patient does not currently meet criteria for inpatient psychiatric admission.   - To evaluate decision-making capacity, recommend use of the Capacity Evaluation Tool. Search “ IP Capacity Evaluation under SmartText\" unless the patient has a legal guardian, in which case all decisions per the legal guardian.  - Patient does not require a 1:1 " sitter from a psychiatric perspective at this time.  - Defer to primary team decision for 1:1 sitter.  - As with all hospitalized patients, would recommend delirium precautions, as below.     Work-up:  :: TSH <0.01 with free thyroxine of 1.63     Medications:  - Continue melatonin 3mg PO for sleep-wake cycle consolidation  - Continue quetiapine 12.5mg PO at bedtime   - Continue quetiapine 12.5mg PO every 8 hours PRN agitation  - Continue olanzapine 2.5mg IM (if unable to take PO) every 8 hours PRN agitation      Ancillary Services:  - Recommend , pet/music therapy consults per patient preference.     Delirium Guidelines  Non-Pharmacologic:  - Assess visual and hearing impairments and provide aids and communication boards.  - Assess immobility and advocate for early evaluation and intervention by physical therapy, out of bed when medically indicated, and expeditious removal of tethers.  - Promote physiologic sleep and maintenance of sleep/wake cycle by ensuring blinds are open during the day, maintaining dark/quiet room at night with minimal interruptions, and minimizing daytime naps.  - Minimize room and staff changes.  - Engage the patient in cognitively stimulating activities and provide frequent reorientation.   - Minimize use of restraints to situations where necessary to keep patient and staff safe and to prevent from removing lines, tubes, medical devices, dressings, etc.      Pharmacologic:  - Minimize use of deliriogenic medications such as benzodiazepines, anticholinergic medications, and opiates (while ensuring adequate treatment of pain).  - Assess and treat disruption in bowel and bladder function.   - Assess and treat abnormalities in nutrition and hydration status.       ==========  - Discussed recommendations with primary team.  - Psychiatry will continue to follow.     Thank you for allowing us to participate in the care of this patient. Please page [phone] with any questions or concerns.      Patient discussed with Dr. Henry, who agrees with above plan.       Medication Consent  Medication Consent: n/a; consult service    Eloy Childress MD

## 2023-12-08 NOTE — PROGRESS NOTES
Physical Therapy    Physical Therapy Treatment    Patient Name: Philip Barfield  MRN: [id number]  Today's Date: 12/8/2023  Time Calculation  Start Time: 1220  Stop Time: 1243  Time Calculation (min): 23 min       Assessment/Plan   PT Assessment  PT Assessment Results: Decreased strength, Decreased endurance, Impaired balance  Rehab Prognosis: Good  Evaluation/Treatment Tolerance: Patient limited by pain  Medical Staff Made Aware: Yes  Barriers to Participation: Ability to acquire knowledge  End of Session Communication: Bedside nurse  End of Session Patient Position: Bed, 2 rail up, Alarm off, not on at start of session     PT Plan  Treatment/Interventions: Bed mobility, Transfer training, Gait training, Stair training, Balance training, Therapeutic exercise, Therapeutic activity  PT Plan: Skilled PT  PT Frequency: 5 times per week  PT Discharge Recommendations: Moderate intensity level of continued care  Equipment Recommended upon Discharge:  (Will continue to assess)  PT Recommended Transfer Status: Assist x1, Assistive device  PT - OK to Discharge: Yes      General Visit Information:   PT  Visit  PT Received On: 12/08/23  Response to Previous Treatment: Patient with no complaints from previous session.  Prior to Session Communication: Bedside nurse  Patient Position Received:  (patient up standing at doorway with FWW upon therapy arrival)  Family/Caregiver Present: No  General Comment: Patient motivated to participate in therapy session. Required increased cues for safety.     Subjective   Precautions:  Precautions  Medical Precautions: Fall precautions, Cardiac precautions  Post-Surgical Precautions: Spinal precautions  Precautions Comment: soft collor (patient not wearing collar and therapist spent increased time providing education on importance of wearing collar.)  Vital Signs:  Vital Signs  Heart Rate: 76  Heart Rate Source: Monitor  BP: 122/75  BP Location: Right arm  BP Method: Automatic  Patient Position:  Sitting    Objective   Pain:  Pain Assessment  Pain Assessment: 0-10  Pain Score: 7  Pain Type: Acute pain  Pain Location: Neck  Pain Orientation: Mid  Clinical Progression: Gradually worsening  Pain Interventions: Medication (See MAR)  Cognition:  Cognition  Overall Cognitive Status: Impaired  Arousal/Alertness: Appropriate responses to stimuli  Orientation Level: Oriented X4  Problem Solving: Assistance required to identify errors made  Impulsive: Mildly  Lines/Tubes/Drains:  Open Drain Anterior Neck 0.64 cm (Active)   Number of days: 3       PT Treatments:  Therapeutic Exercise  Therapeutic Exercise Performed: Yes  Therapeutic Exercise Activity 1: Standing jerry LE: heel raises x 8, marches in place x 12 total        Bed Mobility  Bed Mobility: Yes  Bed Mobility 1  Bed Mobility 1: Sitting to supine  Level of Assistance 1: Contact guard  Bed Mobility Comments 1: HOB max elevated  Ambulation/Gait Training  Ambulation/Gait Training Performed: Yes  Ambulation/Gait Training 1  Surface 1: Level tile  Device 1: Rolling walker  Assistance 1: Contact guard  Quality of Gait 1: Narrow base of support, Inconsistent stride length, Decreased step length  Comments/Distance (ft) 1: 50 ft, 75 ft  Transfers  Transfer: Yes  Transfer 1  Transfer From 1: Sit to  Transfer to 1: Stand  Technique 1: Sit to stand  Transfer Device 1: Walker  Transfer Level of Assistance 1: Contact guard  Trials/Comments 1: x2 trials  Transfers 2  Transfer From 2: Stand to  Transfer to 2: Sit  Technique 2: Stand to sit  Transfer Device 2: Walker  Transfer Level of Assistance 2: Contact guard  Trials/Comments 2: x2 trials             Outcome Measures:  [facility] Basic Mobility  Turning from your back to your side while in a flat bed without using bedrails: A little  Moving from lying on your back to sitting on the side of a flat bed without using bedrails: A little  Moving to and from bed to chair (including a wheelchair): A little  Standing up from a chair using  your arms (e.g. wheelchair or bedside chair): A little  To walk in hospital room: A little  Climbing 3-5 steps with railing: A lot  Basic Mobility - Total Score: 17                            Education Documentation  Handouts, taught by Sobia Wang PTA at 12/8/2023  2:06 PM.  Learner: Patient  Readiness: Eager  Method: Explanation  Response: Needs Reinforcement    Precautions, taught by Sobia Wang PTA at 12/8/2023  2:06 PM.  Learner: Patient  Readiness: Eager  Method: Explanation  Response: Needs Reinforcement    Body Mechanics, taught by Sobia Wang PTA at 12/8/2023  2:06 PM.  Learner: Patient  Readiness: Eager  Method: Explanation  Response: Needs Reinforcement    Home Exercise Program, taught by Sobia Wang PTA at 12/8/2023  2:06 PM.  Learner: Patient  Readiness: Eager  Method: Explanation  Response: Needs Reinforcement    Mobility Training, taught by Sobia Wang PTA at 12/8/2023  2:06 PM.  Learner: Patient  Readiness: Eager  Method: Explanation  Response: Needs Reinforcement    Education Comments  No comments found.          OP EDUCATION:       Encounter Problems       Encounter Problems (Active)       General Goals       state/follow spine precautions without cues 100% of session (Progressing)       Start:  12/05/23    Expected End:  12/21/23            independent with sitting and supine HEP (Progressing)       Start:  12/05/23    Expected End:  12/21/23            supine to/from sit via logrolling (HOB flat, no rail) modified independent (Progressing)       Start:  12/05/23    Expected End:  12/21/23               Mobility       sit to/from stand, bed to/from chair with WW with supervision, no LOB (Progressing)       Start:  12/05/23    Expected End:  12/21/23            ambulate 150' with WW with SBA, no LOB, stable BP pre/post (Progressing)       Start:  12/05/23    Expected End:  12/21/23            up/down 3 steps without rail with cane/min HHA, 1 step at a time, no LOB (Progressing)        Start:  12/05/23    Expected End:  12/21/23               Pain - Adult                12/08/23 at 2:12 PM   Sobia Wang PTA   Rehab Office: [phone]

## 2023-12-09 LAB
ALBUMIN SERPL BCP-MCNC: 3.5 G/DL (ref 3.4–5)
ALBUMIN SERPL BCP-MCNC: 3.7 G/DL (ref 3.4–5)
ALP SERPL-CCNC: 85 U/L (ref 33–120)
ALT SERPL W P-5'-P-CCNC: 16 U/L (ref 10–52)
ANION GAP SERPL CALC-SCNC: 14 MMOL/L (ref 10–20)
ANION GAP SERPL CALC-SCNC: 15 MMOL/L (ref 10–20)
AST SERPL W P-5'-P-CCNC: 24 U/L (ref 9–39)
BILIRUB DIRECT SERPL-MCNC: 0.2 MG/DL (ref 0–0.3)
BILIRUB SERPL-MCNC: 0.6 MG/DL (ref 0–1.2)
BUN SERPL-MCNC: 20 MG/DL (ref 6–23)
BUN SERPL-MCNC: 22 MG/DL (ref 6–23)
CALCIUM SERPL-MCNC: 9 MG/DL (ref 8.6–10.6)
CALCIUM SERPL-MCNC: 9.4 MG/DL (ref 8.6–10.6)
CHLORIDE SERPL-SCNC: 104 MMOL/L (ref 98–107)
CHLORIDE SERPL-SCNC: 105 MMOL/L (ref 98–107)
CO2 SERPL-SCNC: 24 MMOL/L (ref 21–32)
CO2 SERPL-SCNC: 24 MMOL/L (ref 21–32)
CREAT SERPL-MCNC: 1.02 MG/DL (ref 0.5–1.3)
CREAT SERPL-MCNC: 1.08 MG/DL (ref 0.5–1.3)
ERYTHROCYTE [DISTWIDTH] IN BLOOD BY AUTOMATED COUNT: 13 % (ref 11.5–14.5)
ERYTHROCYTE [DISTWIDTH] IN BLOOD BY AUTOMATED COUNT: 13 % (ref 11.5–14.5)
GFR SERPL CREATININE-BSD FRML MDRD: 80 ML/MIN/1.73M*2
GFR SERPL CREATININE-BSD FRML MDRD: 85 ML/MIN/1.73M*2
GLUCOSE SERPL-MCNC: 109 MG/DL (ref 74–99)
GLUCOSE SERPL-MCNC: 151 MG/DL (ref 74–99)
HCT VFR BLD AUTO: 35.6 % (ref 41–52)
HCT VFR BLD AUTO: 38.7 % (ref 41–52)
HGB BLD-MCNC: 11.5 G/DL (ref 13.5–17.5)
HGB BLD-MCNC: 12.2 G/DL (ref 13.5–17.5)
MAGNESIUM SERPL-MCNC: 1.81 MG/DL (ref 1.6–2.4)
MAGNESIUM SERPL-MCNC: 1.97 MG/DL (ref 1.6–2.4)
MCH RBC QN AUTO: 27.9 PG (ref 26–34)
MCH RBC QN AUTO: 28.5 PG (ref 26–34)
MCHC RBC AUTO-ENTMCNC: 31.5 G/DL (ref 32–36)
MCHC RBC AUTO-ENTMCNC: 32.3 G/DL (ref 32–36)
MCV RBC AUTO: 88 FL (ref 80–100)
MCV RBC AUTO: 89 FL (ref 80–100)
NRBC BLD-RTO: 0 /100 WBCS (ref 0–0)
NRBC BLD-RTO: 0 /100 WBCS (ref 0–0)
PHOSPHATE SERPL-MCNC: 3.9 MG/DL (ref 2.5–4.9)
PHOSPHATE SERPL-MCNC: 4.1 MG/DL (ref 2.5–4.9)
PLATELET # BLD AUTO: 326 X10*3/UL (ref 150–450)
PLATELET # BLD AUTO: 361 X10*3/UL (ref 150–450)
POTASSIUM SERPL-SCNC: 4.3 MMOL/L (ref 3.5–5.3)
POTASSIUM SERPL-SCNC: 4.9 MMOL/L (ref 3.5–5.3)
PROT SERPL-MCNC: 6 G/DL (ref 6.4–8.2)
RBC # BLD AUTO: 4.03 X10*6/UL (ref 4.5–5.9)
RBC # BLD AUTO: 4.37 X10*6/UL (ref 4.5–5.9)
SODIUM SERPL-SCNC: 138 MMOL/L (ref 136–145)
SODIUM SERPL-SCNC: 139 MMOL/L (ref 136–145)
WBC # BLD AUTO: 13.4 X10*3/UL (ref 4.4–11.3)
WBC # BLD AUTO: 14.1 X10*3/UL (ref 4.4–11.3)

## 2023-12-09 PROCEDURE — 36415 COLL VENOUS BLD VENIPUNCTURE: CPT | Performed by: STUDENT IN AN ORGANIZED HEALTH CARE EDUCATION/TRAINING PROGRAM

## 2023-12-09 PROCEDURE — 84100 ASSAY OF PHOSPHORUS: CPT | Performed by: STUDENT IN AN ORGANIZED HEALTH CARE EDUCATION/TRAINING PROGRAM

## 2023-12-09 PROCEDURE — 1200000002 HC GENERAL ROOM WITH TELEMETRY DAILY

## 2023-12-09 PROCEDURE — 2500000001 HC RX 250 WO HCPCS SELF ADMINISTERED DRUGS (ALT 637 FOR MEDICARE OP): Performed by: STUDENT IN AN ORGANIZED HEALTH CARE EDUCATION/TRAINING PROGRAM

## 2023-12-09 PROCEDURE — 2500000001 HC RX 250 WO HCPCS SELF ADMINISTERED DRUGS (ALT 637 FOR MEDICARE OP)

## 2023-12-09 PROCEDURE — 80053 COMPREHEN METABOLIC PANEL: CPT | Performed by: STUDENT IN AN ORGANIZED HEALTH CARE EDUCATION/TRAINING PROGRAM

## 2023-12-09 PROCEDURE — 2500000004 HC RX 250 GENERAL PHARMACY W/ HCPCS (ALT 636 FOR OP/ED): Performed by: STUDENT IN AN ORGANIZED HEALTH CARE EDUCATION/TRAINING PROGRAM

## 2023-12-09 PROCEDURE — 85027 COMPLETE CBC AUTOMATED: CPT | Performed by: STUDENT IN AN ORGANIZED HEALTH CARE EDUCATION/TRAINING PROGRAM

## 2023-12-09 PROCEDURE — 83735 ASSAY OF MAGNESIUM: CPT | Performed by: NURSE PRACTITIONER

## 2023-12-09 PROCEDURE — 36415 COLL VENOUS BLD VENIPUNCTURE: CPT | Performed by: NURSE PRACTITIONER

## 2023-12-09 PROCEDURE — 82248 BILIRUBIN DIRECT: CPT | Performed by: STUDENT IN AN ORGANIZED HEALTH CARE EDUCATION/TRAINING PROGRAM

## 2023-12-09 PROCEDURE — 2500000002 HC RX 250 W HCPCS SELF ADMINISTERED DRUGS (ALT 637 FOR MEDICARE OP, ALT 636 FOR OP/ED): Performed by: STUDENT IN AN ORGANIZED HEALTH CARE EDUCATION/TRAINING PROGRAM

## 2023-12-09 PROCEDURE — 99233 SBSQ HOSP IP/OBS HIGH 50: CPT | Performed by: INTERNAL MEDICINE

## 2023-12-09 PROCEDURE — 94660 CPAP INITIATION&MGMT: CPT

## 2023-12-09 PROCEDURE — 83735 ASSAY OF MAGNESIUM: CPT | Performed by: STUDENT IN AN ORGANIZED HEALTH CARE EDUCATION/TRAINING PROGRAM

## 2023-12-09 PROCEDURE — 80069 RENAL FUNCTION PANEL: CPT | Performed by: NURSE PRACTITIONER

## 2023-12-09 PROCEDURE — 85027 COMPLETE CBC AUTOMATED: CPT | Performed by: NURSE PRACTITIONER

## 2023-12-09 RX ORDER — OXYCODONE HYDROCHLORIDE 5 MG/1
5 TABLET ORAL ONCE
Status: COMPLETED | OUTPATIENT
Start: 2023-12-09 | End: 2023-12-09

## 2023-12-09 RX ADMIN — FLUTICASONE FUROATE AND VILANTEROL TRIFENATATE 1 PUFF: 200; 25 POWDER RESPIRATORY (INHALATION) at 09:50

## 2023-12-09 RX ADMIN — OXYCODONE HYDROCHLORIDE 5 MG: 5 TABLET ORAL at 18:35

## 2023-12-09 RX ADMIN — DULOXETINE HYDROCHLORIDE 60 MG: 60 CAPSULE, DELAYED RELEASE ORAL at 08:41

## 2023-12-09 RX ADMIN — APIXABAN 5 MG: 5 TABLET, FILM COATED ORAL at 21:14

## 2023-12-09 RX ADMIN — ASPIRIN 81 MG CHEWABLE TABLET 81 MG: 81 TABLET CHEWABLE at 08:41

## 2023-12-09 RX ADMIN — APIXABAN 5 MG: 5 TABLET, FILM COATED ORAL at 08:41

## 2023-12-09 RX ADMIN — RANOLAZINE 1000 MG: 500 TABLET, EXTENDED RELEASE ORAL at 21:14

## 2023-12-09 RX ADMIN — MELATONIN 3 MG: 3 TAB ORAL at 21:14

## 2023-12-09 RX ADMIN — TAMSULOSIN HYDROCHLORIDE 0.4 MG: 0.4 CAPSULE ORAL at 08:41

## 2023-12-09 RX ADMIN — OXYCODONE HYDROCHLORIDE 5 MG: 5 TABLET ORAL at 12:04

## 2023-12-09 RX ADMIN — QUETIAPINE 12.5 MG: 25 TABLET ORAL at 21:14

## 2023-12-09 RX ADMIN — OXYCODONE HYDROCHLORIDE 5 MG: 5 TABLET ORAL at 05:48

## 2023-12-09 RX ADMIN — PANTOPRAZOLE SODIUM 40 MG: 40 TABLET, DELAYED RELEASE ORAL at 05:48

## 2023-12-09 RX ADMIN — ACETAMINOPHEN 975 MG: 325 TABLET ORAL at 21:23

## 2023-12-09 RX ADMIN — RANOLAZINE 1000 MG: 500 TABLET, EXTENDED RELEASE ORAL at 08:41

## 2023-12-09 RX ADMIN — METOPROLOL TARTRATE 100 MG: 100 TABLET, FILM COATED ORAL at 08:41

## 2023-12-09 RX ADMIN — METOPROLOL TARTRATE 100 MG: 100 TABLET, FILM COATED ORAL at 21:14

## 2023-12-09 RX ADMIN — LEVOTHYROXINE SODIUM 150 MCG: 150 TABLET ORAL at 05:48

## 2023-12-09 RX ADMIN — OXYCODONE HYDROCHLORIDE 5 MG: 5 TABLET ORAL at 00:23

## 2023-12-09 RX ADMIN — ACETAMINOPHEN 975 MG: 325 TABLET ORAL at 12:04

## 2023-12-09 RX ADMIN — TICAGRELOR 90 MG: 90 TABLET ORAL at 21:14

## 2023-12-09 RX ADMIN — ATORVASTATIN CALCIUM 80 MG: 80 TABLET, FILM COATED ORAL at 21:14

## 2023-12-09 RX ADMIN — OXYCODONE HYDROCHLORIDE 5 MG: 5 TABLET ORAL at 21:14

## 2023-12-09 RX ADMIN — TICAGRELOR 90 MG: 90 TABLET ORAL at 08:41

## 2023-12-09 ASSESSMENT — COGNITIVE AND FUNCTIONAL STATUS - GENERAL
DRESSING REGULAR UPPER BODY CLOTHING: A LITTLE
CLIMB 3 TO 5 STEPS WITH RAILING: A LOT
MOBILITY SCORE: 17
DAILY ACTIVITIY SCORE: 17
WALKING IN HOSPITAL ROOM: A LITTLE
MOVING FROM LYING ON BACK TO SITTING ON SIDE OF FLAT BED WITH BEDRAILS: A LITTLE
TURNING FROM BACK TO SIDE WHILE IN FLAT BAD: A LITTLE
STANDING UP FROM CHAIR USING ARMS: A LITTLE
MOVING TO AND FROM BED TO CHAIR: A LITTLE
DRESSING REGULAR LOWER BODY CLOTHING: A LOT
HELP NEEDED FOR BATHING: A LOT
TOILETING: A LOT

## 2023-12-09 ASSESSMENT — PAIN SCALES - GENERAL
PAINLEVEL_OUTOF10: 7
PAINLEVEL_OUTOF10: 8
PAINLEVEL_OUTOF10: 8
PAINLEVEL_OUTOF10: 7
PAINLEVEL_OUTOF10: 8

## 2023-12-09 ASSESSMENT — PAIN - FUNCTIONAL ASSESSMENT
PAIN_FUNCTIONAL_ASSESSMENT: 0-10

## 2023-12-09 NOTE — PROGRESS NOTES
Subjective Data:  Denies chest pain, SOB    TCC for SNF placement    Overnight Events:    Admitted to [facility]     Objective Data:  Last Recorded Vitals:  Vitals:    12/09/23 0419 12/09/23 0759 12/09/23 0843 12/09/23 1200   BP: 106/71 109/69  105/63   BP Location:  Right arm  Right arm   Patient Position:  Lying  Lying   Pulse: 60 51 64 65   Resp: 18 18  18   Temp: 36.2 °C (97.2 °F) 36.3 °C (97.3 °F)  36.6 °C (97.9 °F)   TempSrc:  Temporal  Temporal   SpO2: 94% 94%  95%   Weight: 101 kg (223 lb 1.7 oz)      Height:           Last Labs:  Results for orders placed or performed during the hospital encounter of 11/29/23 (from the past 24 hour(s))   Magnesium   Result Value Ref Range    Magnesium 1.97 1.60 - 2.40 mg/dL   Hepatic Function Panel   Result Value Ref Range    Albumin 3.5 3.4 - 5.0 g/dL    Bilirubin, Total 0.6 0.0 - 1.2 mg/dL    Bilirubin, Direct 0.2 0.0 - 0.3 mg/dL    Alkaline Phosphatase 85 33 - 120 U/L    ALT 16 10 - 52 U/L    AST 24 9 - 39 U/L    Total Protein 6.0 (L) 6.4 - 8.2 g/dL   CBC   Result Value Ref Range    WBC 13.4 (H) 4.4 - 11.3 x10*3/uL    nRBC 0.0 0.0 - 0.0 /100 WBCs    RBC 4.03 (L) 4.50 - 5.90 x10*6/uL    Hemoglobin 11.5 (L) 13.5 - 17.5 g/dL    Hematocrit 35.6 (L) 41.0 - 52.0 %    MCV 88 80 - 100 fL    MCH 28.5 26.0 - 34.0 pg    MCHC 32.3 32.0 - 36.0 g/dL    RDW 13.0 11.5 - 14.5 %    Platelets 326 150 - 450 x10*3/uL   Phosphorus   Result Value Ref Range    Phosphorus 3.9 2.5 - 4.9 mg/dL   Basic Metabolic Panel   Result Value Ref Range    Glucose 109 (H) 74 - 99 mg/dL    Sodium 139 136 - 145 mmol/L    Potassium 4.3 3.5 - 5.3 mmol/L    Chloride 105 98 - 107 mmol/L    Bicarbonate 24 21 - 32 mmol/L    Anion Gap 14 10 - 20 mmol/L    Urea Nitrogen 20 6 - 23 mg/dL    Creatinine 1.02 0.50 - 1.30 mg/dL    eGFR 85 >60 mL/min/1.73m*2    Calcium 9.0 8.6 - 10.6 mg/dL       Last I/O:  I/O last 3 completed shifts:  In: 480 (4.7 mL/kg) [P.O.:480]  Out: - (0 mL/kg)   Weight: 101.2 kg     Past Cardiology Tests  (Last 3 Years):  EKG:  Electrocardiogram 12 Lead 12/06/2023 (Preliminary)  NSR rate 87    Echo:  Transthoracic Echo (TTE) Complete 12/07/2023  CONCLUSIONS:   1. Poorly visualized anatomical structures due to suboptimal image quality.   2. Abnormal septal motion consistent with post-operative status.   3. Left ventricular systolic function is mildly decreased with a 45-50% estimated ejection fraction.   4. Basal and mid inferior septum and basal anteroseptal segment are abnormal.    Cath:  Cardiac catheterization - coronary 12/06/2023  s/p PCI on 12/6. Severe native CAD, Patent LIMA- LAD, RA- diag. VG- OM known occluded. RCA with prior  PCI had new ISR -> treated with penumbra thrombectomy and POBA.       Inpatient Medications:  Scheduled medications   Medication Dose Route Frequency    apixaban  5 mg oral BID    aspirin  81 mg oral Daily    atorvastatin  80 mg oral Nightly    DULoxetine  60 mg oral Daily    fluticasone furoate-vilanteroL  1 puff inhalation Daily    levothyroxine  150 mcg oral Daily before breakfast    melatonin  3 mg oral Nightly    metoprolol tartrate  100 mg oral BID    pantoprazole  40 mg oral Daily before breakfast    QUEtiapine  12.5 mg oral Nightly    ranolazine  1,000 mg oral BID    tamsulosin  0.4 mg oral Daily    ticagrelor  90 mg oral BID     PRN medications   Medication    acetaminophen    albuterol    methocarbamol    OLANZapine    oxyCODONE    oxygen    QUEtiapine     Continuous Medications   Medication Dose Last Rate       Physical Exam:  GENERAL: alert and oriented X3, laying in bed, no acute distress  EYES: PERRL, EOMI  NECK: cervical collar in place  LUNGS: respirations even, unlabored on room air; lungs clear to auscultation  CARDIAC: RRR S1S2; no murmur, rub, or gallop auscultated  ABDOMEN: nondistended, nontender, +BS  EXTREMITIES: x4 warm, no LE edema  NEURO: A&O x3, speech clear, no focal deficits      Assessment/Plan   Philip Barfield is a 58 y.o. male w/ extensive CAD hx w/  CABG x 3 (LIMA-LAD, (left) Radial artery - diag, Vein graft-OM) in 2003 and subsequent PCI of distal LAD via LIMA graft (2 overlapped JACE), multiple RCA overlap stents, cervical myelopathy s/p C5 anterior cervical corpectomy and fusion at C4-6 on 12/4, paroxysmal Afib (on home eliquis), T2DM, HLD, HTN, COPD, asthma, TIAs, vestibular neuritis, vertigo, ARISTEO on CPAP w/ NSTEMI s/p balloon angioplasty of in stent thrombosis of RCA. Admitted to [facility] for further management.    Coronary Artery Disease/ NSTEMI  Hx CABG X3  Chronic combined systolic and diastolic HFmrEF, ICM  - TTE 12/7/23:  Left ventricular systolic function is mildly decreased with a 45-50% estimated ejection fraction.  - RR was called on 12/5 w/ agitation/paranoia and chest pains again, labs showed elevated trops at 2,029, EKG showed NSR w/ no ischemic changes. Trops continued to uptrend overnight: 2803 > 7477 >14,000 > 16,470 (peak on 12/6 at 6 am) > 13,579 > 11,098. Heparin drip was started on 12/6 and pt was taken for LHC on 12/6   - s/p PCI on 12/6. Severe native CAD, Patent LIMA- LAD, RA- diag. VG- OM known occluded. RCA with prior  PCI had new ISR -> treated with penumbra thrombectomy and POBA   - per interventional cardiology:  transition to ASA/ticagrelor/apixaban prior to d/c, and on outpt basis switch to apixaban + clopidogrel in 4 weeks   - heparin gtt in CICU dc'd--> switched to eliquis yesterday  - ACS s/s: pt reported 5 minute episode of mild squeezing CP on 12/8, resolved spontaneously--> will continue to monitor and obtain EKG if recurs   - continue ASA 81 mg daily, brilinta 90 mg BID, eliquis 5 mg BID, metoprolol tartrate 100 mg BID, ranolazine 1000 mg BID     Cervical myelopathy  s/p C5 anterior cervical corpectomy and fusion at C4-6 on 12/4  - orthopedics following daily-> imperative to monitor for incisional hematoma  - no bending, twisting, lifting >10 lbs   - maintain soft cervical collar    Hallucinations  - psych consulted on 12/6  for acute paranoia and hallucinations  - no further hallucinations reported  - pt endorsed paranoid delusions regarding nursing staff but denied auditory or visual hallucinations  - experienced AMS at 2 different points in the last 3 days  - psych following, rec'd melatonin, quetipine, olanzapine    Paroxysmal atrial fibrillation  Hx TIAs  - EKG on admit: NSR rate 60-76  - continue Eliquis 5 mg BID     T2DM  - hgba1c = 6.0  - does not appear to have a home DM regimen, will pending A1c results and determine need for SSI, etc    HLD  - lipid panel 12/7: cholesterol 113, HDL 35.1, LDL 55, triglycerides 117  - continue atorvastatin 80 mg daily    HTN  - admit /75  - last 24 hour systolic range:   - additional anti- HTN as needed though BP controlled at  this time    COPD/ Asthma  ARISTEO on CPAP  - continue albuterol, breo ellipta  - CPAP at night    Hypothyroidism  -Last TSH 12/5/23: 0.01 (very low)  -Last T4 11/29/23: 1.63 (high)  -On home levothyroxine 200 mcg daily-> decreased to 150 mcg daily in CICU  - Outpt endocrine follow-up for TSH w/ T4 reflex check on new levothyroxine dose    DVT prophylaxis: eliquis    Dispo: monitor H/H for tolerance of triple therapy,   recommended for SNF by PT; awaiting choices    Code Status:  Full Code    Pt seen and plan of care discussed with Dr. Devon Haines, APRN-CNP    This is a shared visit. I have reviewed the Advanced Practice Provider's encounter note, approve the Advanced Practice Provider's documentation, and provide the following additional information from my personal encounter.     Tamia Osoiro MD MPH

## 2023-12-09 NOTE — CARE PLAN
The patient's goals for the shift include patient will have managed pain during shift    The clinical goals for the shift include Patient will rate pain 6/10 or less by end of shift    Over the shift, the patient did not make progress toward the following goals. Barriers to progression include neck pain from surgery. Recommendations to address these barriers include change in position and pain medication.    Problem: Pain - Adult  Goal: Verbalizes/displays adequate comfort level or baseline comfort level  Outcome: Progressing

## 2023-12-09 NOTE — CARE PLAN
The patient's goals for the shift include patient will have managed pain during shift    The clinical goals for the shift include Patient will have improved pain control by end of shift    Over the shift, the patient did not make progress toward the following goals. Barriers to progression include patient continues with pain rated 8/10 at times. Recommendations to address these barriers include monitor for pain and medicate with prn oxycodone robaxin.

## 2023-12-10 LAB
ALBUMIN SERPL BCP-MCNC: 3.7 G/DL (ref 3.4–5)
ALP SERPL-CCNC: 87 U/L (ref 33–120)
ALT SERPL W P-5'-P-CCNC: 12 U/L (ref 10–52)
AST SERPL W P-5'-P-CCNC: 24 U/L (ref 9–39)
BILIRUB DIRECT SERPL-MCNC: 0.1 MG/DL (ref 0–0.3)
BILIRUB SERPL-MCNC: 0.5 MG/DL (ref 0–1.2)
PROT SERPL-MCNC: 6.8 G/DL (ref 6.4–8.2)

## 2023-12-10 PROCEDURE — 2500000001 HC RX 250 WO HCPCS SELF ADMINISTERED DRUGS (ALT 637 FOR MEDICARE OP): Performed by: STUDENT IN AN ORGANIZED HEALTH CARE EDUCATION/TRAINING PROGRAM

## 2023-12-10 PROCEDURE — 2500000001 HC RX 250 WO HCPCS SELF ADMINISTERED DRUGS (ALT 637 FOR MEDICARE OP): Performed by: NURSE PRACTITIONER

## 2023-12-10 PROCEDURE — 2500000001 HC RX 250 WO HCPCS SELF ADMINISTERED DRUGS (ALT 637 FOR MEDICARE OP)

## 2023-12-10 PROCEDURE — 36415 COLL VENOUS BLD VENIPUNCTURE: CPT | Performed by: STUDENT IN AN ORGANIZED HEALTH CARE EDUCATION/TRAINING PROGRAM

## 2023-12-10 PROCEDURE — 2500000004 HC RX 250 GENERAL PHARMACY W/ HCPCS (ALT 636 FOR OP/ED): Performed by: STUDENT IN AN ORGANIZED HEALTH CARE EDUCATION/TRAINING PROGRAM

## 2023-12-10 PROCEDURE — 80076 HEPATIC FUNCTION PANEL: CPT | Performed by: STUDENT IN AN ORGANIZED HEALTH CARE EDUCATION/TRAINING PROGRAM

## 2023-12-10 PROCEDURE — 1200000002 HC GENERAL ROOM WITH TELEMETRY DAILY

## 2023-12-10 PROCEDURE — 99233 SBSQ HOSP IP/OBS HIGH 50: CPT | Performed by: INTERNAL MEDICINE

## 2023-12-10 PROCEDURE — 2500000002 HC RX 250 W HCPCS SELF ADMINISTERED DRUGS (ALT 637 FOR MEDICARE OP, ALT 636 FOR OP/ED): Performed by: STUDENT IN AN ORGANIZED HEALTH CARE EDUCATION/TRAINING PROGRAM

## 2023-12-10 RX ORDER — OXYCODONE HYDROCHLORIDE 5 MG/1
2.5 TABLET ORAL EVERY 6 HOURS PRN
Status: DISCONTINUED | OUTPATIENT
Start: 2023-12-10 | End: 2023-12-11 | Stop reason: HOSPADM

## 2023-12-10 RX ADMIN — METHOCARBAMOL TABLETS 750 MG: 500 TABLET, COATED ORAL at 20:46

## 2023-12-10 RX ADMIN — OXYCODONE HYDROCHLORIDE 5 MG: 5 TABLET ORAL at 10:40

## 2023-12-10 RX ADMIN — METOPROLOL TARTRATE 100 MG: 100 TABLET, FILM COATED ORAL at 10:29

## 2023-12-10 RX ADMIN — APIXABAN 5 MG: 5 TABLET, FILM COATED ORAL at 10:30

## 2023-12-10 RX ADMIN — APIXABAN 5 MG: 5 TABLET, FILM COATED ORAL at 20:46

## 2023-12-10 RX ADMIN — OXYCODONE HYDROCHLORIDE 5 MG: 5 TABLET ORAL at 17:22

## 2023-12-10 RX ADMIN — FLUTICASONE FUROATE AND VILANTEROL TRIFENATATE 1 PUFF: 200; 25 POWDER RESPIRATORY (INHALATION) at 10:33

## 2023-12-10 RX ADMIN — OXYCODONE HYDROCHLORIDE 5 MG: 5 TABLET ORAL at 03:05

## 2023-12-10 RX ADMIN — DULOXETINE HYDROCHLORIDE 60 MG: 60 CAPSULE, DELAYED RELEASE ORAL at 10:30

## 2023-12-10 RX ADMIN — OXYCODONE HYDROCHLORIDE 2.5 MG: 5 TABLET ORAL at 21:45

## 2023-12-10 RX ADMIN — OXYCODONE HYDROCHLORIDE 5 MG: 5 TABLET ORAL at 23:34

## 2023-12-10 RX ADMIN — METOPROLOL TARTRATE 100 MG: 100 TABLET, FILM COATED ORAL at 20:46

## 2023-12-10 RX ADMIN — METHOCARBAMOL TABLETS 750 MG: 500 TABLET, COATED ORAL at 06:23

## 2023-12-10 RX ADMIN — TAMSULOSIN HYDROCHLORIDE 0.4 MG: 0.4 CAPSULE ORAL at 10:29

## 2023-12-10 RX ADMIN — TICAGRELOR 90 MG: 90 TABLET ORAL at 20:47

## 2023-12-10 RX ADMIN — LEVOTHYROXINE SODIUM 150 MCG: 150 TABLET ORAL at 06:50

## 2023-12-10 RX ADMIN — QUETIAPINE 12.5 MG: 25 TABLET ORAL at 20:46

## 2023-12-10 RX ADMIN — ASPIRIN 81 MG CHEWABLE TABLET 81 MG: 81 TABLET CHEWABLE at 10:30

## 2023-12-10 RX ADMIN — MELATONIN 3 MG: 3 TAB ORAL at 20:46

## 2023-12-10 RX ADMIN — RANOLAZINE 1000 MG: 500 TABLET, EXTENDED RELEASE ORAL at 10:29

## 2023-12-10 RX ADMIN — ATORVASTATIN CALCIUM 80 MG: 80 TABLET, FILM COATED ORAL at 20:46

## 2023-12-10 RX ADMIN — TICAGRELOR 90 MG: 90 TABLET ORAL at 10:29

## 2023-12-10 RX ADMIN — ACETAMINOPHEN 975 MG: 325 TABLET ORAL at 06:23

## 2023-12-10 RX ADMIN — ACETAMINOPHEN 975 MG: 325 TABLET ORAL at 20:47

## 2023-12-10 RX ADMIN — PANTOPRAZOLE SODIUM 40 MG: 40 TABLET, DELAYED RELEASE ORAL at 06:23

## 2023-12-10 RX ADMIN — RANOLAZINE 1000 MG: 500 TABLET, EXTENDED RELEASE ORAL at 20:46

## 2023-12-10 ASSESSMENT — COGNITIVE AND FUNCTIONAL STATUS - GENERAL
MOBILITY SCORE: 23
DRESSING REGULAR LOWER BODY CLOTHING: A LITTLE
HELP NEEDED FOR BATHING: A LITTLE
DAILY ACTIVITIY SCORE: 22
CLIMB 3 TO 5 STEPS WITH RAILING: A LITTLE

## 2023-12-10 ASSESSMENT — PAIN - FUNCTIONAL ASSESSMENT
PAIN_FUNCTIONAL_ASSESSMENT: 0-10

## 2023-12-10 ASSESSMENT — PAIN SCALES - GENERAL
PAINLEVEL_OUTOF10: 7
PAINLEVEL_OUTOF10: 8
PAINLEVEL_OUTOF10: 4
PAINLEVEL_OUTOF10: 8
PAINLEVEL_OUTOF10: 4
PAINLEVEL_OUTOF10: 8
PAINLEVEL_OUTOF10: 8
PAINLEVEL_OUTOF10: 3
PAINLEVEL_OUTOF10: 8
PAINLEVEL_OUTOF10: 3

## 2023-12-10 ASSESSMENT — PAIN DESCRIPTION - LOCATION: LOCATION: NECK

## 2023-12-10 NOTE — PROGRESS NOTES
Subjective Data:  Denies chest pain, SOB    TCC for SNF placement (he wants to speak with sister today before choosing)  C/o pain medication not lasting 6 hours for neck pain    Overnight Events:    Uneventful overnight     Objective Data:  Last Recorded Vitals:  Vitals:    12/10/23 0047 12/10/23 0255 12/10/23 0610 12/10/23 0751   BP: 94/56 103/59  116/70   BP Location: Right arm Right arm  Right arm   Patient Position: Lying Lying  Lying   Pulse: 51 58  54   Resp: 20 18  18   Temp: 36.5 °C (97.7 °F) 35.6 °C (96.1 °F)  36 °C (96.8 °F)   TempSrc: Temporal Tympanic  Temporal   SpO2: 96% 96%  96%   Weight:   101 kg (222 lb 0.1 oz)    Height:           Last Labs:  Results for orders placed or performed during the hospital encounter of 11/29/23 (from the past 24 hour(s))   Renal Function Panel   Result Value Ref Range    Glucose 151 (H) 74 - 99 mg/dL    Sodium 138 136 - 145 mmol/L    Potassium 4.9 3.5 - 5.3 mmol/L    Chloride 104 98 - 107 mmol/L    Bicarbonate 24 21 - 32 mmol/L    Anion Gap 15 10 - 20 mmol/L    Urea Nitrogen 22 6 - 23 mg/dL    Creatinine 1.08 0.50 - 1.30 mg/dL    eGFR 80 >60 mL/min/1.73m*2    Calcium 9.4 8.6 - 10.6 mg/dL    Phosphorus 4.1 2.5 - 4.9 mg/dL    Albumin 3.7 3.4 - 5.0 g/dL   CBC   Result Value Ref Range    WBC 14.1 (H) 4.4 - 11.3 x10*3/uL    nRBC 0.0 0.0 - 0.0 /100 WBCs    RBC 4.37 (L) 4.50 - 5.90 x10*6/uL    Hemoglobin 12.2 (L) 13.5 - 17.5 g/dL    Hematocrit 38.7 (L) 41.0 - 52.0 %    MCV 89 80 - 100 fL    MCH 27.9 26.0 - 34.0 pg    MCHC 31.5 (L) 32.0 - 36.0 g/dL    RDW 13.0 11.5 - 14.5 %    Platelets 361 150 - 450 x10*3/uL   Magnesium   Result Value Ref Range    Magnesium 1.81 1.60 - 2.40 mg/dL         Last I/O:  I/O last 3 completed shifts:  In: 1320 (13.1 mL/kg) [P.O.:1320]  Out: - (0 mL/kg)   Weight: 100.7 kg     Past Cardiology Tests (Last 3 Years):  EKG:  Electrocardiogram 12 Lead 12/06/2023 (Preliminary)  NSR rate 87    Echo:  Transthoracic Echo (TTE) Complete 12/07/2023  CONCLUSIONS:    1. Poorly visualized anatomical structures due to suboptimal image quality.   2. Abnormal septal motion consistent with post-operative status.   3. Left ventricular systolic function is mildly decreased with a 45-50% estimated ejection fraction.   4. Basal and mid inferior septum and basal anteroseptal segment are abnormal.    Cath:  Cardiac catheterization - coronary 12/06/2023  s/p PCI on 12/6. Severe native CAD, Patent LIMA- LAD, RA- diag. VG- OM known occluded. RCA with prior  PCI had new ISR -> treated with penumbra thrombectomy and POBA.       Inpatient Medications:  Scheduled medications   Medication Dose Route Frequency    apixaban  5 mg oral BID    aspirin  81 mg oral Daily    atorvastatin  80 mg oral Nightly    DULoxetine  60 mg oral Daily    fluticasone furoate-vilanteroL  1 puff inhalation Daily    levothyroxine  150 mcg oral Daily before breakfast    melatonin  3 mg oral Nightly    metoprolol tartrate  100 mg oral BID    pantoprazole  40 mg oral Daily before breakfast    QUEtiapine  12.5 mg oral Nightly    ranolazine  1,000 mg oral BID    tamsulosin  0.4 mg oral Daily    ticagrelor  90 mg oral BID     PRN medications   Medication    acetaminophen    albuterol    methocarbamol    OLANZapine    oxyCODONE    oxyCODONE    oxygen    QUEtiapine     Continuous Medications   Medication Dose Last Rate       Physical Exam:  GENERAL: alert and oriented X3, laying in bed, no acute distress  EYES: PERRL, EOMI  NECK: cervical collar in place  LUNGS: respirations even, unlabored on room air; lungs clear to auscultation  CARDIAC: RRR S1S2; no murmur, rub, or gallop auscultated  ABDOMEN: nondistended, nontender, +BS  EXTREMITIES: x4 warm, no LE edema  NEURO: A&O x3, speech clear, no focal deficits      Assessment/Plan   Philip Barfield is a 58 y.o. male w/ extensive CAD hx w/ CABG x 3 (LIMA-LAD, (left) Radial artery - diag, Vein graft-OM) in 2003 and subsequent PCI of distal LAD via LIMA graft (2 overlapped JACE),  multiple RCA overlap stents, cervical myelopathy s/p C5 anterior cervical corpectomy and fusion at C4-6 on 12/4, paroxysmal Afib (on home eliquis), T2DM, HLD, HTN, COPD, asthma, TIAs, vestibular neuritis, vertigo, ARISTEO on CPAP w/ NSTEMI s/p balloon angioplasty of in stent thrombosis of RCA. [facility].    Coronary Artery Disease/ NSTEMI  Hx CABG X3  Chronic combined systolic and diastolic HFmrEF, ICM  - TTE 12/7/23:  Left ventricular systolic function is mildly decreased with a 45-50% estimated ejection fraction.  - RR was called on 12/5 w/ agitation/paranoia and chest pains again, labs showed elevated trops at 2,029, EKG showed NSR w/ no ischemic changes. Trops continued to uptrend overnight: 2803 > 7477 >14,000 > 16,470 (peak on 12/6 at 6 am) > 13,579 > 11,098. Heparin drip was started on 12/6 and pt was taken for LHC on 12/6   - s/p PCI on 12/6. Severe native CAD, Patent LIMA- LAD, RA- diag. VG- OM known occluded. RCA with prior  PCI had new ISR -> treated with penumbra thrombectomy and POBA   - per interventional cardiology:  transition to ASA/ticagrelor/apixaban prior to d/c, and on outpt basis switch to apixaban + clopidogrel in 4 weeks   - heparin gtt in CICU dc'd--> was switched to eliquis   - ACS s/s: pt reported 5 minute episode of mild squeezing CP on 12/8, resolved spontaneously--> - - no complaints of chest pain since  - continue ASA 81 mg daily, brilinta 90 mg BID, eliquis 5 mg BID, metoprolol tartrate 100 mg BID, ranolazine 1000 mg BID     Cervical myelopathy  s/p C5 anterior cervical corpectomy and fusion at C4-6 on 12/4  - orthopedics following daily-> imperative to monitor for incisional hematoma  - no bending, twisting, lifting >10 lbs   - maintain soft cervical collar  - complaining that pain medication is not lasting for 6 hours at times; 2.5 mg oxycodone for breakthrough pain  - no incisional hematoma noted since starting apixiban    Hallucinations  - psych  consulted on 12/6 for acute paranoia and hallucinations  - pt endorsed paranoid delusions regarding nursing staff but denied auditory or visual hallucinations  - experienced AMS at 2 different points in the last 3 days  - psych following, rec'd melatonin, quetipine, olanzapine  - no further hallucinations reported    Paroxysmal atrial fibrillation  Hx TIAs  - EKG on admit: NSR rate 60-76  - continue Eliquis 5 mg BID     T2DM  - hgba1c = 6.0  - monitor blood glucose    HLD  - lipid panel 12/7: cholesterol 113, HDL 35.1, LDL 55, triglycerides 117  - continue atorvastatin 80 mg daily    HTN  - admit /75  - last 24 hour systolic range:  mmhg  - additional anti- HTN as needed though BP controlled at  this time    COPD/ Asthma  ARISTEO on CPAP  - continue albuterol, breo ellipta  - CPAP at night  - pr reports that his home cpap is broken and he needs new sleep study; will need follow up with pulmonology    Hypothyroidism  -Last TSH 12/5/23: 0.01 (very low)  -Last T4 11/29/23: 1.63 (high)  -On home levothyroxine 200 mcg daily-> decreased to 150 mcg daily in CICU  - Outpt endocrine follow-up for TSH w/ T4 reflex check on new levothyroxine dose    DVT prophylaxis: eliquis    Dispo: monitor H/H for tolerance of triple therapy,   recommended for SNF by PT; awaiting choices    Code Status:  Full Code    Pt seen and plan of care discussed with Dr. Devon Haines, APRN-CNP    This is a shared visit. I have reviewed the Advanced Practice Provider's encounter note, approve the Advanced Practice Provider's documentation, and provide the following additional information from my personal encounter.     Tamia Osorio MD MPH

## 2023-12-10 NOTE — NURSING NOTE
Patient requesting additional medication for pain relief. States pain in 7/10 in his neck and shoulders, and does not see much improvement with current pain management PRN. MD notified, additional one time dose of 5mg oxycodone given. Patient states that this improved his pain levels and has been resting comfortably.

## 2023-12-10 NOTE — CARE PLAN
The patient's goals for the shift include patient will have managed pain during shift    The clinical goals for the shift include Patient will rate pain 6/10 or less by end of shift    Over the shift, the patient did  Problem: Pain - Adult  Goal: Verbalizes/displays adequate comfort level or baseline comfort level  Outcome: Progressing     Problem: Safety - Adult  Goal: Free from fall injury  Outcome: Progressing     Problem: Discharge Planning  Goal: Discharge to home or other facility with appropriate resources  Outcome: Progressing     Problem: Chronic Conditions and Co-morbidities  Goal: Patient's chronic conditions and co-morbidity symptoms are monitored and maintained or improved  Outcome: Progressing     Problem: Pain  Goal: Takes deep breaths with improved pain control throughout the shift  Outcome: Progressing  Goal: Turns in bed with improved pain control throughout the shift  Outcome: Progressing  Goal: Walks with improved pain control throughout the shift  Outcome: Progressing  Goal: Performs ADL's with improved pain control throughout shift  Outcome: Progressing  Goal: Participates in PT with improved pain control throughout the shift  Outcome: Progressing    make progress toward the following goals.

## 2023-12-11 ENCOUNTER — PHARMACY VISIT (OUTPATIENT)
Dept: PHARMACY | Facility: CLINIC | Age: 58
End: 2023-12-11
Payer: COMMERCIAL

## 2023-12-11 ENCOUNTER — HOME HEALTH ADMISSION (OUTPATIENT)
Dept: HOME HEALTH SERVICES | Facility: HOME HEALTH | Age: 58
End: 2023-12-11
Payer: MEDICARE

## 2023-12-11 VITALS
TEMPERATURE: 97.9 F | HEIGHT: 64 IN | OXYGEN SATURATION: 96 % | BODY MASS INDEX: 37.37 KG/M2 | SYSTOLIC BLOOD PRESSURE: 95 MMHG | HEART RATE: 57 BPM | DIASTOLIC BLOOD PRESSURE: 60 MMHG | RESPIRATION RATE: 18 BRPM | WEIGHT: 218.92 LBS

## 2023-12-11 PROBLEM — Z95.5 STENTED CORONARY ARTERY: Status: RESOLVED | Noted: 2023-12-04 | Resolved: 2023-12-11

## 2023-12-11 PROBLEM — G95.20 CERVICAL SPINAL CORD COMPRESSION (MULTI): Status: RESOLVED | Noted: 2023-11-29 | Resolved: 2023-12-11

## 2023-12-11 PROBLEM — Z98.890 HISTORY OF GENERAL ANESTHESIA: Status: RESOLVED | Noted: 2023-12-04 | Resolved: 2023-12-11

## 2023-12-11 LAB
ALBUMIN SERPL BCP-MCNC: 3.8 G/DL (ref 3.4–5)
ALP SERPL-CCNC: 92 U/L (ref 33–120)
ALT SERPL W P-5'-P-CCNC: 13 U/L (ref 10–52)
ANION GAP SERPL CALC-SCNC: 13 MMOL/L (ref 10–20)
AST SERPL W P-5'-P-CCNC: 17 U/L (ref 9–39)
ATRIAL RATE: 70 BPM
ATRIAL RATE: 79 BPM
BILIRUB DIRECT SERPL-MCNC: 0.1 MG/DL (ref 0–0.3)
BILIRUB SERPL-MCNC: 0.7 MG/DL (ref 0–1.2)
BUN SERPL-MCNC: 17 MG/DL (ref 6–23)
CALCIUM SERPL-MCNC: 9.6 MG/DL (ref 8.6–10.6)
CHLORIDE SERPL-SCNC: 104 MMOL/L (ref 98–107)
CO2 SERPL-SCNC: 25 MMOL/L (ref 21–32)
CREAT SERPL-MCNC: 1.06 MG/DL (ref 0.5–1.3)
ERYTHROCYTE [DISTWIDTH] IN BLOOD BY AUTOMATED COUNT: 13.1 % (ref 11.5–14.5)
GFR SERPL CREATININE-BSD FRML MDRD: 81 ML/MIN/1.73M*2
GLUCOSE SERPL-MCNC: 128 MG/DL (ref 74–99)
HCT VFR BLD AUTO: 40.3 % (ref 41–52)
HGB BLD-MCNC: 12.7 G/DL (ref 13.5–17.5)
MAGNESIUM SERPL-MCNC: 1.83 MG/DL (ref 1.6–2.4)
MCH RBC QN AUTO: 27.7 PG (ref 26–34)
MCHC RBC AUTO-ENTMCNC: 31.5 G/DL (ref 32–36)
MCV RBC AUTO: 88 FL (ref 80–100)
NRBC BLD-RTO: 0 /100 WBCS (ref 0–0)
P AXIS: 64 DEGREES
P AXIS: 67 DEGREES
P OFFSET: 183 MS
P OFFSET: 193 MS
P ONSET: 132 MS
P ONSET: 146 MS
PHOSPHATE SERPL-MCNC: 4.1 MG/DL (ref 2.5–4.9)
PLATELET # BLD AUTO: 405 X10*3/UL (ref 150–450)
POTASSIUM SERPL-SCNC: 4.3 MMOL/L (ref 3.5–5.3)
PR INTERVAL: 154 MS
PR INTERVAL: 176 MS
PROT SERPL-MCNC: 7 G/DL (ref 6.4–8.2)
Q ONSET: 220 MS
Q ONSET: 223 MS
QRS COUNT: 11 BEATS
QRS COUNT: 13 BEATS
QRS DURATION: 88 MS
QRS DURATION: 90 MS
QT INTERVAL: 394 MS
QT INTERVAL: 396 MS
QTC CALCULATION(BAZETT): 427 MS
QTC CALCULATION(BAZETT): 451 MS
QTC FREDERICIA: 417 MS
QTC FREDERICIA: 432 MS
R AXIS: 55 DEGREES
R AXIS: 74 DEGREES
RBC # BLD AUTO: 4.59 X10*6/UL (ref 4.5–5.9)
SODIUM SERPL-SCNC: 138 MMOL/L (ref 136–145)
T AXIS: 41 DEGREES
T AXIS: 69 DEGREES
T OFFSET: 417 MS
T OFFSET: 421 MS
VENTRICULAR RATE: 70 BPM
VENTRICULAR RATE: 79 BPM
WBC # BLD AUTO: 17.4 X10*3/UL (ref 4.4–11.3)

## 2023-12-11 PROCEDURE — 99239 HOSP IP/OBS DSCHRG MGMT >30: CPT | Performed by: INTERNAL MEDICINE

## 2023-12-11 PROCEDURE — 84100 ASSAY OF PHOSPHORUS: CPT | Performed by: NURSE PRACTITIONER

## 2023-12-11 PROCEDURE — 97530 THERAPEUTIC ACTIVITIES: CPT | Mod: GP,CQ

## 2023-12-11 PROCEDURE — 94640 AIRWAY INHALATION TREATMENT: CPT

## 2023-12-11 PROCEDURE — 2500000001 HC RX 250 WO HCPCS SELF ADMINISTERED DRUGS (ALT 637 FOR MEDICARE OP): Performed by: NURSE PRACTITIONER

## 2023-12-11 PROCEDURE — 2500000001 HC RX 250 WO HCPCS SELF ADMINISTERED DRUGS (ALT 637 FOR MEDICARE OP): Performed by: STUDENT IN AN ORGANIZED HEALTH CARE EDUCATION/TRAINING PROGRAM

## 2023-12-11 PROCEDURE — 2500000005 HC RX 250 GENERAL PHARMACY W/O HCPCS: Performed by: STUDENT IN AN ORGANIZED HEALTH CARE EDUCATION/TRAINING PROGRAM

## 2023-12-11 PROCEDURE — 2500000004 HC RX 250 GENERAL PHARMACY W/ HCPCS (ALT 636 FOR OP/ED): Performed by: STUDENT IN AN ORGANIZED HEALTH CARE EDUCATION/TRAINING PROGRAM

## 2023-12-11 PROCEDURE — 97116 GAIT TRAINING THERAPY: CPT | Mod: GP,CQ

## 2023-12-11 PROCEDURE — 82248 BILIRUBIN DIRECT: CPT | Performed by: STUDENT IN AN ORGANIZED HEALTH CARE EDUCATION/TRAINING PROGRAM

## 2023-12-11 PROCEDURE — 2500000005 HC RX 250 GENERAL PHARMACY W/O HCPCS: Performed by: NURSE PRACTITIONER

## 2023-12-11 PROCEDURE — 80053 COMPREHEN METABOLIC PANEL: CPT | Performed by: NURSE PRACTITIONER

## 2023-12-11 PROCEDURE — 36415 COLL VENOUS BLD VENIPUNCTURE: CPT | Performed by: NURSE PRACTITIONER

## 2023-12-11 PROCEDURE — 94660 CPAP INITIATION&MGMT: CPT

## 2023-12-11 PROCEDURE — RXMED WILLOW AMBULATORY MEDICATION CHARGE

## 2023-12-11 PROCEDURE — 83735 ASSAY OF MAGNESIUM: CPT | Performed by: NURSE PRACTITIONER

## 2023-12-11 PROCEDURE — 2500000002 HC RX 250 W HCPCS SELF ADMINISTERED DRUGS (ALT 637 FOR MEDICARE OP, ALT 636 FOR OP/ED): Performed by: STUDENT IN AN ORGANIZED HEALTH CARE EDUCATION/TRAINING PROGRAM

## 2023-12-11 PROCEDURE — 2500000001 HC RX 250 WO HCPCS SELF ADMINISTERED DRUGS (ALT 637 FOR MEDICARE OP)

## 2023-12-11 PROCEDURE — 85027 COMPLETE CBC AUTOMATED: CPT | Performed by: NURSE PRACTITIONER

## 2023-12-11 RX ORDER — OXYCODONE HYDROCHLORIDE 5 MG/1
5 TABLET ORAL ONCE
Status: COMPLETED | OUTPATIENT
Start: 2023-12-11 | End: 2023-12-11

## 2023-12-11 RX ORDER — TALC
3 POWDER (GRAM) TOPICAL NIGHTLY
Qty: 30 TABLET | Refills: 0 | Status: SHIPPED | OUTPATIENT
Start: 2023-12-11

## 2023-12-11 RX ORDER — OXYCODONE HYDROCHLORIDE 5 MG/1
5 TABLET ORAL EVERY 6 HOURS PRN
Qty: 15 TABLET | Refills: 0 | Status: SHIPPED | OUTPATIENT
Start: 2023-12-11 | End: 2023-12-19 | Stop reason: SDUPTHER

## 2023-12-11 RX ORDER — LIDOCAINE 560 MG/1
1 PATCH PERCUTANEOUS; TOPICAL; TRANSDERMAL ONCE
Status: COMPLETED | OUTPATIENT
Start: 2023-12-11 | End: 2023-12-11

## 2023-12-11 RX ORDER — RANOLAZINE 1000 MG/1
1000 TABLET, EXTENDED RELEASE ORAL 2 TIMES DAILY
Qty: 30 TABLET | Refills: 0 | Status: SHIPPED | OUTPATIENT
Start: 2023-12-11

## 2023-12-11 RX ORDER — OXYCODONE HYDROCHLORIDE 5 MG/1
5 TABLET ORAL EVERY 6 HOURS PRN
Qty: 15 TABLET | Refills: 0 | OUTPATIENT
Start: 2023-12-11 | End: 2023-12-27 | Stop reason: HOSPADM

## 2023-12-11 RX ORDER — PANTOPRAZOLE SODIUM 40 MG/1
40 TABLET, DELAYED RELEASE ORAL
Qty: 30 TABLET | Refills: 0 | Status: SHIPPED | OUTPATIENT
Start: 2023-12-12 | End: 2024-01-11

## 2023-12-11 RX ORDER — NAPROXEN SODIUM 220 MG/1
81 TABLET, FILM COATED ORAL DAILY
Qty: 30 TABLET | Refills: 0 | Status: SHIPPED | OUTPATIENT
Start: 2023-12-12

## 2023-12-11 RX ORDER — TAMSULOSIN HYDROCHLORIDE 0.4 MG/1
0.4 CAPSULE ORAL DAILY
Qty: 30 CAPSULE | Refills: 0 | Status: SHIPPED | OUTPATIENT
Start: 2023-12-12

## 2023-12-11 RX ORDER — LIDOCAINE 560 MG/1
1 PATCH PERCUTANEOUS; TOPICAL; TRANSDERMAL DAILY
Status: DISCONTINUED | OUTPATIENT
Start: 2023-12-11 | End: 2023-12-11 | Stop reason: HOSPADM

## 2023-12-11 RX ORDER — LEVOTHYROXINE SODIUM 200 UG/1
200 TABLET ORAL
Qty: 30 TABLET | Refills: 0 | Status: SHIPPED | OUTPATIENT
Start: 2023-12-11

## 2023-12-11 RX ORDER — ATORVASTATIN CALCIUM 80 MG/1
80 TABLET, FILM COATED ORAL DAILY
Qty: 30 TABLET | Refills: 0 | Status: SHIPPED | OUTPATIENT
Start: 2023-12-11 | End: 2024-01-10

## 2023-12-11 RX ORDER — ACETAMINOPHEN 325 MG/1
975 TABLET ORAL EVERY 8 HOURS PRN
Qty: 30 TABLET | Refills: 0 | Status: SHIPPED | OUTPATIENT
Start: 2023-12-11

## 2023-12-11 RX ADMIN — RANOLAZINE 1000 MG: 500 TABLET, EXTENDED RELEASE ORAL at 08:50

## 2023-12-11 RX ADMIN — TAMSULOSIN HYDROCHLORIDE 0.4 MG: 0.4 CAPSULE ORAL at 08:50

## 2023-12-11 RX ADMIN — LEVOTHYROXINE SODIUM 150 MCG: 150 TABLET ORAL at 05:17

## 2023-12-11 RX ADMIN — DULOXETINE HYDROCHLORIDE 60 MG: 60 CAPSULE, DELAYED RELEASE ORAL at 08:50

## 2023-12-11 RX ADMIN — OXYCODONE HYDROCHLORIDE 5 MG: 5 TABLET ORAL at 17:29

## 2023-12-11 RX ADMIN — OXYCODONE HYDROCHLORIDE 5 MG: 5 TABLET ORAL at 11:20

## 2023-12-11 RX ADMIN — TICAGRELOR 90 MG: 90 TABLET ORAL at 08:49

## 2023-12-11 RX ADMIN — PANTOPRAZOLE SODIUM 40 MG: 40 TABLET, DELAYED RELEASE ORAL at 05:17

## 2023-12-11 RX ADMIN — FLUTICASONE FUROATE AND VILANTEROL TRIFENATATE 1 PUFF: 200; 25 POWDER RESPIRATORY (INHALATION) at 09:56

## 2023-12-11 RX ADMIN — APIXABAN 5 MG: 5 TABLET, FILM COATED ORAL at 08:50

## 2023-12-11 RX ADMIN — OXYCODONE HYDROCHLORIDE 5 MG: 5 TABLET ORAL at 13:30

## 2023-12-11 RX ADMIN — METHOCARBAMOL TABLETS 750 MG: 500 TABLET, COATED ORAL at 08:50

## 2023-12-11 RX ADMIN — METHOCARBAMOL TABLETS 750 MG: 500 TABLET, COATED ORAL at 18:30

## 2023-12-11 RX ADMIN — LIDOCAINE 1 PATCH: 4 PATCH TOPICAL at 13:30

## 2023-12-11 RX ADMIN — METOPROLOL TARTRATE 100 MG: 100 TABLET, FILM COATED ORAL at 08:50

## 2023-12-11 RX ADMIN — ACETAMINOPHEN 975 MG: 325 TABLET ORAL at 12:43

## 2023-12-11 RX ADMIN — LIDOCAINE 1 PATCH: 4 PATCH TOPICAL at 05:17

## 2023-12-11 RX ADMIN — OXYCODONE HYDROCHLORIDE 5 MG: 5 TABLET ORAL at 05:17

## 2023-12-11 RX ADMIN — ACETAMINOPHEN 975 MG: 325 TABLET ORAL at 04:23

## 2023-12-11 RX ADMIN — ASPIRIN 81 MG CHEWABLE TABLET 81 MG: 81 TABLET CHEWABLE at 08:50

## 2023-12-11 ASSESSMENT — COGNITIVE AND FUNCTIONAL STATUS - GENERAL
MOVING FROM LYING ON BACK TO SITTING ON SIDE OF FLAT BED WITH BEDRAILS: A LITTLE
MOBILITY SCORE: 18
WALKING IN HOSPITAL ROOM: A LITTLE
STANDING UP FROM CHAIR USING ARMS: A LITTLE
MOVING TO AND FROM BED TO CHAIR: A LITTLE
TURNING FROM BACK TO SIDE WHILE IN FLAT BAD: A LITTLE
CLIMB 3 TO 5 STEPS WITH RAILING: A LITTLE

## 2023-12-11 ASSESSMENT — PAIN SCALES - GENERAL
PAINLEVEL_OUTOF10: 7
PAINLEVEL_OUTOF10: 4
PAINLEVEL_OUTOF10: 3

## 2023-12-11 ASSESSMENT — PAIN - FUNCTIONAL ASSESSMENT
PAIN_FUNCTIONAL_ASSESSMENT: 0-10

## 2023-12-11 ASSESSMENT — PAIN DESCRIPTION - DESCRIPTORS: DESCRIPTORS: DISCOMFORT;ACHING

## 2023-12-11 NOTE — CARE PLAN
The patient's goals for the shift include patient will have managed pain during shift    The clinical goals for the shift include Patient will have better pain management    Over the shift, the patient did   Problem: Pain - Adult  Goal: Verbalizes/displays adequate comfort level or baseline comfort level  Outcome: Progressing     Problem: Safety - Adult  Goal: Free from fall injury  Outcome: Progressing     Problem: Discharge Planning  Goal: Discharge to home or other facility with appropriate resources  Outcome: Progressing     Problem: Chronic Conditions and Co-morbidities  Goal: Patient's chronic conditions and co-morbidity symptoms are monitored and maintained or improved  Outcome: Progressing     Problem: Pain  Goal: Takes deep breaths with improved pain control throughout the shift  Outcome: Progressing  Goal: Turns in bed with improved pain control throughout the shift  Outcome: Progressing  Goal: Walks with improved pain control throughout the shift  Outcome: Progressing  Goal: Performs ADL's with improved pain control throughout shift  Outcome: Progressing  Goal: Participates in PT with improved pain control throughout the shift  Outcome: Progressing   make progress toward the following goals.

## 2023-12-11 NOTE — CARE PLAN
Problem: Pain - Adult  Goal: Verbalizes/displays adequate comfort level or baseline comfort level  Outcome: Progressing     Problem: Pain  Goal: Takes deep breaths with improved pain control throughout the shift  Outcome: Progressing  Goal: Turns in bed with improved pain control throughout the shift  Outcome: Progressing  Goal: Walks with improved pain control throughout the shift  Outcome: Progressing  Goal: Performs ADL's with improved pain control throughout shift  Outcome: Progressing  Goal: Participates in PT with improved pain control throughout the shift  Outcome: Progressing   The patient's goals for the shift include patient will have managed pain during shift    The clinical goals for the shift include pain management    Over the shift, the patient did not make progress toward the following goals. Barriers to progression include n/a. Recommendations to address these barriers include n/a.

## 2023-12-11 NOTE — NURSING NOTE
Pt discharged in stable condition via wheelchair with transport.  Pt has all belongings.  PIVs discontinued.  Tele box returned.  Discharge instructions, follow up appointments, medications reviewed.  LEEANNA Haines CNP also discussed discharge instructions and follow up appointments prior to discharge. Meds to beds delivered medication.  Narcotic given to patient from charge nurse drawer.  Friend picking up patient in main lobby. Pt has no further questions.

## 2023-12-11 NOTE — DISCHARGE SUMMARY
Discharge Diagnosis  Cervical spinal cord compression (CMS/HCC)    Issues Requiring Follow-Up  Post PCI for in stent thrombosis of RCA  S/p corpectomy and fusion of C4-C6 for severe cervical stenosis      Test Results Pending At Discharge  Pending Labs       No current pending labs.            Hospital Course  Philip Barfield is a 58 y.o. male w/ extensive CAD hx w/ CABG x 3 (LIMA-LAD, (left) Radial artery - diag, Vein graft-OM) in 2003 and subsequent PCI of distal LAD via LIMA graft (2 overlapped JACE), multiple RCA overlap stents, cervical myelopathy s/p C5 anterior cervical corpectomy and fusion at C4-6 on 12/4, paroxysmal Afib (on home eliquis), T2DM, HLD, HTN, COPD, asthma, TIAs, vestibular neuritis, vertigo, ARISTEO on CPAP who presents to CICU w/ NSTEMI s/p balloon angioplasty of in stent thrombosis of RCA.     Pt was originally a direct admit from [place] (presented to [place] ED on 11/28) on 11/30 after presenting w/  bilateral UE/LE weakness, numbness, and paresthesias starting in September after he sustained a syncopal fall striking his neck. Multiple nonsyncopal falls since then. MRI spine showed C5/C6 severe stenosis w/ c/f myelopathy. Home plavix and eliquis were stopped for 5 days for pre-surgery washout and pt was taken for C5 anterior cervical corpectomy and fusion at C4-6 with Dr. Ruelas on 12/04.     Following his procedure, a rapid response was called for agitation, AMS, and chest pains. There was no concern for ischemic changes on EKG and pt was vitally stable. Another RR was called on 12/5 w/ agitation/paranoia and chest pains again, labs showed elevated trops at 2,029, EKG showed NSR w/ no ischemic changes. Trops continued to uptrend overnight: 2803 > 7477 >14,000 > 16,470 (peak on 12/6 at 6 am) > 13,579 > 11,098. Heparin drip was started on 12/6 and pt was taken for LHC on 12/6 which showed patent LIMA-LAD, Radial artery - diag, and Vein graft-OM known occluded. RCA with prior chronic total  occlusion PCI, now has new in stent thrombosis -> treated with penumbra thrombectomy and balloon angioplasty. Pt was given ASA, cangrelor drip, ticagrelor load in lab.     On arrival to CICU on 12/6 post-cath, pt was vitally stable. Pt found in bed comfortably on exam. He has some shortness of breath on exam, and complains of neck soreness. He denies CP, abdominal pain, fevers/chills, dizziness.     Cangrelor drip was stopped 2 hours after ticacrelor load and heparin drip was continued w/ low intensity protocol. Heparin drip stopped 12/7 AM. 12/7 echo with abnormal septal motion c/w postoperative status, 45-50% EF, and abnormal basal and mid inferior septum and basal anteroseptal segment. Psychiatry consulted given agitation with paranoia, believed to be hyperactive delirium multifactorial in etiology however if patient continues to decline may consider further workup for psychosis. On melatonin and quetiapine. PT/OT recommending SNF. Stable for transfer to the floor.   He was transferred to the telemetry nursing floor.  He was seen by PT/OT who recommend home physical and occupational therapy.  Requested nursing for post op wound check and cardiopulmonary follow up.     For post cardiac procedure he will need to take triple therapy (aspirin, ticagrelor and apixiban) for 4 week 1/3/2024 then stop ticagrelor and aspirin; to restart home plavix and continue apixiban  starting 1/4/2024     An appointment with His cardiology Dr. Jamie Quiroz has been requested for 2-3 weeks    He has an appointment with spine surgeon Dr. Balaji Ruelas for 1/3/2024    He need to call PCP Dr. Rossy Valdez for appt in 1-2 weeks    His home CPAP machine is broken so an outpatient sleep medicine appointment with Dr. Ender Childs on 1/16/2024 has been scheduled.        Pertinent Physical Exam At Time of Discharge  Physical Exam      Home Medications     Medication List      START taking these medications     acetaminophen 325 mg tablet;  Commonly known as: Tylenol; Take 3 tablets   (975 mg) by mouth every 8 hours if needed for moderate pain (4 - 6).   aspirin 81 mg chewable tablet; Chew 1 tablet (81 mg) once daily. Do not   start before December 12, 2023.; Start taking on: December 12, 2023   Brilinta 90 mg tablet; Generic drug: ticagrelor; Take 1 tablet (90 mg)   by mouth 2 times a day for 24 days. Take as directed until 1/3/2024  then   STOP and start taking plavix as directed   melatonin 3 mg tablet; Take 1 tablet (3 mg) by mouth once daily at   bedtime.   * oxyCODONE 5 mg immediate release tablet; Commonly known as:   Roxicodone; Take 1 tablet (5 mg) by mouth every 6 hours if needed for   severe pain (7 - 10) for up to 7 days. Only take for severe pain   * oxyCODONE 5 mg immediate release tablet; Commonly known as:   Roxicodone; Take 1 tablet (5 mg) by mouth every 6 hours if needed for   severe pain (7-10) for up to 7 days.   tamsulosin 0.4 mg 24 hr capsule; Commonly known as: Flomax; Take 1   capsule (0.4 mg) by mouth once daily. Do not start before December 12, 2023.; Start taking on: December 12, 2023  * This list has 2 medication(s) that are the same as other medications   prescribed for you. Read the directions carefully, and ask your doctor or   other care provider to review them with you.     CHANGE how you take these medications     * pantoprazole 40 mg EC tablet; Commonly known as: ProtoNix; What   changed: Another medication with the same name was added. Make sure you   understand how and when to take each.   * pantoprazole 40 mg EC tablet; Commonly known as: ProtoNix; Take 1   tablet (40 mg) by mouth once daily in the morning. Take before meals. Do   not crush, chew, or split. Do not start before December 12, 2023.; Start   taking on: December 12, 2023; What changed: You were already taking a   medication with the same name, and this prescription was added. Make sure   you understand how and when to take each.   * ranolazine 1,000  mg 12 hr tablet; Commonly known as: Ranexa; What   changed: Another medication with the same name was added. Make sure you   understand how and when to take each.   * ranolazine 1,000 mg 12 hr tablet; Commonly known as: Ranexa; Take 1   tablet (1,000 mg) by mouth 2 times a day. Do not crush, chew, or split.;   What changed: You were already taking a medication with the same name, and   this prescription was added. Make sure you understand how and when to take   each.  * This list has 4 medication(s) that are the same as other medications   prescribed for you. Read the directions carefully, and ask your doctor or   other care provider to review them with you.     CONTINUE taking these medications     albuterol 90 mcg/actuation inhaler   atorvastatin 80 mg tablet; Commonly known as: Lipitor; Take 1 tablet (80   mg) by mouth once daily.   azelastine 137 mcg (0.1 %) nasal spray; Commonly known as: Astelin   Breo Ellipta 200-25 mcg/dose inhaler; Generic drug: fluticasone   furoate-vilanteroL   Claritin 10 mg tablet; Generic drug: loratadine   cyclobenzaprine 10 mg tablet; Commonly known as: Flexeril   Cymbalta 60 mg DR capsule; Generic drug: DULoxetine   Eliquis 5 mg tablet; Generic drug: apixaban   evolocumab 140 mg/mL injection; Commonly known as: Repatha SureClick   levothyroxine 200 mcg tablet; Commonly known as: Synthroid, Levoxyl;   Take 1 tablet (200 mcg) by mouth once daily in the morning. Take before   meals.   Lopressor 50 mg tablet; Generic drug: metoprolol tartrate   Miralax 17 gram/dose powder; Generic drug: polyethylene glycol   Nitrostat 0.4 mg SL tablet; Generic drug: nitroglycerin   Plavix 75 mg tablet; Generic drug: clopidogrel     STOP taking these medications     methylPREDNISolone 4 mg tablets; Commonly known as: Medrol Dospak       Outpatient Follow-Up  Future Appointments   Date Time Provider Department [facility]   1/3/2024 10:40 AM Balaji Ruelas MD [id number] [facility]   1/16/2024 11:00 AM  Brent Childs, APRN-CNP [id number]       Suki Haines, APRN-CNP

## 2023-12-11 NOTE — PROGRESS NOTES
Transitional Care Coordination Progress Note:  Patient discussed during interdisciplinary rounds.   Team members present: Miguel Hermosillo RN TCC, HVI, Nurse Manager  Plan per Medical/Surgical team: s/p cervical Fusion, Ortho recs, Psych recs, PT/OT, pain control, s/p PCI 12/06, Eliquis, monitor H/H  Payer: Medicare/ Medicaid  Status: inpatient  Discharge disposition: SNF pending choices  Potential Barriers: Accepting facility  ADOD: 12/11 vs 12/12  This TCC spoke with patient regarding recommendation for moderate intensity on discharge. Patient states he was previously given a list but lost it. This TCC provided patient with new list from [facility] of facilities that accept patients insurance and are within patients demographic area. Patient states he will speak with sister regarding facility choices. This TCC showed patient the location of [facility] in relation to patients home. Patient agreeable to having referral sent to [facility] while he reaches out to his sister for other choices. This TCC will follow up with patient regarding more choices.   Miguel Hermosillo RN Transitional Care Coordinator [phone]

## 2023-12-11 NOTE — PROGRESS NOTES
"Physical Therapy    Physical Therapy Treatment    Patient Name: Philip Barfield  MRN: [id number]  Today's Date: 12/11/2023  Time Calculation  Start Time: 1150  Stop Time: 1213  Time Calculation (min): 23 min       Assessment/Plan   PT Assessment  PT Assessment Results: Decreased endurance, Impaired balance  Rehab Prognosis: Good  Evaluation/Treatment Tolerance: Patient limited by pain  Medical Staff Made Aware: Yes  Strengths: Housing layout, Support of Caregivers, Attitude of self  End of Session Communication: Bedside nurse  Assessment Comment: Patient required decreased assist for all mobility this date. Will uodate d/c rec to home with LOW intensity PT.  End of Session Patient Position:  (seated EOB)     PT Plan  Treatment/Interventions: Bed mobility, Transfer training, Gait training, Stair training, Balance training, Therapeutic exercise, Therapeutic activity  PT Plan: Skilled PT  PT Frequency: 5 times per week  PT Discharge Recommendations: Moderate intensity level of continued care  Equipment Recommended upon Discharge: LOW intensity   PT Recommended Transfer Status: Assist x1, Assistive device  PT - OK to Discharge: Yes      General Visit Information:   PT  Visit  PT Received On: 12/11/23  Response to Previous Treatment: Patient with no complaints from previous session.  Prior to Session Communication: Bedside nurse  Patient Position Received:  (seated up on bench)  Family/Caregiver Present: No  General Comment: Motivated to participate in therapy session stating \"I am ready to go home\".     Subjective   Precautions:  Precautions  Medical Precautions: Cardiac precautions  Post-Surgical Precautions: Spinal precautions  Precautions Comment: soft collar  Vital Signs:  Vital Signs  Heart Rate: 66  Heart Rate Source: Monitor  SpO2: 96 %  BP: 107/72  BP Location: Right arm  BP Method: Automatic  Patient Position: Sitting    Objective   Pain:  Pain Assessment  Pain Assessment: 0-10  Pain Score: 7  Pain Type: Acute " pain  Pain Location: Shoulder  Pain Orientation: Right  Pain Descriptors: Discomfort, Aching  Pain Frequency: Constant/continuous  Pain Onset: Ongoing  Cognition:  Cognition  Overall Cognitive Status: Within Functional Limits  Arousal/Alertness: Appropriate responses to stimuli  Orientation Level: Oriented X4  Lines/Tubes/Drains:  Open Drain Anterior Neck 0.64 cm (Active)   Number of days: 6       PT Treatments:  Therapeutic Exercise  Therapeutic Exercise Performed: Yes  Therapeutic Exercise Activity 1: standing jerry LE: marches in place x 10           Ambulation/Gait Training  Ambulation/Gait Training Performed: Yes  Ambulation/Gait Training 1  Surface 1: Level tile  Device 1: No device  Assistance 1: Contact guard  Quality of Gait 1: Inconsistent stride length (mild path dev noted)  Comments/Distance (ft) 1: 100 ft x 2 (mild undteadiness noted. Patient would benefit from use of FWW)  Transfers  Transfer: Yes  Transfer 1  Transfer From 1: Sit to  Transfer to 1: Stand  Technique 1: Sit to stand  Transfer Level of Assistance 1: Close supervision  Trials/Comments 1: x2 trials  Transfers 2  Transfer From 2: Stand to  Transfer to 2: Sit  Technique 2: Stand to sit  Transfer Level of Assistance 2: Close supervision  Trials/Comments 2: x2 trials             Outcome Measures:  [facility] Basic Mobility  Turning from your back to your side while in a flat bed without using bedrails: A little  Moving from lying on your back to sitting on the side of a flat bed without using bedrails: A little  Moving to and from bed to chair (including a wheelchair): A little  Standing up from a chair using your arms (e.g. wheelchair or bedside chair): A little  To walk in hospital room: A little  Climbing 3-5 steps with railing: A little  Basic Mobility - Total Score: 18                            Education Documentation  Precautions, taught by Sobia Wang PTA at 12/11/2023 12:35 PM.  Learner: Patient  Readiness: Acceptance  Method:  Explanation  Response: Verbalizes Understanding    Home Exercise Program, taught by Sobia Wang PTA at 12/11/2023 12:35 PM.  Learner: Patient  Readiness: Acceptance  Method: Explanation  Response: Verbalizes Understanding    ADL Training, taught by Sobia Wang PTA at 12/11/2023 12:35 PM.  Learner: Patient  Readiness: Acceptance  Method: Explanation  Response: Verbalizes Understanding    Handouts, taught by Sobia Wang PTA at 12/11/2023 12:35 PM.  Learner: Patient  Readiness: Acceptance  Method: Explanation  Response: Verbalizes Understanding    Precautions, taught by Sobia Wang PTA at 12/11/2023 12:35 PM.  Learner: Patient  Readiness: Acceptance  Method: Explanation  Response: Verbalizes Understanding    Body Mechanics, taught by Sobia Wang PTA at 12/11/2023 12:35 PM.  Learner: Patient  Readiness: Acceptance  Method: Explanation  Response: Verbalizes Understanding    Home Exercise Program, taught by Sobia Wang PTA at 12/11/2023 12:35 PM.  Learner: Patient  Readiness: Acceptance  Method: Explanation  Response: Verbalizes Understanding    Mobility Training, taught by Sobia Wang PTA at 12/11/2023 12:35 PM.  Learner: Patient  Readiness: Acceptance  Method: Explanation  Response: Verbalizes Understanding    Education Comments  No comments found.          OP EDUCATION:       Encounter Problems       Encounter Problems (Active)       General Goals       state/follow spine precautions without cues 100% of session (Progressing)       Start:  12/05/23    Expected End:  12/21/23            independent with sitting and supine HEP (Progressing)       Start:  12/05/23    Expected End:  12/21/23            supine to/from sit via logrolling (HOB flat, no rail) modified independent (Progressing)       Start:  12/05/23    Expected End:  12/21/23               Mobility       sit to/from stand, bed to/from chair with WW with supervision, no LOB (Met)       Start:  12/05/23    Expected End:  12/21/23     Resolved:  12/11/23         ambulate 150' with WW with SBA, no LOB, stable BP pre/post (Progressing)       Start:  12/05/23    Expected End:  12/21/23            up/down 3 steps without rail with cane/min HHA, 1 step at a time, no LOB (Progressing)       Start:  12/05/23    Expected End:  12/21/23               Pain - Adult                12/11/23 at 12:36 PM   Sobia Wang PTA   Rehab Office: [phone]

## 2023-12-11 NOTE — NURSING NOTE
Ok to discharge when pt has a ride.  Will dc with [facility].  Meds delivered bedside.  Narcotic script filled and logged into locked charge nurse drawer with Christy BUSTILLO as witness.    Pt will discharge tonight or in a.m.  Unable to send in Uber d/t needing assistance with mobility.  Family to  will assist with mobility into home.

## 2023-12-12 ENCOUNTER — DOCUMENTATION (OUTPATIENT)
Dept: HOME HEALTH SERVICES | Age: 58
End: 2023-12-12
Payer: MEDICARE

## 2023-12-13 ENCOUNTER — HOME CARE VISIT (OUTPATIENT)
Dept: HOME HEALTH SERVICES | Facility: HOME HEALTH | Age: 58
End: 2023-12-13
Payer: MEDICARE

## 2023-12-13 ENCOUNTER — APPOINTMENT (OUTPATIENT)
Dept: HOME HEALTH SERVICES | Facility: HOME HEALTH | Age: 58
End: 2023-12-13
Payer: MEDICARE

## 2023-12-13 VITALS
HEART RATE: 61 BPM | SYSTOLIC BLOOD PRESSURE: 116 MMHG | TEMPERATURE: 97.5 F | OXYGEN SATURATION: 98 % | DIASTOLIC BLOOD PRESSURE: 81 MMHG

## 2023-12-13 PROCEDURE — G0151 HHCP-SERV OF PT,EA 15 MIN: HCPCS | Mod: HHH

## 2023-12-13 PROCEDURE — 0023 HH SOC

## 2023-12-13 PROCEDURE — 1090000001 HH PPS REVENUE CREDIT

## 2023-12-13 PROCEDURE — G0299 HHS/HOSPICE OF RN EA 15 MIN: HCPCS | Mod: HHH

## 2023-12-13 PROCEDURE — 169592 NO-PAY CLAIM PROCEDURE

## 2023-12-13 PROCEDURE — 1090000002 HH PPS REVENUE DEBIT

## 2023-12-13 ASSESSMENT — ENCOUNTER SYMPTOMS
HIGHEST PAIN SEVERITY IN PAST 24 HOURS: 9/10
LOWEST PAIN SEVERITY IN PAST 24 HOURS: 5/10
PAIN LOCATION - PAIN SEVERITY: 5/10
SUBJECTIVE PAIN PROGRESSION: WAXING AND WANING
PAIN LOCATION: NECK
PAIN: 1
PAIN SEVERITY GOAL: 0/10

## 2023-12-13 ASSESSMENT — ACTIVITIES OF DAILY LIVING (ADL)
AMBULATION ASSISTANCE ON FLAT SURFACES: 1
AMBULATION_DISTANCE/DURATION_TOLERATED: 30 FT

## 2023-12-13 NOTE — HOME HEALTH
Subjective:    Patient presents to his first home health PT visit following recent cervical fusion and stent placement.  He returned home from hospital 12/11/2023.  He is currently wearing his soft collar.  Patient resides with his adult son in a one story home with 3 steps to enter with handrail.  Patient does not currently work.  Prior to surgery, patient was driving.  Son is home in the evenings to assist and his friend lives right down the street and he is also helpful.  Patient has been ambulating with his cane and states that he is supposed to have a walker delivered to his home.  He had 3-4 falls this year prior to surgery likely due to cord compression and LE weakness.  Patient also reports dizziness, but has been dealing with vertigo for over 20 years.  PLOF was independent without a device, but did hold onto his son at times for stability.  PMH: DM, HTN, vertigo, heart triple bypass, hypothyroidism, depression, anxiety.    Objective:    See evaluation for details.    Assessment:    Philip already feels better following his surgery.  He has noticed improved upper and lower body strength.  He has been up ad jocelynn with his cane as he waits for his FWW to be delivered.  PT did recommend using the FWW once it arrives.  PT also recommended that patient take an extra second when rising from a seated position to gather himself and allow dizziness to subside prior to walking to improve safety.  A written HEP was issued and PT reviewed it with patient who demonstrated a good understanding.  PT also reviewed post operative precautions from both procedures.  Patient will benefit from skilled PT services to improve his gait, balance, transfers, and safety.    Plan:    Progress as tolerated.

## 2023-12-14 ENCOUNTER — HOME CARE VISIT (OUTPATIENT)
Dept: HOME HEALTH SERVICES | Facility: HOME HEALTH | Age: 58
End: 2023-12-14
Payer: MEDICARE

## 2023-12-14 VITALS
HEIGHT: 64 IN | RESPIRATION RATE: 18 BRPM | DIASTOLIC BLOOD PRESSURE: 70 MMHG | SYSTOLIC BLOOD PRESSURE: 148 MMHG | HEART RATE: 70 BPM | WEIGHT: 218 LBS | BODY MASS INDEX: 37.22 KG/M2

## 2023-12-14 PROCEDURE — G0152 HHCP-SERV OF OT,EA 15 MIN: HCPCS | Mod: HHH

## 2023-12-14 PROCEDURE — 1090000002 HH PPS REVENUE DEBIT

## 2023-12-14 PROCEDURE — 1090000001 HH PPS REVENUE CREDIT

## 2023-12-14 ASSESSMENT — ENCOUNTER SYMPTOMS
LAST BOWEL MOVEMENT: 66821
PAIN LOCATION - EXACERBATING FACTORS: MOVEMENT
COUGH CHARACTERISTICS: DRY
HYPERTENSION: 1
PAIN LOCATION - PAIN FREQUENCY: INTERMITTENT
PERSON REPORTING PAIN: PATIENT
HYPOTENSION: 1
LOWEST PAIN SEVERITY IN PAST 24 HOURS: 1/10
SUBJECTIVE PAIN PROGRESSION: GRADUALLY IMPROVING
PAIN LOCATION - PAIN SEVERITY: 5/10
COUGH: 1
PAIN LOCATION - PAIN QUALITY: ACHE
COUGH CHARACTERISTICS: NON-PRODUCTIVE
PAIN: 1
ABDOMINAL PAIN: 1
BOWEL PATTERN NORMAL: 1
COUGH CHARACTERISTICS: STRONG
PAIN LOCATION: JAW
HIGHEST PAIN SEVERITY IN PAST 24 HOURS: 6/10
COUGH CHARACTERISTICS: WEAK
PAIN SEVERITY GOAL: 1/10

## 2023-12-14 ASSESSMENT — ACTIVITIES OF DAILY LIVING (ADL): ENTERING_EXITING_HOME: NEEDS ASSISTANCE

## 2023-12-14 ASSESSMENT — PAIN SCALES - PAIN ASSESSMENT IN ADVANCED DEMENTIA (PAINAD)
TOTALSCORE: 0
CONSOLABILITY: 0
CONSOLABILITY: 0 - NO NEED TO CONSOLE.
FACIALEXPRESSION: 0
BODYLANGUAGE: 0 - RELAXED.
NEGVOCALIZATION: 0
NEGVOCALIZATION: 0 - NONE.
BODYLANGUAGE: 0
BREATHING: 0
FACIALEXPRESSION: 0 - SMILING OR INEXPRESSIVE.

## 2023-12-15 ENCOUNTER — HOME CARE VISIT (OUTPATIENT)
Dept: HOME HEALTH SERVICES | Facility: HOME HEALTH | Age: 58
End: 2023-12-15
Payer: MEDICARE

## 2023-12-15 PROCEDURE — 1090000001 HH PPS REVENUE CREDIT

## 2023-12-15 PROCEDURE — 1090000002 HH PPS REVENUE DEBIT

## 2023-12-15 SDOH — ECONOMIC STABILITY: FOOD INSECURITY: MEALS PER DAY: 3

## 2023-12-15 SDOH — ECONOMIC STABILITY: FOOD INSECURITY: SIPS PER DAY: 8

## 2023-12-15 SDOH — ECONOMIC STABILITY: FOOD INSECURITY: SNACKS PER DAY: 2

## 2023-12-15 SDOH — ECONOMIC STABILITY: FOOD INSECURITY: BITES PER DAY: 6

## 2023-12-15 ASSESSMENT — ENCOUNTER SYMPTOMS
LOSS OF SENSATION IN FEET: 0
PAIN SEVERITY GOAL: 1/10
PAIN LOCATION - PAIN FREQUENCY: CONSTANT
HIGHEST PAIN SEVERITY IN PAST 24 HOURS: 8/10
PAIN: 1
PAIN LOCATION: RIGHT SHOULDER
SUBJECTIVE PAIN PROGRESSION: UNCHANGED
PERSON REPORTING PAIN: PATIENT
PAIN LOCATION - PAIN QUALITY: DEEP ACHE
DEPRESSION: 0
PAIN LOCATION - PAIN SEVERITY: 6/10
APPETITE LEVEL: FAIR
OCCASIONAL FEELINGS OF UNSTEADINESS: 0
CHANGE IN APPETITE: UNCHANGED
LOWEST PAIN SEVERITY IN PAST 24 HOURS: 3/10

## 2023-12-15 ASSESSMENT — LIFESTYLE VARIABLES
FAMILY HISTORY ILLEGAL DRUGS: 1
PERSONAL HISTORY ALCOHOL: 1

## 2023-12-15 ASSESSMENT — ACTIVITIES OF DAILY LIVING (ADL): AMBULATION ASSISTANCE: 1

## 2023-12-16 PROCEDURE — 1090000002 HH PPS REVENUE DEBIT

## 2023-12-16 PROCEDURE — 1090000001 HH PPS REVENUE CREDIT

## 2023-12-16 ASSESSMENT — ENCOUNTER SYMPTOMS
SHORTNESS OF BREATH: 1
DYSPNEA ACTIVITY LEVEL: AFTER AMBULATING MORE THAN 20 FT
LOWER EXTREMITY EDEMA: 1
MUSCLE WEAKNESS: 1
LIMITED RANGE OF MOTION: 1

## 2023-12-16 ASSESSMENT — ACTIVITIES OF DAILY LIVING (ADL)
AMBULATION ASSISTANCE: SUPERVISION
CURRENT_FUNCTION: STAND BY ASSIST
AMBULATION ASSISTANCE: STAND BY ASSIST

## 2023-12-17 PROCEDURE — 1090000002 HH PPS REVENUE DEBIT

## 2023-12-17 PROCEDURE — 1090000001 HH PPS REVENUE CREDIT

## 2023-12-18 ENCOUNTER — APPOINTMENT (OUTPATIENT)
Dept: RADIOLOGY | Facility: HOSPITAL | Age: 58
End: 2023-12-18
Payer: MEDICARE

## 2023-12-18 ENCOUNTER — HOSPITAL ENCOUNTER (EMERGENCY)
Facility: HOSPITAL | Age: 58
Discharge: HOME | End: 2023-12-18
Attending: EMERGENCY MEDICINE
Payer: MEDICARE

## 2023-12-18 VITALS
OXYGEN SATURATION: 97 % | RESPIRATION RATE: 16 BRPM | HEIGHT: 64 IN | WEIGHT: 220 LBS | DIASTOLIC BLOOD PRESSURE: 74 MMHG | SYSTOLIC BLOOD PRESSURE: 123 MMHG | BODY MASS INDEX: 37.56 KG/M2 | HEART RATE: 67 BPM

## 2023-12-18 DIAGNOSIS — S16.1XXA STRAIN OF NECK MUSCLE, INITIAL ENCOUNTER: Primary | ICD-10-CM

## 2023-12-18 DIAGNOSIS — S40.011A CONTUSION OF MULTIPLE SITES OF RIGHT SHOULDER, INITIAL ENCOUNTER: ICD-10-CM

## 2023-12-18 PROCEDURE — 99285 EMERGENCY DEPT VISIT HI MDM: CPT | Performed by: EMERGENCY MEDICINE

## 2023-12-18 PROCEDURE — 73030 X-RAY EXAM OF SHOULDER: CPT | Mod: RIGHT SIDE | Performed by: RADIOLOGY

## 2023-12-18 PROCEDURE — 73030 X-RAY EXAM OF SHOULDER: CPT | Mod: RT,FY

## 2023-12-18 PROCEDURE — 1090000001 HH PPS REVENUE CREDIT

## 2023-12-18 PROCEDURE — G0390 TRAUMA RESPONS W/HOSP CRITI: HCPCS

## 2023-12-18 PROCEDURE — 70450 CT HEAD/BRAIN W/O DYE: CPT | Performed by: RADIOLOGY

## 2023-12-18 PROCEDURE — 96360 HYDRATION IV INFUSION INIT: CPT | Performed by: EMERGENCY MEDICINE

## 2023-12-18 PROCEDURE — 72125 CT NECK SPINE W/O DYE: CPT

## 2023-12-18 PROCEDURE — 70450 CT HEAD/BRAIN W/O DYE: CPT

## 2023-12-18 PROCEDURE — 2500000001 HC RX 250 WO HCPCS SELF ADMINISTERED DRUGS (ALT 637 FOR MEDICARE OP): Performed by: EMERGENCY MEDICINE

## 2023-12-18 PROCEDURE — 1090000002 HH PPS REVENUE DEBIT

## 2023-12-18 PROCEDURE — 2500000004 HC RX 250 GENERAL PHARMACY W/ HCPCS (ALT 636 FOR OP/ED): Performed by: EMERGENCY MEDICINE

## 2023-12-18 PROCEDURE — 72125 CT NECK SPINE W/O DYE: CPT | Performed by: RADIOLOGY

## 2023-12-18 RX ORDER — LIDOCAINE 50 MG/G
1 PATCH TOPICAL DAILY
Qty: 10 PATCH | Refills: 0 | Status: SHIPPED | OUTPATIENT
Start: 2023-12-18

## 2023-12-18 RX ORDER — OXYCODONE AND ACETAMINOPHEN 5; 325 MG/1; MG/1
1 TABLET ORAL ONCE
Status: COMPLETED | OUTPATIENT
Start: 2023-12-18 | End: 2023-12-18

## 2023-12-18 RX ADMIN — SODIUM CHLORIDE 1000 ML: 9 INJECTION, SOLUTION INTRAVENOUS at 15:41

## 2023-12-18 RX ADMIN — OXYCODONE HYDROCHLORIDE AND ACETAMINOPHEN 1 TABLET: 5; 325 TABLET ORAL at 14:56

## 2023-12-18 ASSESSMENT — COLUMBIA-SUICIDE SEVERITY RATING SCALE - C-SSRS
2. HAVE YOU ACTUALLY HAD ANY THOUGHTS OF KILLING YOURSELF?: NO
6. HAVE YOU EVER DONE ANYTHING, STARTED TO DO ANYTHING, OR PREPARED TO DO ANYTHING TO END YOUR LIFE?: NO
1. IN THE PAST MONTH, HAVE YOU WISHED YOU WERE DEAD OR WISHED YOU COULD GO TO SLEEP AND NOT WAKE UP?: NO

## 2023-12-18 ASSESSMENT — PAIN DESCRIPTION - ORIENTATION: ORIENTATION: LEFT

## 2023-12-18 ASSESSMENT — PAIN DESCRIPTION - LOCATION: LOCATION: SHOULDER

## 2023-12-18 ASSESSMENT — PAIN DESCRIPTION - PAIN TYPE: TYPE: ACUTE PAIN

## 2023-12-18 ASSESSMENT — LIFESTYLE VARIABLES
EVER FELT BAD OR GUILTY ABOUT YOUR DRINKING: NO
EVER HAD A DRINK FIRST THING IN THE MORNING TO STEADY YOUR NERVES TO GET RID OF A HANGOVER: NO
HAVE PEOPLE ANNOYED YOU BY CRITICIZING YOUR DRINKING: NO
HAVE YOU EVER FELT YOU SHOULD CUT DOWN ON YOUR DRINKING: NO
REASON UNABLE TO ASSESS: NO

## 2023-12-18 ASSESSMENT — PAIN - FUNCTIONAL ASSESSMENT: PAIN_FUNCTIONAL_ASSESSMENT: 0-10

## 2023-12-18 ASSESSMENT — PAIN SCALES - GENERAL: PAINLEVEL_OUTOF10: 8

## 2023-12-18 NOTE — ED PROVIDER NOTES
HPI   No chief complaint on file.      Patient presents after a fall.  This occurred around 7 AM this morning.  He states he was walking into his kitchen today when he slipped and fell and hit his head.  He thinks he had a couple seconds of LOC.  He landed on his head and right shoulder.  This is where he is having pain.  Pain is worse with movement of the right shoulder.  The patient is wearing a soft neck roll.  He had a C5 anterior cervical corpectomy and fusion at C4 and C6 on December 4.  He denies having any weakness of his arms or legs.  No numbness or tingling.  Denies any slurred speech.  He is on aspirin and Brilinta at home.  He is also on Eliquis.  His GCS is currently 15.  EMS transported him here without incident.                          No data recorded                Patient History   Past Medical History:   Diagnosis Date   • Old myocardial infarction     History of myocardial infarction   • Personal history of other diseases of the circulatory system     History of hypertension   • Personal history of other diseases of the musculoskeletal system and connective tissue     History of gout   • Personal history of other diseases of the nervous system and sense organs     History of sleep apnea   • Personal history of other endocrine, nutritional and metabolic disease     History of diabetes mellitus   • Personal history of other endocrine, nutritional and metabolic disease     History of hyperlipidemia     Past Surgical History:   Procedure Laterality Date   • CARDIAC CATHETERIZATION N/A 12/6/2023    Procedure: Left Heart Cath;  Surgeon: Dez Gonzales MD;  Location: [place] [id number] [facility];  Service: Cardiovascular;  Laterality: N/A;   • OTHER SURGICAL HISTORY  12/11/2018    Tonsillectomy   • OTHER SURGICAL HISTORY  12/11/2018    Knee arthroscopy   • OTHER SURGICAL HISTORY  12/11/2018    Cardiac catheterization   • OTHER SURGICAL HISTORY  12/11/2018    Coronary artery bypass graft   • OTHER  SURGICAL HISTORY  12/11/2018    Nasal septoplasty   • OTHER SURGICAL HISTORY  12/11/2018    Arterial stent placement     Family History   Problem Relation Name Age of Onset   • Hypertension Mother     • Cancer Father     • Diabetes Brother     • Diabetes Maternal Grandmother     • Colon cancer Maternal Grandfather       Social History     Tobacco Use   • Smoking status: Never   • Smokeless tobacco: Never   Substance Use Topics   • Alcohol use: Never   • Drug use: Never       Physical Exam   ED Triage Vitals   Temp Pulse Resp BP   -- -- -- --      SpO2 Temp src Heart Rate Source Patient Position   -- -- -- --      BP Location FiO2 (%)     -- --       Physical Exam  Vitals and nursing note reviewed.   Constitutional:       Appearance: Normal appearance.   HENT:      Head: Normocephalic and atraumatic.      Mouth/Throat:      Mouth: Mucous membranes are moist.   Eyes:      Extraocular Movements: Extraocular movements intact.      Pupils: Pupils are equal, round, and reactive to light.   Cardiovascular:      Rate and Rhythm: Normal rate and regular rhythm.      Heart sounds: No murmur heard.  Pulmonary:      Effort: Pulmonary effort is normal. No respiratory distress.      Breath sounds: Normal breath sounds.   Abdominal:      General: There is no distension.      Palpations: Abdomen is soft.      Tenderness: There is no abdominal tenderness.   Musculoskeletal:         General: No tenderness or deformity. Normal range of motion.      Right lower leg: No edema.      Left lower leg: No edema.      Comments: Diffuse right shoulder tenderness without deformity.  Range of motion is full with pain throughout the right shoulder.   Skin:     General: Skin is warm and dry.      Findings: No lesion or rash.   Neurological:      General: No focal deficit present.      Mental Status: He is alert and oriented to person, place, and time.      Sensory: No sensory deficit.      Motor: No weakness.   Psychiatric:         Behavior:  Behavior normal.       Labs Reviewed - No data to display  No orders to display     ED Course & MDM   Diagnoses as of 12/18/23 1627   Strain of neck muscle, initial encounter   Contusion of multiple sites of right shoulder, initial encounter       Medical Decision Making  Differentials include fracture, contusion, intracranial hemorrhage. Imaging studies, if performed, were independently reviewed and interpreted by myself and confirmed by radiologist. EKG(s), if performed, were interpreted by myself.  Patient was made a limited trauma due to the fall.  CAT scan of the head and cervical spine revealed no acute findings.  2 view x-ray of the right shoulder, interpreted by myself shows no evidence of fracture or dislocation.  Degenerative changes are noted at the AC joint.  Patient will be given IV fluids because he did feel little bit lightheaded while getting into the CAT scan table.  He is asking for lidocaine patches for his shoulder which I will write him for.  I feel that he can be discharged to follow-up with his PCP.        Procedure  Procedures     Geovany Vail MD  12/18/23 1077

## 2023-12-19 ENCOUNTER — HOME CARE VISIT (OUTPATIENT)
Dept: HOME HEALTH SERVICES | Facility: HOME HEALTH | Age: 58
End: 2023-12-19
Payer: MEDICARE

## 2023-12-19 DIAGNOSIS — G95.20 CERVICAL SPINAL CORD COMPRESSION (MULTI): ICD-10-CM

## 2023-12-19 PROCEDURE — 1090000002 HH PPS REVENUE DEBIT

## 2023-12-19 PROCEDURE — 1090000001 HH PPS REVENUE CREDIT

## 2023-12-19 PROCEDURE — G0152 HHCP-SERV OF OT,EA 15 MIN: HCPCS | Mod: HHH

## 2023-12-19 RX ORDER — OXYCODONE HYDROCHLORIDE 5 MG/1
5 TABLET ORAL EVERY 6 HOURS PRN
Qty: 15 TABLET | Refills: 0 | Status: ON HOLD | OUTPATIENT
Start: 2023-12-19 | End: 2023-12-27

## 2023-12-19 NOTE — PROGRESS NOTES
Pt called for a refill on their pain meds.    [facility] history was reviewed by me and there is no evidence of abnormal narcotic prescription history.

## 2023-12-20 ENCOUNTER — HOME CARE VISIT (OUTPATIENT)
Dept: HOME HEALTH SERVICES | Facility: HOME HEALTH | Age: 58
End: 2023-12-20
Payer: MEDICARE

## 2023-12-20 VITALS
OXYGEN SATURATION: 98 % | SYSTOLIC BLOOD PRESSURE: 118 MMHG | DIASTOLIC BLOOD PRESSURE: 62 MMHG | TEMPERATURE: 98.6 F | HEART RATE: 60 BPM | RESPIRATION RATE: 16 BRPM

## 2023-12-20 PROCEDURE — 1090000001 HH PPS REVENUE CREDIT

## 2023-12-20 PROCEDURE — G0300 HHS/HOSPICE OF LPN EA 15 MIN: HCPCS | Mod: HHH

## 2023-12-20 PROCEDURE — 1090000002 HH PPS REVENUE DEBIT

## 2023-12-20 ASSESSMENT — ENCOUNTER SYMPTOMS
CHANGE IN APPETITE: UNCHANGED
SUBJECTIVE PAIN PROGRESSION: UNCHANGED
PAIN LOCATION - PAIN FREQUENCY: CONSTANT
PAIN SEVERITY GOAL: 0/10
APPETITE LEVEL: GOOD
HIGHEST PAIN SEVERITY IN PAST 24 HOURS: 10/10
PAIN: 1
PERSON REPORTING PAIN: PATIENT
PAIN: 1
PERSON REPORTING PAIN: PATIENT
PAIN LOCATION - PAIN QUALITY: SHARP
PAIN LOCATION: RIGHT SHOULDER
LOWEST PAIN SEVERITY IN PAST 24 HOURS: 10/10
DIZZINESS: 1
PAIN LOCATION - PAIN SEVERITY: 9/10

## 2023-12-20 ASSESSMENT — PAIN SCALES - PAIN ASSESSMENT IN ADVANCED DEMENTIA (PAINAD)
CONSOLABILITY: 0
BODYLANGUAGE: 1
NEGVOCALIZATION: 1
FACIALEXPRESSION: 0 - SMILING OR INEXPRESSIVE.
CONSOLABILITY: 0 - NO NEED TO CONSOLE.
TOTALSCORE: 2
FACIALEXPRESSION: 0
BREATHING: 0
NEGVOCALIZATION: 1 - OCCASIONAL MOAN OR GROAN. LOW-LEVEL SPEECH WITH A NEGATIVE OR DISAPPROVING QUALITY.
BODYLANGUAGE: 1 - TENSE. DISTRESSED PACING. FIDGETING.

## 2023-12-21 ENCOUNTER — APPOINTMENT (OUTPATIENT)
Dept: RADIOLOGY | Facility: HOSPITAL | Age: 58
DRG: 871 | End: 2023-12-21
Payer: MEDICARE

## 2023-12-21 ENCOUNTER — HOME CARE VISIT (OUTPATIENT)
Dept: HOME HEALTH SERVICES | Facility: HOME HEALTH | Age: 58
End: 2023-12-21
Payer: MEDICARE

## 2023-12-21 ENCOUNTER — APPOINTMENT (OUTPATIENT)
Dept: CARDIOLOGY | Facility: HOSPITAL | Age: 58
DRG: 871 | End: 2023-12-21
Payer: MEDICARE

## 2023-12-21 ENCOUNTER — HOSPITAL ENCOUNTER (INPATIENT)
Facility: HOSPITAL | Age: 58
LOS: 5 days | Discharge: HOME | DRG: 871 | End: 2023-12-27
Attending: EMERGENCY MEDICINE | Admitting: INTERNAL MEDICINE
Payer: MEDICARE

## 2023-12-21 DIAGNOSIS — G95.20 CERVICAL SPINAL CORD COMPRESSION (MULTI): ICD-10-CM

## 2023-12-21 DIAGNOSIS — A41.9 SEPSIS, DUE TO UNSPECIFIED ORGANISM, UNSPECIFIED WHETHER ACUTE ORGAN DYSFUNCTION PRESENT (MULTI): Primary | ICD-10-CM

## 2023-12-21 DIAGNOSIS — I48.91 ATRIAL FIBRILLATION WITH RVR (MULTI): ICD-10-CM

## 2023-12-21 LAB
ALBUMIN SERPL BCP-MCNC: 3.9 G/DL (ref 3.4–5)
ALP SERPL-CCNC: 125 U/L (ref 33–120)
ALT SERPL W P-5'-P-CCNC: 13 U/L (ref 10–52)
ANION GAP BLDV CALCULATED.4IONS-SCNC: 10 MMOL/L (ref 10–25)
ANION GAP SERPL CALC-SCNC: 14 MMOL/L (ref 10–20)
APPEARANCE UR: CLEAR
AST SERPL W P-5'-P-CCNC: 16 U/L (ref 9–39)
BASE EXCESS BLDV CALC-SCNC: 3.6 MMOL/L (ref -2–3)
BASOPHILS # BLD AUTO: 0.08 X10*3/UL (ref 0–0.1)
BASOPHILS NFR BLD AUTO: 0.3 %
BILIRUB DIRECT SERPL-MCNC: 0.3 MG/DL (ref 0–0.3)
BILIRUB SERPL-MCNC: 1.1 MG/DL (ref 0–1.2)
BILIRUB UR STRIP.AUTO-MCNC: NEGATIVE MG/DL
BNP SERPL-MCNC: 225 PG/ML (ref 0–99)
BODY TEMPERATURE: 37 DEGREES CELSIUS
BUN SERPL-MCNC: 12 MG/DL (ref 6–23)
CA-I BLDV-SCNC: 1.2 MMOL/L (ref 1.1–1.33)
CALCIUM SERPL-MCNC: 9.3 MG/DL (ref 8.6–10.3)
CARDIAC TROPONIN I PNL SERPL HS: 10 NG/L (ref 0–20)
CARDIAC TROPONIN I PNL SERPL HS: 9 NG/L (ref 0–20)
CHLORIDE BLDV-SCNC: 99 MMOL/L (ref 98–107)
CHLORIDE SERPL-SCNC: 100 MMOL/L (ref 98–107)
CO2 SERPL-SCNC: 24 MMOL/L (ref 21–32)
COLOR UR: YELLOW
CREAT SERPL-MCNC: 0.95 MG/DL (ref 0.5–1.3)
CRP SERPL-MCNC: 10.76 MG/DL
EOSINOPHIL # BLD AUTO: 0.12 X10*3/UL (ref 0–0.7)
EOSINOPHIL NFR BLD AUTO: 0.4 %
ERYTHROCYTE [DISTWIDTH] IN BLOOD BY AUTOMATED COUNT: 13.5 % (ref 11.5–14.5)
ERYTHROCYTE [SEDIMENTATION RATE] IN BLOOD BY WESTERGREN METHOD: 34 MM/H (ref 0–20)
GFR SERPL CREATININE-BSD FRML MDRD: >90 ML/MIN/1.73M*2
GLUCOSE BLDV-MCNC: 160 MG/DL (ref 74–99)
GLUCOSE SERPL-MCNC: 149 MG/DL (ref 74–99)
GLUCOSE UR STRIP.AUTO-MCNC: NEGATIVE MG/DL
HCO3 BLDV-SCNC: 28.5 MMOL/L (ref 22–26)
HCT VFR BLD AUTO: 42.3 % (ref 41–52)
HCT VFR BLD EST: 43 % (ref 41–52)
HGB BLD-MCNC: 14.6 G/DL (ref 13.5–17.5)
HGB BLDV-MCNC: 14.4 G/DL (ref 13.5–17.5)
IMM GRANULOCYTES # BLD AUTO: 0.22 X10*3/UL (ref 0–0.7)
IMM GRANULOCYTES NFR BLD AUTO: 0.8 % (ref 0–0.9)
INHALED O2 CONCENTRATION: 21 %
KETONES UR STRIP.AUTO-MCNC: ABNORMAL MG/DL
LACTATE BLDV-SCNC: 1.4 MMOL/L (ref 0.4–2)
LEUKOCYTE ESTERASE UR QL STRIP.AUTO: ABNORMAL
LYMPHOCYTES # BLD AUTO: 1.85 X10*3/UL (ref 1.2–4.8)
LYMPHOCYTES NFR BLD AUTO: 6.6 %
MAGNESIUM SERPL-MCNC: 1.49 MG/DL (ref 1.6–2.4)
MCH RBC QN AUTO: 29 PG (ref 26–34)
MCHC RBC AUTO-ENTMCNC: 34.5 G/DL (ref 32–36)
MCV RBC AUTO: 84 FL (ref 80–100)
MONOCYTES # BLD AUTO: 1.48 X10*3/UL (ref 0.1–1)
MONOCYTES NFR BLD AUTO: 5.3 %
MUCOUS THREADS #/AREA URNS AUTO: ABNORMAL /LPF
NEUTROPHILS # BLD AUTO: 24.1 X10*3/UL (ref 1.2–7.7)
NEUTROPHILS NFR BLD AUTO: 86.6 %
NITRITE UR QL STRIP.AUTO: NEGATIVE
NRBC BLD-RTO: 0 /100 WBCS (ref 0–0)
OXYHGB MFR BLDV: 16.6 % (ref 45–75)
PCO2 BLDV: 43 MM HG (ref 41–51)
PH BLDV: 7.43 PH (ref 7.33–7.43)
PH UR STRIP.AUTO: 5 [PH]
PLATELET # BLD AUTO: 342 X10*3/UL (ref 150–450)
PO2 BLDV: 17 MM HG (ref 35–45)
POTASSIUM BLDV-SCNC: 4 MMOL/L (ref 3.5–5.3)
POTASSIUM SERPL-SCNC: 3.9 MMOL/L (ref 3.5–5.3)
PROT SERPL-MCNC: 7.3 G/DL (ref 6.4–8.2)
PROT UR STRIP.AUTO-MCNC: NEGATIVE MG/DL
RBC # BLD AUTO: 5.03 X10*6/UL (ref 4.5–5.9)
RBC # UR STRIP.AUTO: NEGATIVE /UL
RBC #/AREA URNS AUTO: ABNORMAL /HPF
SAO2 % BLDV: 17 % (ref 45–75)
SODIUM BLDV-SCNC: 133 MMOL/L (ref 136–145)
SODIUM SERPL-SCNC: 134 MMOL/L (ref 136–145)
SP GR UR STRIP.AUTO: 1.02
T4 FREE SERPL-MCNC: 2.48 NG/DL (ref 0.61–1.12)
TSH SERPL-ACNC: <0.01 MIU/L (ref 0.44–3.98)
UROBILINOGEN UR STRIP.AUTO-MCNC: 2 MG/DL
WBC # BLD AUTO: 27.9 X10*3/UL (ref 4.4–11.3)
WBC #/AREA URNS AUTO: ABNORMAL /HPF

## 2023-12-21 PROCEDURE — 80076 HEPATIC FUNCTION PANEL: CPT | Performed by: EMERGENCY MEDICINE

## 2023-12-21 PROCEDURE — 1090000002 HH PPS REVENUE DEBIT

## 2023-12-21 PROCEDURE — 2500000004 HC RX 250 GENERAL PHARMACY W/ HCPCS (ALT 636 FOR OP/ED): Performed by: EMERGENCY MEDICINE

## 2023-12-21 PROCEDURE — G0390 TRAUMA RESPONS W/HOSP CRITI: HCPCS

## 2023-12-21 PROCEDURE — 84484 ASSAY OF TROPONIN QUANT: CPT | Performed by: EMERGENCY MEDICINE

## 2023-12-21 PROCEDURE — 84443 ASSAY THYROID STIM HORMONE: CPT | Performed by: EMERGENCY MEDICINE

## 2023-12-21 PROCEDURE — 96365 THER/PROPH/DIAG IV INF INIT: CPT

## 2023-12-21 PROCEDURE — 70450 CT HEAD/BRAIN W/O DYE: CPT | Performed by: RADIOLOGY

## 2023-12-21 PROCEDURE — 72128 CT CHEST SPINE W/O DYE: CPT | Mod: RSC

## 2023-12-21 PROCEDURE — 76705 ECHO EXAM OF ABDOMEN: CPT

## 2023-12-21 PROCEDURE — 2550000001 HC RX 255 CONTRASTS: Performed by: EMERGENCY MEDICINE

## 2023-12-21 PROCEDURE — 73030 X-RAY EXAM OF SHOULDER: CPT | Mod: RIGHT SIDE | Performed by: RADIOLOGY

## 2023-12-21 PROCEDURE — 36415 COLL VENOUS BLD VENIPUNCTURE: CPT | Performed by: EMERGENCY MEDICINE

## 2023-12-21 PROCEDURE — 96376 TX/PRO/DX INJ SAME DRUG ADON: CPT

## 2023-12-21 PROCEDURE — 84132 ASSAY OF SERUM POTASSIUM: CPT | Performed by: EMERGENCY MEDICINE

## 2023-12-21 PROCEDURE — 85025 COMPLETE CBC W/AUTO DIFF WBC: CPT | Performed by: EMERGENCY MEDICINE

## 2023-12-21 PROCEDURE — 70450 CT HEAD/BRAIN W/O DYE: CPT

## 2023-12-21 PROCEDURE — 2500000001 HC RX 250 WO HCPCS SELF ADMINISTERED DRUGS (ALT 637 FOR MEDICARE OP): Performed by: INTERNAL MEDICINE

## 2023-12-21 PROCEDURE — 2500000004 HC RX 250 GENERAL PHARMACY W/ HCPCS (ALT 636 FOR OP/ED): Performed by: INTERNAL MEDICINE

## 2023-12-21 PROCEDURE — 73030 X-RAY EXAM OF SHOULDER: CPT | Mod: RT

## 2023-12-21 PROCEDURE — 86140 C-REACTIVE PROTEIN: CPT | Performed by: EMERGENCY MEDICINE

## 2023-12-21 PROCEDURE — 84439 ASSAY OF FREE THYROXINE: CPT | Performed by: EMERGENCY MEDICINE

## 2023-12-21 PROCEDURE — 74177 CT ABD & PELVIS W/CONTRAST: CPT | Mod: FOREIGN READ | Performed by: RADIOLOGY

## 2023-12-21 PROCEDURE — 71045 X-RAY EXAM CHEST 1 VIEW: CPT | Mod: FOREIGN READ | Performed by: RADIOLOGY

## 2023-12-21 PROCEDURE — 2500000005 HC RX 250 GENERAL PHARMACY W/O HCPCS: Performed by: EMERGENCY MEDICINE

## 2023-12-21 PROCEDURE — 83880 ASSAY OF NATRIURETIC PEPTIDE: CPT | Performed by: EMERGENCY MEDICINE

## 2023-12-21 PROCEDURE — 72125 CT NECK SPINE W/O DYE: CPT

## 2023-12-21 PROCEDURE — 72128 CT CHEST SPINE W/O DYE: CPT | Mod: RSC | Performed by: RADIOLOGY

## 2023-12-21 PROCEDURE — 72125 CT NECK SPINE W/O DYE: CPT | Performed by: RADIOLOGY

## 2023-12-21 PROCEDURE — 81001 URINALYSIS AUTO W/SCOPE: CPT | Performed by: EMERGENCY MEDICINE

## 2023-12-21 PROCEDURE — 99291 CRITICAL CARE FIRST HOUR: CPT | Mod: 25 | Performed by: EMERGENCY MEDICINE

## 2023-12-21 PROCEDURE — 87040 BLOOD CULTURE FOR BACTERIA: CPT | Mod: PORLAB | Performed by: EMERGENCY MEDICINE

## 2023-12-21 PROCEDURE — 99222 1ST HOSP IP/OBS MODERATE 55: CPT | Performed by: SURGERY

## 2023-12-21 PROCEDURE — 93005 ELECTROCARDIOGRAM TRACING: CPT

## 2023-12-21 PROCEDURE — 85652 RBC SED RATE AUTOMATED: CPT | Performed by: EMERGENCY MEDICINE

## 2023-12-21 PROCEDURE — 96375 TX/PRO/DX INJ NEW DRUG ADDON: CPT

## 2023-12-21 PROCEDURE — 96361 HYDRATE IV INFUSION ADD-ON: CPT

## 2023-12-21 PROCEDURE — 71260 CT THORAX DX C+: CPT | Mod: FOREIGN READ | Performed by: RADIOLOGY

## 2023-12-21 PROCEDURE — 74177 CT ABD & PELVIS W/CONTRAST: CPT

## 2023-12-21 PROCEDURE — G0151 HHCP-SERV OF PT,EA 15 MIN: HCPCS | Mod: HHH

## 2023-12-21 PROCEDURE — 72131 CT LUMBAR SPINE W/O DYE: CPT | Mod: RSC,FR

## 2023-12-21 PROCEDURE — 76705 ECHO EXAM OF ABDOMEN: CPT | Mod: FOREIGN READ | Performed by: RADIOLOGY

## 2023-12-21 PROCEDURE — 72131 CT LUMBAR SPINE W/O DYE: CPT | Mod: RSC | Performed by: RADIOLOGY

## 2023-12-21 PROCEDURE — 1090000001 HH PPS REVENUE CREDIT

## 2023-12-21 PROCEDURE — 83735 ASSAY OF MAGNESIUM: CPT | Performed by: EMERGENCY MEDICINE

## 2023-12-21 PROCEDURE — 71045 X-RAY EXAM CHEST 1 VIEW: CPT

## 2023-12-21 RX ORDER — FENTANYL CITRATE 50 UG/ML
25 INJECTION, SOLUTION INTRAMUSCULAR; INTRAVENOUS ONCE
Status: COMPLETED | OUTPATIENT
Start: 2023-12-21 | End: 2023-12-21

## 2023-12-21 RX ORDER — OXYCODONE HYDROCHLORIDE 5 MG/1
10 TABLET ORAL EVERY 6 HOURS PRN
Status: DISCONTINUED | OUTPATIENT
Start: 2023-12-21 | End: 2023-12-21

## 2023-12-21 RX ORDER — PANTOPRAZOLE SODIUM 40 MG/1
40 TABLET, DELAYED RELEASE ORAL
Status: DISCONTINUED | OUTPATIENT
Start: 2023-12-22 | End: 2023-12-27 | Stop reason: HOSPADM

## 2023-12-21 RX ORDER — CLOPIDOGREL BISULFATE 75 MG/1
75 TABLET ORAL DAILY
Status: DISCONTINUED | OUTPATIENT
Start: 2024-01-04 | End: 2023-12-22

## 2023-12-21 RX ORDER — METOPROLOL TARTRATE 50 MG/1
100 TABLET ORAL 2 TIMES DAILY
Status: DISCONTINUED | OUTPATIENT
Start: 2023-12-21 | End: 2023-12-27 | Stop reason: HOSPADM

## 2023-12-21 RX ORDER — ONDANSETRON HYDROCHLORIDE 2 MG/ML
4 INJECTION, SOLUTION INTRAVENOUS ONCE
Status: COMPLETED | OUTPATIENT
Start: 2023-12-21 | End: 2023-12-21

## 2023-12-21 RX ORDER — RANOLAZINE 500 MG/1
1000 TABLET, EXTENDED RELEASE ORAL EVERY 12 HOURS
Status: DISCONTINUED | OUTPATIENT
Start: 2023-12-21 | End: 2023-12-27 | Stop reason: HOSPADM

## 2023-12-21 RX ORDER — ORPHENADRINE CITRATE 30 MG/ML
60 INJECTION INTRAMUSCULAR; INTRAVENOUS EVERY 12 HOURS PRN
Status: DISCONTINUED | OUTPATIENT
Start: 2023-12-21 | End: 2023-12-27 | Stop reason: HOSPADM

## 2023-12-21 RX ORDER — METOPROLOL TARTRATE 1 MG/ML
5 INJECTION, SOLUTION INTRAVENOUS ONCE
Status: COMPLETED | OUTPATIENT
Start: 2023-12-21 | End: 2023-12-21

## 2023-12-21 RX ORDER — ACETAMINOPHEN 325 MG/1
650 TABLET ORAL EVERY 4 HOURS PRN
Status: DISCONTINUED | OUTPATIENT
Start: 2023-12-21 | End: 2023-12-23

## 2023-12-21 RX ORDER — DULOXETIN HYDROCHLORIDE 30 MG/1
60 CAPSULE, DELAYED RELEASE ORAL DAILY
Status: DISCONTINUED | OUTPATIENT
Start: 2023-12-22 | End: 2023-12-27 | Stop reason: HOSPADM

## 2023-12-21 RX ORDER — METOPROLOL TARTRATE 1 MG/ML
5 INJECTION, SOLUTION INTRAVENOUS
Status: DISPENSED | OUTPATIENT
Start: 2023-12-21 | End: 2023-12-21

## 2023-12-21 RX ORDER — POLYETHYLENE GLYCOL 3350 17 G/17G
17 POWDER, FOR SOLUTION ORAL DAILY PRN
Status: DISCONTINUED | OUTPATIENT
Start: 2023-12-21 | End: 2023-12-27 | Stop reason: HOSPADM

## 2023-12-21 RX ORDER — CEFEPIME 1 G/50ML
2 INJECTION, SOLUTION INTRAVENOUS EVERY 8 HOURS
Status: DISCONTINUED | OUTPATIENT
Start: 2023-12-21 | End: 2023-12-22

## 2023-12-21 RX ORDER — TALC
9 POWDER (GRAM) TOPICAL NIGHTLY PRN
Status: DISCONTINUED | OUTPATIENT
Start: 2023-12-21 | End: 2023-12-27 | Stop reason: HOSPADM

## 2023-12-21 RX ORDER — LEVOTHYROXINE SODIUM 100 UG/1
200 TABLET ORAL
Status: DISCONTINUED | OUTPATIENT
Start: 2023-12-22 | End: 2023-12-27 | Stop reason: HOSPADM

## 2023-12-21 RX ORDER — ALBUTEROL SULFATE 90 UG/1
2 AEROSOL, METERED RESPIRATORY (INHALATION) EVERY 6 HOURS PRN
Status: DISCONTINUED | OUTPATIENT
Start: 2023-12-21 | End: 2023-12-27 | Stop reason: HOSPADM

## 2023-12-21 RX ORDER — FLUTICASONE FUROATE AND VILANTEROL 200; 25 UG/1; UG/1
1 POWDER RESPIRATORY (INHALATION) DAILY
Status: DISCONTINUED | OUTPATIENT
Start: 2023-12-22 | End: 2023-12-27 | Stop reason: HOSPADM

## 2023-12-21 RX ORDER — ATORVASTATIN CALCIUM 40 MG/1
80 TABLET, FILM COATED ORAL DAILY
Status: DISCONTINUED | OUTPATIENT
Start: 2023-12-22 | End: 2023-12-27 | Stop reason: HOSPADM

## 2023-12-21 RX ORDER — MORPHINE SULFATE 4 MG/ML
4 INJECTION, SOLUTION INTRAMUSCULAR; INTRAVENOUS ONCE
Status: COMPLETED | OUTPATIENT
Start: 2023-12-21 | End: 2023-12-21

## 2023-12-21 RX ORDER — NAPROXEN SODIUM 220 MG/1
81 TABLET, FILM COATED ORAL DAILY
Status: DISCONTINUED | OUTPATIENT
Start: 2023-12-22 | End: 2023-12-27 | Stop reason: HOSPADM

## 2023-12-21 RX ORDER — ACETAMINOPHEN 325 MG/1
975 TABLET ORAL ONCE
Status: COMPLETED | OUTPATIENT
Start: 2023-12-21 | End: 2023-12-21

## 2023-12-21 RX ORDER — TAMSULOSIN HYDROCHLORIDE 0.4 MG/1
0.4 CAPSULE ORAL DAILY
Status: DISCONTINUED | OUTPATIENT
Start: 2023-12-22 | End: 2023-12-27 | Stop reason: HOSPADM

## 2023-12-21 RX ORDER — ONDANSETRON HYDROCHLORIDE 2 MG/ML
4 INJECTION, SOLUTION INTRAVENOUS EVERY 6 HOURS PRN
Status: DISCONTINUED | OUTPATIENT
Start: 2023-12-21 | End: 2023-12-27 | Stop reason: HOSPADM

## 2023-12-21 RX ORDER — CEFEPIME 1 G/50ML
1 INJECTION, SOLUTION INTRAVENOUS EVERY 8 HOURS
Status: DISCONTINUED | OUTPATIENT
Start: 2023-12-22 | End: 2023-12-25

## 2023-12-21 RX ADMIN — IOHEXOL 75 ML: 350 INJECTION, SOLUTION INTRAVENOUS at 20:42

## 2023-12-21 RX ADMIN — ONDANSETRON 4 MG: 2 INJECTION INTRAMUSCULAR; INTRAVENOUS at 16:03

## 2023-12-21 RX ADMIN — RANOLAZINE 1000 MG: 500 TABLET, EXTENDED RELEASE ORAL at 23:40

## 2023-12-21 RX ADMIN — ORPHENADRINE CITRATE 60 MG: 60 INJECTION INTRAMUSCULAR; INTRAVENOUS at 23:39

## 2023-12-21 RX ADMIN — ACETAMINOPHEN 975 MG: 325 TABLET ORAL at 20:35

## 2023-12-21 RX ADMIN — CEFEPIME 2 G: 1 INJECTION, SOLUTION INTRAVENOUS at 19:32

## 2023-12-21 RX ADMIN — FENTANYL CITRATE 25 MCG: 50 INJECTION INTRAMUSCULAR; INTRAVENOUS at 21:59

## 2023-12-21 RX ADMIN — HYDROMORPHONE HYDROCHLORIDE 0.2 MG: 1 INJECTION, SOLUTION INTRAMUSCULAR; INTRAVENOUS; SUBCUTANEOUS at 23:39

## 2023-12-21 RX ADMIN — APIXABAN 5 MG: 5 TABLET, FILM COATED ORAL at 23:40

## 2023-12-21 RX ADMIN — MORPHINE SULFATE 4 MG: 4 INJECTION, SOLUTION INTRAMUSCULAR; INTRAVENOUS at 16:03

## 2023-12-21 RX ADMIN — SODIUM CHLORIDE 1000 ML: 9 INJECTION, SOLUTION INTRAVENOUS at 19:21

## 2023-12-21 RX ADMIN — METOPROLOL TARTRATE 5 MG: 5 INJECTION INTRAVENOUS at 20:49

## 2023-12-21 RX ADMIN — METOPROLOL TARTRATE 5 MG: 5 INJECTION INTRAVENOUS at 19:44

## 2023-12-21 RX ADMIN — METOPROLOL TARTRATE 5 MG: 5 INJECTION INTRAVENOUS at 21:58

## 2023-12-21 RX ADMIN — SODIUM CHLORIDE, POTASSIUM CHLORIDE, SODIUM LACTATE AND CALCIUM CHLORIDE 1000 ML: 600; 310; 30; 20 INJECTION, SOLUTION INTRAVENOUS at 21:58

## 2023-12-21 RX ADMIN — MORPHINE SULFATE 4 MG: 4 INJECTION, SOLUTION INTRAMUSCULAR; INTRAVENOUS at 19:21

## 2023-12-21 RX ADMIN — VANCOMYCIN HYDROCHLORIDE 1500 MG: 1.5 INJECTION, POWDER, LYOPHILIZED, FOR SOLUTION INTRAVENOUS at 20:35

## 2023-12-21 RX ADMIN — TICAGRELOR 90 MG: 90 TABLET ORAL at 23:45

## 2023-12-21 ASSESSMENT — LIFESTYLE VARIABLES
EVER FELT BAD OR GUILTY ABOUT YOUR DRINKING: NO
REASON UNABLE TO ASSESS: NO
HAVE PEOPLE ANNOYED YOU BY CRITICIZING YOUR DRINKING: NO
EVER HAD A DRINK FIRST THING IN THE MORNING TO STEADY YOUR NERVES TO GET RID OF A HANGOVER: NO
HAVE YOU EVER FELT YOU SHOULD CUT DOWN ON YOUR DRINKING: NO

## 2023-12-21 ASSESSMENT — COLUMBIA-SUICIDE SEVERITY RATING SCALE - C-SSRS
1. IN THE PAST MONTH, HAVE YOU WISHED YOU WERE DEAD OR WISHED YOU COULD GO TO SLEEP AND NOT WAKE UP?: NO
6. HAVE YOU EVER DONE ANYTHING, STARTED TO DO ANYTHING, OR PREPARED TO DO ANYTHING TO END YOUR LIFE?: NO
2. HAVE YOU ACTUALLY HAD ANY THOUGHTS OF KILLING YOURSELF?: NO

## 2023-12-21 ASSESSMENT — PAIN DESCRIPTION - PAIN TYPE: TYPE: ACUTE PAIN

## 2023-12-21 ASSESSMENT — PAIN - FUNCTIONAL ASSESSMENT
PAIN_FUNCTIONAL_ASSESSMENT: 0-10
PAIN_FUNCTIONAL_ASSESSMENT: 0-10

## 2023-12-21 ASSESSMENT — PAIN SCALES - GENERAL: PAINLEVEL_OUTOF10: 6

## 2023-12-21 NOTE — HOME HEALTH
Subjective:    Upon arrival, patient in bed stated that he could not get out of bed.  Son present.  Patient stated that he fell earlier that morning in the bathroom and hit his head.  In addition, he reported severe right shoulder and chest pain.    Objective:    NA    Assessment:    PT called 911 and EMS arrived to assess patient.  Paramedics transported patient to ED for evaluation.  No formal PT provided this date as patient stated he could not get out of bed.  PT waited alongside patient until EMS arrived.    Plan:    To be determined.

## 2023-12-22 LAB
ALBUMIN SERPL BCP-MCNC: 3.2 G/DL (ref 3.4–5)
ANION GAP SERPL CALC-SCNC: 11 MMOL/L (ref 10–20)
BUN SERPL-MCNC: 11 MG/DL (ref 6–23)
CALCIUM SERPL-MCNC: 8.6 MG/DL (ref 8.6–10.3)
CHLORIDE SERPL-SCNC: 105 MMOL/L (ref 98–107)
CO2 SERPL-SCNC: 23 MMOL/L (ref 21–32)
CREAT SERPL-MCNC: 0.87 MG/DL (ref 0.5–1.3)
CRP SERPL-MCNC: 18.08 MG/DL
ERYTHROCYTE [DISTWIDTH] IN BLOOD BY AUTOMATED COUNT: 13.5 % (ref 11.5–14.5)
ERYTHROCYTE [SEDIMENTATION RATE] IN BLOOD BY WESTERGREN METHOD: 33 MM/H (ref 0–20)
GFR SERPL CREATININE-BSD FRML MDRD: >90 ML/MIN/1.73M*2
GLUCOSE SERPL-MCNC: 190 MG/DL (ref 74–99)
HCT VFR BLD AUTO: 36.9 % (ref 41–52)
HGB BLD-MCNC: 12.5 G/DL (ref 13.5–17.5)
LACTATE SERPL-SCNC: 0.7 MMOL/L (ref 0.4–2)
MAGNESIUM SERPL-MCNC: 1.42 MG/DL (ref 1.6–2.4)
MCH RBC QN AUTO: 28.6 PG (ref 26–34)
MCHC RBC AUTO-ENTMCNC: 33.9 G/DL (ref 32–36)
MCV RBC AUTO: 84 FL (ref 80–100)
NRBC BLD-RTO: 0 /100 WBCS (ref 0–0)
PHOSPHATE SERPL-MCNC: 2.8 MG/DL (ref 2.5–4.9)
PLATELET # BLD AUTO: 274 X10*3/UL (ref 150–450)
POTASSIUM SERPL-SCNC: 3.5 MMOL/L (ref 3.5–5.3)
RBC # BLD AUTO: 4.37 X10*6/UL (ref 4.5–5.9)
SODIUM SERPL-SCNC: 135 MMOL/L (ref 136–145)
VANCOMYCIN SERPL-MCNC: 8.5 UG/ML (ref 5–20)
WBC # BLD AUTO: 22.8 X10*3/UL (ref 4.4–11.3)

## 2023-12-22 PROCEDURE — 1090000002 HH PPS REVENUE DEBIT

## 2023-12-22 PROCEDURE — 99222 1ST HOSP IP/OBS MODERATE 55: CPT | Performed by: INTERNAL MEDICINE

## 2023-12-22 PROCEDURE — 83605 ASSAY OF LACTIC ACID: CPT | Performed by: INTERNAL MEDICINE

## 2023-12-22 PROCEDURE — 2500000004 HC RX 250 GENERAL PHARMACY W/ HCPCS (ALT 636 FOR OP/ED): Performed by: INTERNAL MEDICINE

## 2023-12-22 PROCEDURE — 97165 OT EVAL LOW COMPLEX 30 MIN: CPT | Mod: GO

## 2023-12-22 PROCEDURE — 93010 ELECTROCARDIOGRAM REPORT: CPT | Performed by: INTERNAL MEDICINE

## 2023-12-22 PROCEDURE — 83735 ASSAY OF MAGNESIUM: CPT | Performed by: INTERNAL MEDICINE

## 2023-12-22 PROCEDURE — 2500000004 HC RX 250 GENERAL PHARMACY W/ HCPCS (ALT 636 FOR OP/ED)

## 2023-12-22 PROCEDURE — 2500000001 HC RX 250 WO HCPCS SELF ADMINISTERED DRUGS (ALT 637 FOR MEDICARE OP): Performed by: STUDENT IN AN ORGANIZED HEALTH CARE EDUCATION/TRAINING PROGRAM

## 2023-12-22 PROCEDURE — 85652 RBC SED RATE AUTOMATED: CPT | Performed by: INTERNAL MEDICINE

## 2023-12-22 PROCEDURE — 85027 COMPLETE CBC AUTOMATED: CPT | Performed by: INTERNAL MEDICINE

## 2023-12-22 PROCEDURE — 36415 COLL VENOUS BLD VENIPUNCTURE: CPT | Performed by: INTERNAL MEDICINE

## 2023-12-22 PROCEDURE — 80069 RENAL FUNCTION PANEL: CPT | Performed by: INTERNAL MEDICINE

## 2023-12-22 PROCEDURE — 1090000001 HH PPS REVENUE CREDIT

## 2023-12-22 PROCEDURE — 99232 SBSQ HOSP IP/OBS MODERATE 35: CPT | Performed by: SURGERY

## 2023-12-22 PROCEDURE — 99223 1ST HOSP IP/OBS HIGH 75: CPT | Performed by: INTERNAL MEDICINE

## 2023-12-22 PROCEDURE — 2500000005 HC RX 250 GENERAL PHARMACY W/O HCPCS: Performed by: INTERNAL MEDICINE

## 2023-12-22 PROCEDURE — 86140 C-REACTIVE PROTEIN: CPT | Performed by: INTERNAL MEDICINE

## 2023-12-22 PROCEDURE — 80202 ASSAY OF VANCOMYCIN: CPT

## 2023-12-22 PROCEDURE — 97161 PT EVAL LOW COMPLEX 20 MIN: CPT | Mod: GP | Performed by: PHYSICAL THERAPIST

## 2023-12-22 PROCEDURE — 2060000001 HC INTERMEDIATE ICU ROOM DAILY

## 2023-12-22 PROCEDURE — 2500000001 HC RX 250 WO HCPCS SELF ADMINISTERED DRUGS (ALT 637 FOR MEDICARE OP): Performed by: INTERNAL MEDICINE

## 2023-12-22 RX ORDER — POLYETHYLENE GLYCOL 3350 17 G/17G
17 POWDER, FOR SOLUTION ORAL DAILY
Status: DISCONTINUED | OUTPATIENT
Start: 2023-12-22 | End: 2023-12-27 | Stop reason: HOSPADM

## 2023-12-22 RX ORDER — HYDROMORPHONE HYDROCHLORIDE 1 MG/ML
0.6 INJECTION, SOLUTION INTRAMUSCULAR; INTRAVENOUS; SUBCUTANEOUS EVERY 4 HOURS PRN
Status: DISCONTINUED | OUTPATIENT
Start: 2023-12-22 | End: 2023-12-22

## 2023-12-22 RX ORDER — DOCUSATE SODIUM 100 MG/1
100 CAPSULE, LIQUID FILLED ORAL 2 TIMES DAILY
Status: DISCONTINUED | OUTPATIENT
Start: 2023-12-22 | End: 2023-12-27 | Stop reason: HOSPADM

## 2023-12-22 RX ORDER — LIDOCAINE 560 MG/1
2 PATCH PERCUTANEOUS; TOPICAL; TRANSDERMAL DAILY
Status: DISCONTINUED | OUTPATIENT
Start: 2023-12-22 | End: 2023-12-27 | Stop reason: HOSPADM

## 2023-12-22 RX ORDER — MAGNESIUM SULFATE HEPTAHYDRATE 40 MG/ML
2 INJECTION, SOLUTION INTRAVENOUS ONCE
Status: COMPLETED | OUTPATIENT
Start: 2023-12-22 | End: 2023-12-22

## 2023-12-22 RX ORDER — METRONIDAZOLE 500 MG/1
500 TABLET ORAL EVERY 8 HOURS SCHEDULED
Status: DISCONTINUED | OUTPATIENT
Start: 2023-12-22 | End: 2023-12-27 | Stop reason: HOSPADM

## 2023-12-22 RX ORDER — HYDROMORPHONE HYDROCHLORIDE 1 MG/ML
1 INJECTION, SOLUTION INTRAMUSCULAR; INTRAVENOUS; SUBCUTANEOUS EVERY 4 HOURS PRN
Status: DISCONTINUED | OUTPATIENT
Start: 2023-12-22 | End: 2023-12-23

## 2023-12-22 RX ORDER — BISACODYL 10 MG/1
10 SUPPOSITORY RECTAL DAILY
Status: DISCONTINUED | OUTPATIENT
Start: 2023-12-22 | End: 2023-12-27 | Stop reason: HOSPADM

## 2023-12-22 RX ADMIN — TICAGRELOR 90 MG: 90 TABLET ORAL at 08:33

## 2023-12-22 RX ADMIN — LEVOTHYROXINE SODIUM 200 MCG: 0.1 TABLET ORAL at 06:52

## 2023-12-22 RX ADMIN — HYDROMORPHONE HYDROCHLORIDE 0.5 MG: 0.5 INJECTION, SOLUTION INTRAMUSCULAR; INTRAVENOUS; SUBCUTANEOUS at 00:53

## 2023-12-22 RX ADMIN — MAGNESIUM SULFATE HEPTAHYDRATE 2 G: 40 INJECTION, SOLUTION INTRAVENOUS at 11:56

## 2023-12-22 RX ADMIN — DOCUSATE SODIUM 100 MG: 100 CAPSULE, LIQUID FILLED ORAL at 20:03

## 2023-12-22 RX ADMIN — RANOLAZINE 1000 MG: 500 TABLET, EXTENDED RELEASE ORAL at 22:20

## 2023-12-22 RX ADMIN — POLYETHYLENE GLYCOL 3350 17 G: 17 POWDER, FOR SOLUTION ORAL at 08:36

## 2023-12-22 RX ADMIN — LIDOCAINE 2 PATCH: 4 PATCH TOPICAL at 08:34

## 2023-12-22 RX ADMIN — CEFEPIME 1 G: 1 INJECTION, SOLUTION INTRAVENOUS at 11:56

## 2023-12-22 RX ADMIN — VANCOMYCIN HYDROCHLORIDE 1250 MG: 1.25 INJECTION, POWDER, LYOPHILIZED, FOR SOLUTION INTRAVENOUS at 20:52

## 2023-12-22 RX ADMIN — HYDROMORPHONE HYDROCHLORIDE 1 MG: 1 INJECTION, SOLUTION INTRAMUSCULAR; INTRAVENOUS; SUBCUTANEOUS at 22:26

## 2023-12-22 RX ADMIN — VANCOMYCIN HYDROCHLORIDE 1250 MG: 1.25 INJECTION, POWDER, LYOPHILIZED, FOR SOLUTION INTRAVENOUS at 10:32

## 2023-12-22 RX ADMIN — PANTOPRAZOLE SODIUM 40 MG: 40 TABLET, DELAYED RELEASE ORAL at 06:52

## 2023-12-22 RX ADMIN — ACETAMINOPHEN 650 MG: 325 TABLET ORAL at 22:26

## 2023-12-22 RX ADMIN — BISACODYL 10 MG: 10 SUPPOSITORY RECTAL at 08:35

## 2023-12-22 RX ADMIN — HYDROMORPHONE HYDROCHLORIDE 1 MG: 1 INJECTION, SOLUTION INTRAMUSCULAR; INTRAVENOUS; SUBCUTANEOUS at 18:41

## 2023-12-22 RX ADMIN — HYDROMORPHONE HYDROCHLORIDE 0.6 MG: 1 INJECTION, SOLUTION INTRAMUSCULAR; INTRAVENOUS; SUBCUTANEOUS at 14:13

## 2023-12-22 RX ADMIN — APIXABAN 5 MG: 5 TABLET, FILM COATED ORAL at 20:03

## 2023-12-22 RX ADMIN — ACETAMINOPHEN 650 MG: 325 TABLET ORAL at 08:34

## 2023-12-22 RX ADMIN — HYDROMORPHONE HYDROCHLORIDE 0.5 MG: 0.5 INJECTION, SOLUTION INTRAMUSCULAR; INTRAVENOUS; SUBCUTANEOUS at 03:22

## 2023-12-22 RX ADMIN — ACETAMINOPHEN 650 MG: 325 TABLET ORAL at 00:53

## 2023-12-22 RX ADMIN — CEFEPIME 1 G: 1 INJECTION, SOLUTION INTRAVENOUS at 03:22

## 2023-12-22 RX ADMIN — CEFEPIME 1 G: 1 INJECTION, SOLUTION INTRAVENOUS at 20:04

## 2023-12-22 RX ADMIN — TAMSULOSIN HYDROCHLORIDE 0.4 MG: 0.4 CAPSULE ORAL at 08:33

## 2023-12-22 RX ADMIN — ORPHENADRINE CITRATE 60 MG: 60 INJECTION INTRAMUSCULAR; INTRAVENOUS at 20:04

## 2023-12-22 RX ADMIN — TICAGRELOR 90 MG: 90 TABLET ORAL at 20:03

## 2023-12-22 RX ADMIN — ASPIRIN 81 MG CHEWABLE TABLET 81 MG: 81 TABLET CHEWABLE at 08:33

## 2023-12-22 RX ADMIN — Medication 9 MG: at 00:53

## 2023-12-22 RX ADMIN — RANOLAZINE 1000 MG: 500 TABLET, EXTENDED RELEASE ORAL at 10:32

## 2023-12-22 RX ADMIN — APIXABAN 5 MG: 5 TABLET, FILM COATED ORAL at 08:33

## 2023-12-22 RX ADMIN — ATORVASTATIN CALCIUM 80 MG: 40 TABLET, FILM COATED ORAL at 20:14

## 2023-12-22 RX ADMIN — HYDROMORPHONE HYDROCHLORIDE 0.5 MG: 0.5 INJECTION, SOLUTION INTRAMUSCULAR; INTRAVENOUS; SUBCUTANEOUS at 10:32

## 2023-12-22 RX ADMIN — DOCUSATE SODIUM 100 MG: 100 CAPSULE, LIQUID FILLED ORAL at 08:34

## 2023-12-22 RX ADMIN — METOPROLOL TARTRATE 100 MG: 50 TABLET, FILM COATED ORAL at 04:37

## 2023-12-22 RX ADMIN — DULOXETINE HYDROCHLORIDE 60 MG: 30 CAPSULE, DELAYED RELEASE ORAL at 08:33

## 2023-12-22 RX ADMIN — METOPROLOL TARTRATE 100 MG: 50 TABLET, FILM COATED ORAL at 20:03

## 2023-12-22 RX ADMIN — METRONIDAZOLE 500 MG: 500 TABLET ORAL at 22:20

## 2023-12-22 RX ADMIN — METRONIDAZOLE 500 MG: 500 TABLET ORAL at 14:13

## 2023-12-22 RX ADMIN — HYDROMORPHONE HYDROCHLORIDE 0.5 MG: 0.5 INJECTION, SOLUTION INTRAMUSCULAR; INTRAVENOUS; SUBCUTANEOUS at 06:52

## 2023-12-22 SDOH — SOCIAL STABILITY: SOCIAL INSECURITY: ARE THERE ANY APPARENT SIGNS OF INJURIES/BEHAVIORS THAT COULD BE RELATED TO ABUSE/NEGLECT?: NO

## 2023-12-22 SDOH — SOCIAL STABILITY: SOCIAL INSECURITY: ABUSE: ADULT

## 2023-12-22 SDOH — SOCIAL STABILITY: SOCIAL INSECURITY: DOES ANYONE TRY TO KEEP YOU FROM HAVING/CONTACTING OTHER FRIENDS OR DOING THINGS OUTSIDE YOUR HOME?: NO

## 2023-12-22 SDOH — SOCIAL STABILITY: SOCIAL INSECURITY: HAVE YOU HAD THOUGHTS OF HARMING ANYONE ELSE?: NO

## 2023-12-22 SDOH — SOCIAL STABILITY: SOCIAL INSECURITY: ARE YOU OR HAVE YOU BEEN THREATENED OR ABUSED PHYSICALLY, EMOTIONALLY, OR SEXUALLY BY ANYONE?: NO

## 2023-12-22 SDOH — SOCIAL STABILITY: SOCIAL INSECURITY: DO YOU FEEL UNSAFE GOING BACK TO THE PLACE WHERE YOU ARE LIVING?: NO

## 2023-12-22 SDOH — SOCIAL STABILITY: SOCIAL INSECURITY: HAS ANYONE EVER THREATENED TO HURT YOUR FAMILY OR YOUR PETS?: NO

## 2023-12-22 SDOH — SOCIAL STABILITY: SOCIAL INSECURITY: WERE YOU ABLE TO COMPLETE ALL THE BEHAVIORAL HEALTH SCREENINGS?: YES

## 2023-12-22 ASSESSMENT — PAIN SCALES - GENERAL
PAINLEVEL_OUTOF10: 8
PAINLEVEL_OUTOF10: 10 - WORST POSSIBLE PAIN
PAINLEVEL_OUTOF10: 7
PAINLEVEL_OUTOF10: 9

## 2023-12-22 ASSESSMENT — COGNITIVE AND FUNCTIONAL STATUS - GENERAL
TURNING FROM BACK TO SIDE WHILE IN FLAT BAD: A LITTLE
MOVING TO AND FROM BED TO CHAIR: A LITTLE
TOILETING: A LITTLE
PATIENT BASELINE BEDBOUND: NO
MOBILITY SCORE: 17
DRESSING REGULAR LOWER BODY CLOTHING: A LITTLE
HELP NEEDED FOR BATHING: A LITTLE
CLIMB 3 TO 5 STEPS WITH RAILING: A LITTLE
MOVING FROM LYING ON BACK TO SITTING ON SIDE OF FLAT BED WITH BEDRAILS: A LITTLE
DAILY ACTIVITIY SCORE: 20
MOBILITY SCORE: 23
TOILETING: A LITTLE
DAILY ACTIVITIY SCORE: 24
CLIMB 3 TO 5 STEPS WITH RAILING: A LOT
STANDING UP FROM CHAIR USING ARMS: A LITTLE
HELP NEEDED FOR BATHING: A LITTLE
WALKING IN HOSPITAL ROOM: A LITTLE
DRESSING REGULAR LOWER BODY CLOTHING: A LITTLE
MOBILITY SCORE: 22
DAILY ACTIVITIY SCORE: 20
CLIMB 3 TO 5 STEPS WITH RAILING: A LITTLE
DRESSING REGULAR UPPER BODY CLOTHING: A LITTLE
WALKING IN HOSPITAL ROOM: A LITTLE
DRESSING REGULAR UPPER BODY CLOTHING: A LITTLE

## 2023-12-22 ASSESSMENT — ACTIVITIES OF DAILY LIVING (ADL)
FEEDING YOURSELF: INDEPENDENT
TOILETING: INDEPENDENT
GROOMING: INDEPENDENT
DRESSING YOURSELF: INDEPENDENT
WALKS IN HOME: NEEDS ASSISTANCE
LACK_OF_TRANSPORTATION: NO
HEARING - LEFT EAR: FUNCTIONAL
BATHING: INDEPENDENT
ADEQUATE_TO_COMPLETE_ADL: YES
PATIENT'S MEMORY ADEQUATE TO SAFELY COMPLETE DAILY ACTIVITIES?: YES
JUDGMENT_ADEQUATE_SAFELY_COMPLETE_DAILY_ACTIVITIES: YES
HEARING - RIGHT EAR: FUNCTIONAL

## 2023-12-22 ASSESSMENT — LIFESTYLE VARIABLES
SKIP TO QUESTIONS 9-10: 1
HOW OFTEN DO YOU HAVE A DRINK CONTAINING ALCOHOL: NEVER
HOW MANY STANDARD DRINKS CONTAINING ALCOHOL DO YOU HAVE ON A TYPICAL DAY: PATIENT DOES NOT DRINK
AUDIT-C TOTAL SCORE: 0
AUDIT-C TOTAL SCORE: 0
HOW OFTEN DO YOU HAVE 6 OR MORE DRINKS ON ONE OCCASION: NEVER

## 2023-12-22 ASSESSMENT — ENCOUNTER SYMPTOMS
DIZZINESS: 1
HEADACHES: 0
LIGHT-HEADEDNESS: 1
NUMBNESS: 0
ACTIVITY CHANGE: 1
DYSURIA: 1
NECK STIFFNESS: 1
PALPITATIONS: 1
MYALGIAS: 1
WEAKNESS: 1
SEIZURES: 0
ARTHRALGIAS: 1
TREMORS: 0
BACK PAIN: 1
SPEECH DIFFICULTY: 0
NECK PAIN: 1
CONSTIPATION: 1
FATIGUE: 1

## 2023-12-22 ASSESSMENT — PAIN - FUNCTIONAL ASSESSMENT
PAIN_FUNCTIONAL_ASSESSMENT: 0-10
PAIN_FUNCTIONAL_ASSESSMENT: UNABLE TO SELF-REPORT
PAIN_FUNCTIONAL_ASSESSMENT: 0-10

## 2023-12-22 ASSESSMENT — PAIN DESCRIPTION - ORIENTATION: ORIENTATION: RIGHT

## 2023-12-22 ASSESSMENT — PATIENT HEALTH QUESTIONNAIRE - PHQ9
2. FEELING DOWN, DEPRESSED OR HOPELESS: NOT AT ALL
1. LITTLE INTEREST OR PLEASURE IN DOING THINGS: NOT AT ALL
SUM OF ALL RESPONSES TO PHQ9 QUESTIONS 1 & 2: 0

## 2023-12-22 ASSESSMENT — PAIN DESCRIPTION - LOCATION: LOCATION: SHOULDER

## 2023-12-22 NOTE — PROGRESS NOTES
Emergency Medicine Transition of Care Note.    I received Philip Barfield in signout from Dr. Man.  Please see the previous ED provider note for all HPI, PE and MDM up to the time of signout. This is in addition to the primary record.    In brief Philip Barfield is an 58 y.o. male presenting for   Chief Complaint   Patient presents with    Trauma     At the time of signout we were awaiting: CT results    ED Course as of 12/21/23 2120   Thu Dec 21, 2023   2049 Patient CBC shows leukocytosis 28,000, chemistries unremarkable lactate normal ESR CRP elevated chest x-ray negative CT head negative on my interpretation CT C-spine normal interpretation looks okay CT chest abdomen pelvis on my interpretation shows large gallbladder with some gallstones no cholecystitis again awaiting read by radiology.  While in the ED patient did spike a fever.  He became tachycardic EKG did show atrial flutter versus fib with rapid ventricular rate.  Initial troponin negative.  He was pancultured was given intravenous fluids not shock protocol also received intravenous cefepime and vancomycin IV morphine oral Tylenol for the fever, he also received IV metoprolol for his heart rate with good response and currently awaiting imaging results anticipate patient will be hospitalized for further care. [MT]      ED Course User Index  [MT] Ant Man MD         Diagnoses as of 12/21/23 2120   Sepsis, due to unspecified organism, unspecified whether acute organ dysfunction present (CMS/HCC)   Atrial fibrillation with RVR (CMS/HCC)       Medical Decision Making  CT head and C-spine are negative for fracture or intracranial hemorrhage.  CT of the chest, abdomen, pelvis does show a distended gallbladder with luminal gallstones.  No other acute findings were identified.  Ultrasound of the gallbladder was ordered and is negative for gallbladder wall thickening.    On my reevaluation at 11:30 PM, the patient's blood pressure is normal.  Heart rate  is normal.  Lactate is normal.    Given the patient's illness and lack of identified cause I did contact surgery who evaluated the patient in the emergency department.  They agree that gallbladder is unlikely to be the source of sepsis.    I contacted Dr. Eason, hospitalist who agrees to admit the patient for further care.    Final diagnoses:   [A41.9] Sepsis, due to unspecified organism, unspecified whether acute organ dysfunction present (CMS/[facility])   [I48.91] Atrial fibrillation with RVR (CMS/[facility])           Procedure  Procedures    Fatmata Leyva DO

## 2023-12-22 NOTE — PROGRESS NOTES
Physical Therapy    Physical Therapy Evaluation    Patient Name: Philip Barfield  MRN: [id number]  Today's Date: 12/22/2023   Time Calculation  Start Time: 1151  Stop Time: 1204  Time Calculation (min): 13 min    Assessment/Plan   PT Assessment  PT Assessment Results: Decreased endurance, Decreased mobility, Pain  Rehab Prognosis: Good  Barriers to Discharge:  (none)  Evaluation/Treatment Tolerance: Patient limited by pain  Medical Staff Made Aware: Yes  End of Session Communication: Bedside nurse  Assessment Comment:  (Patient moving fairly well but limited today due to severe pain; anticipate good improvement over course of admit, marianela sosa need FWW for home)  End of Session Patient Position: Bed, 3 rail up, Alarm off, not on at start of session  IP OR SWING BED PT PLAN  Inpatient or Swing Bed: Inpatient  PT Plan  Treatment/Interventions: Bed mobility, Transfer training, Gait training, Stair training, Balance training, Strengthening, Endurance training, Therapeutic exercise, Therapeutic activity, Home exercise program  PT Plan: Skilled PT  PT Frequency: 3 times per week  PT Discharge Recommendations: Low intensity level of continued care  Equipment Recommended upon Discharge: Wheeled walker (Patient to check with son to make sure son did not obtain FWW already)  PT - OK to Discharge: Yes (when medically cleared)      General Visit Information:  General  Reason for Referral: Dx: falls, weakness, sepsis, R shoulder pain  Referred By: Jenaro  Past Medical History Relevant to Rehab: Recent Cervical spine surgery due to severe stenosis and meylopathy: Corpectomy and fusion C4-6 on 12/4/23 c/b elevated troponin, need for thrombectomy and angioplasty; HTN, CAD/CABG, COPD, asthma, TIA's, Susan, vertigo.  Prior to Session Communication: Bedside nurse  Patient Position Received: Bed, 3 rail up, Alarm off, not on at start of session  General Comment:  (Patient seen awake and alert in room 1014, tele, IV, soft cervical collar  on)    Home Living:  Home Living  Type of Home:  (Patient lives with son (who works) in 1 level home, few steps to enter. Patient was amb with cane prn prior to recent surgery, states he was supposed to get FWW after surgery but never did.)    Prior Level of Function:       Precautions:  Precautions  Precautions Comment:  (spinal precautions, falls)    Vital Signs:     Objective     Pain:  Pain Assessment  Pain Assessment:  (8/10 neck/R shoulder prior to eval increased to 9-10/10 with activity; RN aware, ice applied to R shoulder following return to supine)    Cognition:  Cognition  Overall Cognitive Status: Within Functional Limits    General Assessments:      Activity Tolerance  Endurance: Tolerates less than 10 min exercise, no significant change in vital signs, Decreased tolerance for upright activites (due to shoulder pain)     Strength  Strength Comments:  (DEEJAY LE quads grossly 4+/5)        Postural Control  Postural Control: Within Functional Limits          Functional Assessments:     Bed Mobility  Bed Mobility:  (supine to sit with Min assist x 1, partial L sidelying; sit to supine with CGA x1)  Transfers  Transfer:  (Sit to stand with CGA x1 and FWW; cues for sequencing, safety, posture)  Ambulation/Gait Training  Ambulation/Gait Training Performed:  (Amb 5 feet in room with CGA x1 and FWW; cues for sequencing, safety, posture - distance limited by shoulder pain)          Extremity/Trunk Assessments:                Outcome Measures:  [facility] Basic Mobility  Turning from your back to your side while in a flat bed without using bedrails: A little  Moving from lying on your back to sitting on the side of a flat bed without using bedrails: A little  Moving to and from bed to chair (including a wheelchair): A little  Standing up from a chair using your arms (e.g. wheelchair or bedside chair): A little  To walk in hospital room: A little  Climbing 3-5 steps with railing: A lot  Basic Mobility - Total Score: 17                             Goals:  Encounter Problems       Encounter Problems (Active)       PT Problem       transfers       Start:  12/22/23    Expected End:  01/05/24       Patient will perform all transfers with SBA x1; Maintaining spinal precautions           gait       Start:  12/22/23    Expected End:  01/05/24       Patient will amb 100+ feet with SBA x1, FWW, rest breaks prn for energy conservation              Pain - Adult            Education Documentation  Precautions, taught by Mireille Lombardi, PT at 12/22/2023  1:22 PM.  Learner: Patient  Readiness: Eager  Method: Explanation  Response: Verbalizes Understanding    Mobility Training, taught by Mireille Lombardi, PT at 12/22/2023  1:22 PM.  Learner: Patient  Readiness: Eager  Method: Explanation  Response: Verbalizes Understanding    Education Comments  No comments found.

## 2023-12-22 NOTE — CONSULTS
Inpatient consult to Cardiology  Consult performed by: Dez Gonzales MD  Consult ordered by: Carter Eason DO        History Of Present Illness:    Philip Barfield is a 58 y.o. male presenting with past medical history significant for HTN, HLD, CAD hx w/ CABG x 3 (LIMA-LAD, (left) Radial artery - diag, Vein graft-OM) in 2003 and subsequent PCI of distal LAD via LIMA graft (2 overlapped JACE), multiple RCA overlap stents, paroxysmal Afib (on home eliquis), HLD, HTN, COPD, asthma, TIAs, vestibular neuritis, vertigo, ARISTEO on CPAP, BPH, anxiety, depression and GERD.  Patient states that ever since he returned home he has had persistent right upper extremity pain with movement of the right shoulder which was has not gotten any better since his last discharge.  He also stated that he fell on 12/18/2023 while walking into the kitchen when he fell and hit his head and lost consciousness for couple seconds.  He states that he landed on his right shoulder but it should be noted that the patient stated that he was having pain in that right shoulder prior to this fall but it has been much more significant since the fall.  He then fell again prior to presentation on 12/21/2023 as he was about to use the restroom when he fell in between the bathtub and bathroom cabinet.  He states that his son was able to assist him up but then when his traveling nurse came to visit they had called EMS for him to be brought in for further evaluation and management.  He states that he did feel dizzy throughout the morning otherwise was in his usual state of health.  He states that he was supposed to get a walker after he was discharged from his most recent hospitalization but has not received this yet.  He is also complaining of generalized weakness, occasional lightheadedness, dizziness, feeling unwell.  He is also admitted that he has not been taking his medications as recommended with regards to the cardiac medications especially the antiplatelets  and anticoagulation.  He is also noted constipation which was in the hospital but then also persistent upon discharge.  He is also admitted to very foul-smelling urine and dark urine with some burning at the end of urination.  He denies any recent sick contacts, chemical/environmental exposures, changes in dietary habits or any recent traumatic events/falls other than noted above.  He denies any fevers, chills, night sweats, vision changes, auditory changes, change in taste/smell, loss of bowel/bladder control, vertigo, syncope, seizure-like activity, chest pain, palpitations, shortness of breath, cough, wheezing, congestion, hemoptysis, hematemesis, abdominal pain, nausea, vomiting, diarrhea, hematuria, dyschezia, hematochezia any lateralizing motor/sensory deficits other than noted above.   Last Recorded Vitals:  Vitals:    12/22/23 0630 12/22/23 0655 12/22/23 0700 12/22/23 0729   BP:  103/59     BP Location:       Patient Position:       Pulse: 67 73 71    Resp: 14 12 (!) 7    Temp:    36.2 °C (97.2 °F)   TempSrc:       SpO2: 98% 96% 93%    Weight:       Height:           Last Labs:  CBC - 12/22/2023:  5:15 AM  22.8 12.5 274    36.9      CMP - 12/22/2023:  5:15 AM  8.6 7.3 16 --- 1.1   2.8 3.2 13 125      PTT - 12/6/2023:  4:41 PM  1.4   15.9 >200     Troponin I, High Sensitivity   Date/Time Value Ref Range Status   12/21/2023 08:03 PM 10 0 - 20 ng/L Final   12/21/2023 04:00 PM 9 0 - 20 ng/L Final   12/06/2023 04:41 PM 8,473 (HH) 0 - 53 ng/L Final     Comment:     Previous result verified on 12/5/2023 2123 on specimen/case [id number] called with component [facility] for procedure Troponin I, High Sensitivity with value 2,803 ng/L.     BNP   Date/Time Value Ref Range Status   12/21/2023 04:00  (H) 0 - 99 pg/mL Final     Hemoglobin A1C   Date/Time Value Ref Range Status   12/08/2023 07:12 AM 6.0 (H) see below % Final   01/22/2023 01:20 AM 6.0 (H) <5.7 % Final     Comment:     Normal less than  5.7%  Prediabetes 5.7% to 6.4%  Diabetes 6.5% or higher      --HgbA1C levels may not be accurate in patients who have renal disease, received recent blood transfusions, are anemic, or who have dyshemoglobinemia.   03/28/2022 11:49 AM 6.5 (A) % Final     Comment:     Normal less than 5.7%  Prediabetes 5.7% to 6.4%  Diabetes 6.5% or higher      --HgbA1C levels may not be accurate in patients who have  renal disease, received recent blood transfusions, are anemic,  or who have dyshemoglobinemia.   07/22/2021 11:12 AM 6.8 % Final     Comment:          Diagnosis of Diabetes-Adults   Non-Diabetic: < or = 5.6%   Increased risk for developing diabetes: 5.7-6.4%   Diagnostic of diabetes: > or = 6.5%  .       Monitoring of Diabetes                Age (y)     Therapeutic Goal (%)   Adults:          >18           <7.0   Pediatrics:    13-18           <7.5                   7-12           <8.0                   0- 6            7.5-8.5   American Diabetes Association. Diabetes Care 33(S1), Jan 2010.     08/03/2018 07:20 AM 8.8 % Final     Comment:          Diagnosis of Diabetes-Adults   Non-Diabetic: < or = 5.6%   Increased risk for developing diabetes: 5.7-6.4%   Diagnostic of diabetes: > or = 6.5%  .       Monitoring of Diabetes                Age (y)     Therapeutic Goal (%)   Adults:          >18           <7.0   Pediatrics:    13-18           <7.5                   7-12           <8.0                   0- 6            7.5-8.5   American Diabetes Association. Diabetes Care 33(S1), Jan 2010.       LDL Calculated   Date/Time Value Ref Range Status   12/07/2023 12:15 AM 55 <=99 mg/dL Final     Comment:                                 Near   Borderline      AGE      Desirable  Optimal    High     High     Very High     0-19 Y     0 - 109     ---    110-129   >/= 130     ----    20-24 Y     0 - 119     ---    120-159   >/= 160     ----      >24 Y     0 -  99   100-129  130-159   160-189     >/=190       VLDL   Date/Time Value Ref  "Range Status   12/07/2023 12:15 AM 23 0 - 40 mg/dL Final   07/22/2021 11:12 AM 49 (H) 0 - 40 mg/dL Final      Last I/O:  I/O last 3 completed shifts:  In: 1550 (15.7 mL/kg) [IV Piggyback:1550]  Out: - (0 mL/kg)   Weight: 98.9 kg     Past Cardiology Tests (Last 3 Years):  EKG:  Electrocardiogram 12 Lead 12/06/2023      ECG 12 Lead 12/06/2023      ECG 12 Lead 12/06/2023      Electrocardiogram, 12-lead PRN ACS symptoms 12/06/2023    Echo:  Transthoracic Echo (TTE) Complete 12/07/2023    Ejection Fractions:  No results found for: \"EF\"  Cath:  Cardiac catheterization - coronary 12/06/2023    Stress Test:  No results found for this or any previous visit from the past 1095 days.    Cardiac Imaging:  No results found for this or any previous visit from the past 1095 days.      Past Medical History:  He has a past medical history of Old myocardial infarction, Paroxysmal atrial fibrillation (CMS/HCC), Personal history of other diseases of the circulatory system, Personal history of other diseases of the musculoskeletal system and connective tissue, Personal history of other diseases of the nervous system and sense organs, Personal history of other endocrine, nutritional and metabolic disease, Personal history of other endocrine, nutritional and metabolic disease, and Vestibular neuronitis of both ears.    Past Surgical History:  He has a past surgical history that includes Other surgical history (12/11/2018); Other surgical history (12/11/2018); Other surgical history (12/11/2018); Other surgical history (12/11/2018); Other surgical history (12/11/2018); Other surgical history (12/11/2018); Cardiac catheterization (N/A, 12/06/2023); and Cervical fusion.      Social History:  He reports that he has never smoked. He has never used smokeless tobacco. He reports that he does not drink alcohol and does not use drugs.    Family History:  Family History   Problem Relation Name Age of Onset    Hypertension Mother      Cancer Father   "    Coronary artery disease Father      Diabetes Brother      Diabetes Maternal Grandmother      Colon cancer Maternal Grandfather          Allergies:  Penicillins, Tetanus toxoid, Hydrocodone-acetaminophen, Niacin, and Sulfa (sulfonamide antibiotics)    Inpatient Medications:  Scheduled medications   Medication Dose Route Frequency    apixaban  5 mg oral BID    aspirin  81 mg oral Daily    atorvastatin  80 mg oral Daily    bisacodyl  10 mg rectal Daily    cefepime  1 g intravenous q8h    [START ON 1/4/2024] clopidogrel  75 mg oral Daily    docusate sodium  100 mg oral BID    DULoxetine  60 mg oral Daily    fluticasone furoate-vilanteroL  1 puff inhalation Daily    levothyroxine  200 mcg oral Daily before breakfast    lidocaine  2 patch transdermal Daily    metoprolol tartrate  100 mg oral BID    pantoprazole  40 mg oral Daily before breakfast    polyethylene glycol  17 g oral Daily    ranolazine  1,000 mg oral q12h    tamsulosin  0.4 mg oral Daily    ticagrelor  90 mg oral BID     PRN medications   Medication    acetaminophen    albuterol    HYDROmorphone    HYDROmorphone    melatonin    ondansetron    orphenadrine    polyethylene glycol     Continuous Medications   Medication Dose Last Rate     Outpatient Medications:  Current Outpatient Medications   Medication Instructions    acetaminophen (TYLENOL) 975 mg, oral, Every 8 hours PRN    albuterol 90 mcg/actuation inhaler 2 puffs, inhalation, Every 4 hours PRN    apixaban (ELIQUIS) 5 mg, oral, 2 times daily    aspirin 81 mg, oral, Daily    atorvastatin (LIPITOR) 80 mg, oral, Daily    azelastine (Astelin) 137 mcg (0.1 %) nasal spray 1 spray, nasal, 2 times daily    Brilinta 90 mg, oral, 2 times daily, Take as directed until 1/3/2024  then STOP and start taking plavix as directed    clopidogrel (Plavix) 75 mg tablet 1 tablet, oral, Daily    cyclobenzaprine (FLEXERIL) 10 mg, oral, 3 times daily PRN    DULoxetine (Cymbalta) 60 mg DR capsule 1 capsule, oral, Daily     evolocumab (REPATHA SURECLICK) 140 mg, subcutaneous, Every 14 days    fluticasone furoate-vilanteroL (Breo Ellipta) 200-25 mcg/dose inhaler 1 puff, inhalation, Daily    levothyroxine (SYNTHROID, LEVOXYL) 200 mcg, oral, Daily before breakfast    lidocaine (Lidoderm) 5 % patch 1 patch, transdermal, Daily, Remove & discard patch within 12 hours or as directed by MD.    loratadine (Claritin) 10 mg tablet 1 tablet, oral, Daily    melatonin 3 mg, oral, Nightly    metoprolol tartrate (Lopressor) 50 mg tablet 2 tablets, oral, 2 times daily    nitroglycerin (Nitrostat) 0.4 mg SL tablet sublingual,  TAKE AS DIRECTED.<BR>    oxyCODONE (ROXICODONE) 5 mg, oral, Every 6 hours PRN, Only take for severe pain    pantoprazole (PROTONIX) 40 mg, oral, Daily before breakfast, Do not crush, chew, or split.    pantoprazole (PROTONIX) 40 mg, oral, Daily before breakfast, Do not crush, chew, or split.    polyethylene glycol (Miralax) 17 gram/dose powder  MIX 1 CAPFUL IN 8 OUNCES OF WATER AND DRINK DAILY AS DIRECTED.    ranolazine (Ranexa) 1,000 mg 12 hr tablet 1 tablet, oral, Every 12 hours    ranolazine (RANEXA) 1,000 mg, oral, 2 times daily, Do not crush, chew, or split.    tamsulosin (FLOMAX) 0.4 mg, oral, Daily       Physical Exam:  Vitals and nursing note reviewed.   Constitutional:       General: He is in acute distress (Pain with wincing on palpation and ROM of the Right SHoulder).      Appearance: He is obese. He is not ill-appearing or diaphoretic.   HENT:      Head: Normocephalic and atraumatic.      Nose: Nose normal.      Mouth/Throat:      Mouth: Mucous membranes are moist.      Pharynx: Oropharynx is clear.   Eyes:      Extraocular Movements: Extraocular movements intact.      Pupils: Pupils are equal, round, and reactive to light.   Neck:      Vascular: No carotid bruit.      Comments: Tenderness along anterior surgical scar but no wound dehiscence or erythema or fluctuance, ROM diminished due to stiffness and discomfort at  the should, in a soft collar currently  Cardiovascular:      Rate and Rhythm: Normal rate and regular rhythm.      Pulses: Normal pulses.      Heart sounds: No murmur heard.  Pulmonary:      Effort: Pulmonary effort is normal. No respiratory distress.      Breath sounds: Normal breath sounds. No stridor. No wheezing or rales.   Chest:      Chest wall: No tenderness.   Abdominal:      General: Bowel sounds are normal. There is no distension.      Palpations: Abdomen is soft. There is no mass.      Tenderness: There is no abdominal tenderness. There is no right CVA tenderness, left CVA tenderness, guarding or rebound.   Musculoskeletal:         General: Swelling and tenderness present. No deformity or signs of injury. Normal range of motion.      Cervical back: Neck supple. Tenderness present. No rigidity.      Right lower leg: Edema present.      Left lower leg: Edema present.      Comments: Trace pitting bilateral lower extremity edema   Lymphadenopathy:      Cervical: No cervical adenopathy.   Skin:     General: Skin is warm.      Capillary Refill: Capillary refill takes less than 2 seconds.      Findings: No bruising, erythema, lesion or rash.   Neurological:      General: No focal deficit present.      Mental Status: He is alert and oriented to person, place, and time.      Cranial Nerves: No cranial nerve deficit.      Sensory: No sensory deficit.      Motor: Weakness present.      Coordination: Coordination normal.      Gait: Gait abnormal.      Comments: Finger-nose/heel-to-shin intact, range of motion/strength globally diminished at 4 out of 5 except significant pain with flexion/extension at the right shoulder with tenderness to palpation around the joint line with some crepitus noted on palpation   Psychiatric:         Mood and Affect: Mood normal.         Behavior: Behavior normal.         Thought Content: Thought content normal.         Judgment: Judgment normal.      Assessment/Plan   1) CAD s/p CABG s/p  recent stents  ECG and Troponin negative  On Triple therapy for a month and then double therapy    2) Afib  In NSR  On apixaban    Peripheral IV 12/21/23 20 G Left Antecubital (Active)   Site Assessment Clean;Dry;Intact 12/22/23 0800   Dressing Type Transparent 12/22/23 0800   Line Status Blood return noted;Flushed 12/22/23 0800   Dressing Status Clean;Dry;Occlusive 12/22/23 0800   Number of days: 1       Peripheral IV 12/21/23 22 G Right Antecubital (Active)   Site Assessment Clean;Dry;Intact 12/22/23 0800   Dressing Type Transparent 12/22/23 0800   Line Status Flushed;Blood return noted 12/22/23 0800   Dressing Status Clean;Occlusive;Dry 12/22/23 0800   Number of days: 1       Code Status:  Full Code    I spent 30 minutes in the professional and overall care of this patient.        Dez Gonzales MD

## 2023-12-22 NOTE — ED PROVIDER NOTES
HPI   Chief Complaint   Patient presents with   • Trauma       HPI: []  58-year-old white male with a history of hypertension, diabetes, CAD, multiple coronary stents, recent intervention done couple weeks ago recent spinal fusion C-spine today comes in with mechanical fall.  He states that he today he felt dizzy and he fell hitting his head on the floor no LOC.  Complains of right shoulder pain.  No headache.  No neck pain.  He denies any fever or chills no nausea no vomiting no abdominal pain and no hematemesis melena medic easier hemoptysis no syncope or near syncope recently in the ED for similar symptoms few days ago.  Discharged home.  Currently on Brilinta and Eliquis.    Past history: Hypertension, diabetes, CAD, paroxysmal A-fib, recent cervical spine fusion, coronary stents  Social: Patient denies current tobacco alcohol drug abuse.  REVIEW OF SYSTEMS:    GENERAL.: No weight loss, fatigue, anorexia, insomnia, fever.  Positive for fall    EYES: No vision loss, double vision, drainage, eye pain.    ENT: No pharyngitis, dry mouth.    CARDIOPULMONARY: No chest pain, palpitations, syncope, near syncope. No shortness of breath, cough, hemoptysis.    GI: No abdominal pain, change in bowel habits, melena, hematemesis, hematochezia, nausea, vomiting, diarrhea.    : No discharge, dysuria, frequency, urgency, hematuria.    MS: No limb pain, positive right shoulder pain, no joint swelling.    SKIN: No rashes.    PSYCH: No depression, anxiety, suicidality, homicidality.    Review of systems is otherwise negative unless stated above or in history of present illness.  Social history, family history, allergies reviewed.  PHYSICAL EXAM:    GENERAL: Vitals noted, no distress. Alert and oriented  x 3. Non-toxic.      EENT: TMs clear. Posterior oropharynx unremarkable. No meningismus. No LAD.  Negative hemotympanum negative Ricardo sign negative mastoid tenderness    NECK: Supple. Nontender. No midline tenderness.  Patient  has soft collar in place.    CARDIAC: Tachycardic with an irregularly irregular rate and rhythm. No murmurs rubs or gallops. No JVD    PULMONARY: Lungs clear bilaterally with good aeration. No wheezes rales or rhonchi. No respiratory distress.  No tachypnea stridor or retractions able to speak in full sentences    ABDOMEN: Soft, nonsurgical. Nontender. No peritoneal signs. Normoactive bowel sounds. No pulsatile masses.     EXTREMITIES: No peripheral edema. Negative Homans bilaterally, no cords.  2+ bounding possible perfused    SKIN: No rash. Intact.   Musculoskeletal: Patient Novolinge C, T, L-spine tenderness.  Pelvis stable good range of motion both hip knees and ankles.  Shoulders no deformity but tender on palpation with decreased any motion.  NEURO: No focal neurologic deficits, NIH score of 0. Cranial nerves normal as tested from II through XII.     MEDICAL DECISION MAKING:  EKG on my interpretation shows atrial flutter/fibrillation with rapid ventricular rate about 140 with nonspecific ST and T wave changes.    CBC shows leukocytosis 28,000, chemistries unremarkable lactate normal troponin negative ESR CRP mildly elevated UA negative chest x-ray negative CT head appears negative on my interpretation CT C-spine shows postop changes on monitor potation awaiting read by radiology CT chest and pelvis on monitor potation shows no obvious pathology did show some gallstones but I do not see any bowel obstruction pneumonia or pneumothorax or pulm emboli but again I am awaiting read by radiology.    Treatment in ED: Arrival IV established, pancultured, given nausea IV fluids, intravenous cefepime and vancomycin, intravenous metoprolol for his heart rate and Tylenol for the fever.    ED course: Patient heart rate remains in the 120s remains in flutter versus fib.  Remains normotensive.    Impression: #1 mechanical fall, #2 A-fib with RVR, #3 sepsis, #4 leukocytosis    Plan/MDM: 58-year-old white male history of  hypertension diabetes paroxysmal A-fib CAD coronary stents recent spinal fusion of C-spine presents with mechanical fall appears to be in A-fib RVR has a leukocytosis spiked a fever concerning for an occult infection source unclear and likely is surgical wound could be bacteremic low suspicion of septic shock meningitis encephalitis, anticipate patient will be hospitalized for further care.                          [place] Coma Scale Score: 15                  Patient History   Past Medical History:   Diagnosis Date   • Old myocardial infarction     History of myocardial infarction   • Personal history of other diseases of the circulatory system     History of hypertension   • Personal history of other diseases of the musculoskeletal system and connective tissue     History of gout   • Personal history of other diseases of the nervous system and sense organs     History of sleep apnea   • Personal history of other endocrine, nutritional and metabolic disease     History of diabetes mellitus   • Personal history of other endocrine, nutritional and metabolic disease     History of hyperlipidemia     Past Surgical History:   Procedure Laterality Date   • CARDIAC CATHETERIZATION N/A 12/6/2023    Procedure: Left Heart Cath;  Surgeon: Dez Gonzales MD;  Location: [place] [address] Cardiac Cath Lab;  Service: Cardiovascular;  Laterality: N/A;   • OTHER SURGICAL HISTORY  12/11/2018    Tonsillectomy   • OTHER SURGICAL HISTORY  12/11/2018    Knee arthroscopy   • OTHER SURGICAL HISTORY  12/11/2018    Cardiac catheterization   • OTHER SURGICAL HISTORY  12/11/2018    Coronary artery bypass graft   • OTHER SURGICAL HISTORY  12/11/2018    Nasal septoplasty   • OTHER SURGICAL HISTORY  12/11/2018    Arterial stent placement     Family History   Problem Relation Name Age of Onset   • Hypertension Mother     • Cancer Father     • Diabetes Brother     • Diabetes Maternal Grandmother     • Colon cancer Maternal Grandfather       Social  History     Tobacco Use   • Smoking status: Never   • Smokeless tobacco: Never   Substance Use Topics   • Alcohol use: Never   • Drug use: Never       Physical Exam   ED Triage Vitals [12/21/23 1527]   Temp Heart Rate Resp BP   37 °C (98.6 °F) 101 20 119/86      SpO2 Temp src Heart Rate Source Patient Position   99 % -- -- --      BP Location FiO2 (%)     -- --       Physical Exam    ED Course & [facility]   ED Course as of 12/21/23 2056   Thu Dec 21, 2023   2049 Patient CBC shows leukocytosis 28,000, chemistries unremarkable lactate normal ESR CRP elevated chest x-ray negative CT head negative on my interpretation CT C-spine normal interpretation looks okay CT chest abdomen pelvis on my interpretation shows large gallbladder with some gallstones no cholecystitis again awaiting read by radiology.  While in the ED patient did spike a fever.  He became tachycardic EKG did show atrial flutter versus fib with rapid ventricular rate.  Initial troponin negative.  He was pancultured was given intravenous fluids not shock protocol also received intravenous cefepime and vancomycin IV morphine oral Tylenol for the fever, he also received IV metoprolol for his heart rate with good response and currently awaiting imaging results anticipate patient will be hospitalized for further care. [MT]      ED Course User Index  [MT] Ant Man MD         Diagnoses as of 12/21/23 2056   Sepsis, due to unspecified organism, unspecified whether acute organ dysfunction present (CMS/HCC)   Atrial fibrillation with RVR (CMS/HCC)       Medical Decision Making      Procedure  Critical Care    Performed by: Ant Man MD  Authorized by: Ant Man MD    Critical care provider statement:     Critical care time (minutes):  70    Critical care was necessary to treat or prevent imminent or life-threatening deterioration of the following conditions:  Sepsis (cardiac arrythmia)    Critical care was time spent personally by me on the  following activities:  Blood draw for specimens, development of treatment plan with patient or surrogate, examination of patient, review of old charts, re-evaluation of patient's condition, pulse oximetry, ordering and review of radiographic studies, ordering and review of laboratory studies and ordering and performing treatments and interventions    I assumed direction of critical care for this patient from another provider in my specialty: no      Care discussed with: admitting provider         Ant Man MD  12/21/23 2059

## 2023-12-22 NOTE — CONSULTS
[facility]  GENERAL SURGERY/TRAUMA SURGERY - HISTORY AND PHYSICAL / CONSULT    Patient Name: Philip Barfield  MRN: [id number]  Admit Date: 1221  : 1965  AGE: 58 y.o.   GENDER: male    CHIEF COMPLAINT/REASON FOR CONSULT:  Generalized weakness multiple falls.  CT scan showing distended gallbladder with gallstones.    Patient is a 58-year-old male with a recent history of C5 anterior cervical corpectomy and fusion at C4 C6 with Dr. Rosas on  at [facility] complicated by NSTEMI status post left heart cath on  with occluded vein graft s/p penubra thrombectomy and angioplasty on ASA, Brillinta, and Eliquis for Afrb who presented from home after generalized weakness and his second fall within the last 3 days.    Surgery was consulted and after patient was pan scanned and found to have a distended gallbladder with cholelithiasis which recommended further evaluation with ultrasound.  Patient also was found to have leukocytosis of 27 and heart rate was in 140s with elevated temperature of 38.5.    Patient states he is been feeling unwell recently admits he has not been taking medications including his metoprolol antiplatelets and anticoagulation.  Patient denies any significant abdominal pain or emesis within this.  Patient states that he is having constipation and concern for him to go 6 or 7 days without having a bowel movement.  Patient states he has not had a bowel movement since he left the hospital on .  Patient also endorses having had some chest pain earlier while in the emergency room.  Cardiac workup pending.  Patient also endorses having some burning with urination and was found to have trace leuk esterase in his urinalysis.  Patient denies any significant abdominal surgical history.  Patient did have an episode of appendicitis and was managed nonoperatively at [facility] in 2018.    PAST MEDICAL HISTORY:   PMH: CAD, COPD,  DMII, ARISTEO, DMII, TIA  Past Medical History:   Diagnosis Date    Old myocardial infarction     History of myocardial infarction    Personal history of other diseases of the circulatory system     History of hypertension    Personal history of other diseases of the musculoskeletal system and connective tissue     History of gout    Personal history of other diseases of the nervous system and sense organs     History of sleep apnea    Personal history of other endocrine, nutritional and metabolic disease     History of diabetes mellitus    Personal history of other endocrine, nutritional and metabolic disease     History of hyperlipidemia       Past Surgical History:   Procedure Laterality Date    CARDIAC CATHETERIZATION N/A 12/6/2023    Procedure: Left Heart Cath;  Surgeon: Dez Gonzales MD;  Location: [place] [address] Cardiac Cath Lab;  Service: Cardiovascular;  Laterality: N/A;    OTHER SURGICAL HISTORY  12/11/2018    Tonsillectomy    OTHER SURGICAL HISTORY  12/11/2018    Knee arthroscopy    OTHER SURGICAL HISTORY  12/11/2018    Cardiac catheterization    OTHER SURGICAL HISTORY  12/11/2018    Coronary artery bypass graft    OTHER SURGICAL HISTORY  12/11/2018    Nasal septoplasty    OTHER SURGICAL HISTORY  12/11/2018    Arterial stent placement       Family History   Problem Relation Name Age of Onset    Hypertension Mother      Cancer Father      Diabetes Brother      Diabetes Maternal Grandmother      Colon cancer Maternal Grandfather         Social History     Tobacco Use   Smoking Status Never   Smokeless Tobacco Never     Social History     Substance and Sexual Activity   Alcohol Use Never       Prior to Admission medications    Medication Sig Start Date End Date Taking? Authorizing Provider   acetaminophen (Tylenol) 325 mg tablet Take 3 tablets (975 mg) by mouth every 8 hours if needed for moderate pain (4 - 6). 12/11/23   Suki Haines APRN-CNP   albuterol 90 mcg/actuation inhaler Inhale 2 puffs every 4 hours if  needed.    Historical Provider, MD   apixaban (Eliquis) 5 mg tablet Take 1 tablet (5 mg) by mouth 2 times a day.    Historical Provider, MD   aspirin 81 mg chewable tablet Chew 1 tablet (81 mg) once daily. Do not start before December 12, 2023. 12/12/23   TERRY Barnett   atorvastatin (Lipitor) 80 mg tablet Take 1 tablet (80 mg) by mouth once daily. 12/11/23 1/10/24  TERRY Barnett   azelastine (Astelin) 137 mcg (0.1 %) nasal spray Administer 1 spray into affected nostril(s) 2 times a day.    Historical Provider, MD   clopidogrel (Plavix) 75 mg tablet Take 1 tablet (75 mg) by mouth once daily.    Historical Provider, MD   cyclobenzaprine (Flexeril) 10 mg tablet Take 1 tablet (10 mg) by mouth 3 times a day as needed for muscle spasms.    Historical Provider, MD   DULoxetine (Cymbalta) 60 mg DR capsule Take 1 capsule (60 mg) by mouth once daily.    Historical Provider, MD   evolocumab (Repatha SureClick) 140 mg/mL injection Inject 1 mL (140 mg) under the skin every 14 (fourteen) days.    Historical Provider, MD   fluticasone furoate-vilanteroL (Breo Ellipta) 200-25 mcg/dose inhaler Inhale 1 puff once daily.    Historical Provider, MD   levothyroxine (Synthroid, Levoxyl) 200 mcg tablet Take 1 tablet (200 mcg) by mouth once daily in the morning. Take before meals. 12/11/23   TERRY Barnett   lidocaine (Lidoderm) 5 % patch Place 1 patch over 12 hours on the skin once daily. Remove & discard patch within 12 hours or as directed by MD. 12/18/23   Geovany Vail MD   loratadine (Claritin) 10 mg tablet Take 1 tablet (10 mg) by mouth once daily.    Historical Provider, MD   melatonin 3 mg tablet Take 1 tablet (3 mg) by mouth once daily at bedtime. 12/11/23   TERRY Barnett   metoprolol tartrate (Lopressor) 50 mg tablet Take 2 tablets by mouth 2 times a day.    Historical Provider, MD   nitroglycerin (Nitrostat) 0.4 mg SL tablet Place under the tongue.  TAKE AS DIRECTED.    Historical  Provider, MD   oxyCODONE (Roxicodone) 5 mg immediate release tablet Take 1 tablet (5 mg) by mouth every 6 hours if needed for severe pain (7-10) for up to 7 days. 12/11/23 12/18/23  TERRY Barnett   oxyCODONE (Roxicodone) 5 mg immediate release tablet Take 1 tablet (5 mg) by mouth every 6 hours if needed for severe pain (7 - 10) for up to 7 days. Only take for severe pain 12/19/23 12/26/23  Sabra Dietrich PA-C   pantoprazole (ProtoNix) 40 mg EC tablet Take 1 tablet (40 mg) by mouth once daily in the morning. Take before meals. Do not crush, chew, or split.    Historical Provider, MD   pantoprazole (ProtoNix) 40 mg EC tablet Take 1 tablet (40 mg) by mouth once daily in the morning. Take before meals. Do not crush, chew, or split. Do not start before December 12, 2023. 12/12/23 1/11/24  TERRY Barnett   polyethylene glycol (Miralax) 17 gram/dose powder MIX 1 CAPFUL IN 8 OUNCES OF WATER AND DRINK DAILY AS DIRECTED.    Historical Provider, MD   ranolazine (Ranexa) 1,000 mg 12 hr tablet Take 1 tablet (1,000 mg) by mouth every 12 hours.    Historical Provider, MD   ranolazine (Ranexa) 1,000 mg 12 hr tablet Take 1 tablet (1,000 mg) by mouth 2 times a day. Do not crush, chew, or split. 12/11/23   TERRY Barnett   tamsulosin (Flomax) 0.4 mg 24 hr capsule Take 1 capsule (0.4 mg) by mouth once daily. Do not start before December 12, 2023. 12/12/23   TERRY Barnett   ticagrelor (Brilinta) 90 mg tablet Take 1 tablet (90 mg) by mouth 2 times a day for 24 days. Take as directed until 1/3/2024  then STOP and start taking plavix as directed 12/11/23 1/4/24  TERRY Barnett   oxyCODONE (Roxicodone) 5 mg immediate release tablet Take 1 tablet (5 mg) by mouth every 6 hours if needed for severe pain (7 - 10) for up to 7 days. Only take for severe pain 12/11/23 12/19/23  Suki Haines, APRN-CNP       Allergies   Allergen Reactions    Penicillins Rash and Shortness of breath    Tetanus  Toxoid Rash and Shortness of breath    Hydrocodone-Acetaminophen Unknown    Niacin Unknown    Sulfa (Sulfonamide Antibiotics) Unknown       REVIEW OF SYSTEMS:  Review of Systems   Constitutional:   Positive for chills, fever, lethargy  HENT:  Negative for congestion and dental problem.    Eyes:  Negative for discharge and itching.   Respiratory: . Negative for apnea, choking and wheezing.    Cardiovascular: Positive for chest pain.   Gastrointestinal:  negative for abdominal pain, noted for nausea, negative for emesis  Endocrine: Negative for cold intolerance and heat intolerance.   Musculoskeletal:  Negative for neck pain.   Skin:  Negative for color change.   Neurological:  Negative for tingling, loss of consciousness and numbness.   Psychiatric/Behavioral:  Negative for agitation and behavioral problems.    PHYSICAL EXAM:    NEURO: A&O x3, GCS 15, morbidly obese male lying in left lateral decubitus,   HEAD: NC/AT, No lacerations or abrasions  EENT: PERRL, EOMI. Pupils 4-2mm b/l.   NECK: Mild cervical spine tenderness near posterior midline which patient states is not changed since recent surgery.  Soft cervical collar in place, tracheal midline. No JVD.  RESPIRATORY/CHEST: Non-labored, equal chest expansion  CV: Tachycardic on monitor  ABDOMEN: soft, tender to palpation of bilateral lower quadrants, nondistended. No scars, abrasions or lacerations.  PELVIS: Stable to compression.  : nml external genitalia,   BACK/SPINE: No thoracic midline tenderness, step-offs or deformities. No lumbar midline tenderness,   EXTREMITIES: No cyanosis, lacerations or contusions      LABS:  Results from last 7 days   Lab Units 12/21/23  1600   WBC AUTO x10*3/uL 27.9*   HEMOGLOBIN g/dL 14.6   HEMATOCRIT % 42.3   PLATELETS AUTO x10*3/uL 342   NEUTROS PCT AUTO % 86.6   LYMPHS PCT AUTO % 6.6   MONOS PCT AUTO % 5.3   EOS PCT AUTO % 0.4         Results from last 7 days   Lab Units 12/21/23  1600   SODIUM mmol/L 134*   POTASSIUM mmol/L  3.9   CHLORIDE mmol/L 100   CO2 mmol/L 24   BUN mg/dL 12   CREATININE mg/dL 0.95   CALCIUM mg/dL 9.3   PROTEIN TOTAL g/dL 7.3   BILIRUBIN TOTAL mg/dL 1.1   ALK PHOS U/L 125*   ALT U/L 13   AST U/L 16   GLUCOSE mg/dL 149*     Results from last 7 days   Lab Units 12/21/23  1600   BILIRUBIN TOTAL mg/dL 1.1   BILIRUBIN DIRECT mg/dL 0.3             I have reviewed all laboratory and imaging results ordered/pertinent for this encounter.      ==============================================================================  ASSESSMENT/PLAN:  Patient is a 58-year-old male with an extensive medical history presenting for generalized weakness after fall.  Patient had pan scan which showed a distended gallbladder and cholelithiasis.  Patient's not having any real abdominal pain.  Ultrasound did not show any significant pericholecystic fluid or wall thickening, will follow-up final read.  Patient does have leukocytosis of 27 heart rates in the 140s 150s in A-fib, and febrile.  Patient is having some dysuria trace leuk esterase.  This time low suspicion for biliary source of infection.  If ultrasound is equivocal could consider getting HIDA scan for further evaluation.  No acute surgical intervention at this time.      Plan:  -No acute surgical intervention  -Consider HIDA scan if ultrasound equivocal  -IV fluids  -Antibiotics  -If source of infection deemed gallbladder patient will likely need percutaneous cholecystostomy tube as he is not a good surgical candidate    Patient will be discussed with Attending Physician Dr. Po Patel PGY3  General Surgery Service   ==============================================================================

## 2023-12-22 NOTE — PROGRESS NOTES
Social work consult placed for positive medical risk screen. SW reviewed pt's chart and communicated with TCC. No SW needs foreseen at this time. SW signing off; available upon request.    Blanco Morales, MSW, LSW ([id number])   Care Transitions

## 2023-12-22 NOTE — CARE PLAN
The patient's goals for the shift include      The clinical goals for the shift include pain control    Problem: Pain - Adult  Goal: Verbalizes/displays adequate comfort level or baseline comfort level  Outcome: Progressing     Problem: Safety - Adult  Goal: Free from fall injury  Outcome: Progressing     Problem: Discharge Planning  Goal: Discharge to home or other facility with appropriate resources  Outcome: Progressing     Problem: Chronic Conditions and Co-morbidities  Goal: Patient's chronic conditions and co-morbidity symptoms are monitored and maintained or improved  Outcome: Progressing     Problem: Fall/Injury  Goal: Not fall by end of shift  Outcome: Progressing  Goal: Be free from injury by end of the shift  Outcome: Progressing  Goal: Verbalize understanding of personal risk factors for fall in the hospital  Outcome: Progressing  Goal: Verbalize understanding of risk factor reduction measures to prevent injury from fall in the home  Outcome: Progressing  Goal: Use assistive devices by end of the shift  Outcome: Progressing  Goal: Pace activities to prevent fatigue by end of the shift  Outcome: Progressing     Problem: Diabetes  Goal: Achieve decreasing blood glucose levels by end of shift  Outcome: Progressing  Goal: Increase stability of blood glucose readings by end of shift  Outcome: Progressing  Goal: Decrease in ketones present in urine by end of shift  Outcome: Progressing  Goal: Maintain electrolyte levels within acceptable range throughout shift  Outcome: Progressing  Goal: Maintain glucose levels >70mg/dl to <250mg/dl throughout shift  Outcome: Progressing  Goal: No changes in neurological exam by end of shift  Outcome: Progressing  Goal: Learn about and adhere to nutrition recommendations by end of shift  Outcome: Progressing  Goal: Vital signs within normal range for age by end of shift  Outcome: Progressing  Goal: Increase self care and/or family involovement by end of shift  Outcome:  Progressing  Goal: Receive DSME education by end of shift  Outcome: Progressing     Problem: Pain  Goal: Takes deep breaths with improved pain control throughout the shift  Outcome: Progressing  Goal: Turns in bed with improved pain control throughout the shift  Outcome: Progressing  Goal: Walks with improved pain control throughout the shift  Outcome: Progressing  Goal: Performs ADL's with improved pain control throughout shift  Outcome: Progressing  Goal: Participates in PT with improved pain control throughout the shift  Outcome: Progressing  Goal: Free from opioid side effects throughout the shift  Outcome: Progressing  Goal: Free from acute confusion related to pain meds throughout the shift  Outcome: Progressing

## 2023-12-22 NOTE — PROGRESS NOTES
12/22/23 1131   Discharge Planning   Living Arrangements Children   Support Systems Children   Assistance Needed PT/OT   Type of Residence Private residence   Number of Stairs to Enter Residence 5   Number of Stairs Within Residence 0   Home or Post Acute Services In home services   Type of Home Care Services Home nursing visits;Home OT;Home PT   Patient expects to be discharged to: home with HHC     Pt is from home where he lives with his son in a mobile home.  He states he is able to navigate entry steps but does admit to falls d/t vertigo.  He also had recent cervical fusion and is in soft cervical collar.  He has been fairly independent at home but without clearance to drive.  Pt was set up with [facility] post fusion and is still receiving. Plan to continue HHC on DC. PT/OT evals pending.  He states he uses a cane as he was not given walker for home and is in need of one so will have therapy bring him one.  He is current with PCP visits. Per surgery, pt will need perc estiven tube as well.  TCC to continue to follow to manage discharge planning and monitor for discharge needs.

## 2023-12-22 NOTE — CARE PLAN
Problem: Pain - Adult  Goal: Verbalizes/displays adequate comfort level or baseline comfort level  Outcome: Progressing  Flowsheets (Taken 12/22/2023 0779)  Verbalizes/displays adequate comfort level or baseline comfort level:   Assess pain using appropriate pain scale   Administer analgesics based on type and severity of pain and evaluate response  Note: Pt having acute pain in right shoulder. Pain medication ineffective after evaluation of intervention. MD notified. Order updated    The patient's goals for the shift include      The clinical goals for the shift include pain control    Patient still experiencing pain requiring Q4 prn medication. Will continue to assess and administer medication per order.

## 2023-12-22 NOTE — PROGRESS NOTES
"Vancomycin Dosing by Pharmacy- FOLLOW UP    Philip Barfield is a 58 y.o. year old male who Pharmacy has been consulted for vancomycin dosing for other unclear etiology with concern for sepsis . Based on the patient's indication and renal status this patient is being dosed based on a goal AUC of 500-600.     Renal function is currently stable.    Current vancomycin dose: 1250 mg given every 12 hours    Estimated vancomycin AUC on current dose: 508 mg/L.hr     Visit Vitals  /59   Pulse 71   Temp 36.2 °C (97.2 °F)   Resp (!) 7        Lab Results   Component Value Date    CREATININE 0.87 12/22/2023    CREATININE 0.95 12/21/2023    CREATININE 1.06 12/11/2023    CREATININE 1.08 12/09/2023        Patient weight is No results found for: \"PTWEIGHT\"    No results found for: \"CULTURE\"     I/O last 3 completed shifts:  In: 1550 (15.7 mL/kg) [IV Piggyback:1550]  Out: - (0 mL/kg)   Weight: 98.9 kg   [unfilled]    Lab Results   Component Value Date    PATIENTTEMP 37.0 12/21/2023        Assessment/Plan    Within goal AUC range. Continue current vancomycin regimen.    This dosing regimen is predicted by [employer] to result in the following pharmacokinetic parameters:  Loading dose: N/A  Regimen: 1250 mg IV every 12 hours.  Start time: 08:59 on 12/22/2023  Exposure target: AUC24 (range)400-600 mg/L.hr   AUC24,ss: 508 mg/L.hr  Probability of AUC24 > 400: 84 %  Ctrough,ss: 16.2 mg/L  Probability of Ctrough,ss > 20: 26 %  Probability of nephrotoxicity (Lodise TONY 2009): 12 %      The next level will be obtained on 12/26 at 0500. May be obtained sooner if clinically indicated.   Will continue to monitor renal function daily while on vancomycin and order serum creatinine at least every 48 hours if not already ordered.  Follow for continued vancomycin needs, clinical response, and signs/symptoms of toxicity.       Blanco Storey [facility]           "

## 2023-12-22 NOTE — PROGRESS NOTES
Please review night physician H&P for today's notes.    Admitted for septic shock to ICU.  Continue broad-spectrum antibiotics with vancomycin and cefepime.  Hypokalemia hypomagnesemia replaced recheck with a.m. labs.  Continuing symptomatic pain medications.  History of coronary artery disease status post CABG-patient is on triple therapy currently.  Appreciate cardiology and infectious disease recommendations.  Follow CBC BMP ordered.  Continue PT OT

## 2023-12-22 NOTE — PROGRESS NOTES
Occupational Therapy    Evaluation    Patient Name: Philip Barfield  MRN: [id number]  Today's Date: 12/22/2023  Time Calculation  Start Time: 1152  Stop Time: 1204  Time Calculation (min): 12 min        Assessment:  OT Assessment: OT eval completed. Pt  appears to have had a slight decline in functional baseline. Pt current level of assist is CGA to MIN A for mobility and ADLs. Pt would benefit from further OT to ensure safe return to PLOF  Prognosis: Good  Barriers to Discharge: None  Evaluation/Treatment Tolerance: Patient limited by pain  End of Session Communication: Bedside nurse  End of Session Patient Position: Bed, 3 rail up, Alarm on  OT Assessment Results: Decreased ADL status, Decreased upper extremity strength, Decreased endurance, Decreased functional mobility  Prognosis: Good  Barriers to Discharge: None  Evaluation/Treatment Tolerance: Patient limited by pain  Plan:  Treatment Interventions: ADL retraining, Functional transfer training, UE strengthening/ROM, Endurance training, Patient/family training, Compensatory technique education, Neuromuscular reeducation  OT Frequency: 2 times per week  OT Discharge Recommendations: Low intensity level of continued care  Equipment Recommended upon Discharge:  (TBD)  OT Recommended Transfer Status: Assist of 1  OT - OK to Discharge: Yes (okay to be dc to next level of care once medically cleared)  Treatment Interventions: ADL retraining, Functional transfer training, UE strengthening/ROM, Endurance training, Patient/family training, Compensatory technique education, Neuromuscular reeducation    Subjective   Current Problem:  1. Sepsis, due to unspecified organism, unspecified whether acute organ dysfunction present (CMS/HCC)        2. Atrial fibrillation with RVR (CMS/[facility])          General:  General  Reason for Referral: impaired ADls and functional mobility d/t (DX: sepsis; AFIB w/ RVR)  Past Medical History Relevant to Rehab: 57 y/o male recent admission 11/30/23  - 12/11/23 = presented [facility] to [facility] for severe cervical stenosis and myelopathy now s/p corpectomy and fusion of C4-C6 for severe cervical stenosis on 12/04/23, patient also developed significant agitation and altered mental status with chest pain and was noted to have elevated troponins with a peak of 16,470 on 12/06/2023.  He does have an extensive cardiac history at which time he was taken to Cath Lab during that hospitalization and had a thrombectomy and balloon angioplasty.   Patient was discharged home (PMH: HTN, HLD, CAD hx w/ CABG and subsequent PCI of distal LAD via LIMA graft (2 overlapped JACE), multiple RCA overlap stents, paroxysmal Afib , HLD, HTN, COPD, asthma, TIAs, vestibular neuritis, vertigo, ARISTEO on CPAP, BPH, anxiety, depression and GERD.)  Co-Treatment: PT  Co-Treatment Reason: Co-eval d/t complexity of pt to maximize safety with mobility while focusing on discpline specific goals  Prior to Session Communication: Bedside nurse  Patient Position Received: Bed, 3 rail up, Alarm on  General Comment: Pt. seen in room 14 A ICU bed. Pt. cleared by RN for therapy. Pt.gowned, IV, SCD's, tele. Pt. with c/ of Right shoulder pain, pain meds administered prior to session  Precautions:  Medical Precautions: Fall precautions (cervical soft collar. Cervical precautions)  Vital Signs:     Pain:  Pain Assessment  Pain Assessment: 0-10  Pain Score: 9  Pain Type: Acute pain, Surgical pain  Pain Frequency: Constant/continuous  Pain Interventions: Cold applied, Repositioned  Response to Interventions: some relief    Objective   Cognition:  Overall Cognitive Status: Within Functional Limits           Home Living:  Type of Home: Mobile home  Lives With: Adult children  Home Living Comments: Lives w/ son, IADLs and ADLs (+) Drives, Amb w/ cane prior to  cervical fusion surgery on 12/4/23  Prior Function:     IADL History:     ADL:  ADL Comments: Simulated  UB ADL duirng UE AROM screen in supine, anticipate   CGA for UB ADLs d/t Rt. shoulder pain, pain also limiting pt standing tolerance less than 2 min before requiring rest. Anticipate  CGA for LB ADLs and toileting tasks  Activity Tolerance:  Endurance: Decreased tolerance for upright activites (secondary to  pain)  Bed Mobility/Transfers: Bed Mobility  Bed Mobility:  (supine <>sit  MIN A x2)    Transfers  Transfer:  (STS, stand to sit, bed CGA)      Ambulation/Gait Training:  Ambulation/Gait Training  Ambulation/Gait Training Performed:  (Pt only able to take a few steps with FWW before requesting to return to supine position, CGA for functional amb with FWW)  Sitting Balance:  Static Sitting Balance  Static Sitting-Balance Support: No upper extremity supported, Feet supported  Static Sitting-Level of Assistance: Independent  Standing Balance:  Static Standing Balance  Static Standing-Balance Support: Bilateral upper extremity supported  Static Standing-Level of Assistance: Contact guard (with FWW)   Modalities:     Vision:   Sensation:  Sensation Comment: Pt denies numbness + tingling  Strength:  Strength Comments: no formal MMT applied d/t Rt. shoulder pain, however per clinical observation Right shoulder appears at  3/5 and distally 3(+)/5, 4(-)/5 LUE  Perception:     Coordination:      Hand Function:  Gross Grasp: Functional  Coordination: Functional  Extremities: RUE   RUE : Within Functional Limits and LUE   LUE: Within Functional Limits        Outcome Measures:[facility] Daily Activity  Putting on and taking off regular lower body clothing: A little  Bathing (including washing, rinsing, drying): A little  Putting on and taking off regular upper body clothing: A little  Toileting, which includes using toilet, bedpan or urinal: A little  Taking care of personal grooming such as brushing teeth: None  Eating Meals: None  Daily Activity - Total Score: 20        Education Documentation  Body Mechanics, taught by Enriqueta Garner OT at 12/22/2023  1:04 PM.  Learner:  Patient  Readiness: Acceptance  Method: Demonstration  Response: Needs Reinforcement    Precautions, taught by Enriqueta Garner OT at 12/22/2023  1:04 PM.  Learner: Patient  Readiness: Acceptance  Method: Demonstration  Response: Needs Reinforcement    ADL Training, taught by Enriqueta Garner OT at 12/22/2023  1:04 PM.  Learner: Patient  Readiness: Acceptance  Method: Demonstration  Response: Needs Reinforcement    Education Comments  No comments found.        OP EDUCATION:       Goals:  Encounter Problems       Encounter Problems (Active)       ADLs       Patient will perform UB and LB bathing  with supervision level of assistance .       Start:  12/22/23    Expected End:  01/05/24               MOBILITY       Patient will perform Functional mobility mod  Household distances/Community Distances with stand by assist level of assistance and front wheeled walker in order to improve safety and functional mobility.       Start:  12/22/23    Expected End:  01/05/24

## 2023-12-22 NOTE — H&P
History Of Present Illness    Recent Admission:  A. 11/30/23 till 12/11/23 = presented from [facility] to [facility] for severe cervical stenosis and myelopathy now s/p corpectomy and fusion of C4-C6 for severe cervical stenosis on 12/04/23, patient also developed significant agitation and altered mental status with chest pain and was noted to have elevated troponins with a peak of 16,470 on 12/06/2023.  He does have an extensive cardiac history at which time he was taken to Cath Lab during that hospitalization and had a thrombectomy and balloon angioplasty.  He was treated accordingly per cardiology recommendations.  Patient was discharged on aspirin, Brilinta and Eliquis until the evening of 01/03/2024.  Per cardiology recommendations they stated to resume only Eliquis and Plavix on the morning of 01/04/2024.    Philip Barfield is a 58 y.o. male with a past medical history significant for HTN, HLD, CAD hx w/ CABG x 3 (LIMA-LAD, (left) Radial artery - diag, Vein graft-OM) in 2003 and subsequent PCI of distal LAD via LIMA graft (2 overlapped JACE), multiple RCA overlap stents, paroxysmal Afib (on home eliquis), HLD, HTN, COPD, asthma, TIAs, vestibular neuritis, vertigo, ARISTEO on CPAP, BPH, anxiety, depression and GERD.  Patient states that ever since he returned home he has had persistent right upper extremity pain with movement of the right shoulder which was has not gotten any better since his last discharge.  He also stated that he fell on 12/18/2023 while walking into the kitchen when he fell and hit his head and lost consciousness for couple seconds.  He states that he landed on his right shoulder but it should be noted that the patient stated that he was having pain in that right shoulder prior to this fall but it has been much more significant since the fall.  He then fell again prior to presentation on 12/21/2023 as he was about to use the restroom when he fell in between the bathtub and bathroom cabinet.  He states  that his son was able to assist him up but then when his traveling nurse came to visit they had called EMS for him to be brought in for further evaluation and management.  He states that he did feel dizzy throughout the morning otherwise was in his usual state of health.  He states that he was supposed to get a walker after he was discharged from his most recent hospitalization but has not received this yet.  He is also complaining of generalized weakness, occasional lightheadedness, dizziness, feeling unwell.  He is also admitted that he has not been taking his medications as recommended with regards to the cardiac medications especially the antiplatelets and anticoagulation.  He is also noted constipation which was in the hospital but then also persistent upon discharge.  He is also admitted to very foul-smelling urine and dark urine with some burning at the end of urination.  He denies any recent sick contacts, chemical/environmental exposures, changes in dietary habits or any recent traumatic events/falls other than noted above.  He denies any fevers, chills, night sweats, vision changes, auditory changes, change in taste/smell, loss of bowel/bladder control, vertigo, syncope, seizure-like activity, chest pain, palpitations, shortness of breath, cough, wheezing, congestion, hemoptysis, hematemesis, abdominal pain, nausea, vomiting, diarrhea, hematuria, dyschezia, hematochezia any lateralizing motor/sensory deficits other than noted above.    ED course:  1.  Vital signs on presentation: Temperature of 37 °C, heart rate of 101, respirations 20, blood pressure 119/86, pulse ox at 99% on room air  2.  EKG: Atrial flutter/fibrillation with rapid ventricular response with nonspecific ST segment changes  3.  Patient initially was not in A-fib with RVR but then developed it later on in his ED visit with his rates being as high as 150s at which time he was given several doses of IV Lopressor, IV morphine 4 mg x 2 and 25  mcg of fentanyl with slow improvement in his heart rates now in sinus rhythm.  4.  WBC = 27.9 -->  5.  TSH = <0.01  6.  Free T4 = 2.48  7.  HSTI = 9 --> 10  8.  Glucose = 149 -->  9.  Sodium = 134 -->  10.  BUN/Creatinine = 12/0.95  11.  Alk Phos = 125  12.  Magnesium = 1.49 -->  13.  CRP = 10.76 -->  14.  ESR = 34  15.  BNP = 225  16.  Blood Culture x2 = pending  17.  VBG: pH 7.43, pCO2 43, pO2 17, SO2 of 17, Drasco bicarb of 28.5, lactate of 1.4  18.  UA: 2 urobilinogen, trace ketones, trace leukocyte Estrace, 11-20 urine WBCs  19.  CT head/cervical spine without contrast: No acute intracranial processes, encephalomalacia along the right anterior lateral ventricle similar to prior imaging, no acute fracture/traumatic subluxation of cervical spine, postsurgical changes of cervical spine with degenerative changes, moderate canal stenosis again noted at C3-C4 and moderate to severe canal stenosis at C4-C5.  20.  CT thoracic/lumbar spine without contrast: Thoracic and lumbar spine was without any acute fracture/malalignment but there is diffuse degenerative changes,  21.  CXR: Noted no acute cardiopulmonary processes  22.  XR right shoulder noted no acute osseous abnormalities.  23.  CT chest/abdomen/pelvis with IV contrast: No acute cardiopulmonary processes, distended gallbladder with luminal gallstones and findings concerning for acute cholecystitis, moderate colonic stool  24.  Ultrasound gallbladder noted diffuse hepatic steatosis, cholelithiasis without any gallbladder wall thickening or biliary dilatation appreciated.    A 12 point ROS was performed with the patient denying any complaints at this time aside from those listed in the HPI above.    Past Medical History  He has a past medical history of Old myocardial infarction, Personal history of other diseases of the circulatory system, Personal history of other diseases of the musculoskeletal system and connective tissue, Personal history of other diseases  of the nervous system and sense organs, Personal history of other endocrine, nutritional and metabolic disease, and Personal history of other endocrine, nutritional and metabolic disease.    Surgical History  He has a past surgical history that includes Other surgical history (12/11/2018); Other surgical history (12/11/2018); Other surgical history (12/11/2018); Other surgical history (12/11/2018); Other surgical history (12/11/2018); Other surgical history (12/11/2018); and Cardiac catheterization (N/A, 12/6/2023).     Social History  He reports that he has never smoked. He has never used smokeless tobacco. He reports that he does not drink alcohol and does not use drugs.    Family History  Family History   Problem Relation Name Age of Onset    Hypertension Mother      Cancer Father      Diabetes Brother      Diabetes Maternal Grandmother      Colon cancer Maternal Grandfather          Allergies  Penicillins, Tetanus toxoid, Hydrocodone-acetaminophen, Niacin, and Sulfa (sulfonamide antibiotics)    Review of Systems   Constitutional:  Positive for activity change and fatigue.   Cardiovascular:  Positive for chest pain and palpitations. Negative for leg swelling.        Pain with deep breaths and palpation of the right shoulder and chest wall   Gastrointestinal:  Positive for constipation.   Genitourinary:  Positive for dysuria.   Musculoskeletal:  Positive for arthralgias, back pain, gait problem, myalgias, neck pain and neck stiffness.   Neurological:  Positive for dizziness, syncope, weakness and light-headedness. Negative for tremors, seizures, speech difficulty, numbness and headaches.   All other systems reviewed and are negative.       Physical Exam  Vitals and nursing note reviewed.   Constitutional:       General: He is in acute distress (Pain with wincing on palpation and ROM of the Right [place]).      Appearance: He is obese. He is not ill-appearing or diaphoretic.   HENT:      Head: Normocephalic and  atraumatic.      Nose: Nose normal.      Mouth/Throat:      Mouth: Mucous membranes are moist.      Pharynx: Oropharynx is clear.   Eyes:      Extraocular Movements: Extraocular movements intact.      Pupils: Pupils are equal, round, and reactive to light.   Neck:      Vascular: No carotid bruit.      Comments: Tenderness along anterior surgical scar but no wound dehiscence or erythema or fluctuance, ROM diminished due to stiffness and discomfort at the should, in a soft collar currently  Cardiovascular:      Rate and Rhythm: Normal rate and regular rhythm.      Pulses: Normal pulses.      Heart sounds: No murmur heard.  Pulmonary:      Effort: Pulmonary effort is normal. No respiratory distress.      Breath sounds: Normal breath sounds. No stridor. No wheezing or rales.   Chest:      Chest wall: No tenderness.   Abdominal:      General: Bowel sounds are normal. There is no distension.      Palpations: Abdomen is soft. There is no mass.      Tenderness: There is no abdominal tenderness. There is no right CVA tenderness, left CVA tenderness, guarding or rebound.   Musculoskeletal:         General: Swelling and tenderness present. No deformity or signs of injury. Normal range of motion.      Cervical back: Neck supple. Tenderness present. No rigidity.      Right lower leg: Edema present.      Left lower leg: Edema present.      Comments: Trace pitting bilateral lower extremity edema   Lymphadenopathy:      Cervical: No cervical adenopathy.   Skin:     General: Skin is warm.      Capillary Refill: Capillary refill takes less than 2 seconds.      Findings: No bruising, erythema, lesion or rash.   Neurological:      General: No focal deficit present.      Mental Status: He is alert and oriented to person, place, and time.      Cranial Nerves: No cranial nerve deficit.      Sensory: No sensory deficit.      Motor: Weakness present.      Coordination: Coordination normal.      Gait: Gait abnormal.      Comments:  "Finger-nose/heel-to-shin intact, range of motion/strength globally diminished at 4 out of 5 except significant pain with flexion/extension at the right shoulder with tenderness to palpation around the joint line with some crepitus noted on palpation   Psychiatric:         Mood and Affect: Mood normal.         Behavior: Behavior normal.         Thought Content: Thought content normal.         Judgment: Judgment normal.         SCHEDULED MEDICATIONS  apixaban, 5 mg, oral, BID  aspirin, 81 mg, oral, Daily  atorvastatin, 80 mg, oral, Daily  cefepime, 1 g, intravenous, q8h  cefepime, 2 g, intravenous, q8h  [START ON 1/4/2024] clopidogrel, 75 mg, oral, Daily  DULoxetine, 60 mg, oral, Daily  fluticasone furoate-vilanteroL, 1 puff, inhalation, Daily  HYDROmorphone, 0.5 mg, intravenous, Once  levothyroxine, 200 mcg, oral, Daily before breakfast  metoprolol tartrate, 100 mg, oral, BID  pantoprazole, 40 mg, oral, Daily before breakfast  ranolazine, 1,000 mg, oral, q12h  tamsulosin, 0.4 mg, oral, Daily  ticagrelor, 90 mg, oral, BID           CONTINUOUS MEDICATIONS          PRN MEDICATIONS  PRN medications: acetaminophen, albuterol, HYDROmorphone, HYDROmorphone, melatonin, ondansetron, orphenadrine, polyethylene glycol      Last Recorded Vitals  Blood pressure 114/70, pulse 95, temperature 36.6 °C (97.9 °F), resp. rate 20, height 1.626 m (5' 4\"), weight 99.8 kg (220 lb), SpO2 98 %.    Relevant Results    Results for orders placed or performed during the hospital encounter of 12/21/23 (from the past 24 hour(s))   CBC and Auto Differential   Result Value Ref Range    WBC 27.9 (H) 4.4 - 11.3 x10*3/uL    nRBC 0.0 0.0 - 0.0 /100 WBCs    RBC 5.03 4.50 - 5.90 x10*6/uL    Hemoglobin 14.6 13.5 - 17.5 g/dL    Hematocrit 42.3 41.0 - 52.0 %    MCV 84 80 - 100 fL    MCH 29.0 26.0 - 34.0 pg    MCHC 34.5 32.0 - 36.0 g/dL    RDW 13.5 11.5 - 14.5 %    Platelets 342 150 - 450 x10*3/uL    Neutrophils % 86.6 40.0 - 80.0 %    Immature Granulocytes " %, Automated 0.8 0.0 - 0.9 %    Lymphocytes % 6.6 13.0 - 44.0 %    Monocytes % 5.3 2.0 - 10.0 %    Eosinophils % 0.4 0.0 - 6.0 %    Basophils % 0.3 0.0 - 2.0 %    Neutrophils Absolute 24.10 (H) 1.20 - 7.70 x10*3/uL    Immature Granulocytes Absolute, Automated 0.22 0.00 - 0.70 x10*3/uL    Lymphocytes Absolute 1.85 1.20 - 4.80 x10*3/uL    Monocytes Absolute 1.48 (H) 0.10 - 1.00 x10*3/uL    Eosinophils Absolute 0.12 0.00 - 0.70 x10*3/uL    Basophils Absolute 0.08 0.00 - 0.10 x10*3/uL   Basic metabolic panel   Result Value Ref Range    Glucose 149 (H) 74 - 99 mg/dL    Sodium 134 (L) 136 - 145 mmol/L    Potassium 3.9 3.5 - 5.3 mmol/L    Chloride 100 98 - 107 mmol/L    Bicarbonate 24 21 - 32 mmol/L    Anion Gap 14 10 - 20 mmol/L    Urea Nitrogen 12 6 - 23 mg/dL    Creatinine 0.95 0.50 - 1.30 mg/dL    eGFR >90 >60 mL/min/1.73m*2    Calcium 9.3 8.6 - 10.3 mg/dL   Magnesium   Result Value Ref Range    Magnesium 1.49 (L) 1.60 - 2.40 mg/dL   Hepatic function panel   Result Value Ref Range    Albumin 3.9 3.4 - 5.0 g/dL    Bilirubin, Total 1.1 0.0 - 1.2 mg/dL    Bilirubin, Direct 0.3 0.0 - 0.3 mg/dL    Alkaline Phosphatase 125 (H) 33 - 120 U/L    ALT 13 10 - 52 U/L    AST 16 9 - 39 U/L    Total Protein 7.3 6.4 - 8.2 g/dL   Troponin I, High Sensitivity   Result Value Ref Range    Troponin I, High Sensitivity 9 0 - 20 ng/L   B-Type Natriuretic Peptide   Result Value Ref Range     (H) 0 - 99 pg/mL   Blood Gas Venous Full Panel   Result Value Ref Range    POCT pH, Venous 7.43 7.33 - 7.43 pH    POCT pCO2, Venous 43 41 - 51 mm Hg    POCT pO2, Venous 17 (L) 35 - 45 mm Hg    POCT SO2, Venous 17 (L) 45 - 75 %    POCT Oxy Hemoglobin, Venous 16.6 (L) 45.0 - 75.0 %    POCT Hematocrit Calculated, Venous 43.0 41.0 - 52.0 %    POCT Sodium, Venous 133 (L) 136 - 145 mmol/L    POCT Potassium, Venous 4.0 3.5 - 5.3 mmol/L    POCT Chloride, Venous 99 98 - 107 mmol/L    POCT Ionized Calicum, Venous 1.20 1.10 - 1.33 mmol/L    POCT Glucose,  Venous 160 (H) 74 - 99 mg/dL    POCT Lactate, Venous 1.4 0.4 - 2.0 mmol/L    POCT Base Excess, Venous 3.6 (H) -2.0 - 3.0 mmol/L    POCT HCO3 Calculated, Venous 28.5 (H) 22.0 - 26.0 mmol/L    POCT Hemoglobin, Venous 14.4 13.5 - 17.5 g/dL    POCT Anion Gap, Venous 10.0 10.0 - 25.0 mmol/L    Patient Temperature 37.0 degrees Celsius    FiO2 21 %   Sedimentation Rate   Result Value Ref Range    Sedimentation Rate 34 (H) 0 - 20 mm/h   C-Reactive Protein   Result Value Ref Range    C-Reactive Protein 10.76 (H) <1.00 mg/dL   TSH with reflex to Free T4 if abnormal   Result Value Ref Range    Thyroid Stimulating Hormone <0.01 (L) 0.44 - 3.98 mIU/L   Thyroxine, Free   Result Value Ref Range    Thyroxine, Free 2.48 (H) 0.61 - 1.12 ng/dL   Troponin I, High Sensitivity   Result Value Ref Range    Troponin I, High Sensitivity 10 0 - 20 ng/L   Urinalysis with Reflex Microscopic   Result Value Ref Range    Color, Urine Yellow Straw, Yellow    Appearance, Urine Clear Clear    Specific Gravity, Urine 1.016 1.005 - 1.035    pH, Urine 5.0 5.0, 5.5, 6.0, 6.5, 7.0, 7.5, 8.0    Protein, Urine NEGATIVE NEGATIVE mg/dL    Glucose, Urine NEGATIVE NEGATIVE mg/dL    Blood, Urine NEGATIVE NEGATIVE    Ketones, Urine 5 (TRACE) (A) NEGATIVE mg/dL    Bilirubin, Urine NEGATIVE NEGATIVE    Urobilinogen, Urine 2.0 (N) <2.0 mg/dL    Nitrite, Urine NEGATIVE NEGATIVE    Leukocyte Esterase, Urine TRACE (A) NEGATIVE   Microscopic Only, Urine   Result Value Ref Range    WBC, Urine 11-20 (A) 1-5, NONE /HPF    RBC, Urine NONE NONE, 1-2, 3-5 /HPF    Mucus, Urine 1+ Reference range not established. /LPF          CT thoracic spine wo IV contrast    Result Date: 12/21/2023  STUDY: CT Thoracic Spine, and Lumbar Spine without IV Contrast; 12/21/2023 10:03 pm INDICATION: Post operative fever. COMPARISON: None available. ACCESSION NUMBER(S): [id number], [id number] ORDERING CLINICIAN: WESLEY WILEY TECHNIQUE:  CT of the thoracic spine, and lumbar spine was performed  without intravenous or intrathecal contrast.  Sagittal and coronal reconstructions were generated.  Automated mA/kV exposure control was utilized and patient examination was performed in strict accordance with principles of ALARA. FINDINGS: Thoracic spine: The alignment is anatomic.  There is no fracture or traumatic subluxation. The vertebral body heights are well maintained.  There is disc space narrowing and mild endplate degenerative changes with anterior marginal osteophytes extending T5-T11.  There is mild facet arthrosis.  There is narrowing of the neural foramina greater at T10-11. The paravertebral soft tissues are within normal limits.  There are postsurgical changes in the cervical spine.   Lumbar spine: The alignment is anatomic.  There is no fracture or traumatic subluxation. The vertebral body heights are well maintained.  There is disc space narrowing at L5-S1.  There is mild facet arthrosis. At L3-4, there is a mild annular disc bulge and mild facet arthrosis. There is mild narrowing of the central canal and bilateral neural foramina. At L4-5 there is a broad-based disc bulge and left foraminal disc protrusion with moderate facet arthrosis.  There is mild narrowing of the central canal and moderate right and moderate to advanced left neural foraminal stenosis. At L5-S1 there is a mild annular disc bulge and moderate facet arthrosis.  Central canal is patent with mild right and moderate left neural foraminal stenosis. The paravertebral soft tissues are within normal limits.  Gallstones are demonstrated within the partially imaged gallbladder.      Thoracic Spine:  No acute fracture or malalignment.  Degenerative changes as described. Cholelithiasis. Lumbar Spine:  No acute fracture or malalignment.  Degenerative changes as described. Signed by Alfred Snyder, DO    CT lumbar spine wo IV contrast    Result Date: 12/21/2023  STUDY: CT Thoracic Spine, and Lumbar Spine without IV Contrast; 12/21/2023  10:03 pm INDICATION: Post operative fever. COMPARISON: None available. ACCESSION NUMBER(S): [id number], [id number] ORDERING CLINICIAN: WESLEY WILEY TECHNIQUE:  CT of the thoracic spine, and lumbar spine was performed without intravenous or intrathecal contrast.  Sagittal and coronal reconstructions were generated.  Automated mA/kV exposure control was utilized and patient examination was performed in strict accordance with principles of ALARA. FINDINGS: Thoracic spine: The alignment is anatomic.  There is no fracture or traumatic subluxation. The vertebral body heights are well maintained.  There is disc space narrowing and mild endplate degenerative changes with anterior marginal osteophytes extending T5-T11.  There is mild facet arthrosis.  There is narrowing of the neural foramina greater at T10-11. The paravertebral soft tissues are within normal limits.  There are postsurgical changes in the cervical spine.   Lumbar spine: The alignment is anatomic.  There is no fracture or traumatic subluxation. The vertebral body heights are well maintained.  There is disc space narrowing at L5-S1.  There is mild facet arthrosis. At L3-4, there is a mild annular disc bulge and mild facet arthrosis. There is mild narrowing of the central canal and bilateral neural foramina. At L4-5 there is a broad-based disc bulge and left foraminal disc protrusion with moderate facet arthrosis.  There is mild narrowing of the central canal and moderate right and moderate to advanced left neural foraminal stenosis. At L5-S1 there is a mild annular disc bulge and moderate facet arthrosis.  Central canal is patent with mild right and moderate left neural foraminal stenosis. The paravertebral soft tissues are within normal limits.  Gallstones are demonstrated within the partially imaged gallbladder.      Thoracic Spine:  No acute fracture or malalignment.  Degenerative changes as described. Cholelithiasis. Lumbar Spine:  No acute fracture or  malalignment.  Degenerative changes as described. Signed by Alfred Snyder DO    US gallbladder    Result Date: 12/21/2023  STUDY: Abdominal Ultrasound; 12/21/2023 9:40 PM. INDICATION: Sepsis. Abnormal CT. COMPARISON: CT A/P 12/21/2023. ACCESSION NUMBER(S): [id number] ORDERING CLINICIAN: WESLEY WILEY TECHNIQUE: Ultrasound of the Right Upper Quadrant.  Multiple images of the abdomen were obtained. FINDINGS: LIVER: The liver demonstrates increased echogenicity.   GALLBLADDER: The gallbladder contains a stone within the neck. There is no pericholecystic fluid or wall thickening. Sonographic Herman's sign is negative.   BILE DUCTS: The common bile duct measures 0.3 cm. There is no intrahepatic biliary dilatation.   PANCREAS: The pancreas is not seen due to overlying bowel gas.  RIGHT KIDNEY: The right kidney measures 10.2 cm in length. Renal cortical echotexture is normal. There is no hydronephrosis. There are no stones. There are no cysts.    Diffuse hepatic steatosis. Cholelithiasis.  No gallbladder wall thickening or biliary dilatation is appreciated. Signed by Alfred Snyder DO    CT head wo IV contrast    Result Date: 12/21/2023  Interpreted By:  Liudmila Vasquez, STUDY: CT HEAD WO IV CONTRAST; CT CERVICAL SPINE WO IV CONTRAST;  12/21/2023 8:39 pm   INDICATION: Signs/Symptoms:fall; Signs/Symptoms:fall recent spiinal fusion.   COMPARISON: 12/18/2023   ACCESSION NUMBER(S): [id number]; [id number]   ORDERING CLINICIAN: MARY MYERS   TECHNIQUE: Noncontrast CT images of head. Axial noncontrast CT images of the cervical spine with coronal and sagittal reconstructed images.   FINDINGS: BRAIN PARENCHYMA: Encephalomalacia in the region of the right head of the caudate measuring up to 2.1 cm which extends to the anterior horn of the right lateral ventricle, similar to prior imaging. Mild-to-moderate generalized parenchymal volume loss and mild nonspecific white matter changes. Atherosclerotic calcifications  noted. No mass effect or midline shift.   HEMORRHAGE: No acute intracranial hemorrhage. VENTRICLES and EXTRA-AXIAL SPACES: The ventricles, sulci and basal cisterns enlarged, concordant with parenchymal volume loss. EXTRACRANIAL SOFT TISSUES: Within normal limits. PARANASAL SINUSES/MASTOIDS: Trace fluid in the mastoid air cells. Partial opacification of the ethmoid air cells with scattered mucosal thickening including the maxillary sinuses. The CALVARIUM: No depressed skull fracture. No destructive osseous lesion.   OTHER FINDINGS: Partially imaged periapical lucency noted along the right frontal maxilla.   CERVICAL SPINE:   ALIGNMENT: Postsurgical changes of an ACDF and corpectomy from C4 through C6. Hardware appears intact without periprosthetic lucency and similar in position to prior imaging. Mild straightening of the upper cervical spine. Facet joint and craniocervical alignment maintained. VERTEBRAE: Postsurgical changes of the vertebral bodies with mild vertebral body height loss again noted at C4. No acute fracture identified. SPINAL CANAL: Degenerative changes facet and uncinate arthropathy, as well as disc space narrowing and end plate hypertrophy. Moderate canal and foraminal narrowing at C3-C4. Moderate to severe canal stenosis at C4-C5 with at least moderate foraminal narrowing. Moderate foraminal narrowing at C5-C6. PREVERTEBRAL SOFT TISSUES: No prevertebral soft tissue swelling. LUNG APICES: Not imaged.   OTHER FINDINGS: Soft tissue stranding in the ventral left cervical region likely related to prior intervention..       No acute intracranial hemorrhage or mass effect.   Encephalomalacia along the right anterior lateral ventricle similar prior imaging and possibly developmental or sequela of prior insult.   No acute fracture or traumatic subluxation of the cervical spine.   Postsurgical changes of the cervical spine with degenerative changes. Moderate canal stenosis again noted at C3-C4 and moderate  to severe canal stenosis at C4-C5.   MACRO: None.   Signed by: Liudmila Vasquez 12/21/2023 9:31 PM Dictation workstation:   [id number]    CT cervical spine wo IV contrast    Result Date: 12/21/2023  Interpreted By:  Liudmila Vasquez, STUDY: CT HEAD WO IV CONTRAST; CT CERVICAL SPINE WO IV CONTRAST;  12/21/2023 8:39 pm   INDICATION: Signs/Symptoms:fall; Signs/Symptoms:fall recent spiinal fusion.   COMPARISON: 12/18/2023   ACCESSION NUMBER(S): [id number]; [id number]   ORDERING CLINICIAN: MARY MYERS   TECHNIQUE: Noncontrast CT images of head. Axial noncontrast CT images of the cervical spine with coronal and sagittal reconstructed images.   FINDINGS: BRAIN PARENCHYMA: Encephalomalacia in the region of the right head of the caudate measuring up to 2.1 cm which extends to the anterior horn of the right lateral ventricle, similar to prior imaging. Mild-to-moderate generalized parenchymal volume loss and mild nonspecific white matter changes. Atherosclerotic calcifications noted. No mass effect or midline shift.   HEMORRHAGE: No acute intracranial hemorrhage. VENTRICLES and EXTRA-AXIAL SPACES: The ventricles, sulci and basal cisterns enlarged, concordant with parenchymal volume loss. EXTRACRANIAL SOFT TISSUES: Within normal limits. PARANASAL SINUSES/MASTOIDS: Trace fluid in the mastoid air cells. Partial opacification of the ethmoid air cells with scattered mucosal thickening including the maxillary sinuses. The CALVARIUM: No depressed skull fracture. No destructive osseous lesion.   OTHER FINDINGS: Partially imaged periapical lucency noted along the right frontal maxilla.   CERVICAL SPINE:   ALIGNMENT: Postsurgical changes of an ACDF and corpectomy from C4 through C6. Hardware appears intact without periprosthetic lucency and similar in position to prior imaging. Mild straightening of the upper cervical spine. Facet joint and craniocervical alignment maintained. VERTEBRAE: Postsurgical changes of the vertebral  bodies with mild vertebral body height loss again noted at C4. No acute fracture identified. SPINAL CANAL: Degenerative changes facet and uncinate arthropathy, as well as disc space narrowing and end plate hypertrophy. Moderate canal and foraminal narrowing at C3-C4. Moderate to severe canal stenosis at C4-C5 with at least moderate foraminal narrowing. Moderate foraminal narrowing at C5-C6. PREVERTEBRAL SOFT TISSUES: No prevertebral soft tissue swelling. LUNG APICES: Not imaged.   OTHER FINDINGS: Soft tissue stranding in the ventral left cervical region likely related to prior intervention..       No acute intracranial hemorrhage or mass effect.   Encephalomalacia along the right anterior lateral ventricle similar prior imaging and possibly developmental or sequela of prior insult.   No acute fracture or traumatic subluxation of the cervical spine.   Postsurgical changes of the cervical spine with degenerative changes. Moderate canal stenosis again noted at C3-C4 and moderate to severe canal stenosis at C4-C5.   MACRO: None.   Signed by: Liudmila Vasquez 12/21/2023 9:31 PM Dictation workstation:   [id number]    CT chest abdomen pelvis w IV contrast    Result Date: 12/21/2023  STUDY: CT Chest, Abdomen, and Pelvis with IV Contrast; 12/21/2023 at 8:42 p.m. INDICATION: Sepsis. COMPARISON: One-view CXR 12/21/2023.  XR pelvis 10/30/2023. ACCESSION NUMBER(S): [id number] ORDERING CLINICIAN: MARY MYERS TECHNIQUE: CT of the chest, abdomen, and pelvis was performed.  Contiguous axial images were obtained at 3 mm slice thickness through the chest, abdomen, and pelvis.  Coronal and sagittal reconstructions at 3 mm slice thickness were performed.  Omnipaque 350 75 mL was administered intravenously.  FINDINGS: CHEST: MEDIASTINUM: The heart is normal in size without pericardial effusion.  Central vascular structures opacify normally.  Patient is status post median sternotomy. LUNGS/PLEURA: There is no pleural effusion,  pleural thickening, or pneumothorax. The airways are patent. Lungs are clear without consolidation, interstitial disease, or suspicious nodules. LYMPH NODES: Thoracic lymph nodes are not enlarged. ABDOMEN:  LIVER: No hepatomegaly.  Smooth surface contour.  There is fatty liver morphology.  BILE DUCTS: No intrahepatic or extrahepatic biliary ductal dilatation.  GALLBLADDER: The gallbladder is prominent with luminal gallstones. STOMACH: No abnormalities identified.  PANCREAS: No masses or ductal dilatation.  SPLEEN: No splenomegaly or focal splenic lesion.  ADRENAL GLANDS: No thickening or nodules.  KIDNEYS AND URETERS: Kidneys are normal in size and location.  No renal or ureteral calculi.  PELVIS:  BLADDER: No abnormalities identified.  REPRODUCTIVE ORGANS: No abnormalities identified.  BOWEL: No abnormalities identified.  There is moderate colonic stool.  VESSELS: No abnormalities identified.  Abdominal aorta is normal in caliber.  PERITONEUM/RETROPERITONEUM/LYMPH NODES: No free fluid.  No pneumoperitoneum. No lymphadenopathy.  ABDOMINAL WALL: No abnormalities identified. SOFT TISSUES: No abnormalities identified.  BONES: No acute fracture or aggressive osseous lesion.    Chest: No acute cardiopulmonary disease. Abdomen: Distended gallbladder with luminal gallstones.  Findings concerning for acute cholecystitis.  Recommend ultrasound for further evaluation. Pelvis: No acute inflammatory changes. Moderate colonic stool. Signed by Alfred Snyder, DO    XR shoulder right 2+ views    Result Date: 12/21/2023  STUDY: Shoulder Radiographs; 12/21/2023 at 4:44 PM. INDICATION: Fall. COMPARISON: XR right shoulder 12/18/2023. ACCESSION NUMBER(S): [id number] ORDERING CLINICIAN: MARY MYERS TECHNIQUE:  Two view(s) of the right shoulder. FINDINGS:  There is no displaced fracture.  There are degenerative changes most pronounced the acromioclavicular joint.  No soft tissue abnormality is seen.    No acute osseous  abnormalities. Signed by Kedar Shepard MD    XR chest 1 view    Result Date: 12/21/2023  STUDY: Chest Radiograph;  12/21/2023 at 4:44 PM. INDICATION: Fall. COMPARISON: Cardiac cath 12/6/2023; XR chest 11/29/2023, 4/20/2022. ACCESSION NUMBER(S): [id number] ORDERING CLINICIAN: MARY MYERS TECHNIQUE:  Frontal chest was obtained at 16:44 hours. FINDINGS: CARDIOMEDIASTINAL SILHOUETTE: The patient status post median sternotomy.  Cardiomediastinal silhouette is normal in size and configuration.  LUNGS: Lungs are clear.  ABDOMEN: No remarkable upper abdominal findings.  BONES: No acute osseous changes.    No evidence consolidating infiltrate or effusion. Signed by Kedar Shepard MD    XR shoulder right 2+ views    Result Date: 12/18/2023  STUDY: Shoulder Radiographs; 12/18/2023 at 3:50 PM INDICATION: Right shoulder pain post fall. COMPARISON: None Available. ACCESSION NUMBER(S): [id number] ORDERING CLINICIAN: JENA BAEZA TECHNIQUE:  Two view(s) (four images) of the right shoulder. FINDINGS:  There is no displaced fracture.  There are degenerative change with joint space loss and osteophytes at the acromioclavicular joint.  No soft tissue abnormality is seen.    No acute osseous abnormalities. Signed by Kedar Shepard MD    CT cervical spine wo IV contrast    Result Date: 12/18/2023  Interpreted By:  Yu Wilburn, STUDY: CT CERVICAL SPINE WO IV CONTRAST;  12/18/2023 3:16 pm   INDICATION: Signs/Symptoms:fall.   COMPARISON: None.   ACCESSION NUMBER(S): [id number]   ORDERING CLINICIAN: JENA BAEZA   TECHNIQUE: Axial CT images of the cervical spine are obtained. Axial, coronal and sagittal reconstructions are provided for review.   FINDINGS: Status post ventral fusion C4-C6. The hardware is intact. Patient appears to have had partial corpectomy of C5 and C6.   Fractures: There is no evidence for an acute fracture of the cervical spine.   Vertebral Alignment: Within normal limits.   Craniocervical Junction: The odontoid  process and craniocervical junction are intact.   Vertebrae/Disc Spaces:  The cervical vertebral body heights are intact and the disc spaces are preserved.   Prevertebral/Paraspinal Soft Tissues: The prevertebral and paraspinal soft tissues are unremarkable.         No evidence for an acute fracture or subluxation of the cervical spine.   MACRO: None   Signed by: Yu Wilburn 12/18/2023 3:40 PM Dictation workstation:   [id number]    CT head wo IV contrast    Result Date: 12/18/2023  Interpreted By:  Yu Wilburn, STUDY: CT HEAD WO IV CONTRAST;  12/18/2023 3:16 pm   INDICATION: Signs/Symptoms:fall.   COMPARISON: None   ACCESSION NUMBER(S): [id number]   ORDERING CLINICIAN: JENA BAEZA   TECHNIQUE: Examination was performed in the axial plane with sagittal and coronal reconstructions. Bone and soft tissue algorithms were performed.   FINDINGS: INTRACRANIAL: The ventricular system is symmetrical and nondilated. No mass or mass effect is identified. There is no hemorrhage or subdural fluid collection. There is no acute infarct. There is a remote small infarct next to the frontal horn of the right lateral ventricle.   EXTRACRANIAL: There is mild mucosal thickening of the maxillary sinuses and ethmoid air cells. There is opacification of few mastoid air cells bilaterally consistent with mastoiditis.       No acute intracranial pathology.   MACRO: None   Signed by: Yu Wilburn 12/18/2023 3:37 PM Dictation workstation:   [id number]    Electrocardiogram, 12-lead PRN ACS symptoms    Result Date: 12/11/2023  Poor data quality, interpretation may be adversely affected Sinus rhythm Low voltage QRS Nonspecific ST abnormality Borderline ECG Confirmed by Saman Fajardo ([id number]) on 12/11/2023 4:11:32 PM    Electrocardiogram 12 Lead    Result Date: 12/11/2023  Normal sinus rhythm Normal ECG When compared with ECG of 06-DEC-2023 07:18, No significant change was found Confirmed by Rickey Membreno ([id number]) on 12/11/2023 12:59:55  PM    Interoperative monitoring - IOM    Result Date: 12/8/2023  This intraoperative neurophysiological monitoring study provided technically successful results, and did not demonstrate any potentially significant changes during the operative procedure. I personally reviewed the recordings and I agree with the findings.     Transthoracic Echo (TTE) Complete    Result Date: 12/7/2023   [facility], [address] [place] [address]                Tel [phone] and Fax [phone] TRANSTHORACIC ECHOCARDIOGRAM REPORT  Patient Name:     BRENDA Jeronimo Physician:  [id number] Neto Monet MD Study Date:       12/7/2023           Ordering Provider:  [id number] KIKI BROWN MRN/PID:          [id number]            Fellow:             Matthew Kerns MD Accession#:       [id number]        Nurse: Date of           1965 / 58      Sonographer:        Aliya Hughes RDCS, Birth/Age:        years                                   RVT Gender:           M                   Additional Staff: Height:           162.56 cm           Admit Date:         11/29/2023 Weight:           100.70 kg           Admission Status:   Inpatient - STAT BSA:              2.04 m2             Encounter#:         [id number]                                       [facility]                                       Location: Blood Pressure: 117 /60 mmHg Study Type:    TRANSTHORACIC ECHO (TTE) COMPLETE Diagnosis/ICD: Non ST elevation (NSTEMI) myocardial infarction-I21.4 Indication:    NSTEMI CPT Code:      Echo Complete w Full Doppler-74713 Patient History: Pertinent History: CABG x 3, 2003. PCI. Afib. DM. HLD. HTN. ARISTEO. TIA. COPD. Study Detail: The following Echo studies were performed: 2D, M-Mode, Doppler and               color flow. Technically challenging study due to poor acoustic               windows and body habitus. Definity used as a  contrast agent for               endocardial border definition. Total contrast used for this               procedure was 4 mL via IV push. Unable to obtain suprasternal               notch view.  PHYSICIAN INTERPRETATION: Left Ventricle: The left ventricular systolic function is mildly decreased, with an estimated ejection fraction of 45-50%. Wall motion is abnormal. The left ventricular cavity size is normal. Abnormal (paradoxical) septal motion consistent with post-operative status. Left ventricular diastolic filling was not assessed. LV Wall Scoring: The basal and mid inferior septum and basal anteroseptal segment are akinetic. Left Atrium: The left atrium was not well visualized. Right Ventricle: The right ventricle was not well visualized. Unable to determine right ventricular systolic function. Right Atrium: The right atrium was not well visualized. Aortic Valve: The aortic valve is probably trileaflet. There is trivial aortic valve regurgitation. The peak instantaneous gradient of the aortic valve is 7.0 mmHg. Mitral Valve: The mitral valve is normal in structure. There is mild mitral annular calcification. There is no evidence of mitral valve regurgitation. Tricuspid Valve: The tricuspid valve was not well visualized. No evidence of tricuspid regurgitation. Pulmonic Valve: The pulmonic valve is structurally normal. There is no indication of pulmonic valve regurgitation. Pericardium: There is a trivial pericardial effusion. Aorta: The aortic root is normal. There is no dilatation of the ascending aorta. Systemic Veins: The inferior vena cava size appears small.  CONCLUSIONS:  1. Poorly visualized anatomical structures due to suboptimal image quality.  2. Abnormal septal motion consistent with post-operative status.  3. Left ventricular systolic function is mildly decreased with a 45-50% estimated ejection fraction.  4. Basal and mid inferior septum and basal anteroseptal segment are abnormal. QUANTITATIVE  DATA SUMMARY: 2D MEASUREMENTS:                           Normal Ranges: Ao Root d:     2.70 cm    (2.0-3.7cm) LAs:           4.00 cm    (2.7-4.0cm) IVSd:          1.20 cm    (0.6-1.1cm) LVPWd:         1.00 cm    (0.6-1.1cm) LVIDd:         5.40 cm    (3.9-5.9cm) LVIDs:         2.90 cm LV Mass Index: 114.8 g/m2 LV % FS        46.3 % AORTA MEASUREMENTS:                    Normal Ranges: Asc Ao, d: 2.80 cm (2.1-3.4cm) LV DIASTOLIC FUNCTION:                     Normal Ranges: MV Peak E: 1.09 m/s (0.7-1.2 m/s) MV Peak A: 1.10 m/s (0.42-0.7 m/s) E/A Ratio: 0.99     (1.0-2.2) MITRAL VALVE:                 Normal Ranges: MV DT: 227 msec (150-240msec) AORTIC VALVE:                         Normal Ranges: AoV Vmax:      1.32 m/s (<=1.7m/s) AoV Peak P.0 mmHg (<20mmHg) LVOT Max Simone:  0.61 m/s (<=1.1m/s) LVOT VTI:      13.00 cm LVOT Diameter: 2.10 cm  (1.8-2.4cm) AoV Area,Vmax: 1.60 cm2 (2.5-4.5cm2) TRICUSPID VALVE/RVSP:                   Normal Ranges: IVC Diam: 1.00 cm PULMONIC VALVE:                         Normal Ranges: PV Accel Time: 90 msec  (>120ms) PV Max Simone:    1.1 m/s  (0.6-0.9m/s) PV Max P.5 mmHg  [id number] Neto Monet MD Electronically signed on 2023 at 9:47:48 AM  Wall Scoring  ** Final **     ECG 12 Lead    Result Date: 2023  Normal sinus rhythm Normal ECG When compared with ECG of 05-DEC-2023 21:14, No significant change was found Confirmed by Saman Fajardo ([id number]) on 2023 8:18:08 AM    ECG 12 Lead    Result Date: 2023  Normal sinus rhythm Normal ECG Confirmed by Saman Fajardo ([id number]) on 2023 8:17:58 AM    Cardiac catheterization - coronary    Result Date: 2023   [facility], Cath Lab, [address] [place] [address] Cardiovascular Catheterization Report Patient Name:      BRENDA CELIS BATOOL      Performing Physician:  [id number] Dez Gonzales MD Study Date:        2023           Verifying  Physician:   [id number] Dez Gonzales MD MRN/PID:           [id number]            Cardiologist/Co-scrub: Accession#:        [id number]        Ordering Provider:     [id number] MARIA ISABEL WARE Date of Birth/Age: 1965 / 58      Fellow:                    years Gender:            M                   Fellow: Encounter#:        [id number]  Study: Left Heart Cath  Indications: BRENDA RENAE is a 59 year old male who presents with prior myocardial infarction, prior percutaneous coronary intervention, prior coronary artery bypass graft surgery, hypertension, dyslipidemia and a chest pain assessment of typical angina. NSTE - ACS. Medical History: Stress test performed: No. CTA performed: No. [place] accessed: No. LVEF Assessed: Yes. Cardiac arrest: No. Cardiac surgical consult: No. Cardiovascular instability: No. Frailty status of patient entering lab: 5 = Mildly frail.  Procedure Description: After infiltration with 2% Lidocaine, the right femoral artery was cannulated with a modified Seldinger technique. Subsequently a 6 [place] sheath was placed in the right femoral artery. Selective coronary catheterization was performed using a 6 Fr catheter(s) exchanged over a guide wire to cannulate the coronary arteries. A JL 4 tip catheter was used for left coronary injections. A JR 4 tip catheter was used for right coronary injections. Multiple injections of contrast were made into the left and right coronary arteries with angiograms recorded in multiple projections. After completion of the procedure, femoral artery angiography was performed. This demonstrated a common femoral artery puncture appropriate for closure. An Angio-Seal Evolution 6F ([employer]) vascular closure device was placed per protocol.  Coronary Angiography: The coronary circulation is right dominant.  Left Main Coronary Artery: The left main  coronary artery is a normal caliber vessel. The left main arises normally from the left coronary sinus of Valsalva and bifurcates into the LAD and circumflex coronary arteries. The left main coronary artery showed no significant disease or stenosis greater than 30%.  Left Anterior Descending Coronary Artery Distribution: The left anterior descending coronary artery is a normal caliber vessel. The LAD arises normally from the left main coronary artery. The LAD demonstrated chronic occlusion and total occlusion.  Circumflex Coronary Artery Distribution: The circumflex coronary artery is a normal caliber vessel. The circumflex arises normally from the left main coronary artery and terminates in the AV groove. The circumflex revealed severe atherosclerotic disease and diffuse atherosclerotic disease. The 2nd obtuse marginal branch is a normal caliber vessel. The 2nd obtuse marginal branch demonstrated significant in-stent restenosis and chronic occlusion.  Right Coronary Artery Distribution: The right coronary artery is a normal caliber vessel. The RCA arises normally from the right sinus of Valsalva. The RCA showed multiple previous stents, total thrombotic occlusion originating at the mid segment and total thrombotic occlusion originating at the mid to distal segment. This lesion was severely thrombotic.  Coronary Grafts:  LIMA Graft: Left internal mammary artery graft conduit, originating in situ and attached to the mid left anterior descending, is patent. Saphaneous Vein Graft(s): The saphenous vein graft, originating from the aorta and attached to the 2nd obtuse marginal, appeared totally occluded. Radial Graft: The radial graft, originating from the aorta and attached to the 1st diagonal, is patent.  Valve Findings: No aortic valve stenosis is visualized.  Coronary Interventions: Angiography reveals a 80% stenosis of the mid and mid to distal right coronary artery coronary artery. Pre-intervention BERTIN flow was 2.  Intravascular Ultrasound (IVUS) was performed within the mid and mid to distal right coronary artery. . Underexpanded stent in mid to distal RCA with thrombus. Underexpanded stent in mid to distal RCA with thrombus. Percutaneous coronary intervention was performed within the mid and mid to distal right coronary artery. The vessel was pre-dilated using a non-compliant balloon 3.5 mm x 15 mm at 22 CHELITA. The stenosis was successfully reduced from 80% to <10%. Post-intervention BERTIN flow was 3. Mechanical thrombectomy of RCA was done with Penumbra catheter witth IC injection of Verapamil and Nipride.  Coronary Lesion Summary: Vessel Vessel Segment RCA    mid to distal  Graft Stenosis Summary: Graft      Destination of Graft LIMA   mid left anterior descending SVG 1  2nd obtuse marginal Radial 1st diagonal  Hemo Personnel: +---------------+---------+ Name           Duty      +---------------+---------+ Dez Gonzales MDPKENDALL MD 1 +---------------+---------+  Hemodynamic Pressures:  +----+-------------------+---------+------------+-------------+------+---------+ Site     Date Time       Phase    Systolic    Diastolic    ED  Mean mmHg                          Name       mmHg        mmHg      mmHg           +----+-------------------+---------+------------+-------------+------+---------+   AO  12/6/2023 2:31:15     Rest         105           60             80                      PM                                                  +----+-------------------+---------+------------+-------------+------+---------+   AO  12/6/2023 2:33:23     Rest         105           61             83                      PM                                                  +----+-------------------+---------+------------+-------------+------+---------+   LV  12/6/2023 2:33:51     Rest         110           12    20                               PM                                                   +----+-------------------+---------+------------+-------------+------+---------+  LVp  12/6/2023 2:33:57     Rest         107            9    17                               PM                                                  +----+-------------------+---------+------------+-------------+------+---------+  AOp  12/6/2023 2:34:04     Rest         108           66             85                      PM                                                  +----+-------------------+---------+------------+-------------+------+---------+   AO  12/6/2023 2:41:02     Rest         108           63             85                      PM                                                  +----+-------------------+---------+------------+-------------+------+---------+   AO  12/6/2023 2:49:03     Rest         113           58             81                      PM                                                  +----+-------------------+---------+------------+-------------+------+---------+   AO  12/6/2023 3:02:47     Rest         102           61             79                      PM                                                  +----+-------------------+---------+------------+-------------+------+---------+   AO  12/6/2023 3:16:15     Rest          98           68             83                      PM                                                  +----+-------------------+---------+------------+-------------+------+---------+   AO  12/6/2023 3:26:09     Rest          79           53             65                      PM                                                  +----+-------------------+---------+------------+-------------+------+---------+   AO  12/6/2023 3:30:22     Rest          91           65             76                      PM                                                   +----+-------------------+---------+------------+-------------+------+---------+   AO  12/6/2023 3:30:54     Rest         115           76             94                      PM                                                  +----+-------------------+---------+------------+-------------+------+---------+  Complications: No in-lab complications observed.  Cardiac Cath Post Procedure Notes: Post Procedure Diagnosis: CAD. Blood Loss:               Estimated blood loss during the procedure was 0 mls. Specimens Removed:        Number of specimen(s) removed: none.  Recommendations: Maximize medical therapy. Agressive risk factor modification efforts. Triple therapy with Brilinta/ASA and Eliquis for 1 month and then Double therapy with Plavix and Eliquis for life. Consider referral to cardiac rehabilitation. ____________________________________________________________________________________ CONCLUSIONS:  1. Triple vessel disease.  2. The 2nd obtuse marginal branch demonstrated significant in-stent restenosis and chronic occlusion.  3. Culprit vessel(s): right coronary artery.  4. Patent LIMA to LAD with diffuse instent restenosis of distal LAD and patent Radial graft to Dg.  5. Thrombotic occlusion of RCA.  6. Successful mechanical thrombectomy and angioplasty of RCA.  7. Left Ventricular end-diastolic pressure = 17.  8. Normal LV filling pressures. ICD 10 Codes: Non ST elevation (NSTEMI) myocardial infarction-I21.4  CPT Codes: Left Heart Cath Bypass Graft w ventriculography and coronary angio(C)-97803; Thrombectomy, mechanical-82405; IVUS, Right Coronary initial Vessel (PCI)-90863.RC; Angioplasty, single Right Coronary major Artery/branch (PCI)-08090.RC; Moderate Sedation Services initial 15 minutes patient >5 years-27302; Moderate Sedation Services 1st additional 15 minutes patient >5 years-[id number]; Moderate Sedation Services 2nd additional 15 minutes patient >5 [id number]  [id number] Dez Gonzales  MD Performing Physician Electronically signed by [id number] Dez Gonzales MD on 12/6/2023 at 7:42:07 PM  ** Final **     XR cervical spine 1 view    Result Date: 12/5/2023  Interpreted By:  Carl Fan and Sheng Max STUDY: Cervical spine, single view.   INDICATION: Signs/Symptoms:intraop.   COMPARISON: Cervical spine radiograph dated 12/04/2023 at 1:50 p.m. and MRI cervical spine dated 11/29/2023   ACCESSION NUMBER(S): [id number]   ORDERING CLINICIAN: MICHAEL MONCADA   FINDINGS: Single lateral radiograph of cervical spine.   There is straightening of the normal cervical lordotic curvature.   Partially imaged hardware plating and screws from anterior cervical corpectomy and fusion at level C4-C6. No hardware fracture or perihardware lucencies within the partially visualized hardware.   Cervical spine levels C5 and lower are obscured by patient's body habitus.   Visualized vertebral body heights are preserved. Posterior elements are intact.   Mild edema within the prevertebral soft tissues.       1. Partially imaged postsurgical changes of anterior C4-C6 cervical fusion without hardware complication.   I personally reviewed the images/study and I agree with the findings as stated by Dr. Guicho Nieto. This study was interpreted at [place], [place].   MACRO: None.   Signed by: Carl Fan 12/5/2023 11:23 AM Dictation workstation:   [id number]    XR cervical spine 1 view    Result Date: 12/4/2023  These images are not reportable by radiology and will not be interpreted by  Radiologists.    XR chest 1 view    Result Date: 11/30/2023  Interpreted By:  Arnaud Alarcon and Liller Gregory STUDY: XR CHEST 1 VIEW;  11/29/2023 11:00 pm   INDICATION: Signs/Symptoms:preop.   COMPARISON: 04/20/2022   ACCESSION NUMBER(S): [id number]   ORDERING CLINICIAN: JEREMI GIBSON   FINDINGS: AP radiograph of the chest was provided.   Postsurgical changes consistent with median sternotomy are seen.  Surgical clips overlie the left mediastinum.   CARDIOMEDIASTINAL SILHOUETTE: Cardiomediastinal silhouette is normal in size and configuration.   LUNGS: Lung volumes contributing to bronchovascular crowding. Otherwise, there is no focal consolidation, pleural effusion, or pneumothorax.   ABDOMEN: No remarkable upper abdominal findings.   BONES: No osseous injury.       No acute cardiopulmonary process   I personally reviewed the images/study and I agree with the findings as stated above by resident physician, Dr. Dharmesh Potter. The study was interpreted at [facility].   Signed by: Arnaud Alarcon 11/30/2023 9:18 AM Dictation workstation:   [id number]    MR brain w and wo IV contrast    Result Date: 11/29/2023  Interpreted By:  Terry Dueñas, STUDY: MR CERVICAL SPINE W AND WO IV CONTRAST; MR THORACIC SPINE W AND WO IV CONTRAST; MR BRAIN W AND WO IV CONTRAST;  11/29/2023 4:47 pm; 11/29/2023 4:50 pm; 11/29/2023 4:51 pm   INDICATION: Signs/Symptoms:paresthesias, weakness legs; Signs/Symptoms:paresthesias, weakness; Signs/Symptoms:lower extremity weakness, parasthesiias.   COMPARISON: None.   ACCESSION NUMBER(S): [id number]; [id number]; [id number]   ORDERING CLINICIAN: RICCI VIEIRA   TECHNIQUE: Sagittal T1, T2, STIR, axial T1 and axial T2 weighted images were acquired through the cervical and thoracic spine with and without contrast. Routine brain MRI protocol without and with contrast including diffusion and gradient echo images. Contrast: 20 mL IV DOTAREM   FINDINGS: BRAIN:   Acute Change: There is no evidence of restricted diffusion to suggest an acute infarct.   Hemorrhage: No evidence of prior parenchymal hemorrhage on the gradient echo images.   Mass Lesion/ Mass Effect: No evidence of an intracranial mass or extra-axial fluid collection. No suspicious parenchymal or leptomeningeal enhancement. No significant mass effect.   Chronic Change: Scattered  foci of hyperintense signal on T2 weighted and FLAIR images are noted involving the supratentorial white matter are nonspecific but likely represent mild microvascular ischemia. Small focus of encephalomalacia and gliosis right periventricular region, likely from prior infarct.   Parenchyma: No significant volume loss for age. The brain parenchyma is otherwise within normal limits of signal intensity and morphology.   Ventricles: Normal caliber and morphology.   Skull Base: Hypothalamic and pituitary region are grossly normal. Craniocervical junction is normal.  No significant marrow replacement process.   Vasculature: Major intracranial arterial structures, and dural venous sinuses show typical flow void, suggesting patency by spin echo criteria.   Other: The visualized paranasal sinuses and mastoid air cells are clear.  The orbits and extracranial soft tissues are unremarkable.     CERVICAL SPINE:   Alignment: The vertebral alignment is within normal limits.   Vertebrae/Intervertebral Discs: The vertebral bodies demonstrate expected height.  The marrow signal is within normal limits. The intervertebral discs demonstrate expected signal and morphology.   Cord: Normal in caliber and signal. No suspicious parenchymal or leptomeningeal enhancement.   C2-C3: There is no posterior disc contour abnormality. There is no significant central canal or neural foraminal stenosis.   C3-C4: Small disc osteophyte complex, left-greater-than-right uncinate hypertrophy and facet degenerative changes. Mild left neural foraminal narrowing. Right neural foramen is patent. No significant canal stenosis.   C4-C5: Disc osteophyte complex, uncinate hypertrophy and facet degenerative change with mild canal stenosis. Moderate bilateral neural foraminal narrowing.   C5-C6: Disc osteophyte complex, uncinate hypertrophy and facet degenerative change with severe canal stenosis. Moderate bilateral neural foraminal narrowing.   C6-C7: Asymmetric  right disc osteophyte complex, uncinate hypertrophy and mild facet degenerative changes with asymmetric right canal stenosis no significant neural foraminal narrowing.   C7-T1: There is no posterior disc contour abnormality. There is no significant central canal or neural foraminal stenosis.     The prevertebral and posterior paraspinous soft tissues are within normal limits.   THORACIC SPINE:   Spinal cord: Normal in caliber and signal characteristics. No suspicious parenchymal or leptomeningeal enhancement.   Alignment: Thoracic kyphosis is within normal limits.   Vertebral body heights: Vertebral body heights are maintained   Marrow signal: No suspicious marrow replacement process   Intervertebral discs: No signal abnormality.   Degenerative change: Scattered degenerative changes without significant spinal canal stenosis or neural foraminal narrowing.   Paraspinous Soft Tissues: Within normal limits.       Brain: No acute intracranial abnormality; no acute infarct intracranial hemorrhage or extra-axial collection. No suspicious intracranial mass, parenchymal or leptomeningeal enhancement. Chronic microvascular ischemia involutional changes.   Cervical spine: No acute fracture or traumatic malalignment. Multilevel degenerative changes with up to severe canal stenosis and moderate neural foraminal narrowing most prominent at C5-C6. No cord signal abnormality, suspicious parenchymal or leptomeningeal enhancement.   Thoracic spine: No acute fracture or traumatic malalignment. Degenerative changes without high-grade canal stenosis or neural foraminal narrowing. No cord signal abnormality, suspicious parenchymal or leptomeningeal enhancement.   Signed by: Terry Dueñas 11/29/2023 5:16 PM Dictation workstation:   [id number]    MR cervical spine w and wo IV contrast    Result Date: 11/29/2023  Interpreted By:  Terry Dueñas, STUDY: MR CERVICAL SPINE W AND WO IV CONTRAST; MR THORACIC SPINE W AND WO IV CONTRAST;  MR BRAIN W AND WO IV CONTRAST;  11/29/2023 4:47 pm; 11/29/2023 4:50 pm; 11/29/2023 4:51 pm   INDICATION: Signs/Symptoms:paresthesias, weakness legs; Signs/Symptoms:paresthesias, weakness; Signs/Symptoms:lower extremity weakness, parasthesiias.   COMPARISON: None.   ACCESSION NUMBER(S): [id number]; [id number]; [id number]   ORDERING CLINICIAN: RICCI VIEIRA   TECHNIQUE: Sagittal T1, T2, STIR, axial T1 and axial T2 weighted images were acquired through the cervical and thoracic spine with and without contrast. Routine brain MRI protocol without and with contrast including diffusion and gradient echo images. Contrast: 20 mL IV DOTAREM   FINDINGS: BRAIN:   Acute Change: There is no evidence of restricted diffusion to suggest an acute infarct.   Hemorrhage: No evidence of prior parenchymal hemorrhage on the gradient echo images.   Mass Lesion/ Mass Effect: No evidence of an intracranial mass or extra-axial fluid collection. No suspicious parenchymal or leptomeningeal enhancement. No significant mass effect.   Chronic Change: Scattered foci of hyperintense signal on T2 weighted and FLAIR images are noted involving the supratentorial white matter are nonspecific but likely represent mild microvascular ischemia. Small focus of encephalomalacia and gliosis right periventricular region, likely from prior infarct.   Parenchyma: No significant volume loss for age. The brain parenchyma is otherwise within normal limits of signal intensity and morphology.   Ventricles: Normal caliber and morphology.   Skull Base: Hypothalamic and pituitary region are grossly normal. Craniocervical junction is normal.  No significant marrow replacement process.   Vasculature: Major intracranial arterial structures, and dural venous sinuses show typical flow void, suggesting patency by spin echo criteria.   Other: The visualized paranasal sinuses and mastoid air cells are clear.  The orbits and extracranial soft tissues are unremarkable.      CERVICAL SPINE:   Alignment: The vertebral alignment is within normal limits.   Vertebrae/Intervertebral Discs: The vertebral bodies demonstrate expected height.  The marrow signal is within normal limits. The intervertebral discs demonstrate expected signal and morphology.   Cord: Normal in caliber and signal. No suspicious parenchymal or leptomeningeal enhancement.   C2-C3: There is no posterior disc contour abnormality. There is no significant central canal or neural foraminal stenosis.   C3-C4: Small disc osteophyte complex, left-greater-than-right uncinate hypertrophy and facet degenerative changes. Mild left neural foraminal narrowing. Right neural foramen is patent. No significant canal stenosis.   C4-C5: Disc osteophyte complex, uncinate hypertrophy and facet degenerative change with mild canal stenosis. Moderate bilateral neural foraminal narrowing.   C5-C6: Disc osteophyte complex, uncinate hypertrophy and facet degenerative change with severe canal stenosis. Moderate bilateral neural foraminal narrowing.   C6-C7: Asymmetric right disc osteophyte complex, uncinate hypertrophy and mild facet degenerative changes with asymmetric right canal stenosis no significant neural foraminal narrowing.   C7-T1: There is no posterior disc contour abnormality. There is no significant central canal or neural foraminal stenosis.     The prevertebral and posterior paraspinous soft tissues are within normal limits.   THORACIC SPINE:   Spinal cord: Normal in caliber and signal characteristics. No suspicious parenchymal or leptomeningeal enhancement.   Alignment: Thoracic kyphosis is within normal limits.   Vertebral body heights: Vertebral body heights are maintained   Marrow signal: No suspicious marrow replacement process   Intervertebral discs: No signal abnormality.   Degenerative change: Scattered degenerative changes without significant spinal canal stenosis or neural foraminal narrowing.   Paraspinous Soft Tissues:  Within normal limits.       Brain: No acute intracranial abnormality; no acute infarct intracranial hemorrhage or extra-axial collection. No suspicious intracranial mass, parenchymal or leptomeningeal enhancement. Chronic microvascular ischemia involutional changes.   Cervical spine: No acute fracture or traumatic malalignment. Multilevel degenerative changes with up to severe canal stenosis and moderate neural foraminal narrowing most prominent at C5-C6. No cord signal abnormality, suspicious parenchymal or leptomeningeal enhancement.   Thoracic spine: No acute fracture or traumatic malalignment. Degenerative changes without high-grade canal stenosis or neural foraminal narrowing. No cord signal abnormality, suspicious parenchymal or leptomeningeal enhancement.   Signed by: Terry Dueñas 11/29/2023 5:16 PM Dictation workstation:   [id number]    MR thoracic spine w and wo IV contrast    Result Date: 11/29/2023  Interpreted By:  Terry Dueñas, STUDY: MR CERVICAL SPINE W AND WO IV CONTRAST; MR THORACIC SPINE W AND WO IV CONTRAST; MR BRAIN W AND WO IV CONTRAST;  11/29/2023 4:47 pm; 11/29/2023 4:50 pm; 11/29/2023 4:51 pm   INDICATION: Signs/Symptoms:paresthesias, weakness legs; Signs/Symptoms:paresthesias, weakness; Signs/Symptoms:lower extremity weakness, parasthesiias.   COMPARISON: None.   ACCESSION NUMBER(S): [id number]; [id number]; [id number]   ORDERING CLINICIAN: RICCI VIEIRA   TECHNIQUE: Sagittal T1, T2, STIR, axial T1 and axial T2 weighted images were acquired through the cervical and thoracic spine with and without contrast. Routine brain MRI protocol without and with contrast including diffusion and gradient echo images. Contrast: 20 mL IV DOTAREM   FINDINGS: BRAIN:   Acute Change: There is no evidence of restricted diffusion to suggest an acute infarct.   Hemorrhage: No evidence of prior parenchymal hemorrhage on the gradient echo images.   Mass Lesion/ Mass Effect: No evidence of an  intracranial mass or extra-axial fluid collection. No suspicious parenchymal or leptomeningeal enhancement. No significant mass effect.   Chronic Change: Scattered foci of hyperintense signal on T2 weighted and FLAIR images are noted involving the supratentorial white matter are nonspecific but likely represent mild microvascular ischemia. Small focus of encephalomalacia and gliosis right periventricular region, likely from prior infarct.   Parenchyma: No significant volume loss for age. The brain parenchyma is otherwise within normal limits of signal intensity and morphology.   Ventricles: Normal caliber and morphology.   Skull Base: Hypothalamic and pituitary region are grossly normal. Craniocervical junction is normal.  No significant marrow replacement process.   Vasculature: Major intracranial arterial structures, and dural venous sinuses show typical flow void, suggesting patency by spin echo criteria.   Other: The visualized paranasal sinuses and mastoid air cells are clear.  The orbits and extracranial soft tissues are unremarkable.     CERVICAL SPINE:   Alignment: The vertebral alignment is within normal limits.   Vertebrae/Intervertebral Discs: The vertebral bodies demonstrate expected height.  The marrow signal is within normal limits. The intervertebral discs demonstrate expected signal and morphology.   Cord: Normal in caliber and signal. No suspicious parenchymal or leptomeningeal enhancement.   C2-C3: There is no posterior disc contour abnormality. There is no significant central canal or neural foraminal stenosis.   C3-C4: Small disc osteophyte complex, left-greater-than-right uncinate hypertrophy and facet degenerative changes. Mild left neural foraminal narrowing. Right neural foramen is patent. No significant canal stenosis.   C4-C5: Disc osteophyte complex, uncinate hypertrophy and facet degenerative change with mild canal stenosis. Moderate bilateral neural foraminal narrowing.   C5-C6: Disc  osteophyte complex, uncinate hypertrophy and facet degenerative change with severe canal stenosis. Moderate bilateral neural foraminal narrowing.   C6-C7: Asymmetric right disc osteophyte complex, uncinate hypertrophy and mild facet degenerative changes with asymmetric right canal stenosis no significant neural foraminal narrowing.   C7-T1: There is no posterior disc contour abnormality. There is no significant central canal or neural foraminal stenosis.     The prevertebral and posterior paraspinous soft tissues are within normal limits.   THORACIC SPINE:   Spinal cord: Normal in caliber and signal characteristics. No suspicious parenchymal or leptomeningeal enhancement.   Alignment: Thoracic kyphosis is within normal limits.   Vertebral body heights: Vertebral body heights are maintained   Marrow signal: No suspicious marrow replacement process   Intervertebral discs: No signal abnormality.   Degenerative change: Scattered degenerative changes without significant spinal canal stenosis or neural foraminal narrowing.   Paraspinous Soft Tissues: Within normal limits.       Brain: No acute intracranial abnormality; no acute infarct intracranial hemorrhage or extra-axial collection. No suspicious intracranial mass, parenchymal or leptomeningeal enhancement. Chronic microvascular ischemia involutional changes.   Cervical spine: No acute fracture or traumatic malalignment. Multilevel degenerative changes with up to severe canal stenosis and moderate neural foraminal narrowing most prominent at C5-C6. No cord signal abnormality, suspicious parenchymal or leptomeningeal enhancement.   Thoracic spine: No acute fracture or traumatic malalignment. Degenerative changes without high-grade canal stenosis or neural foraminal narrowing. No cord signal abnormality, suspicious parenchymal or leptomeningeal enhancement.   Signed by: Terry Dueñas 11/29/2023 5:16 PM Dictation workstation:   [id number]    MR lumbar spine wo IV  contrast    Result Date: 11/29/2023  Interpreted By:  Maged Cortes, STUDY: MR LUMBAR SPINE WO IV CONTRAST;  11/29/2023 2:25 am   INDICATION: Signs/Symptoms:weakness and numbness in both legs.   COMPARISON: CT lumbar spine 11/28/2023.   ACCESSION NUMBER(S): [id number]   ORDERING CLINICIAN: ALOK KERR   TECHNIQUE: Multiplanar, multisequence images of the lumbar spine were obtained without contrast.   FINDINGS: VISUALIZED ABDOMEN: Mild aortic atherosclerosis   PARASPINAL SOFT TISSUES: No significant abnormality.   BONE MARROW/VERTEBRAL BODIES: There is mild Modic type 1 endplate signal changes at the right upper subendplate region T12 vertebral body. There is mild bone marrow edema-like signal within the right superior articulating process of L4, likely degenerative nature. Otherwise, the overall bone marrow signal is within normal limits. Vertebral body heights are maintained. No acute fracture.   ALIGNMENT: No spondylolisthesis.   SPINAL CORD/CONUS: The conus medullaris terminates at L1-L2.   SPINAL CANAL, INTERVERTEBRAL DISCS, AND NEURAL FORAMINA:   T11-T12: There is severe left and moderate right T11-T12 neural foraminal stenosis due to facet arthropathy, likely compressing the intraforaminal left T11 nerve root. There is no significant disc bulge or significant canal stenosis.   T12-L1: Mild disc height loss and a Schmorl's node. No significant disc bulge or canal stenosis. However, there is mild right neural foraminal stenosis due to facet arthropathy. There is mild dorsal epidural lipomatosis causing mild dorsal effacement of the thecal sac.   L1-L2: There is minimal, relative right foraminal stenosis compared to the left. There is no significant disc bulge or canal stenosis. There is mild degenerative facet hypertrophy. There is mild dorsal epidural lipomatosis causing mild effacement of the dorsal thecal sac.   L2-L3: There is mild degenerative facet hypertrophy. There is no significant disc  bulge or significant canal stenosis. There is no significant neural foraminal stenosis.   L3-L4: There is a moderate diffuse disc bulge and dorsal epidural lipomatosis contributing to mild canal stenosis in conjunction with mild ligamentum flavum thickening and moderate degenerative facet hypertrophy. There is no compression of descending cauda equina nerve roots; however, there is mild-moderate right foraminal stenosis and moderate left foraminal stenosis with slight abutment of the intraforaminal bilateral L3 nerve roots (left > right) by hypertrophic facets. There is periarticular bone marrow edema involving the right L3-L4 facet.   L4-L5: There is moderate-severe degenerative facet hypertrophy. There are bilateral intraforaminal disc protrusions (left > right). There is mild compression of the intraforaminal left L4 nerve root between disc spur complex and facet osteophyte (sagittal image 8/28, series 301). There is mild right foraminal stenosis with slight abutment of the intraforaminal right L4 nerve root by hypertrophic facet. Due to combination of disc disease, epidural lipomatosis, and moderate degenerative hypertrophy there is an overall mild circumferential effacement of the thecal sac.   L5-S1: Moderate degenerative facet hypertrophy. Small broad-based disc protrusion and annular fissure. There is mild left foraminal stenosis without significant canal stenosis or right foraminal stenosis.       Multilevel spondylotic changes of the lumbar spine, most advanced at T11-T12, L3-L4, L4-L5, and L5-S1, due to combination of disc disease, facet arthropathy, epidural lipomatosis. Please correlate the radicular distribution of patient's symptoms based on the above described locations of nerve root involvement. Notably, there is compression of the left T11 nerve root, and potential for compression of the intraforaminal bilateral L3 nerve roots, and bilateral L4 nerve roots (left > right).   Mild bone marrow  edema-like signal at T11-T12 and periarticular bone marrow edema at the right L3-L4 facet, which can contribute to localized low back pain.     MACRO: None.   Signed by: Maged Cortes 11/29/2023 2:50 AM Dictation workstation:   [id number]    CT lumbar spine wo IV contrast    Result Date: 11/29/2023  Interpreted By:  Maged Cortes, STUDY: CT LUMBAR SPINE WO IV CONTRAST;  11/29/2023 12:05 am   INDICATION: Signs/Symptoms:back pain.   COMPARISON: None.   ACCESSION NUMBER(S): [id number]   ORDERING CLINICIAN: ALOK KERR   TECHNIQUE: Axial noncontrast images of the lumbar spine with coronal and sagittal reconstructed images.   FINDINGS:     ALIGNMENT:  No traumatic spondylolisthesis or traumatic facet widening.   VERTEBRAE: Osteopenic bone. No acute fracture. Vertebral body heights are maintained.   SPINAL CANAL/INTERVERTEBRAL DISCS/NEURAL FORAMINA:   T11-T12: There is severe left and mild right osteophytic neural foraminal stenosis due to facet arthropathy and disc spur complex, likely sufficient to compress the intraforaminal left T11 nerve root (axial image 5/114, series 5; sagittal image 45/90, series 7). No significant canal stenosis.   T12-L1: Mild right neural foraminal stenosis. No significant canal stenosis.   L1-L2: No significant central canal stenosis or disc bulge. Mild right neural foraminal stenosis.   L2-L3: Minimal diffuse disc bulge. No canal stenosis or significant foraminal stenosis. Mild degenerative facet hypertrophy.   L3-L4: Mild is mild degenerative facet hypertrophy. Moderate diffuse disc bulge results in mild canal stenosis and contributes to mild bilateral foraminal stenosis (right > left). There is a possible inferior disc extrusion component that is difficult to characterize.   L4-L5: Moderate degenerative facet hypertrophy. 0.4 cm right subligamentous synovial cyst. Minimal diffuse disc bulge with asymmetric large left intraforaminal disc spur complex component that  contributes to moderate foraminal stenosis and probable compression/mass effect on the distal intraforaminal/proximal extraforaminal left L2 nerve root. There is mild right neural foraminal stenosis.   L5-S1: There is a diffuse disc bulge with asymmetric left paracentral disc protrusion/extrusion that may abut the descending left S1 nerve root. In conjunction with moderate hypertrophic facet arthropathy, there is mild right and mild-moderate left foraminal stenosis.   PARASPINAL SOFT TISSUES: No significant abnormality.   VISUALIZED ABDOMEN: Cholelithiasis. Mild aortic atherosclerosis without AAA.       No acute fracture or traumatic malalignment of the lumbar spine.   Multilevel lumbar spondylosis without high-grade central canal stenosis. This is most pronounced at L3-L4, L4-L5 and L5-S1. Please correlate these abnormalities, detailed above, to patient's symptomatology.   MACRO: None.   Signed by: Maged Cortes 11/29/2023 12:37 AM Dictation workstation:   [id number]    CT head wo IV contrast    Result Date: 11/29/2023  Interpreted By:  Maged Cortes, STUDY: CT HEAD WO IV CONTRAST;  11/29/2023 12:05 am   INDICATION: Signs/Symptoms:headache.   COMPARISON: None.   ACCESSION NUMBER(S): [id number]   ORDERING CLINICIAN: ALOK KERR   TECHNIQUE: Noncontrast axial CT images of head were obtained with coronal and sagittal reconstructed images.   FINDINGS: BRAIN PARENCHYMA: There is a chronic lacunar infarct involving the head of right caudate nucleus and adjacent periventricular white matter along the frontal horn of the right lateral ventricle. No acute intraparenchymal hemorrhage or parenchymal evidence of acute large territory ischemic infarct. No mass-effect. Gray-white matter distinction is preserved.   VENTRICLES and EXTRA-AXIAL SPACES:  No acute extra-axial or intraventricular hemorrhage. No effacement of cerebral sulci. Ventricles and sulci are age-concordant.   PARANASAL SINUSES/MASTOIDS: There  is mild generalized mucosal thickening of the maxillary and ethmoid sinuses. The frontal sinuses are hypoplastic. There is minimal partial opacification of the bilateral mastoid air cells.   CALVARIUM/ORBITS:  No skull fracture.  The orbits and globes are intact to the extent visualized.   EXTRACRANIAL SOFT TISSUES: No discernible abnormality.       1. No acute intracranial abnormality.   2. Chronic lacunar infarct involving the head of the right caudate nucleus is adjacent periventricular white matter.   3. Mild generalized mucosal thickening of the maxillary and ethmoid sinuses without evidence of acute sinusitis.   MACRO: None.   Signed by: Maged Cortes 11/29/2023 12:29 AM Dictation workstation:   [id number]          Assessment/Plan     1.  Sepsis without Shock  -unclear etiology at this time, initially thought to be biliary etiology but US Gallbladder unremarkable and per surgery likely no the true source  -Blood Cultures x2 = pending   -possible in setting of recent surgical intervention for cervical spine but imaging and examination is relatively unremarkable   -Continued on IV Vancomycin / Cefepime   -WBC = 27.9 -->  -CRP = 10.76 -->  -ESR = 34 -->  -Infectious Disease Consult appreciated     2.  Hypokalemia / Hypomagnesemia  -likely poor oral intake of food/water given recent hospitalization  -replete accordingly and will repeat labs in the morning     3.  Recurrent Falls / Generalized Weakness / Acute on Chronic Deconditioning & Ambulatory Dysfunction  -PT/OT appreciated  -in setting of deconditioning given recent hospitalization and surgical intervention of the cervical spine    4.  Right Shoulder Pain  -this is chronic but per patient was much more intense during last hospitalization as well as the most recent two falls on 12/18/23 and on presentation 12/21/23  -imaging unremarkable for acute pathologies per the final radiologist read  -PT/OT appreciated  -prn analgesics   -patient reports cramping  of the muscle so will try norflex for symptom management and then transition to oral agents during hospitalization    5.  CAD / HTN / HLD / Recent NSTEMI  -CABG x 3 (LIMA-LAD, (left) Radial artery - diag, Vein graft-OM) in 2003 and subsequent PCI of distal LAD via LIMA graft (2 overlapped JACE), multiple RCA overlap stents,   -s/p thrombectomy and balloon angioplasty 12/06/23  -per Cardiology Discharge Recommendations: continued on aspirin 81mg po, Eliquis 5mg po bid and Brilinta 90mg po BID until the evening of 01/03/24 BUT starting on 01/04/24 to only take Eliquis 5mg po bid and Plavix 75mg po daily   -Continued outpatient follow-up with Cardiology  -Continued on Lopressor 100mg po bid   -Continued on Lipitor 80mg po daily   -Continued on Ranexa 1000mg po bid     6.  Atrial Fibrillation w/ Rapid Ventricular Response  -likely in setting of above plus noncompliance with medications  -s/p IV Lopressor in the ED with eventually conversion to sinus rhythm   -Continued on Lopressor 100mg po bid   -Continued on Eliquis 5mg po bid   -Telemetry Monitoring   -Cardiology Consult appreciated    7.  BPH  -Continued on home medications    8.  GERD  -Continued on home medications    9.  Anxiety / Depression  -Continued on home medications    10.  COPD  -Continued on home medications  -does not appear to be in respiratory distress     11.  Hypothyroidism  -Continued on home Synthroid  -TSH = <0.01  -Free T4 = 2.48  -will need to re-assess in outpatient setting and.or sooner but dependent on improvement in setting of above    12.  Severe Cervical Stenosis & Myelopathy  -s/p corpectomy and fusion of C4-C6 for severe cervical stenosis on 12/04/23,  -in soft collar currently  -PT/OT appreciated  -no noted injury to surgical site on imaging     Carter Eason DO

## 2023-12-22 NOTE — CONSULTS
Consults      Primary MD: Ivy Pollock MD    Reason For Consult  sepsis    History Of Present Illness  Philip Barfield is a 58 y.o. male with a past medical history significant for HTN, HLD, CAD hx w/ CABG x 3 (LIMA-LAD, (left) Radial artery - diag, Vein graft-OM) in 2003 and subsequent PCI of distal LAD via LIMA graft (2 overlapped JACE), multiple RCA overlap stents, paroxysmal Afib (on home eliquis), HLD, HTN, COPD, asthma, TIAs, vestibular neuritis, vertigo, ARISTEO on CPAP, BPH, anxiety, depression and GERD.  Patient states that ever since he returned home he has had persistent right upper extremity pain with movement of the right shoulder which was has not gotten any better since his last discharge.  He also stated that he fell on 12/18/2023 while walking into the kitchen when he fell and hit his head and lost consciousness for couple seconds.  He states that he landed on his right shoulder but it should be noted that the patient stated that he was having pain in that right shoulder prior to this fall but it has been much more significant since the fall.  He then fell again prior to presentation on 12/21/2023 as he was about to use the restroom when he fell in between the bathtub and bathroom cabinet.  He states that his son was able to assist him up but then when his traveling nurse came to visit they had called EMS for him to be brought in for further evaluation and management.  He states that he did feel dizzy throughout the morning otherwise was in his usual state of health.  He states that he was supposed to get a walker after he was discharged from his most recent hospitalization but has not received this yet.  He is also complaining of generalized weakness, occasional lightheadedness, dizziness, feeling unwell.  He is also admitted that he has not been taking his medications as recommended with regards to the cardiac medications especially the antiplatelets and anticoagulation.  He is also noted  constipation which was in the hospital but then also persistent upon discharge.  He is also admitted to very foul-smelling urine and dark urine with some burning at the end of urination.  He denies any recent sick contacts, chemical/environmental exposures, changes in dietary habits or any recent traumatic events/falls other than noted above.  He denies any fevers, chills, night sweats, vision changes, auditory changes, change in taste/smell, loss of bowel/bladder control, vertigo, syncope, seizure-like activity, chest pain, palpitations, shortness of breath, cough, wheezing, congestion, hemoptysis, hematemesis, abdominal pain, nausea, vomiting, diarrhea, hematuria, dyschezia, hematochezia any lateralizing motor/sensory deficits other than noted above.      Past Medical History  He has a past medical history of Old myocardial infarction, Paroxysmal atrial fibrillation (CMS/HCC), Personal history of other diseases of the circulatory system, Personal history of other diseases of the musculoskeletal system and connective tissue, Personal history of other diseases of the nervous system and sense organs, Personal history of other endocrine, nutritional and metabolic disease, Personal history of other endocrine, nutritional and metabolic disease, and Vestibular neuronitis of both ears.    Surgical History  He has a past surgical history that includes Other surgical history (12/11/2018); Other surgical history (12/11/2018); Other surgical history (12/11/2018); Other surgical history (12/11/2018); Other surgical history (12/11/2018); Other surgical history (12/11/2018); Cardiac catheterization (N/A, 12/06/2023); and Cervical fusion.     Social History     Occupational History    Not on file   Tobacco Use    Smoking status: Never    Smokeless tobacco: Never   Vaping Use    Vaping Use: Never used   Substance and Sexual Activity    Alcohol use: Never    Drug use: Never    Sexual activity: Not on file     Travel History    Travel since 11/22/23    No documented travel since 11/22/23         Family History  Family History   Problem Relation Name Age of Onset    Hypertension Mother      Cancer Father      Coronary artery disease Father      Diabetes Brother      Diabetes Maternal Grandmother      Colon cancer Maternal Grandfather       Allergies  Penicillins, Tetanus toxoid, Hydrocodone-acetaminophen, Niacin, and Sulfa (sulfonamide antibiotics)     Immunization History   Administered Date(s) Administered    Flu vaccine (IIV4), preservative free *Check age/dose* 11/10/2020    Hepatitis B vaccine, adult (RECOMBIVAX, ENGERIX) 11/10/2020    Influenza, injectable, MDCK, preservative free, quadrivalent 01/04/2023    Influenza, injectable, quadrivalent 10/16/2018    Influenza, seasonal, injectable 10/04/2016     Medications  Home medications:  Medications Prior to Admission   Medication Sig Dispense Refill Last Dose    acetaminophen (Tylenol) 325 mg tablet Take 3 tablets (975 mg) by mouth every 8 hours if needed for moderate pain (4 - 6). 30 tablet 0 12/21/2023    albuterol 90 mcg/actuation inhaler Inhale 2 puffs every 4 hours if needed.   Past Week    apixaban (Eliquis) 5 mg tablet Take 1 tablet (5 mg) by mouth 2 times a day.   Past Week    aspirin 81 mg chewable tablet Chew 1 tablet (81 mg) once daily. Do not start before December 12, 2023. 30 tablet 0 Past Week    atorvastatin (Lipitor) 80 mg tablet Take 1 tablet (80 mg) by mouth once daily. 30 tablet 0 Past Week    azelastine (Astelin) 137 mcg (0.1 %) nasal spray Administer 1 spray into affected nostril(s) 2 times a day.   Past Week    clopidogrel (Plavix) 75 mg tablet Take 1 tablet (75 mg) by mouth once daily.   Past Week    cyclobenzaprine (Flexeril) 10 mg tablet Take 1 tablet (10 mg) by mouth 3 times a day as needed for muscle spasms.   12/21/2023    DULoxetine (Cymbalta) 60 mg DR capsule Take 1 capsule (60 mg) by mouth once daily.   Past Week    evolocumab (Repatha SureClick) 140 mg/mL  injection Inject 1 mL (140 mg) under the skin every 14 (fourteen) days.   Past Month    fluticasone furoate-vilanteroL (Breo Ellipta) 200-25 mcg/dose inhaler Inhale 1 puff once daily.   Past Week    levothyroxine (Synthroid, Levoxyl) 200 mcg tablet Take 1 tablet (200 mcg) by mouth once daily in the morning. Take before meals. 30 tablet 0 Past Week    lidocaine (Lidoderm) 5 % patch Place 1 patch over 12 hours on the skin once daily. Remove & discard patch within 12 hours or as directed by MD. 10 patch 0 2023    loratadine (Claritin) 10 mg tablet Take 1 tablet (10 mg) by mouth once daily.   Past Week    melatonin 3 mg tablet Take 1 tablet (3 mg) by mouth once daily at bedtime. 30 tablet 0 Past Week    metoprolol tartrate (Lopressor) 50 mg tablet Take 2 tablets by mouth 2 times a day.   Past Week    nitroglycerin (Nitrostat) 0.4 mg SL tablet Place under the tongue.  TAKE AS DIRECTED.   More than a month    [] oxyCODONE (Roxicodone) 5 mg immediate release tablet Take 1 tablet (5 mg) by mouth every 6 hours if needed for severe pain (7-10) for up to 7 days. 15 tablet 0     oxyCODONE (Roxicodone) 5 mg immediate release tablet Take 1 tablet (5 mg) by mouth every 6 hours if needed for severe pain (7 - 10) for up to 7 days. Only take for severe pain 15 tablet 0 2023    pantoprazole (ProtoNix) 40 mg EC tablet Take 1 tablet (40 mg) by mouth once daily in the morning. Take before meals. Do not crush, chew, or split.   Past Week    pantoprazole (ProtoNix) 40 mg EC tablet Take 1 tablet (40 mg) by mouth once daily in the morning. Take before meals. Do not crush, chew, or split. Do not start before 2023. 30 tablet 0 Unknown    polyethylene glycol (Miralax) 17 gram/dose powder MIX 1 CAPFUL IN 8 OUNCES OF WATER AND DRINK DAILY AS DIRECTED.   Past Month    ranolazine (Ranexa) 1,000 mg 12 hr tablet Take 1 tablet (1,000 mg) by mouth every 12 hours.   Past Week    ranolazine (Ranexa) 1,000 mg 12 hr tablet  "Take 1 tablet (1,000 mg) by mouth 2 times a day. Do not crush, chew, or split. 30 tablet 0 Unknown    tamsulosin (Flomax) 0.4 mg 24 hr capsule Take 1 capsule (0.4 mg) by mouth once daily. Do not start before December 12, 2023. 30 capsule 0 Past Week    ticagrelor (Brilinta) 90 mg tablet Take 1 tablet (90 mg) by mouth 2 times a day for 24 days. Take as directed until 1/3/2024  then STOP and start taking plavix as directed 24 tablet 0 Past Week     Current medications:  Scheduled medications  apixaban, 5 mg, oral, BID  aspirin, 81 mg, oral, Daily  atorvastatin, 80 mg, oral, Daily  bisacodyl, 10 mg, rectal, Daily  cefepime, 1 g, intravenous, q8h  [START ON 1/4/2024] clopidogrel, 75 mg, oral, Daily  docusate sodium, 100 mg, oral, BID  DULoxetine, 60 mg, oral, Daily  fluticasone furoate-vilanteroL, 1 puff, inhalation, Daily  levothyroxine, 200 mcg, oral, Daily before breakfast  lidocaine, 2 patch, transdermal, Daily  metoprolol tartrate, 100 mg, oral, BID  pantoprazole, 40 mg, oral, Daily before breakfast  polyethylene glycol, 17 g, oral, Daily  ranolazine, 1,000 mg, oral, q12h  tamsulosin, 0.4 mg, oral, Daily  ticagrelor, 90 mg, oral, BID  vancomycin, 1,250 mg, intravenous, q12h      Continuous medications     PRN medications  PRN medications: acetaminophen, albuterol, HYDROmorphone, HYDROmorphone, melatonin, ondansetron, orphenadrine, polyethylene glycol    Review of Systems   As above    Objective  Range of Vitals (last 24 hours)  Heart Rate:  []   Temp:  [35.8 °C (96.4 °F)-38.5 °C (101.3 °F)]   Resp:  [7-27]   BP: (100-152)/()   Height:  [162.6 cm (5' 4\")-162.6 cm (5' 4.02\")]   Weight:  [98.9 kg (218 lb 0.6 oz)-99.8 kg (220 lb)]   SpO2:  [93 %-100 %]   Daily Weight  12/22/23 : 98.9 kg (218 lb 0.6 oz)    Body mass index is 37.41 kg/m².     Physical Exam   General: AAO*3  Skin: no rashes  Neck: supple  CVS: S1S2  Chest:CTAB  GI: soft, non tender  : no CVAT  Psych: alert,oriented  CNS: no FND    Relevant " "Results  Outside Hospital Results  No  Labs  Results from last 72 hours   Lab Units 12/22/23  0515 12/21/23  1600   WBC AUTO x10*3/uL 22.8* 27.9*   HEMOGLOBIN g/dL 12.5* 14.6   HEMATOCRIT % 36.9* 42.3   PLATELETS AUTO x10*3/uL 274 342   NEUTROS PCT AUTO %  --  86.6   LYMPHS PCT AUTO %  --  6.6   MONOS PCT AUTO %  --  5.3   EOS PCT AUTO %  --  0.4     Results from last 72 hours   Lab Units 12/22/23  0515 12/21/23  1600   SODIUM mmol/L 135* 134*   POTASSIUM mmol/L 3.5 3.9   CHLORIDE mmol/L 105 100   CO2 mmol/L 23 24   BUN mg/dL 11 12   CREATININE mg/dL 0.87 0.95   GLUCOSE mg/dL 190* 149*   CALCIUM mg/dL 8.6 9.3   ANION GAP mmol/L 11 14   EGFR mL/min/1.73m*2 >90 >90   PHOSPHORUS mg/dL 2.8  --      Results from last 72 hours   Lab Units 12/22/23  0515 12/21/23  1600   ALK PHOS U/L  --  125*   BILIRUBIN TOTAL mg/dL  --  1.1   BILIRUBIN DIRECT mg/dL  --  0.3   PROTEIN TOTAL g/dL  --  7.3   ALT U/L  --  13   AST U/L  --  16   ALBUMIN g/dL 3.2* 3.9     Estimated Creatinine Clearance: 98.3 mL/min (by C-G formula based on SCr of 0.87 mg/dL).  C-Reactive Protein   Date Value Ref Range Status   12/22/2023 18.08 (H) <1.00 mg/dL Final   12/21/2023 10.76 (H) <1.00 mg/dL Final     CRP   Date Value Ref Range Status   08/06/2018 3.73 (A) mg/dL Final     Comment:     REF VALUE  < 1.00       Sedimentation Rate   Date Value Ref Range Status   12/22/2023 33 (H) 0 - 20 mm/h Final   12/21/2023 34 (H) 0 - 20 mm/h Final     No results found for: \"HIV1X2\", \"HIVCONF\", \"BAKPYL9FT\"  No results found for: \"HEPCABINIT\", \"HEPCAB\", \"HCVPCRQUANT\"  Microbiology    Reviewed    Imaging  reviewed     Assessment/Plan     Sepsis  ? Intraabdominal source  HTN  HLD  CAD  COPD    Suggest;  Follow blood cx  UA negative for infection but patient does report dysuria  CT chest no consolidation   CT ap- distended GB  USG GB_- negative for cholecystitis   Monitor clinical course - may consider HIDA scan  C/w vancomycin   C/w cefepime   Add flagyl pending blood cx " results  Trend wbc and temps    Frannie Pedersen MD

## 2023-12-23 ENCOUNTER — APPOINTMENT (OUTPATIENT)
Dept: RADIOLOGY | Facility: HOSPITAL | Age: 58
DRG: 871 | End: 2023-12-23
Payer: MEDICARE

## 2023-12-23 PROCEDURE — 2500000004 HC RX 250 GENERAL PHARMACY W/ HCPCS (ALT 636 FOR OP/ED): Performed by: STUDENT IN AN ORGANIZED HEALTH CARE EDUCATION/TRAINING PROGRAM

## 2023-12-23 PROCEDURE — 2500000004 HC RX 250 GENERAL PHARMACY W/ HCPCS (ALT 636 FOR OP/ED)

## 2023-12-23 PROCEDURE — 1090000001 HH PPS REVENUE CREDIT

## 2023-12-23 PROCEDURE — 2500000005 HC RX 250 GENERAL PHARMACY W/O HCPCS: Performed by: INTERNAL MEDICINE

## 2023-12-23 PROCEDURE — 97116 GAIT TRAINING THERAPY: CPT | Mod: GP,CQ

## 2023-12-23 PROCEDURE — 2500000004 HC RX 250 GENERAL PHARMACY W/ HCPCS (ALT 636 FOR OP/ED): Performed by: INTERNAL MEDICINE

## 2023-12-23 PROCEDURE — 99232 SBSQ HOSP IP/OBS MODERATE 35: CPT | Performed by: NURSE PRACTITIONER

## 2023-12-23 PROCEDURE — 97530 THERAPEUTIC ACTIVITIES: CPT | Mod: GP,CQ

## 2023-12-23 PROCEDURE — 2500000001 HC RX 250 WO HCPCS SELF ADMINISTERED DRUGS (ALT 637 FOR MEDICARE OP): Performed by: INTERNAL MEDICINE

## 2023-12-23 PROCEDURE — 2500000001 HC RX 250 WO HCPCS SELF ADMINISTERED DRUGS (ALT 637 FOR MEDICARE OP): Performed by: STUDENT IN AN ORGANIZED HEALTH CARE EDUCATION/TRAINING PROGRAM

## 2023-12-23 PROCEDURE — 76770 US EXAM ABDO BACK WALL COMP: CPT

## 2023-12-23 PROCEDURE — 2060000001 HC INTERMEDIATE ICU ROOM DAILY

## 2023-12-23 PROCEDURE — 99233 SBSQ HOSP IP/OBS HIGH 50: CPT | Performed by: INTERNAL MEDICINE

## 2023-12-23 PROCEDURE — 76770 US EXAM ABDO BACK WALL COMP: CPT | Performed by: RADIOLOGY

## 2023-12-23 PROCEDURE — 1090000002 HH PPS REVENUE DEBIT

## 2023-12-23 RX ORDER — OXYCODONE HYDROCHLORIDE 10 MG/1
10 TABLET ORAL EVERY 6 HOURS PRN
Status: DISCONTINUED | OUTPATIENT
Start: 2023-12-23 | End: 2023-12-27 | Stop reason: HOSPADM

## 2023-12-23 RX ORDER — OXYCODONE AND ACETAMINOPHEN 5; 325 MG/1; MG/1
1 TABLET ORAL EVERY 6 HOURS PRN
Status: DISCONTINUED | OUTPATIENT
Start: 2023-12-23 | End: 2023-12-23

## 2023-12-23 RX ORDER — OXYCODONE AND ACETAMINOPHEN 5; 325 MG/1; MG/1
1 TABLET ORAL EVERY 6 HOURS PRN
Status: DISCONTINUED | OUTPATIENT
Start: 2023-12-23 | End: 2023-12-27 | Stop reason: HOSPADM

## 2023-12-23 RX ADMIN — POLYETHYLENE GLYCOL 3350 17 G: 17 POWDER, FOR SOLUTION ORAL at 09:37

## 2023-12-23 RX ADMIN — VANCOMYCIN HYDROCHLORIDE 1250 MG: 1.25 INJECTION, POWDER, LYOPHILIZED, FOR SOLUTION INTRAVENOUS at 09:38

## 2023-12-23 RX ADMIN — ATORVASTATIN CALCIUM 80 MG: 40 TABLET, FILM COATED ORAL at 09:38

## 2023-12-23 RX ADMIN — METRONIDAZOLE 500 MG: 500 TABLET ORAL at 22:16

## 2023-12-23 RX ADMIN — METRONIDAZOLE 500 MG: 500 TABLET ORAL at 13:25

## 2023-12-23 RX ADMIN — HYDROMORPHONE HYDROCHLORIDE 1 MG: 1 INJECTION, SOLUTION INTRAMUSCULAR; INTRAVENOUS; SUBCUTANEOUS at 09:38

## 2023-12-23 RX ADMIN — HYDROMORPHONE HYDROCHLORIDE 1 MG: 1 INJECTION, SOLUTION INTRAMUSCULAR; INTRAVENOUS; SUBCUTANEOUS at 14:31

## 2023-12-23 RX ADMIN — LEVOTHYROXINE SODIUM 200 MCG: 0.1 TABLET ORAL at 06:14

## 2023-12-23 RX ADMIN — DOCUSATE SODIUM 100 MG: 100 CAPSULE, LIQUID FILLED ORAL at 09:38

## 2023-12-23 RX ADMIN — DOCUSATE SODIUM 100 MG: 100 CAPSULE, LIQUID FILLED ORAL at 20:36

## 2023-12-23 RX ADMIN — HYDROMORPHONE HYDROCHLORIDE 1 MG: 1 INJECTION, SOLUTION INTRAMUSCULAR; INTRAVENOUS; SUBCUTANEOUS at 03:47

## 2023-12-23 RX ADMIN — CEFEPIME 1 G: 1 INJECTION, SOLUTION INTRAVENOUS at 20:35

## 2023-12-23 RX ADMIN — VANCOMYCIN HYDROCHLORIDE 1250 MG: 1.25 INJECTION, POWDER, LYOPHILIZED, FOR SOLUTION INTRAVENOUS at 20:36

## 2023-12-23 RX ADMIN — TICAGRELOR 90 MG: 90 TABLET ORAL at 09:38

## 2023-12-23 RX ADMIN — RANOLAZINE 1000 MG: 500 TABLET, EXTENDED RELEASE ORAL at 22:16

## 2023-12-23 RX ADMIN — CEFEPIME 1 G: 1 INJECTION, SOLUTION INTRAVENOUS at 13:25

## 2023-12-23 RX ADMIN — METOPROLOL TARTRATE 100 MG: 50 TABLET, FILM COATED ORAL at 09:38

## 2023-12-23 RX ADMIN — ASPIRIN 81 MG CHEWABLE TABLET 81 MG: 81 TABLET CHEWABLE at 09:38

## 2023-12-23 RX ADMIN — TICAGRELOR 90 MG: 90 TABLET ORAL at 20:36

## 2023-12-23 RX ADMIN — APIXABAN 5 MG: 5 TABLET, FILM COATED ORAL at 09:38

## 2023-12-23 RX ADMIN — CEFEPIME 1 G: 1 INJECTION, SOLUTION INTRAVENOUS at 03:47

## 2023-12-23 RX ADMIN — METOPROLOL TARTRATE 100 MG: 50 TABLET, FILM COATED ORAL at 20:36

## 2023-12-23 RX ADMIN — OXYCODONE HYDROCHLORIDE AND ACETAMINOPHEN 1 TABLET: 5; 325 TABLET ORAL at 20:38

## 2023-12-23 RX ADMIN — ONDANSETRON 4 MG: 2 INJECTION INTRAMUSCULAR; INTRAVENOUS at 09:37

## 2023-12-23 RX ADMIN — METRONIDAZOLE 500 MG: 500 TABLET ORAL at 05:23

## 2023-12-23 RX ADMIN — LIDOCAINE 2 PATCH: 4 PATCH TOPICAL at 09:37

## 2023-12-23 RX ADMIN — PANTOPRAZOLE SODIUM 40 MG: 40 TABLET, DELAYED RELEASE ORAL at 06:14

## 2023-12-23 RX ADMIN — TAMSULOSIN HYDROCHLORIDE 0.4 MG: 0.4 CAPSULE ORAL at 09:38

## 2023-12-23 RX ADMIN — DULOXETINE HYDROCHLORIDE 60 MG: 30 CAPSULE, DELAYED RELEASE ORAL at 09:38

## 2023-12-23 RX ADMIN — APIXABAN 5 MG: 5 TABLET, FILM COATED ORAL at 20:36

## 2023-12-23 RX ADMIN — HYDROMORPHONE HYDROCHLORIDE 0.2 MG: 0.2 INJECTION, SOLUTION INTRAMUSCULAR; INTRAVENOUS; SUBCUTANEOUS at 22:52

## 2023-12-23 RX ADMIN — RANOLAZINE 1000 MG: 500 TABLET, EXTENDED RELEASE ORAL at 09:39

## 2023-12-23 ASSESSMENT — COGNITIVE AND FUNCTIONAL STATUS - GENERAL
MOBILITY SCORE: 20
WALKING IN HOSPITAL ROOM: A LOT
STANDING UP FROM CHAIR USING ARMS: A LOT
PERSONAL GROOMING: A LITTLE
DRESSING REGULAR UPPER BODY CLOTHING: A LITTLE
MOBILITY SCORE: 23
HELP NEEDED FOR BATHING: A LITTLE
MOVING TO AND FROM BED TO CHAIR: A LITTLE
MOVING TO AND FROM BED TO CHAIR: A LITTLE
TURNING FROM BACK TO SIDE WHILE IN FLAT BAD: A LITTLE
DAILY ACTIVITIY SCORE: 19
CLIMB 3 TO 5 STEPS WITH RAILING: A LITTLE
WALKING IN HOSPITAL ROOM: A LITTLE
DRESSING REGULAR LOWER BODY CLOTHING: A LITTLE
STANDING UP FROM CHAIR USING ARMS: A LITTLE
DRESSING REGULAR UPPER BODY CLOTHING: A LITTLE
DRESSING REGULAR LOWER BODY CLOTHING: A LITTLE
DAILY ACTIVITIY SCORE: 20
TOILETING: A LITTLE
CLIMB 3 TO 5 STEPS WITH RAILING: A LITTLE
MOVING FROM LYING ON BACK TO SITTING ON SIDE OF FLAT BED WITH BEDRAILS: A LITTLE
MOBILITY SCORE: 15
CLIMB 3 TO 5 STEPS WITH RAILING: A LOT
TOILETING: A LITTLE
HELP NEEDED FOR BATHING: A LITTLE

## 2023-12-23 ASSESSMENT — PAIN SCALES - GENERAL
PAINLEVEL_OUTOF10: 8
PAINLEVEL_OUTOF10: 8
PAINLEVEL_OUTOF10: 6
PAINLEVEL_OUTOF10: 9

## 2023-12-23 ASSESSMENT — ENCOUNTER SYMPTOMS
FALLS: 1
BACK PAIN: 1
FOCAL WEAKNESS: 0
RESPIRATORY NEGATIVE: 1
DECREASED APPETITE: 0
CONSTITUTIONAL NEGATIVE: 1
BLURRED VISION: 0
IRREGULAR HEARTBEAT: 0
DEPRESSION: 0
LIGHT-HEADEDNESS: 0
GASTROINTESTINAL NEGATIVE: 1
MEMORY LOSS: 0
DYSPNEA ON EXERTION: 0
SHORTNESS OF BREATH: 0
ORTHOPNEA: 0
COUGH: 0
SYNCOPE: 0
PALPITATIONS: 1

## 2023-12-23 ASSESSMENT — PAIN - FUNCTIONAL ASSESSMENT
PAIN_FUNCTIONAL_ASSESSMENT: 0-10

## 2023-12-23 NOTE — CARE PLAN
Problem: Pain - Adult  Goal: Verbalizes/displays adequate comfort level or baseline comfort level  Outcome: Progressing     Problem: Safety - Adult  Goal: Free from fall injury  Outcome: Progressing     Problem: Discharge Planning  Goal: Discharge to home or other facility with appropriate resources  Outcome: Progressing     Problem: Chronic Conditions and Co-morbidities  Goal: Patient's chronic conditions and co-morbidity symptoms are monitored and maintained or improved  Outcome: Progressing     Problem: Fall/Injury  Goal: Not fall by end of shift  Outcome: Progressing  Goal: Be free from injury by end of the shift  Outcome: Progressing  Goal: Verbalize understanding of personal risk factors for fall in the hospital  Outcome: Progressing  Goal: Verbalize understanding of risk factor reduction measures to prevent injury from fall in the home  Outcome: Progressing  Goal: Use assistive devices by end of the shift  Outcome: Progressing  Goal: Pace activities to prevent fatigue by end of the shift  Outcome: Progressing     Problem: Diabetes  Goal: Achieve decreasing blood glucose levels by end of shift  Outcome: Progressing  Goal: Increase stability of blood glucose readings by end of shift  Outcome: Progressing  Goal: Decrease in ketones present in urine by end of shift  Outcome: Progressing  Goal: Maintain electrolyte levels within acceptable range throughout shift  Outcome: Progressing  Goal: Maintain glucose levels >70mg/dl to <250mg/dl throughout shift  Outcome: Progressing  Goal: No changes in neurological exam by end of shift  Outcome: Progressing  Goal: Learn about and adhere to nutrition recommendations by end of shift  Outcome: Progressing  Goal: Vital signs within normal range for age by end of shift  Outcome: Progressing  Goal: Increase self care and/or family involovement by end of shift  Outcome: Progressing  Goal: Receive DSME education by end of shift  Outcome: Progressing     Problem: Pain  Goal:  Takes deep breaths with improved pain control throughout the shift  Outcome: Progressing  Goal: Turns in bed with improved pain control throughout the shift  Outcome: Progressing  Goal: Walks with improved pain control throughout the shift  Outcome: Progressing  Goal: Performs ADL's with improved pain control throughout shift  Outcome: Progressing  Goal: Participates in PT with improved pain control throughout the shift  Outcome: Progressing  Goal: Free from opioid side effects throughout the shift  Outcome: Progressing  Goal: Free from acute confusion related to pain meds throughout the shift  Outcome: Progressing   The patient's goals for the shift include      The clinical goals for the shift include pt will maintain adequate pain control this shift    Over the shift, the patient did not make progress toward the following goals. Barriers to progression include n/a. Recommendations to address these barriers include pain assessment/reassessments and prn pain meds as needed.

## 2023-12-23 NOTE — PROGRESS NOTES
Subjective   Patient is sitting on the bed, denies any chest pain shortness of breath or palpitations  Denies any abdominal pain nausea or vomitings  Tolerating p.o. diet well having good bowel movements  No fever chills or rigors  No other significant overnight issues.    Objective     Intake/Output last 3 shifts:  I/O last 3 completed shifts:  In: 2440 (23.1 mL/kg) [P.O.:240; I.V.:50 (0.5 mL/kg); IV Piggyback:2150]  Out: - (0 mL/kg)   Weight: 105.5 kg     Intake/Output this shift:  I/O this shift:  In: -   Out: 320 [Urine:320]    Recent Results (from the past 48 hour(s))   CBC and Auto Differential    Collection Time: 12/21/23  4:00 PM   Result Value Ref Range    WBC 27.9 (H) 4.4 - 11.3 x10*3/uL    nRBC 0.0 0.0 - 0.0 /100 WBCs    RBC 5.03 4.50 - 5.90 x10*6/uL    Hemoglobin 14.6 13.5 - 17.5 g/dL    Hematocrit 42.3 41.0 - 52.0 %    MCV 84 80 - 100 fL    MCH 29.0 26.0 - 34.0 pg    MCHC 34.5 32.0 - 36.0 g/dL    RDW 13.5 11.5 - 14.5 %    Platelets 342 150 - 450 x10*3/uL    Neutrophils % 86.6 40.0 - 80.0 %    Immature Granulocytes %, Automated 0.8 0.0 - 0.9 %    Lymphocytes % 6.6 13.0 - 44.0 %    Monocytes % 5.3 2.0 - 10.0 %    Eosinophils % 0.4 0.0 - 6.0 %    Basophils % 0.3 0.0 - 2.0 %    Neutrophils Absolute 24.10 (H) 1.20 - 7.70 x10*3/uL    Immature Granulocytes Absolute, Automated 0.22 0.00 - 0.70 x10*3/uL    Lymphocytes Absolute 1.85 1.20 - 4.80 x10*3/uL    Monocytes Absolute 1.48 (H) 0.10 - 1.00 x10*3/uL    Eosinophils Absolute 0.12 0.00 - 0.70 x10*3/uL    Basophils Absolute 0.08 0.00 - 0.10 x10*3/uL   Basic metabolic panel    Collection Time: 12/21/23  4:00 PM   Result Value Ref Range    Glucose 149 (H) 74 - 99 mg/dL    Sodium 134 (L) 136 - 145 mmol/L    Potassium 3.9 3.5 - 5.3 mmol/L    Chloride 100 98 - 107 mmol/L    Bicarbonate 24 21 - 32 mmol/L    Anion Gap 14 10 - 20 mmol/L    Urea Nitrogen 12 6 - 23 mg/dL    Creatinine 0.95 0.50 - 1.30 mg/dL    eGFR >90 >60 mL/min/1.73m*2    Calcium 9.3 8.6 - 10.3 mg/dL    Magnesium    Collection Time: 12/21/23  4:00 PM   Result Value Ref Range    Magnesium 1.49 (L) 1.60 - 2.40 mg/dL   Hepatic function panel    Collection Time: 12/21/23  4:00 PM   Result Value Ref Range    Albumin 3.9 3.4 - 5.0 g/dL    Bilirubin, Total 1.1 0.0 - 1.2 mg/dL    Bilirubin, Direct 0.3 0.0 - 0.3 mg/dL    Alkaline Phosphatase 125 (H) 33 - 120 U/L    ALT 13 10 - 52 U/L    AST 16 9 - 39 U/L    Total Protein 7.3 6.4 - 8.2 g/dL   Troponin I, High Sensitivity    Collection Time: 12/21/23  4:00 PM   Result Value Ref Range    Troponin I, High Sensitivity 9 0 - 20 ng/L   B-Type Natriuretic Peptide    Collection Time: 12/21/23  4:00 PM   Result Value Ref Range     (H) 0 - 99 pg/mL   Blood Gas Venous Full Panel    Collection Time: 12/21/23  4:00 PM   Result Value Ref Range    POCT pH, Venous 7.43 7.33 - 7.43 pH    POCT pCO2, Venous 43 41 - 51 mm Hg    POCT pO2, Venous 17 (L) 35 - 45 mm Hg    POCT SO2, Venous 17 (L) 45 - 75 %    POCT Oxy Hemoglobin, Venous 16.6 (L) 45.0 - 75.0 %    POCT Hematocrit Calculated, Venous 43.0 41.0 - 52.0 %    POCT Sodium, Venous 133 (L) 136 - 145 mmol/L    POCT Potassium, Venous 4.0 3.5 - 5.3 mmol/L    POCT Chloride, Venous 99 98 - 107 mmol/L    POCT Ionized Calicum, Venous 1.20 1.10 - 1.33 mmol/L    POCT Glucose, Venous 160 (H) 74 - 99 mg/dL    POCT Lactate, Venous 1.4 0.4 - 2.0 mmol/L    POCT Base Excess, Venous 3.6 (H) -2.0 - 3.0 mmol/L    POCT HCO3 Calculated, Venous 28.5 (H) 22.0 - 26.0 mmol/L    POCT Hemoglobin, Venous 14.4 13.5 - 17.5 g/dL    POCT Anion Gap, Venous 10.0 10.0 - 25.0 mmol/L    Patient Temperature 37.0 degrees Celsius    FiO2 21 %   Sedimentation Rate    Collection Time: 12/21/23  4:00 PM   Result Value Ref Range    Sedimentation Rate 34 (H) 0 - 20 mm/h   C-Reactive Protein    Collection Time: 12/21/23  4:00 PM   Result Value Ref Range    C-Reactive Protein 10.76 (H) <1.00 mg/dL   TSH with reflex to Free T4 if abnormal    Collection Time: 12/21/23  4:00 PM    Result Value Ref Range    Thyroid Stimulating Hormone <0.01 (L) 0.44 - 3.98 mIU/L   Thyroxine, Free    Collection Time: 12/21/23  4:00 PM   Result Value Ref Range    Thyroxine, Free 2.48 (H) 0.61 - 1.12 ng/dL   Blood Culture    Collection Time: 12/21/23  5:48 PM    Specimen: Peripheral Venipuncture; Blood culture   Result Value Ref Range    Blood Culture No growth at 1 day    Blood Culture    Collection Time: 12/21/23  5:48 PM    Specimen: Peripheral Venipuncture; Blood culture   Result Value Ref Range    Blood Culture No growth at 1 day    Troponin I, High Sensitivity    Collection Time: 12/21/23  8:03 PM   Result Value Ref Range    Troponin I, High Sensitivity 10 0 - 20 ng/L   Urinalysis with Reflex Microscopic    Collection Time: 12/21/23  8:45 PM   Result Value Ref Range    Color, Urine Yellow Straw, Yellow    Appearance, Urine Clear Clear    Specific Gravity, Urine 1.016 1.005 - 1.035    pH, Urine 5.0 5.0, 5.5, 6.0, 6.5, 7.0, 7.5, 8.0    Protein, Urine NEGATIVE NEGATIVE mg/dL    Glucose, Urine NEGATIVE NEGATIVE mg/dL    Blood, Urine NEGATIVE NEGATIVE    Ketones, Urine 5 (TRACE) (A) NEGATIVE mg/dL    Bilirubin, Urine NEGATIVE NEGATIVE    Urobilinogen, Urine 2.0 (N) <2.0 mg/dL    Nitrite, Urine NEGATIVE NEGATIVE    Leukocyte Esterase, Urine TRACE (A) NEGATIVE   Microscopic Only, Urine    Collection Time: 12/21/23  8:45 PM   Result Value Ref Range    WBC, Urine 11-20 (A) 1-5, NONE /HPF    RBC, Urine NONE NONE, 1-2, 3-5 /HPF    Mucus, Urine 1+ Reference range not established. /LPF   CBC    Collection Time: 12/22/23  5:15 AM   Result Value Ref Range    WBC 22.8 (H) 4.4 - 11.3 x10*3/uL    nRBC 0.0 0.0 - 0.0 /100 WBCs    RBC 4.37 (L) 4.50 - 5.90 x10*6/uL    Hemoglobin 12.5 (L) 13.5 - 17.5 g/dL    Hematocrit 36.9 (L) 41.0 - 52.0 %    MCV 84 80 - 100 fL    MCH 28.6 26.0 - 34.0 pg    MCHC 33.9 32.0 - 36.0 g/dL    RDW 13.5 11.5 - 14.5 %    Platelets 274 150 - 450 x10*3/uL   Renal function panel    Collection Time:  12/22/23  5:15 AM   Result Value Ref Range    Glucose 190 (H) 74 - 99 mg/dL    Sodium 135 (L) 136 - 145 mmol/L    Potassium 3.5 3.5 - 5.3 mmol/L    Chloride 105 98 - 107 mmol/L    Bicarbonate 23 21 - 32 mmol/L    Anion Gap 11 10 - 20 mmol/L    Urea Nitrogen 11 6 - 23 mg/dL    Creatinine 0.87 0.50 - 1.30 mg/dL    eGFR >90 >60 mL/min/1.73m*2    Calcium 8.6 8.6 - 10.3 mg/dL    Phosphorus 2.8 2.5 - 4.9 mg/dL    Albumin 3.2 (L) 3.4 - 5.0 g/dL   Magnesium    Collection Time: 12/22/23  5:15 AM   Result Value Ref Range    Magnesium 1.42 (L) 1.60 - 2.40 mg/dL   Lactate    Collection Time: 12/22/23  5:15 AM   Result Value Ref Range    Lactate 0.7 0.4 - 2.0 mmol/L   Sedimentation rate, automated    Collection Time: 12/22/23  5:15 AM   Result Value Ref Range    Sedimentation Rate 33 (H) 0 - 20 mm/h   C-reactive protein    Collection Time: 12/22/23  5:15 AM   Result Value Ref Range    C-Reactive Protein 18.08 (H) <1.00 mg/dL   Vancomycin    Collection Time: 12/22/23  5:15 AM   Result Value Ref Range    Vancomycin 8.5 5.0 - 20.0 ug/mL        CT lumbar spine wo IV contrast  Narrative: STUDY:  CT Thoracic Spine, and Lumbar Spine without IV Contrast; 12/21/2023  10:03 pm  INDICATION:  Post operative fever.  COMPARISON:  None available.  ACCESSION NUMBER(S):  [id number], [id number]  ORDERING CLINICIAN:  WESLEY WILEY  TECHNIQUE:  CT of the thoracic spine, and lumbar spine was performed  without intravenous or intrathecal contrast.  Sagittal and coronal  reconstructions were generated.    Automated mA/kV exposure control was utilized and patient examination  was performed in strict accordance with principles of ALARA.  FINDINGS:  Thoracic spine:  The alignment is anatomic.  There is no fracture or traumatic  subluxation.  The vertebral body heights are well maintained.  There is disc space  narrowing and mild endplate degenerative changes with anterior  marginal osteophytes extending T5-T11.  There is mild facet arthrosis.   There is  narrowing of the neural foramina greater at T10-11.  The paravertebral soft tissues are within normal limits.  There are  postsurgical changes in the cervical spine.     Lumbar spine:  The alignment is anatomic.  There is no fracture or traumatic  subluxation.  The vertebral body heights are well maintained.  There is disc space  narrowing at L5-S1.  There is mild facet arthrosis.  At L3-4, there is a mild annular disc bulge and mild facet arthrosis.   There is mild narrowing of the central canal and bilateral neural  foramina.  At L4-5 there is a broad-based disc bulge and left foraminal disc  protrusion with moderate facet arthrosis.  There is mild narrowing of  the central canal and moderate right and moderate to advanced left  neural foraminal stenosis.  At L5-S1 there is a mild annular disc bulge and moderate facet  arthrosis.  Central canal is patent with mild right and moderate left  neural foraminal stenosis.  The paravertebral soft tissues are within normal limits.  Gallstones  are demonstrated within the partially imaged gallbladder.    Impression: Thoracic Spine:  No acute fracture or malalignment.  Degenerative  changes as described.  Cholelithiasis.  Lumbar Spine:  No acute fracture or malalignment.  Degenerative  changes as described.  Signed by Alfred Snyder, DO  CT thoracic spine wo IV contrast  Narrative: STUDY:  CT Thoracic Spine, and Lumbar Spine without IV Contrast; 12/21/2023  10:03 pm  INDICATION:  Post operative fever.  COMPARISON:  None available.  ACCESSION NUMBER(S):  [id number], [id number]  ORDERING CLINICIAN:  WESLEY WILEY  TECHNIQUE:  CT of the thoracic spine, and lumbar spine was performed  without intravenous or intrathecal contrast.  Sagittal and coronal  reconstructions were generated.    Automated mA/kV exposure control was utilized and patient examination  was performed in strict accordance with principles of ALARA.  FINDINGS:  Thoracic spine:  The alignment is anatomic.   There is no fracture or traumatic  subluxation.  The vertebral body heights are well maintained.  There is disc space  narrowing and mild endplate degenerative changes with anterior  marginal osteophytes extending T5-T11.  There is mild facet arthrosis.   There is narrowing of the neural foramina greater at T10-11.  The paravertebral soft tissues are within normal limits.  There are  postsurgical changes in the cervical spine.     Lumbar spine:  The alignment is anatomic.  There is no fracture or traumatic  subluxation.  The vertebral body heights are well maintained.  There is disc space  narrowing at L5-S1.  There is mild facet arthrosis.  At L3-4, there is a mild annular disc bulge and mild facet arthrosis.   There is mild narrowing of the central canal and bilateral neural  foramina.  At L4-5 there is a broad-based disc bulge and left foraminal disc  protrusion with moderate facet arthrosis.  There is mild narrowing of  the central canal and moderate right and moderate to advanced left  neural foraminal stenosis.  At L5-S1 there is a mild annular disc bulge and moderate facet  arthrosis.  Central canal is patent with mild right and moderate left  neural foraminal stenosis.  The paravertebral soft tissues are within normal limits.  Gallstones  are demonstrated within the partially imaged gallbladder.    Impression: Thoracic Spine:  No acute fracture or malalignment.  Degenerative  changes as described.  Cholelithiasis.  Lumbar Spine:  No acute fracture or malalignment.  Degenerative  changes as described.  Signed by Alfred Snyder, DO  US gallbladder  Narrative: STUDY:  Abdominal Ultrasound; 12/21/2023 9:40 PM.  INDICATION:  Sepsis. Abnormal CT.  COMPARISON:  CT A/P 12/21/2023.  ACCESSION NUMBER(S):  [id number]  ORDERING CLINICIAN:  WESLEY WILEY  TECHNIQUE:  Ultrasound of the Right Upper Quadrant.  Multiple images of the  abdomen were obtained.  FINDINGS:  LIVER:  The liver demonstrates increased echogenicity.        GALLBLADDER:  The gallbladder contains a stone within the neck. There is no  pericholecystic fluid or wall thickening. Sonographic Herman's sign is  negative.       BILE DUCTS:  The common bile duct measures 0.3 cm. There is no intrahepatic biliary  dilatation.       PANCREAS:  The pancreas is not seen due to overlying bowel gas.      RIGHT KIDNEY:  The right kidney measures 10.2 cm in length. Renal cortical  echotexture is normal. There is no hydronephrosis. There are no  stones. There are no cysts.  Impression: Diffuse hepatic steatosis.  Cholelithiasis.  No gallbladder wall thickening or biliary dilatation  is appreciated.  Signed by Alfred Snyder, DO  CT head wo IV contrast, CT cervical spine wo IV contrast  Narrative: Interpreted By:  Liudmila Vasquez,   STUDY:  CT HEAD WO IV CONTRAST; CT CERVICAL SPINE WO IV CONTRAST;  12/21/2023  8:39 pm      INDICATION:  Signs/Symptoms:fall; Signs/Symptoms:fall recent spiinal fusion.      COMPARISON:  12/18/2023      ACCESSION NUMBER(S):  [id number]; [id number]      ORDERING CLINICIAN:  MARY MYERS      TECHNIQUE:  Noncontrast CT images of head. Axial noncontrast CT images of the  cervical spine with coronal and sagittal reconstructed images.      FINDINGS:  BRAIN PARENCHYMA: Encephalomalacia in the region of the right head of  the caudate measuring up to 2.1 cm which extends to the anterior horn  of the right lateral ventricle, similar to prior imaging.  Mild-to-moderate generalized parenchymal volume loss and mild  nonspecific white matter changes. Atherosclerotic calcifications  noted. No mass effect or midline shift.      HEMORRHAGE: No acute intracranial hemorrhage.  VENTRICLES and EXTRA-AXIAL SPACES: The ventricles, sulci and basal  cisterns enlarged, concordant with parenchymal volume loss.  EXTRACRANIAL SOFT TISSUES: Within normal limits. PARANASAL  SINUSES/MASTOIDS: Trace fluid in the mastoid air cells. Partial  opacification of the ethmoid air cells  with scattered mucosal  thickening including the maxillary sinuses. The CALVARIUM: No  depressed skull fracture. No destructive osseous lesion.      OTHER FINDINGS: Partially imaged periapical lucency noted along the  right frontal maxilla.      CERVICAL SPINE:      ALIGNMENT: Postsurgical changes of an ACDF and corpectomy from C4  through C6. Hardware appears intact without periprosthetic lucency  and similar in position to prior imaging. Mild straightening of the  upper cervical spine. Facet joint and craniocervical alignment  maintained. VERTEBRAE: Postsurgical changes of the vertebral bodies  with mild vertebral body height loss again noted at C4. No acute  fracture identified. SPINAL CANAL: Degenerative changes facet and  uncinate arthropathy, as well as disc space narrowing and end plate  hypertrophy. Moderate canal and foraminal narrowing at C3-C4.  Moderate to severe canal stenosis at C4-C5 with at least moderate  foraminal narrowing. Moderate foraminal narrowing at C5-C6.  PREVERTEBRAL SOFT TISSUES: No prevertebral soft tissue swelling. LUNG  APICES: Not imaged.      OTHER FINDINGS: Soft tissue stranding in the ventral left cervical  region likely related to prior intervention..      Impression: No acute intracranial hemorrhage or mass effect.      Encephalomalacia along the right anterior lateral ventricle similar  prior imaging and possibly developmental or sequela of prior insult.      No acute fracture or traumatic subluxation of the cervical spine.      Postsurgical changes of the cervical spine with degenerative changes.  Moderate canal stenosis again noted at C3-C4 and moderate to severe  canal stenosis at C4-C5.      MACRO:  None.      Signed by: Liudmila Vasquez 12/21/2023 9:31 PM  Dictation workstation:   [id number]  CT chest abdomen pelvis w IV contrast  Narrative: STUDY:  CT Chest, Abdomen, and Pelvis with IV Contrast; 12/21/2023 at 8:42  p.m.  INDICATION:  Sepsis.  COMPARISON:  One-view CXR  12/21/2023.  XR pelvis 10/30/2023.  ACCESSION NUMBER(S):  [id number]  ORDERING CLINICIAN:  MARY MYERS  TECHNIQUE:  CT of the chest, abdomen, and pelvis was performed.  Contiguous axial  images were obtained at 3 mm slice thickness through the chest,  abdomen, and pelvis.  Coronal and sagittal reconstructions at 3 mm  slice thickness were performed.  Omnipaque 350 75 mL was administered  intravenously.    FINDINGS:  CHEST:  MEDIASTINUM:  The heart is normal in size without pericardial effusion.  Central  vascular structures opacify normally.  Patient is status post median  sternotomy.  LUNGS/PLEURA:  There is no pleural effusion, pleural thickening, or pneumothorax.   The airways are patent.  Lungs are clear without consolidation, interstitial disease, or  suspicious nodules.  LYMPH NODES:  Thoracic lymph nodes are not enlarged.  ABDOMEN:     LIVER:  No hepatomegaly.  Smooth surface contour.  There is fatty liver  morphology.     BILE DUCTS:  No intrahepatic or extrahepatic biliary ductal dilatation.     GALLBLADDER:  The gallbladder is prominent with luminal gallstones.  STOMACH:  No abnormalities identified.     PANCREAS:  No masses or ductal dilatation.     SPLEEN:  No splenomegaly or focal splenic lesion.     ADRENAL GLANDS:  No thickening or nodules.     KIDNEYS AND URETERS:  Kidneys are normal in size and location.  No renal or ureteral  calculi.     PELVIS:     BLADDER:  No abnormalities identified.     REPRODUCTIVE ORGANS:  No abnormalities identified.     BOWEL:  No abnormalities identified.  There is moderate colonic stool.     VESSELS:  No abnormalities identified.  Abdominal aorta is normal in caliber.      PERITONEUM/RETROPERITONEUM/LYMPH NODES:  No free fluid.  No pneumoperitoneum.  No lymphadenopathy.     ABDOMINAL WALL:  No abnormalities identified.  SOFT TISSUES:   No abnormalities identified.     BONES:  No acute fracture or aggressive osseous lesion.  Impression: Chest:   No acute  cardiopulmonary disease.  Abdomen:   Distended gallbladder with luminal gallstones.  Findings concerning  for acute cholecystitis.  Recommend ultrasound for further evaluation.  Pelvis:   No acute inflammatory changes.  Moderate colonic stool.  Signed by Alfred Snyder DO  XR shoulder right 2+ views  Narrative: STUDY:  Shoulder Radiographs; 12/21/2023 at 4:44 PM.  INDICATION:  Fall.  COMPARISON:  XR right shoulder 12/18/2023.  ACCESSION NUMBER(S):  [id number]  ORDERING CLINICIAN:  MARY MYERS  TECHNIQUE:  Two view(s) of the right shoulder.  FINDINGS:    There is no displaced fracture.  There are degenerative changes most  pronounced the acromioclavicular joint.  No soft tissue abnormality is  seen.  Impression: No acute osseous abnormalities.  Signed by Kedar Shepard MD  XR chest 1 view  Narrative: STUDY:  Chest Radiograph;  12/21/2023 at 4:44 PM.  INDICATION:  Fall.  COMPARISON:  Cardiac cath 12/6/2023; XR chest 11/29/2023, 4/20/2022.  ACCESSION NUMBER(S):  [id number]  ORDERING CLINICIAN:  MARY MYERS  TECHNIQUE:  Frontal chest was obtained at 16:44 hours.  FINDINGS:  CARDIOMEDIASTINAL SILHOUETTE:  The patient status post median sternotomy.  Cardiomediastinal  silhouette is normal in size and configuration.     LUNGS:  Lungs are clear.     ABDOMEN:  No remarkable upper abdominal findings.     BONES:  No acute osseous changes.  Impression: No evidence consolidating infiltrate or effusion.  Signed by Kedar Shepard MD       Vital signs in last 24 hours:  Temp:  [36.3 °C (97.3 °F)-37.4 °C (99.4 °F)] 36.3 °C (97.3 °F)  Heart Rate:  [64-84] 76  Resp:  [18] 18  BP: ()/(62-75) 117/66    Physical Exam  General: No distress  Neck: Supple.  HEENT: Normocephalic atraumatic pupils equal right lip  Heart: S1-S2 heard no murmurs gallops regurgitation  Lungs: Clear to auscultation bilaterally wheezing rales rhonchi  Abdomen: Nondistended nontender bowel sounds are present  Neuro: No focal  deficits    Assessment/Plan   Severe sepsis.  Hypokalemia.  Hypomagnesemia.  Atrial fibrillation with RVR  Right shoulder pain.    History of:  Coronary artery disease s/p CABG LIMA to LAD, left radial artery to diagonal, vein graft to OM in 2003.  Subsequently patient had PCI of distal LAD with 2 overlapping drug-eluting stents.  S/p thrombectomy and balloon angioplasty 12/6/2023.  Gastroesophageal reflux disease  Anxiety/depression.  BPH.  COPD.  Hypothyroidism.  Severe cervical stenosis and myelopathy.  Generalized weakness with recurrent falls.    Plan:  Signs of sepsis resolved.  Source of infection unknown suspecting intra-abdominal.  Continue broad-spectrum antibiotics with cefepime vancomycin and Flagyl-infectious diseases managing.  Follow-up on the cultures.  Cardiology evaluated the patient recommended to continue triple therapy with dual antiplatelets and Eliquis for 1 month and then later transition to dual therapy-no further recommendations from cardiology standpoint, signed off.  Hypokalemia, hypomagnesemia-replaced recheck with a.m. labs.  Heart rate is well-controlled-patient is anticoagulated and rate controlled.  Right shoulder pain resolved, imaging workup is negative.  Other chronic conditions are close to baseline.  Home medications reviewed and resumed appropriately.  Continue PT OT evaluation.    DVT prophylaxis:  Patient is on Eliquis    Disposition:  Awaiting blood cultures and transition to p.o. antibiotics.  Possible discharge in next 1 to 2 days.     LOS: 1 day

## 2023-12-23 NOTE — NURSING NOTE
Patient flipped into Afib with hr 150. Patient laying in bed, denies palpitations, sob and chest pain. Patient now NSR with hr 86. IMS notified.

## 2023-12-23 NOTE — PROGRESS NOTES
Philip Barfield is a 58 y.o. male on day 1 of admission presenting with Sepsis, due to unspecified organism, unspecified whether acute organ dysfunction present (CMS/[facility]).    Subjective   Interval History:   Remains afebrile. Saturating well on room air.   Wbc improved 27.9 > 22.8  CRP 10.76 > 18.08    Blood Cx NG x 1 day   Patient reports that he had abd pain and nausea this morning which has resolved.   Complains of weakness and body aches today.   Continues to have dysuria and urgency reporting he hasn't been able to make it to the bathroom.  Denies any further complaints.       Review of Systems  10 point ROS completed, negative unless otherwise mentioned in HPI.    Objective   Range of Vitals (last 24 hours)  Heart Rate:  [61-84]   Temp:  [35.9 °C (96.6 °F)-37.4 °C (99.4 °F)]   Resp:  [18-28]   BP: ()/(61-75)   Weight:  [99 kg (218 lb 4.1 oz)-105 kg (232 lb 9.4 oz)]   SpO2:  [95 %-99 %]   Daily Weight  12/23/23 : 105 kg (232 lb 9.4 oz)    Body mass index is 39.9 kg/m².    Physical Exam  GEN: Alert, NAD  HEENT: Moist mucous membranes  NECK: Supple  LUNGS: CTAB  CV: RRR  GI: Soft, NT, ND, BS+  , No dow, No CVAT  SKIN: Warm, Dry  EXT: No edema, No joint inflammation  PIV: C/D/I      Antibiotics  morphine injection 4 mg  ondansetron (Zofran) injection 4 mg  cefepime (Maxipime) IV 2 g  vancomycin (Vancocin) in dextrose 5 % water (D5W) 500 mL IV 1,500 mg  sodium chloride 0.9 % bolus 1,000 mL  morphine injection 4 mg  iohexol (OMNIPaque) 350 mg iodine/mL solution 75 mL  metoprolol tartrate (Lopressor) injection 5 mg  acetaminophen (Tylenol) tablet 975 mg  fentaNYL PF (Sublimaze) injection 25 mcg  lactated Ringer's bolus 1,000 mL  metoprolol tartrate (Lopressor) injection 5 mg  acetaminophen (Tylenol) tablet 650 mg  ondansetron (Zofran) injection 4 mg  cefepime (Maxipime) IV 1 g  albuterol 90 mcg/actuation inhaler 2 puff  apixaban (Eliquis) tablet 5 mg  aspirin chewable tablet 81 mg  atorvastatin (Lipitor)  tablet 80 mg  DULoxetine (Cymbalta) DR capsule 60 mg  fluticasone furoate-vilanteroL (Breo Ellipta) 200-25 mcg/dose inhaler 1 puff  levothyroxine (Synthroid, Levoxyl) tablet 200 mcg  metoprolol tartrate (Lopressor) tablet 100 mg  oxyCODONE (Roxicodone) immediate release tablet 10 mg  pantoprazole (ProtoNix) EC tablet 40 mg  polyethylene glycol (Glycolax, Miralax) packet 17 g  ranolazine (Ranexa) 12 hr tablet 1,000 mg  tamsulosin (Flomax) 24 hr capsule 0.4 mg  ticagrelor (Brilinta) tablet 90 mg  clopidogrel (Plavix) tablet 75 mg  melatonin tablet 9 mg  HYDROmorphone (Dilaudid) injection 0.2 mg  HYDROmorphone (Dilaudid) injection 0.5 mg  HYDROmorphone (Dilaudid) injection 0.5 mg  orphenadrine (Norflex) injection 60 mg  lidocaine 4 % patch 2 patch  docusate sodium (Colace) capsule 100 mg  polyethylene glycol (Glycolax, Miralax) packet 17 g  bisacodyl (Dulcolax) suppository 10 mg  vancomycin (Vancocin) in dextrose 5 % water (D5W) 250 mL IV 1,250 mg  magnesium sulfate IV 2 g  HYDROmorphone (Dilaudid) injection 0.6 mg  metroNIDAZOLE (Flagyl) tablet 500 mg  HYDROmorphone (Dilaudid) injection 1 mg      Relevant Results  Labs  Results from last 72 hours   Lab Units 12/22/23  0515 12/21/23  1600   WBC AUTO x10*3/uL 22.8* 27.9*   HEMOGLOBIN g/dL 12.5* 14.6   HEMATOCRIT % 36.9* 42.3   PLATELETS AUTO x10*3/uL 274 342   NEUTROS PCT AUTO %  --  86.6   LYMPHS PCT AUTO %  --  6.6   MONOS PCT AUTO %  --  5.3   EOS PCT AUTO %  --  0.4     Results from last 72 hours   Lab Units 12/22/23  0515 12/21/23  1600   SODIUM mmol/L 135* 134*   POTASSIUM mmol/L 3.5 3.9   CHLORIDE mmol/L 105 100   CO2 mmol/L 23 24   BUN mg/dL 11 12   CREATININE mg/dL 0.87 0.95   GLUCOSE mg/dL 190* 149*   CALCIUM mg/dL 8.6 9.3   ANION GAP mmol/L 11 14   EGFR mL/min/1.73m*2 >90 >90   PHOSPHORUS mg/dL 2.8  --      Results from last 72 hours   Lab Units 12/22/23  0515 12/21/23  1600   ALK PHOS U/L  --  125*   BILIRUBIN TOTAL mg/dL  --  1.1   BILIRUBIN DIRECT mg/dL   --  0.3   PROTEIN TOTAL g/dL  --  7.3   ALT U/L  --  13   AST U/L  --  16   ALBUMIN g/dL 3.2* 3.9     Estimated Creatinine Clearance: 102 mL/min (by C-G formula based on SCr of 0.87 mg/dL).  C-Reactive Protein   Date Value Ref Range Status   12/22/2023 18.08 (H) <1.00 mg/dL Final   12/21/2023 10.76 (H) <1.00 mg/dL Final     CRP   Date Value Ref Range Status   08/06/2018 3.73 (A) mg/dL Final     Comment:     REF VALUE  < 1.00       Microbiology  No results found for the last 14 days.    Results from last 7 days   Lab Units 12/22/23  0515   VANCOMYCIN RM ug/mL 8.5       Imaging  CT thoracic spine wo IV contrast    Result Date: 12/21/2023  STUDY: CT Thoracic Spine, and Lumbar Spine without IV Contrast; 12/21/2023 10:03 pm INDICATION: Post operative fever. COMPARISON: None available. ACCESSION NUMBER(S): [id number], [id number] ORDERING CLINICIAN: WESLEY WILEY TECHNIQUE:  CT of the thoracic spine, and lumbar spine was performed without intravenous or intrathecal contrast.  Sagittal and coronal reconstructions were generated.  Automated mA/kV exposure control was utilized and patient examination was performed in strict accordance with principles of ALARA. FINDINGS: Thoracic spine: The alignment is anatomic.  There is no fracture or traumatic subluxation. The vertebral body heights are well maintained.  There is disc space narrowing and mild endplate degenerative changes with anterior marginal osteophytes extending T5-T11.  There is mild facet arthrosis.  There is narrowing of the neural foramina greater at T10-11. The paravertebral soft tissues are within normal limits.  There are postsurgical changes in the cervical spine.   Lumbar spine: The alignment is anatomic.  There is no fracture or traumatic subluxation. The vertebral body heights are well maintained.  There is disc space narrowing at L5-S1.  There is mild facet arthrosis. At L3-4, there is a mild annular disc bulge and mild facet arthrosis. There is mild  narrowing of the central canal and bilateral neural foramina. At L4-5 there is a broad-based disc bulge and left foraminal disc protrusion with moderate facet arthrosis.  There is mild narrowing of the central canal and moderate right and moderate to advanced left neural foraminal stenosis. At L5-S1 there is a mild annular disc bulge and moderate facet arthrosis.  Central canal is patent with mild right and moderate left neural foraminal stenosis. The paravertebral soft tissues are within normal limits.  Gallstones are demonstrated within the partially imaged gallbladder.      Thoracic Spine:  No acute fracture or malalignment.  Degenerative changes as described. Cholelithiasis. Lumbar Spine:  No acute fracture or malalignment.  Degenerative changes as described. Signed by Alfred Snyder, DO    CT lumbar spine wo IV contrast    Result Date: 12/21/2023  STUDY: CT Thoracic Spine, and Lumbar Spine without IV Contrast; 12/21/2023 10:03 pm INDICATION: Post operative fever. COMPARISON: None available. ACCESSION NUMBER(S): [id number], [id number] ORDERING CLINICIAN: WESLEY WILEY TECHNIQUE:  CT of the thoracic spine, and lumbar spine was performed without intravenous or intrathecal contrast.  Sagittal and coronal reconstructions were generated.  Automated mA/kV exposure control was utilized and patient examination was performed in strict accordance with principles of ALARA. FINDINGS: Thoracic spine: The alignment is anatomic.  There is no fracture or traumatic subluxation. The vertebral body heights are well maintained.  There is disc space narrowing and mild endplate degenerative changes with anterior marginal osteophytes extending T5-T11.  There is mild facet arthrosis.  There is narrowing of the neural foramina greater at T10-11. The paravertebral soft tissues are within normal limits.  There are postsurgical changes in the cervical spine.   Lumbar spine: The alignment is anatomic.  There is no fracture or traumatic  subluxation. The vertebral body heights are well maintained.  There is disc space narrowing at L5-S1.  There is mild facet arthrosis. At L3-4, there is a mild annular disc bulge and mild facet arthrosis. There is mild narrowing of the central canal and bilateral neural foramina. At L4-5 there is a broad-based disc bulge and left foraminal disc protrusion with moderate facet arthrosis.  There is mild narrowing of the central canal and moderate right and moderate to advanced left neural foraminal stenosis. At L5-S1 there is a mild annular disc bulge and moderate facet arthrosis.  Central canal is patent with mild right and moderate left neural foraminal stenosis. The paravertebral soft tissues are within normal limits.  Gallstones are demonstrated within the partially imaged gallbladder.      Thoracic Spine:  No acute fracture or malalignment.  Degenerative changes as described. Cholelithiasis. Lumbar Spine:  No acute fracture or malalignment.  Degenerative changes as described. Signed by Alfred Snyder, DO    US gallbladder    Result Date: 12/21/2023  STUDY: Abdominal Ultrasound; 12/21/2023 9:40 PM. INDICATION: Sepsis. Abnormal CT. COMPARISON: CT A/P 12/21/2023. ACCESSION NUMBER(S): [id number] ORDERING CLINICIAN: WESLEY WILEY TECHNIQUE: Ultrasound of the Right Upper Quadrant.  Multiple images of the abdomen were obtained. FINDINGS: LIVER: The liver demonstrates increased echogenicity.   GALLBLADDER: The gallbladder contains a stone within the neck. There is no pericholecystic fluid or wall thickening. Sonographic Herman's sign is negative.   BILE DUCTS: The common bile duct measures 0.3 cm. There is no intrahepatic biliary dilatation.   PANCREAS: The pancreas is not seen due to overlying bowel gas.  RIGHT KIDNEY: The right kidney measures 10.2 cm in length. Renal cortical echotexture is normal. There is no hydronephrosis. There are no stones. There are no cysts.    Diffuse hepatic steatosis. Cholelithiasis.  No  gallbladder wall thickening or biliary dilatation is appreciated. Signed by Alfred Snyder,     CT head wo IV contrast    Result Date: 12/21/2023  Interpreted By:  Liudmila Vasquez, STUDY: CT HEAD WO IV CONTRAST; CT CERVICAL SPINE WO IV CONTRAST;  12/21/2023 8:39 pm   INDICATION: Signs/Symptoms:fall; Signs/Symptoms:fall recent spiinal fusion.   COMPARISON: 12/18/2023   ACCESSION NUMBER(S): [id number]; [id number]   ORDERING CLINICIAN: MARY MYERS   TECHNIQUE: Noncontrast CT images of head. Axial noncontrast CT images of the cervical spine with coronal and sagittal reconstructed images.   FINDINGS: BRAIN PARENCHYMA: Encephalomalacia in the region of the right head of the caudate measuring up to 2.1 cm which extends to the anterior horn of the right lateral ventricle, similar to prior imaging. Mild-to-moderate generalized parenchymal volume loss and mild nonspecific white matter changes. Atherosclerotic calcifications noted. No mass effect or midline shift.   HEMORRHAGE: No acute intracranial hemorrhage. VENTRICLES and EXTRA-AXIAL SPACES: The ventricles, sulci and basal cisterns enlarged, concordant with parenchymal volume loss. EXTRACRANIAL SOFT TISSUES: Within normal limits. PARANASAL SINUSES/MASTOIDS: Trace fluid in the mastoid air cells. Partial opacification of the ethmoid air cells with scattered mucosal thickening including the maxillary sinuses. The CALVARIUM: No depressed skull fracture. No destructive osseous lesion.   OTHER FINDINGS: Partially imaged periapical lucency noted along the right frontal maxilla.   CERVICAL SPINE:   ALIGNMENT: Postsurgical changes of an ACDF and corpectomy from C4 through C6. Hardware appears intact without periprosthetic lucency and similar in position to prior imaging. Mild straightening of the upper cervical spine. Facet joint and craniocervical alignment maintained. VERTEBRAE: Postsurgical changes of the vertebral bodies with mild vertebral body height loss again  noted at C4. No acute fracture identified. SPINAL CANAL: Degenerative changes facet and uncinate arthropathy, as well as disc space narrowing and end plate hypertrophy. Moderate canal and foraminal narrowing at C3-C4. Moderate to severe canal stenosis at C4-C5 with at least moderate foraminal narrowing. Moderate foraminal narrowing at C5-C6. PREVERTEBRAL SOFT TISSUES: No prevertebral soft tissue swelling. LUNG APICES: Not imaged.   OTHER FINDINGS: Soft tissue stranding in the ventral left cervical region likely related to prior intervention..       No acute intracranial hemorrhage or mass effect.   Encephalomalacia along the right anterior lateral ventricle similar prior imaging and possibly developmental or sequela of prior insult.   No acute fracture or traumatic subluxation of the cervical spine.   Postsurgical changes of the cervical spine with degenerative changes. Moderate canal stenosis again noted at C3-C4 and moderate to severe canal stenosis at C4-C5.   MACRO: None.   Signed by: Liudmila Vasquez 12/21/2023 9:31 PM Dictation workstation:   [id number]    CT cervical spine wo IV contrast    Result Date: 12/21/2023  Interpreted By:  Liudmila Vasquez, STUDY: CT HEAD WO IV CONTRAST; CT CERVICAL SPINE WO IV CONTRAST;  12/21/2023 8:39 pm   INDICATION: Signs/Symptoms:fall; Signs/Symptoms:fall recent spiinal fusion.   COMPARISON: 12/18/2023   ACCESSION NUMBER(S): [id number]; [id number]   ORDERING CLINICIAN: MARY MYERS   TECHNIQUE: Noncontrast CT images of head. Axial noncontrast CT images of the cervical spine with coronal and sagittal reconstructed images.   FINDINGS: BRAIN PARENCHYMA: Encephalomalacia in the region of the right head of the caudate measuring up to 2.1 cm which extends to the anterior horn of the right lateral ventricle, similar to prior imaging. Mild-to-moderate generalized parenchymal volume loss and mild nonspecific white matter changes. Atherosclerotic calcifications noted. No mass effect  or midline shift.   HEMORRHAGE: No acute intracranial hemorrhage. VENTRICLES and EXTRA-AXIAL SPACES: The ventricles, sulci and basal cisterns enlarged, concordant with parenchymal volume loss. EXTRACRANIAL SOFT TISSUES: Within normal limits. PARANASAL SINUSES/MASTOIDS: Trace fluid in the mastoid air cells. Partial opacification of the ethmoid air cells with scattered mucosal thickening including the maxillary sinuses. The CALVARIUM: No depressed skull fracture. No destructive osseous lesion.   OTHER FINDINGS: Partially imaged periapical lucency noted along the right frontal maxilla.   CERVICAL SPINE:   ALIGNMENT: Postsurgical changes of an ACDF and corpectomy from C4 through C6. Hardware appears intact without periprosthetic lucency and similar in position to prior imaging. Mild straightening of the upper cervical spine. Facet joint and craniocervical alignment maintained. VERTEBRAE: Postsurgical changes of the vertebral bodies with mild vertebral body height loss again noted at C4. No acute fracture identified. SPINAL CANAL: Degenerative changes facet and uncinate arthropathy, as well as disc space narrowing and end plate hypertrophy. Moderate canal and foraminal narrowing at C3-C4. Moderate to severe canal stenosis at C4-C5 with at least moderate foraminal narrowing. Moderate foraminal narrowing at C5-C6. PREVERTEBRAL SOFT TISSUES: No prevertebral soft tissue swelling. LUNG APICES: Not imaged.   OTHER FINDINGS: Soft tissue stranding in the ventral left cervical region likely related to prior intervention..       No acute intracranial hemorrhage or mass effect.   Encephalomalacia along the right anterior lateral ventricle similar prior imaging and possibly developmental or sequela of prior insult.   No acute fracture or traumatic subluxation of the cervical spine.   Postsurgical changes of the cervical spine with degenerative changes. Moderate canal stenosis again noted at C3-C4 and moderate to severe canal  stenosis at C4-C5.   MACRO: None.   Signed by: Liudmila Vasquez 12/21/2023 9:31 PM Dictation workstation:   [id number]    CT chest abdomen pelvis w IV contrast    Result Date: 12/21/2023  STUDY: CT Chest, Abdomen, and Pelvis with IV Contrast; 12/21/2023 at 8:42 p.m. INDICATION: Sepsis. COMPARISON: One-view CXR 12/21/2023.  XR pelvis 10/30/2023. ACCESSION NUMBER(S): [id number] ORDERING CLINICIAN: MARY MYERS TECHNIQUE: CT of the chest, abdomen, and pelvis was performed.  Contiguous axial images were obtained at 3 mm slice thickness through the chest, abdomen, and pelvis.  Coronal and sagittal reconstructions at 3 mm slice thickness were performed.  Omnipaque 350 75 mL was administered intravenously.  FINDINGS: CHEST: MEDIASTINUM: The heart is normal in size without pericardial effusion.  Central vascular structures opacify normally.  Patient is status post median sternotomy. LUNGS/PLEURA: There is no pleural effusion, pleural thickening, or pneumothorax. The airways are patent. Lungs are clear without consolidation, interstitial disease, or suspicious nodules. LYMPH NODES: Thoracic lymph nodes are not enlarged. ABDOMEN:  LIVER: No hepatomegaly.  Smooth surface contour.  There is fatty liver morphology.  BILE DUCTS: No intrahepatic or extrahepatic biliary ductal dilatation.  GALLBLADDER: The gallbladder is prominent with luminal gallstones. STOMACH: No abnormalities identified.  PANCREAS: No masses or ductal dilatation.  SPLEEN: No splenomegaly or focal splenic lesion.  ADRENAL GLANDS: No thickening or nodules.  KIDNEYS AND URETERS: Kidneys are normal in size and location.  No renal or ureteral calculi.  PELVIS:  BLADDER: No abnormalities identified.  REPRODUCTIVE ORGANS: No abnormalities identified.  BOWEL: No abnormalities identified.  There is moderate colonic stool.  VESSELS: No abnormalities identified.  Abdominal aorta is normal in caliber.  PERITONEUM/RETROPERITONEUM/LYMPH NODES: No free fluid.  No  pneumoperitoneum. No lymphadenopathy.  ABDOMINAL WALL: No abnormalities identified. SOFT TISSUES: No abnormalities identified.  BONES: No acute fracture or aggressive osseous lesion.    Chest: No acute cardiopulmonary disease. Abdomen: Distended gallbladder with luminal gallstones.  Findings concerning for acute cholecystitis.  Recommend ultrasound for further evaluation. Pelvis: No acute inflammatory changes. Moderate colonic stool. Signed by Alfred Snyder DO    XR shoulder right 2+ views    Result Date: 12/21/2023  STUDY: Shoulder Radiographs; 12/21/2023 at 4:44 PM. INDICATION: Fall. COMPARISON: XR right shoulder 12/18/2023. ACCESSION NUMBER(S): [id number] ORDERING CLINICIAN: MARY MYERS TECHNIQUE:  Two view(s) of the right shoulder. FINDINGS:  There is no displaced fracture.  There are degenerative changes most pronounced the acromioclavicular joint.  No soft tissue abnormality is seen.    No acute osseous abnormalities. Signed by Kedar Shepard MD    XR chest 1 view    Result Date: 12/21/2023  STUDY: Chest Radiograph;  12/21/2023 at 4:44 PM. INDICATION: Fall. COMPARISON: Cardiac cath 12/6/2023; XR chest 11/29/2023, 4/20/2022. ACCESSION NUMBER(S): [id number] ORDERING CLINICIAN: MARY MYERS TECHNIQUE:  Frontal chest was obtained at 16:44 hours. FINDINGS: CARDIOMEDIASTINAL SILHOUETTE: The patient status post median sternotomy.  Cardiomediastinal silhouette is normal in size and configuration.  LUNGS: Lungs are clear.  ABDOMEN: No remarkable upper abdominal findings.  BONES: No acute osseous changes.    No evidence consolidating infiltrate or effusion. Signed by Kedar Shepard MD     Assessment/Plan   Sepsis  ? Intraabdominal source  HTN  HLD  CAD  COPD    Recommendations:  Follow blood cx  Ordered Renal US  UA negative for infection but patient does report dysuria and urgency   CT chest no consolidation   CT ap- distended GB  USG GB_- negative for cholecystitis   Monitor clinical course - may consider  HIDA scan  C/w vancomycin   C/w cefepime   C/w flagyl pending blood cx results  Trend wbc and temps  Plans d/w ID attending, Dr. Wood and patient.           Zelda Berkowitz, APRN-CNP

## 2023-12-23 NOTE — PROGRESS NOTES
Physical Therapy                 Therapy Communication Note    Patient Name: Philip Barfield  MRN: [id number]  Today's Date: 12/23/2023     Discipline: Physical Therapy    Missed Visit Reason: Missed Visit Reason: Patient sleeping    Missed Time: Attempt    Comment: pt. Sleeping,Will attempt another date/time

## 2023-12-23 NOTE — PROGRESS NOTES
HPI:  Philip Barfield is a 58 y.o. male presenting with past medical history significant for HTN, HLD, CAD hx w/ CABG x 3 (LIMA-LAD, (left) Radial artery - diag, Vein graft-OM) in 2003 and subsequent PCI of distal LAD via LIMA graft (2 overlapped JACE), multiple RCA overlap stents, paroxysmal Afib (on home eliquis), HLD, HTN, COPD, asthma, TIAs, vestibular neuritis, vertigo, ARISTEO on CPAP, BPH, anxiety, depression and GERD.  Patient states that ever since he returned home he has had persistent right upper extremity pain with movement of the right shoulder which was has not gotten any better since his last discharge.  He also stated that he fell on 12/18/2023 while walking into the kitchen when he fell and hit his head and lost consciousness for couple seconds.  He states that he landed on his right shoulder but it should be noted that the patient stated that he was having pain in that right shoulder prior to this fall but it has been much more significant since the fall.  He then fell again prior to presentation on 12/21/2023 as he was about to use the restroom when he fell in between the bathtub and bathroom cabinet.  He states that his son was able to assist him up but then when his traveling nurse came to visit they had called EMS for him to be brought in for further evaluation and management.  He states that he did feel dizzy throughout the morning otherwise was in his usual state of health.  He states that he was supposed to get a walker after he was discharged from his most recent hospitalization but has not received this yet.  He is also complaining of generalized weakness, occasional lightheadedness, dizziness, feeling unwell.  He is also admitted that he has not been taking his medications as recommended with regards to the cardiac medications especially the antiplatelets and anticoagulation.  He is also noted constipation which was in the hospital but then also persistent upon discharge.  He is also admitted  to very foul-smelling urine and dark urine with some burning at the end of urination.  He denies any recent sick contacts, chemical/environmental exposures, changes in dietary habits or any recent traumatic events/falls other than noted above.  He denies any fevers, chills, night sweats, vision changes, auditory changes, change in taste/smell, loss of bowel/bladder control, vertigo, syncope, seizure-like activity, chest pain, palpitations, shortness of breath, cough, wheezing, congestion, hemoptysis, hematemesis, abdominal pain, nausea, vomiting, diarrhea, hematuria, dyschezia, hematochezia any lateralizing motor/sensory deficits other than noted above.      Subjective Data:  Hx of CABG with recent PCI. Here with sepsis.  Denies any CP/dyspnea.  Has palpitations at times.  Monitor: SR.  Had Afib earlier this admission.  K 3.5, mag 1.42, creatinine 0.87    Overnight Events:    None     Objective Data:  Last Recorded Vitals:  Vitals:    12/22/23 2220 12/22/23 2321 12/23/23 0320 12/23/23 0714   BP: 152/74 121/63 98/62 137/67   BP Location:  Right arm Right arm Left arm   Patient Position:  Lying Lying Lying   Pulse: 82 69 69 84   Resp:  18 18    Temp: 37.4 °C (99.3 °F) 37.4 °C (99.4 °F) 36.3 °C (97.4 °F) 37.1 °C (98.8 °F)   TempSrc: Temporal Temporal Temporal Temporal   SpO2:  96% 95% 96%   Weight:   105 kg (232 lb 9.4 oz)    Height:           Last Labs:    Results from last 7 days   Lab Units 12/22/23  0515 12/21/23  1600   SODIUM mmol/L 135* 134*   POTASSIUM mmol/L 3.5 3.9   CHLORIDE mmol/L 105 100   CO2 mmol/L 23 24   BUN mg/dL 11 12   CREATININE mg/dL 0.87 0.95   GLUCOSE mg/dL 190* 149*   CALCIUM mg/dL 8.6 9.3        Results from last 7 days   Lab Units 12/22/23  0515 12/21/23  1600   WBC AUTO x10*3/uL 22.8* 27.9*   HEMOGLOBIN g/dL 12.5* 14.6   HEMATOCRIT % 36.9* 42.3   PLATELETS AUTO x10*3/uL 274 342      Results from last 7 days   Lab Units 12/22/23  0515   MAGNESIUM mg/dL 1.42*            Last I/O:  I/O last 3  completed shifts:  In: 2440 (23.1 mL/kg) [P.O.:240; I.V.:50 (0.5 mL/kg); IV Piggyback:2150]  Out: - (0 mL/kg)   Weight: 105.5 kg     Past Cardiology Tests (Last 3 Years):    Echo:12-7-23  CONCLUSIONS:   1. Poorly visualized anatomical structures due to suboptimal image quality.   2. Abnormal septal motion consistent with post-operative status.   3. Left ventricular systolic function is mildly decreased with a 45-50% estimated ejection fraction.   4. Basal and mid inferior septum and basal anteroseptal segment are abnormal.    Inpatient Medications:    Scheduled medications   Medication Dose Route Frequency    apixaban  5 mg oral BID    aspirin  81 mg oral Daily    atorvastatin  80 mg oral Daily    bisacodyl  10 mg rectal Daily    cefepime  1 g intravenous q8h    docusate sodium  100 mg oral BID    DULoxetine  60 mg oral Daily    fluticasone furoate-vilanteroL  1 puff inhalation Daily    levothyroxine  200 mcg oral Daily before breakfast    lidocaine  2 patch transdermal Daily    metoprolol tartrate  100 mg oral BID    metroNIDAZOLE  500 mg oral q8h SEBLE    pantoprazole  40 mg oral Daily before breakfast    polyethylene glycol  17 g oral Daily    ranolazine  1,000 mg oral q12h    tamsulosin  0.4 mg oral Daily    ticagrelor  90 mg oral BID    vancomycin  1,250 mg intravenous q12h     PRN medications   Medication    acetaminophen    albuterol    HYDROmorphone    melatonin    ondansetron    orphenadrine    polyethylene glycol     Continuous Medications   Medication Dose Last Rate       ROS:  Review of Systems   Constitutional: Negative. Negative for decreased appetite and malaise/fatigue.   HENT: Negative.     Eyes:  Negative for blurred vision and visual disturbance.   Cardiovascular:  Positive for palpitations. Negative for chest pain, dyspnea on exertion, irregular heartbeat, leg swelling, orthopnea and syncope.   Respiratory: Negative.  Negative for cough and shortness of breath.    Musculoskeletal:  Positive for  back pain and falls. Negative for arthritis.   Gastrointestinal: Negative.    Neurological:  Negative for focal weakness and light-headedness.   Psychiatric/Behavioral:  Negative for depression and memory loss.         Mild confusion.        Physical Exam:  Physical Exam  Constitutional:       Appearance: Normal appearance.   HENT:      Head: Normocephalic.   Eyes:      Conjunctiva/sclera: Conjunctivae normal.   Neck:      Comments: Wearing soft C collar    Cardiovascular:      Rate and Rhythm: Normal rate and regular rhythm.      Pulses: Normal pulses.      Heart sounds: S1 normal and S2 normal. No murmur heard.     No friction rub. No gallop.   Pulmonary:      Effort: Pulmonary effort is normal.      Breath sounds: Normal breath sounds.   Abdominal:      General: Bowel sounds are normal.      Palpations: Abdomen is soft.      Tenderness: There is no abdominal tenderness.   Musculoskeletal:      Comments: Trace LE edema   Skin:     General: Skin is warm and dry.   Neurological:      General: No focal deficit present.      Mental Status: He is alert and oriented to person, place, and time.   Psychiatric:         Attention and Perception: Attention normal.         Mood and Affect: Mood normal.          Assessment/Plan   1) CAD s/p CABG s/p recent stents  ECG and Troponin negative  On Triple therapy for a month and then double therapy  12-23-23: Patient denies any CP/pressure today.  On ASA, atorvastatin, metoprolol, Ranexa,and Brilinta.  He is also on Eliquis d/t Afib.  Can stop ASA after he has been on 30 days of tx s/p PCI.     2) Afib  In NSR  On apixaban  12-23-23: SR on tele.  Continue on BB and Eliquis.      Disposition:    Continue on current meds  Patient to follow up with his usual cardiologist on d/c 1-2 weeks  Will sign off.         Code Status:  Full Code          Annie Pleitez, APRN-CNP

## 2023-12-24 LAB
ANION GAP SERPL CALC-SCNC: 11 MMOL/L (ref 10–20)
BUN SERPL-MCNC: 12 MG/DL (ref 6–23)
CALCIUM SERPL-MCNC: 8.9 MG/DL (ref 8.6–10.3)
CHLORIDE SERPL-SCNC: 101 MMOL/L (ref 98–107)
CO2 SERPL-SCNC: 23 MMOL/L (ref 21–32)
CREAT SERPL-MCNC: 0.8 MG/DL (ref 0.5–1.3)
GFR SERPL CREATININE-BSD FRML MDRD: >90 ML/MIN/1.73M*2
GLUCOSE SERPL-MCNC: 125 MG/DL (ref 74–99)
MAGNESIUM SERPL-MCNC: 1.5 MG/DL (ref 1.6–2.4)
POTASSIUM SERPL-SCNC: 3.8 MMOL/L (ref 3.5–5.3)
SODIUM SERPL-SCNC: 131 MMOL/L (ref 136–145)

## 2023-12-24 PROCEDURE — 2500000004 HC RX 250 GENERAL PHARMACY W/ HCPCS (ALT 636 FOR OP/ED): Performed by: FAMILY MEDICINE

## 2023-12-24 PROCEDURE — 36415 COLL VENOUS BLD VENIPUNCTURE: CPT | Performed by: FAMILY MEDICINE

## 2023-12-24 PROCEDURE — 80048 BASIC METABOLIC PNL TOTAL CA: CPT | Performed by: FAMILY MEDICINE

## 2023-12-24 PROCEDURE — 83735 ASSAY OF MAGNESIUM: CPT | Performed by: FAMILY MEDICINE

## 2023-12-24 PROCEDURE — 1090000001 HH PPS REVENUE CREDIT

## 2023-12-24 PROCEDURE — 1090000002 HH PPS REVENUE DEBIT

## 2023-12-24 PROCEDURE — 2500000001 HC RX 250 WO HCPCS SELF ADMINISTERED DRUGS (ALT 637 FOR MEDICARE OP): Performed by: INTERNAL MEDICINE

## 2023-12-24 PROCEDURE — 2500000001 HC RX 250 WO HCPCS SELF ADMINISTERED DRUGS (ALT 637 FOR MEDICARE OP): Performed by: STUDENT IN AN ORGANIZED HEALTH CARE EDUCATION/TRAINING PROGRAM

## 2023-12-24 PROCEDURE — 2500000004 HC RX 250 GENERAL PHARMACY W/ HCPCS (ALT 636 FOR OP/ED): Performed by: STUDENT IN AN ORGANIZED HEALTH CARE EDUCATION/TRAINING PROGRAM

## 2023-12-24 PROCEDURE — 2500000005 HC RX 250 GENERAL PHARMACY W/O HCPCS: Performed by: INTERNAL MEDICINE

## 2023-12-24 PROCEDURE — 2500000002 HC RX 250 W HCPCS SELF ADMINISTERED DRUGS (ALT 637 FOR MEDICARE OP, ALT 636 FOR OP/ED): Performed by: INTERNAL MEDICINE

## 2023-12-24 PROCEDURE — 99233 SBSQ HOSP IP/OBS HIGH 50: CPT | Performed by: FAMILY MEDICINE

## 2023-12-24 PROCEDURE — 2060000001 HC INTERMEDIATE ICU ROOM DAILY

## 2023-12-24 PROCEDURE — 2500000004 HC RX 250 GENERAL PHARMACY W/ HCPCS (ALT 636 FOR OP/ED)

## 2023-12-24 PROCEDURE — 2500000004 HC RX 250 GENERAL PHARMACY W/ HCPCS (ALT 636 FOR OP/ED): Performed by: INTERNAL MEDICINE

## 2023-12-24 RX ORDER — MAGNESIUM SULFATE HEPTAHYDRATE 40 MG/ML
2 INJECTION, SOLUTION INTRAVENOUS ONCE
Status: COMPLETED | OUTPATIENT
Start: 2023-12-24 | End: 2023-12-24

## 2023-12-24 RX ADMIN — OXYCODONE HYDROCHLORIDE 10 MG: 10 TABLET ORAL at 23:46

## 2023-12-24 RX ADMIN — LIDOCAINE 2 PATCH: 4 PATCH TOPICAL at 08:47

## 2023-12-24 RX ADMIN — OXYCODONE HYDROCHLORIDE 10 MG: 10 TABLET ORAL at 17:43

## 2023-12-24 RX ADMIN — HYDROMORPHONE HYDROCHLORIDE 0.4 MG: 1 INJECTION, SOLUTION INTRAMUSCULAR; INTRAVENOUS; SUBCUTANEOUS at 12:43

## 2023-12-24 RX ADMIN — DOCUSATE SODIUM 100 MG: 100 CAPSULE, LIQUID FILLED ORAL at 08:50

## 2023-12-24 RX ADMIN — PANTOPRAZOLE SODIUM 40 MG: 40 TABLET, DELAYED RELEASE ORAL at 06:34

## 2023-12-24 RX ADMIN — OXYCODONE HYDROCHLORIDE 10 MG: 10 TABLET ORAL at 02:22

## 2023-12-24 RX ADMIN — METOPROLOL TARTRATE 100 MG: 50 TABLET, FILM COATED ORAL at 20:16

## 2023-12-24 RX ADMIN — METRONIDAZOLE 500 MG: 500 TABLET ORAL at 05:00

## 2023-12-24 RX ADMIN — CEFEPIME 1 G: 1 INJECTION, SOLUTION INTRAVENOUS at 21:10

## 2023-12-24 RX ADMIN — TICAGRELOR 90 MG: 90 TABLET ORAL at 20:17

## 2023-12-24 RX ADMIN — FLUTICASONE FUROATE AND VILANTEROL TRIFENATATE 1 PUFF: 200; 25 POWDER RESPIRATORY (INHALATION) at 12:43

## 2023-12-24 RX ADMIN — APIXABAN 5 MG: 5 TABLET, FILM COATED ORAL at 20:17

## 2023-12-24 RX ADMIN — METRONIDAZOLE 500 MG: 500 TABLET ORAL at 15:31

## 2023-12-24 RX ADMIN — RANOLAZINE 1000 MG: 500 TABLET, EXTENDED RELEASE ORAL at 21:14

## 2023-12-24 RX ADMIN — DULOXETINE HYDROCHLORIDE 60 MG: 30 CAPSULE, DELAYED RELEASE ORAL at 08:47

## 2023-12-24 RX ADMIN — ASPIRIN 81 MG CHEWABLE TABLET 81 MG: 81 TABLET CHEWABLE at 08:47

## 2023-12-24 RX ADMIN — CEFEPIME 1 G: 1 INJECTION, SOLUTION INTRAVENOUS at 05:00

## 2023-12-24 RX ADMIN — METOPROLOL TARTRATE 100 MG: 50 TABLET, FILM COATED ORAL at 08:47

## 2023-12-24 RX ADMIN — APIXABAN 5 MG: 5 TABLET, FILM COATED ORAL at 08:47

## 2023-12-24 RX ADMIN — CEFEPIME 1 G: 1 INJECTION, SOLUTION INTRAVENOUS at 11:32

## 2023-12-24 RX ADMIN — OXYCODONE HYDROCHLORIDE 10 MG: 10 TABLET ORAL at 08:53

## 2023-12-24 RX ADMIN — RANOLAZINE 1000 MG: 500 TABLET, EXTENDED RELEASE ORAL at 09:36

## 2023-12-24 RX ADMIN — HYDROMORPHONE HYDROCHLORIDE 0.4 MG: 1 INJECTION, SOLUTION INTRAMUSCULAR; INTRAVENOUS; SUBCUTANEOUS at 20:10

## 2023-12-24 RX ADMIN — METRONIDAZOLE 500 MG: 500 TABLET ORAL at 21:15

## 2023-12-24 RX ADMIN — VANCOMYCIN HYDROCHLORIDE 1250 MG: 1.25 INJECTION, POWDER, LYOPHILIZED, FOR SOLUTION INTRAVENOUS at 22:17

## 2023-12-24 RX ADMIN — TICAGRELOR 90 MG: 90 TABLET ORAL at 08:47

## 2023-12-24 RX ADMIN — OXYCODONE HYDROCHLORIDE AND ACETAMINOPHEN 1 TABLET: 5; 325 TABLET ORAL at 22:17

## 2023-12-24 RX ADMIN — HYDROMORPHONE HYDROCHLORIDE 0.2 MG: 0.2 INJECTION, SOLUTION INTRAMUSCULAR; INTRAVENOUS; SUBCUTANEOUS at 09:41

## 2023-12-24 RX ADMIN — LEVOTHYROXINE SODIUM 200 MCG: 0.1 TABLET ORAL at 06:34

## 2023-12-24 RX ADMIN — VANCOMYCIN HYDROCHLORIDE 1250 MG: 1.25 INJECTION, POWDER, LYOPHILIZED, FOR SOLUTION INTRAVENOUS at 09:32

## 2023-12-24 RX ADMIN — MAGNESIUM SULFATE HEPTAHYDRATE 2 G: 40 INJECTION, SOLUTION INTRAVENOUS at 12:46

## 2023-12-24 RX ADMIN — BISACODYL 10 MG: 10 SUPPOSITORY RECTAL at 08:45

## 2023-12-24 RX ADMIN — POLYETHYLENE GLYCOL 3350 17 G: 17 POWDER, FOR SOLUTION ORAL at 08:46

## 2023-12-24 RX ADMIN — POLYETHYLENE GLYCOL 3350 17 G: 17 POWDER, FOR SOLUTION ORAL at 09:00

## 2023-12-24 RX ADMIN — TAMSULOSIN HYDROCHLORIDE 0.4 MG: 0.4 CAPSULE ORAL at 08:47

## 2023-12-24 ASSESSMENT — PAIN - FUNCTIONAL ASSESSMENT
PAIN_FUNCTIONAL_ASSESSMENT: 0-10

## 2023-12-24 ASSESSMENT — PAIN SCALES - GENERAL
PAINLEVEL_OUTOF10: 6
PAINLEVEL_OUTOF10: 0 - NO PAIN
PAINLEVEL_OUTOF10: 8
PAINLEVEL_OUTOF10: 0 - NO PAIN
PAINLEVEL_OUTOF10: 8
PAINLEVEL_OUTOF10: 6
PAINLEVEL_OUTOF10: 6
PAINLEVEL_OUTOF10: 10 - WORST POSSIBLE PAIN
PAINLEVEL_OUTOF10: 8
PAINLEVEL_OUTOF10: 10 - WORST POSSIBLE PAIN
PAINLEVEL_OUTOF10: 3
PAINLEVEL_OUTOF10: 10 - WORST POSSIBLE PAIN
PAINLEVEL_OUTOF10: 9
PAINLEVEL_OUTOF10: 6
PAINLEVEL_OUTOF10: 6
PAINLEVEL_OUTOF10: 8
PAINLEVEL_OUTOF10: 8

## 2023-12-24 ASSESSMENT — PAIN DESCRIPTION - LOCATION
LOCATION: ARM
LOCATION: ARM

## 2023-12-24 ASSESSMENT — PAIN DESCRIPTION - DESCRIPTORS
DESCRIPTORS: ACHING;DISCOMFORT

## 2023-12-24 ASSESSMENT — COGNITIVE AND FUNCTIONAL STATUS - GENERAL
MOBILITY SCORE: 20
DAILY ACTIVITIY SCORE: 23
WALKING IN HOSPITAL ROOM: A LITTLE
CLIMB 3 TO 5 STEPS WITH RAILING: A LITTLE
TOILETING: A LITTLE
STANDING UP FROM CHAIR USING ARMS: A LITTLE
MOVING TO AND FROM BED TO CHAIR: A LITTLE

## 2023-12-24 ASSESSMENT — PAIN DESCRIPTION - ORIENTATION
ORIENTATION: RIGHT
ORIENTATION: RIGHT

## 2023-12-24 NOTE — NURSING NOTE
"Pt lying in bed watching tv.  Requests  IV pain medication.  Reminded pt that medication was every 4 hours as needed and that it was too soon (1643 was the soonest).  Pt laughing saying, \"that's all right.  Just bring it in later when I can have it.\"    "

## 2023-12-24 NOTE — PROGRESS NOTES
Physical Therapy    Physical Therapy Treatment    Patient Name: Philip Barfield  MRN: [id number]  Today's Date: 12/24/2023  Time Calculation  Start Time: 1500  Stop Time: 1523  Time Calculation (min): 23 min       Assessment/Plan   PT Assessment  PT Assessment Results: Decreased range of motion, Decreased endurance, Decreased mobility (Pt. limited by pain in R shoulder and Nausea during upright activities. pt.increased gait to 30' total with FWW and CGA. pt. had no LOB, SOB or c/o dizziness. pt. returned to supine with call light in reach. Notified RN of nause and pain. Placed heatpack)  Rehab Prognosis:  (pack to right shoulder at pt.request, Ok  per RN.)     PT Plan  Treatment/Interventions: Bed mobility, Transfer training, Gait training, Stair training, Balance training, Strengthening, Endurance training, Therapeutic exercise, Therapeutic activity, Home exercise program  PT Plan: Skilled PT  PT Frequency: 3 times per week  PT Discharge Recommendations: Low intensity level of continued care  Equipment Recommended upon Discharge: Wheeled walker (Patient to check with son to make sure son did not obtain FWW already)  PT - OK to Discharge: Yes (when medically cleared)      General Visit Information:      General  Patient Position Received: Bed, 3 rail up  Preferred Learning Style: verbal, visual  General Comment:  (pt. pleasantly agreeable for tx, easily falling back asleep between verbal interactions prior to mobility. C-collar.)    Subjective   Precautions:     Vital Signs:       Objective   Pain:  Pain Assessment  Pain Assessment: 0-10  Pain Score: 8  Multiple Pain Sites:  (Right shoulder)  Cognition:  Cognition  Orientation Level: Oriented X4      Activity Tolerance:  Activity Tolerance  Endurance: Decreased tolerance for upright activites  Activity Tolerance Comments:  (Limited by pain and nausea)  Treatments:  Therapeutic Activity  Therapeutic Activity Performed:  (Stood at toilet with Good static standing  balance, no AD for 2-3 minutes, followed by hand hygiene at sink with Fair + dyn. standing balance using sink for 1 UE support balance. NO LOB.)         Ambulation/Gait Training  Ambulation/Gait Training Performed: Yes (Gait training x's 10' +20' with FWW and CGA. pt. c/o of feeling nauseous and requested to lay back down.)       Outcome Measures:  [facility] Basic Mobility  Turning from your back to your side while in a flat bed without using bedrails: None  Moving from lying on your back to sitting on the side of a flat bed without using bedrails: None  Moving to and from bed to chair (including a wheelchair): A little  Standing up from a chair using your arms (e.g. wheelchair or bedside chair): A little  To walk in hospital room: A little  Climbing 3-5 steps with railing: A little  Basic Mobility - Total Score: 20    Education Documentation  No documentation found.  Education Comments  No comments found.        OP EDUCATION:       Encounter Problems       Encounter Problems (Active)       PT Problem       transfers (Progressing)       Start:  12/22/23    Expected End:  01/05/24       Patient will perform all transfers with SBA x1; Maintaining spinal precautions           gait (Progressing)       Start:  12/22/23    Expected End:  01/05/24       Patient will amb 100+ feet with SBA x1, FWW, rest breaks prn for energy conservation              Pain - Adult

## 2023-12-24 NOTE — PROGRESS NOTES
Subjective   Patient is sitting on the bed, denies any chest pain shortness of breath or palpitations  Denies any abdominal pain nausea or vomitings  Tolerating p.o. diet well having good bowel movements  No fever chills or rigors  No other significant overnight issues.    12/24:                 Today the patient is seen in bed.  He is awake alert and interactive appropriately and appears NAD at the time of visit.  However he does complain of inadequate pain control primarily involving the right shoulder.  Also describes he is having a persistent headache but this is chronic which apparently dates back to the time of his cervical spine surgery.  Admits he really has not followed up with anybody regarding this.  He is requesting that we increase his pain medication.  Describes at home that he takes 1 or 2 oxycodone for his pain.  Describes lack of BM but admits that typically at home it is at least every 3 to 5 days for BMs.  He did have a temp of 100.3 at 8 PM last evening but afebrile since then today.  Blood cultures remain no growth.  Continues on cefepime vancomycin and Flagyl.  No other focal complaints to suggest site of infection.  Otherwise denies interval new symptoms or complaints including chest pain palpitation pleuritic type pain unusual shortness of breath cough sputum nausea abdominal pain flank pain dysuria diarrhea diaphoresis fever or chills.    Objective     Intake/Output last 3 shifts:  I/O last 3 completed shifts:  In: 830 (8.2 mL/kg) [P.O.:480; IV Piggyback:350]  Out: 995 (9.9 mL/kg) [Urine:995 (0.3 mL/kg/hr)]  Weight: 101 kg     Intake/Output this shift:  I/O this shift:  In: 480 [P.O.:480]  Out: -     Recent Results (from the past 48 hour(s))   Basic metabolic panel    Collection Time: 12/24/23  8:16 AM   Result Value Ref Range    Glucose 125 (H) 74 - 99 mg/dL    Sodium 131 (L) 136 - 145 mmol/L    Potassium 3.8 3.5 - 5.3 mmol/L    Chloride 101 98 - 107 mmol/L    Bicarbonate 23 21 - 32 mmol/L     Anion Gap 11 10 - 20 mmol/L    Urea Nitrogen 12 6 - 23 mg/dL    Creatinine 0.80 0.50 - 1.30 mg/dL    eGFR >90 >60 mL/min/1.73m*2    Calcium 8.9 8.6 - 10.3 mg/dL   Magnesium    Collection Time: 12/24/23  8:16 AM   Result Value Ref Range    Magnesium 1.50 (L) 1.60 - 2.40 mg/dL        CT lumbar spine wo IV contrast  Narrative: STUDY:  CT Thoracic Spine, and Lumbar Spine without IV Contrast; 12/21/2023  10:03 pm  INDICATION:  Post operative fever.  COMPARISON:  None available.  ACCESSION NUMBER(S):  [id number], [id number]  ORDERING CLINICIAN:  WESLEY WILEY  TECHNIQUE:  CT of the thoracic spine, and lumbar spine was performed  without intravenous or intrathecal contrast.  Sagittal and coronal  reconstructions were generated.    Automated mA/kV exposure control was utilized and patient examination  was performed in strict accordance with principles of ALARA.  FINDINGS:  Thoracic spine:  The alignment is anatomic.  There is no fracture or traumatic  subluxation.  The vertebral body heights are well maintained.  There is disc space  narrowing and mild endplate degenerative changes with anterior  marginal osteophytes extending T5-T11.  There is mild facet arthrosis.   There is narrowing of the neural foramina greater at T10-11.  The paravertebral soft tissues are within normal limits.  There are  postsurgical changes in the cervical spine.     Lumbar spine:  The alignment is anatomic.  There is no fracture or traumatic  subluxation.  The vertebral body heights are well maintained.  There is disc space  narrowing at L5-S1.  There is mild facet arthrosis.  At L3-4, there is a mild annular disc bulge and mild facet arthrosis.   There is mild narrowing of the central canal and bilateral neural  foramina.  At L4-5 there is a broad-based disc bulge and left foraminal disc  protrusion with moderate facet arthrosis.  There is mild narrowing of  the central canal and moderate right and moderate to advanced left  neural foraminal  stenosis.  At L5-S1 there is a mild annular disc bulge and moderate facet  arthrosis.  Central canal is patent with mild right and moderate left  neural foraminal stenosis.  The paravertebral soft tissues are within normal limits.  Gallstones  are demonstrated within the partially imaged gallbladder.    Impression: Thoracic Spine:  No acute fracture or malalignment.  Degenerative  changes as described.  Cholelithiasis.  Lumbar Spine:  No acute fracture or malalignment.  Degenerative  changes as described.  Signed by Alfred Snyder, DO  CT thoracic spine wo IV contrast  Narrative: STUDY:  CT Thoracic Spine, and Lumbar Spine without IV Contrast; 12/21/2023  10:03 pm  INDICATION:  Post operative fever.  COMPARISON:  None available.  ACCESSION NUMBER(S):  [id number], [id number]  ORDERING CLINICIAN:  WESLEY WILEY  TECHNIQUE:  CT of the thoracic spine, and lumbar spine was performed  without intravenous or intrathecal contrast.  Sagittal and coronal  reconstructions were generated.    Automated mA/kV exposure control was utilized and patient examination  was performed in strict accordance with principles of ALARA.  FINDINGS:  Thoracic spine:  The alignment is anatomic.  There is no fracture or traumatic  subluxation.  The vertebral body heights are well maintained.  There is disc space  narrowing and mild endplate degenerative changes with anterior  marginal osteophytes extending T5-T11.  There is mild facet arthrosis.   There is narrowing of the neural foramina greater at T10-11.  The paravertebral soft tissues are within normal limits.  There are  postsurgical changes in the cervical spine.     Lumbar spine:  The alignment is anatomic.  There is no fracture or traumatic  subluxation.  The vertebral body heights are well maintained.  There is disc space  narrowing at L5-S1.  There is mild facet arthrosis.  At L3-4, there is a mild annular disc bulge and mild facet arthrosis.   There is mild narrowing of the central  canal and bilateral neural  foramina.  At L4-5 there is a broad-based disc bulge and left foraminal disc  protrusion with moderate facet arthrosis.  There is mild narrowing of  the central canal and moderate right and moderate to advanced left  neural foraminal stenosis.  At L5-S1 there is a mild annular disc bulge and moderate facet  arthrosis.  Central canal is patent with mild right and moderate left  neural foraminal stenosis.  The paravertebral soft tissues are within normal limits.  Gallstones  are demonstrated within the partially imaged gallbladder.    Impression: Thoracic Spine:  No acute fracture or malalignment.  Degenerative  changes as described.  Cholelithiasis.  Lumbar Spine:  No acute fracture or malalignment.  Degenerative  changes as described.  Signed by Alfred Snyder DO   gallbladder  Narrative: STUDY:  Abdominal Ultrasound; 12/21/2023 9:40 PM.  INDICATION:  Sepsis. Abnormal CT.  COMPARISON:  CT A/P 12/21/2023.  ACCESSION NUMBER(S):  [id number]  ORDERING CLINICIAN:  WESLEY WILEY  TECHNIQUE:  Ultrasound of the Right Upper Quadrant.  Multiple images of the  abdomen were obtained.  FINDINGS:  LIVER:  The liver demonstrates increased echogenicity.       GALLBLADDER:  The gallbladder contains a stone within the neck. There is no  pericholecystic fluid or wall thickening. Sonographic Herman's sign is  negative.       BILE DUCTS:  The common bile duct measures 0.3 cm. There is no intrahepatic biliary  dilatation.       PANCREAS:  The pancreas is not seen due to overlying bowel gas.      RIGHT KIDNEY:  The right kidney measures 10.2 cm in length. Renal cortical  echotexture is normal. There is no hydronephrosis. There are no  stones. There are no cysts.  Impression: Diffuse hepatic steatosis.  Cholelithiasis.  No gallbladder wall thickening or biliary dilatation  is appreciated.  Signed by Alfred Snyder,   CT head wo IV contrast, CT cervical spine wo IV contrast  Narrative: Interpreted By:   Liudmila Vasquez,   STUDY:  CT HEAD WO IV CONTRAST; CT CERVICAL SPINE WO IV CONTRAST;  12/21/2023  8:39 pm      INDICATION:  Signs/Symptoms:fall; Signs/Symptoms:fall recent spiinal fusion.      COMPARISON:  12/18/2023      ACCESSION NUMBER(S):  [id number]; [id number]      ORDERING CLINICIAN:  MAYR MYERS      TECHNIQUE:  Noncontrast CT images of head. Axial noncontrast CT images of the  cervical spine with coronal and sagittal reconstructed images.      FINDINGS:  BRAIN PARENCHYMA: Encephalomalacia in the region of the right head of  the caudate measuring up to 2.1 cm which extends to the anterior horn  of the right lateral ventricle, similar to prior imaging.  Mild-to-moderate generalized parenchymal volume loss and mild  nonspecific white matter changes. Atherosclerotic calcifications  noted. No mass effect or midline shift.      HEMORRHAGE: No acute intracranial hemorrhage.  VENTRICLES and EXTRA-AXIAL SPACES: The ventricles, sulci and basal  cisterns enlarged, concordant with parenchymal volume loss.  EXTRACRANIAL SOFT TISSUES: Within normal limits. PARANASAL  SINUSES/MASTOIDS: Trace fluid in the mastoid air cells. Partial  opacification of the ethmoid air cells with scattered mucosal  thickening including the maxillary sinuses. The CALVARIUM: No  depressed skull fracture. No destructive osseous lesion.      OTHER FINDINGS: Partially imaged periapical lucency noted along the  right frontal maxilla.      CERVICAL SPINE:      ALIGNMENT: Postsurgical changes of an ACDF and corpectomy from C4  through C6. Hardware appears intact without periprosthetic lucency  and similar in position to prior imaging. Mild straightening of the  upper cervical spine. Facet joint and craniocervical alignment  maintained. VERTEBRAE: Postsurgical changes of the vertebral bodies  with mild vertebral body height loss again noted at C4. No acute  fracture identified. SPINAL CANAL: Degenerative changes facet and  uncinate arthropathy,  as well as disc space narrowing and end plate  hypertrophy. Moderate canal and foraminal narrowing at C3-C4.  Moderate to severe canal stenosis at C4-C5 with at least moderate  foraminal narrowing. Moderate foraminal narrowing at C5-C6.  PREVERTEBRAL SOFT TISSUES: No prevertebral soft tissue swelling. LUNG  APICES: Not imaged.      OTHER FINDINGS: Soft tissue stranding in the ventral left cervical  region likely related to prior intervention..      Impression: No acute intracranial hemorrhage or mass effect.      Encephalomalacia along the right anterior lateral ventricle similar  prior imaging and possibly developmental or sequela of prior insult.      No acute fracture or traumatic subluxation of the cervical spine.      Postsurgical changes of the cervical spine with degenerative changes.  Moderate canal stenosis again noted at C3-C4 and moderate to severe  canal stenosis at C4-C5.      MACRO:  None.      Signed by: Liudmila Vasquez 12/21/2023 9:31 PM  Dictation workstation:   [id number]  CT chest abdomen pelvis w IV contrast  Narrative: STUDY:  CT Chest, Abdomen, and Pelvis with IV Contrast; 12/21/2023 at 8:42  p.m.  INDICATION:  Sepsis.  COMPARISON:  One-view CXR 12/21/2023.  XR pelvis 10/30/2023.  ACCESSION NUMBER(S):  [id number]  ORDERING CLINICIAN:  MARY MYERS  TECHNIQUE:  CT of the chest, abdomen, and pelvis was performed.  Contiguous axial  images were obtained at 3 mm slice thickness through the chest,  abdomen, and pelvis.  Coronal and sagittal reconstructions at 3 mm  slice thickness were performed.  Omnipaque 350 75 mL was administered  intravenously.    FINDINGS:  CHEST:  MEDIASTINUM:  The heart is normal in size without pericardial effusion.  Central  vascular structures opacify normally.  Patient is status post median  sternotomy.  LUNGS/PLEURA:  There is no pleural effusion, pleural thickening, or pneumothorax.   The airways are patent.  Lungs are clear without consolidation, interstitial  disease, or  suspicious nodules.  LYMPH NODES:  Thoracic lymph nodes are not enlarged.  ABDOMEN:     LIVER:  No hepatomegaly.  Smooth surface contour.  There is fatty liver  morphology.     BILE DUCTS:  No intrahepatic or extrahepatic biliary ductal dilatation.     GALLBLADDER:  The gallbladder is prominent with luminal gallstones.  STOMACH:  No abnormalities identified.     PANCREAS:  No masses or ductal dilatation.     SPLEEN:  No splenomegaly or focal splenic lesion.     ADRENAL GLANDS:  No thickening or nodules.     KIDNEYS AND URETERS:  Kidneys are normal in size and location.  No renal or ureteral  calculi.     PELVIS:     BLADDER:  No abnormalities identified.     REPRODUCTIVE ORGANS:  No abnormalities identified.     BOWEL:  No abnormalities identified.  There is moderate colonic stool.     VESSELS:  No abnormalities identified.  Abdominal aorta is normal in caliber.      PERITONEUM/RETROPERITONEUM/LYMPH NODES:  No free fluid.  No pneumoperitoneum.  No lymphadenopathy.     ABDOMINAL WALL:  No abnormalities identified.  SOFT TISSUES:   No abnormalities identified.     BONES:  No acute fracture or aggressive osseous lesion.  Impression: Chest:   No acute cardiopulmonary disease.  Abdomen:   Distended gallbladder with luminal gallstones.  Findings concerning  for acute cholecystitis.  Recommend ultrasound for further evaluation.  Pelvis:   No acute inflammatory changes.  Moderate colonic stool.  Signed by Alfred Snyder, DO  XR shoulder right 2+ views  Narrative: STUDY:  Shoulder Radiographs; 12/21/2023 at 4:44 PM.  INDICATION:  Fall.  COMPARISON:  XR right shoulder 12/18/2023.  ACCESSION NUMBER(S):  [id number]  ORDERING CLINICIAN:  MARY MYERS  TECHNIQUE:  Two view(s) of the right shoulder.  FINDINGS:    There is no displaced fracture.  There are degenerative changes most  pronounced the acromioclavicular joint.  No soft tissue abnormality is  seen.  Impression: No acute osseous  abnormalities.  Signed by Kedar Shepard MD  XR chest 1 view  Narrative: STUDY:  Chest Radiograph;  12/21/2023 at 4:44 PM.  INDICATION:  Fall.  COMPARISON:  Cardiac cath 12/6/2023; XR chest 11/29/2023, 4/20/2022.  ACCESSION NUMBER(S):  [id number]  ORDERING CLINICIAN:  MARY MYERS  TECHNIQUE:  Frontal chest was obtained at 16:44 hours.  FINDINGS:  CARDIOMEDIASTINAL SILHOUETTE:  The patient status post median sternotomy.  Cardiomediastinal  silhouette is normal in size and configuration.     LUNGS:  Lungs are clear.     ABDOMEN:  No remarkable upper abdominal findings.     BONES:  No acute osseous changes.  Impression: No evidence consolidating infiltrate or effusion.  Signed by Kedar Shepard MD       Vital signs in last 24 hours:  Temp:  [35.8 °C (96.5 °F)-37.9 °C (100.3 °F)] 35.8 °C (96.5 °F)  Heart Rate:  [58-79] 58  Resp:  [16-20] 20  BP: (115-129)/(71-75) 125/75    Physical Exam  General: No distress  Neck: Supple.  HEENT: Normocephalic atraumatic pupils equal right lip  Heart: S1-S2 heard no murmurs gallops regurgitation  Lungs: Clear to auscultation bilaterally wheezing rales rhonchi  Abdomen: Nondistended nontender bowel sounds are present  Neuro: No focal deficits    Assessment/Plan   Severe sepsis.  Hypokalemia.  Hypomagnesemia.  Atrial fibrillation with RVR  Right shoulder pain.    History of:  Coronary artery disease s/p CABG LIMA to LAD, left radial artery to diagonal, vein graft to OM in 2003.  Subsequently patient had PCI of distal LAD with 2 overlapping drug-eluting stents.  S/p thrombectomy and balloon angioplasty 12/6/2023.  Gastroesophageal reflux disease  Anxiety/depression.  BPH.  COPD.  Hypothyroidism.  Severe cervical stenosis and myelopathy.  Generalized weakness with recurrent falls.  Chronic headache    Plan:  Signs of sepsis resolved.  Source of infection unknown suspecting intra-abdominal.  Continue broad-spectrum antibiotics with cefepime vancomycin and Flagyl-infectious diseases  managing.  Blood cultures no growth so far.  Cardiology evaluated the patient recommended to continue triple therapy with dual antiplatelets and Eliquis for 1 month and then later transition to dual therapy-no further recommendations from cardiology standpoint, signed off.  Hypokalemia, hypomagnesemia-replaced recheck with a.m. labs.  Heart rate is well-controlled-patient is anticoagulated and rate controlled.  Right shoulder pain persists, imaging workup is negative.  Other chronic conditions are close to baseline.  Home medications reviewed and resumed appropriately.  Continue PT OT evaluation.    DVT prophylaxis:  Patient is on Eliquis    Disposition:  Awaiting blood cultures and transition to p.o. antibiotics.  Possible discharge in next 1 to 2 days.     LOS: 2 days

## 2023-12-25 LAB
ANION GAP SERPL CALC-SCNC: 11 MMOL/L (ref 10–20)
BUN SERPL-MCNC: 11 MG/DL (ref 6–23)
CALCIUM SERPL-MCNC: 8.7 MG/DL (ref 8.6–10.3)
CHLORIDE SERPL-SCNC: 103 MMOL/L (ref 98–107)
CO2 SERPL-SCNC: 23 MMOL/L (ref 21–32)
CREAT SERPL-MCNC: 0.72 MG/DL (ref 0.5–1.3)
ERYTHROCYTE [DISTWIDTH] IN BLOOD BY AUTOMATED COUNT: 13.2 % (ref 11.5–14.5)
GFR SERPL CREATININE-BSD FRML MDRD: >90 ML/MIN/1.73M*2
GLUCOSE SERPL-MCNC: 130 MG/DL (ref 74–99)
HCT VFR BLD AUTO: 37.1 % (ref 41–52)
HGB BLD-MCNC: 12.1 G/DL (ref 13.5–17.5)
MAGNESIUM SERPL-MCNC: 1.94 MG/DL (ref 1.6–2.4)
MCH RBC QN AUTO: 27.9 PG (ref 26–34)
MCHC RBC AUTO-ENTMCNC: 32.6 G/DL (ref 32–36)
MCV RBC AUTO: 86 FL (ref 80–100)
NRBC BLD-RTO: 0 /100 WBCS (ref 0–0)
PLATELET # BLD AUTO: 261 X10*3/UL (ref 150–450)
POTASSIUM SERPL-SCNC: 4 MMOL/L (ref 3.5–5.3)
RBC # BLD AUTO: 4.34 X10*6/UL (ref 4.5–5.9)
SODIUM SERPL-SCNC: 133 MMOL/L (ref 136–145)
WBC # BLD AUTO: 5.8 X10*3/UL (ref 4.4–11.3)

## 2023-12-25 PROCEDURE — 36415 COLL VENOUS BLD VENIPUNCTURE: CPT | Performed by: FAMILY MEDICINE

## 2023-12-25 PROCEDURE — 80048 BASIC METABOLIC PNL TOTAL CA: CPT | Performed by: FAMILY MEDICINE

## 2023-12-25 PROCEDURE — 2500000001 HC RX 250 WO HCPCS SELF ADMINISTERED DRUGS (ALT 637 FOR MEDICARE OP): Performed by: STUDENT IN AN ORGANIZED HEALTH CARE EDUCATION/TRAINING PROGRAM

## 2023-12-25 PROCEDURE — 2500000004 HC RX 250 GENERAL PHARMACY W/ HCPCS (ALT 636 FOR OP/ED): Performed by: INTERNAL MEDICINE

## 2023-12-25 PROCEDURE — 99233 SBSQ HOSP IP/OBS HIGH 50: CPT | Performed by: INTERNAL MEDICINE

## 2023-12-25 PROCEDURE — 94640 AIRWAY INHALATION TREATMENT: CPT

## 2023-12-25 PROCEDURE — 2500000004 HC RX 250 GENERAL PHARMACY W/ HCPCS (ALT 636 FOR OP/ED)

## 2023-12-25 PROCEDURE — 1090000001 HH PPS REVENUE CREDIT

## 2023-12-25 PROCEDURE — 2500000001 HC RX 250 WO HCPCS SELF ADMINISTERED DRUGS (ALT 637 FOR MEDICARE OP): Performed by: INTERNAL MEDICINE

## 2023-12-25 PROCEDURE — 1090000002 HH PPS REVENUE DEBIT

## 2023-12-25 PROCEDURE — 2500000005 HC RX 250 GENERAL PHARMACY W/O HCPCS: Performed by: INTERNAL MEDICINE

## 2023-12-25 PROCEDURE — 97530 THERAPEUTIC ACTIVITIES: CPT | Mod: CQ,GP

## 2023-12-25 PROCEDURE — 2500000004 HC RX 250 GENERAL PHARMACY W/ HCPCS (ALT 636 FOR OP/ED): Performed by: FAMILY MEDICINE

## 2023-12-25 PROCEDURE — 85027 COMPLETE CBC AUTOMATED: CPT | Performed by: FAMILY MEDICINE

## 2023-12-25 PROCEDURE — 2500000002 HC RX 250 W HCPCS SELF ADMINISTERED DRUGS (ALT 637 FOR MEDICARE OP, ALT 636 FOR OP/ED): Performed by: INTERNAL MEDICINE

## 2023-12-25 PROCEDURE — 2060000001 HC INTERMEDIATE ICU ROOM DAILY

## 2023-12-25 PROCEDURE — 83735 ASSAY OF MAGNESIUM: CPT | Performed by: FAMILY MEDICINE

## 2023-12-25 RX ORDER — CEFTRIAXONE 2 G/50ML
2 INJECTION, SOLUTION INTRAVENOUS EVERY 24 HOURS
Status: DISCONTINUED | OUTPATIENT
Start: 2023-12-25 | End: 2023-12-27 | Stop reason: HOSPADM

## 2023-12-25 RX ADMIN — TICAGRELOR 90 MG: 90 TABLET ORAL at 21:24

## 2023-12-25 RX ADMIN — METRONIDAZOLE 500 MG: 500 TABLET ORAL at 21:24

## 2023-12-25 RX ADMIN — OXYCODONE HYDROCHLORIDE AND ACETAMINOPHEN 1 TABLET: 5; 325 TABLET ORAL at 05:15

## 2023-12-25 RX ADMIN — RANOLAZINE 1000 MG: 500 TABLET, EXTENDED RELEASE ORAL at 11:25

## 2023-12-25 RX ADMIN — CEFEPIME 1 G: 1 INJECTION, SOLUTION INTRAVENOUS at 05:16

## 2023-12-25 RX ADMIN — METOPROLOL TARTRATE 100 MG: 50 TABLET, FILM COATED ORAL at 21:21

## 2023-12-25 RX ADMIN — OXYCODONE HYDROCHLORIDE 10 MG: 10 TABLET ORAL at 14:25

## 2023-12-25 RX ADMIN — METRONIDAZOLE 500 MG: 500 TABLET ORAL at 05:15

## 2023-12-25 RX ADMIN — OXYCODONE HYDROCHLORIDE AND ACETAMINOPHEN 1 TABLET: 5; 325 TABLET ORAL at 11:29

## 2023-12-25 RX ADMIN — APIXABAN 5 MG: 5 TABLET, FILM COATED ORAL at 21:25

## 2023-12-25 RX ADMIN — PANTOPRAZOLE SODIUM 40 MG: 40 TABLET, DELAYED RELEASE ORAL at 05:15

## 2023-12-25 RX ADMIN — HYDROMORPHONE HYDROCHLORIDE 0.4 MG: 1 INJECTION, SOLUTION INTRAMUSCULAR; INTRAVENOUS; SUBCUTANEOUS at 09:49

## 2023-12-25 RX ADMIN — VANCOMYCIN HYDROCHLORIDE 1250 MG: 1.25 INJECTION, POWDER, LYOPHILIZED, FOR SOLUTION INTRAVENOUS at 08:27

## 2023-12-25 RX ADMIN — OXYCODONE HYDROCHLORIDE AND ACETAMINOPHEN 1 TABLET: 5; 325 TABLET ORAL at 17:58

## 2023-12-25 RX ADMIN — CEFTRIAXONE SODIUM 2 G: 2 INJECTION, SOLUTION INTRAVENOUS at 11:25

## 2023-12-25 RX ADMIN — LIDOCAINE 2 PATCH: 4 PATCH TOPICAL at 08:20

## 2023-12-25 RX ADMIN — OXYCODONE HYDROCHLORIDE 10 MG: 10 TABLET ORAL at 08:19

## 2023-12-25 RX ADMIN — ATORVASTATIN CALCIUM 80 MG: 40 TABLET, FILM COATED ORAL at 21:24

## 2023-12-25 RX ADMIN — TAMSULOSIN HYDROCHLORIDE 0.4 MG: 0.4 CAPSULE ORAL at 08:19

## 2023-12-25 RX ADMIN — METOPROLOL TARTRATE 100 MG: 50 TABLET, FILM COATED ORAL at 08:19

## 2023-12-25 RX ADMIN — APIXABAN 5 MG: 5 TABLET, FILM COATED ORAL at 08:19

## 2023-12-25 RX ADMIN — RANOLAZINE 1000 MG: 500 TABLET, EXTENDED RELEASE ORAL at 21:25

## 2023-12-25 RX ADMIN — METRONIDAZOLE 500 MG: 500 TABLET ORAL at 14:24

## 2023-12-25 RX ADMIN — HYDROMORPHONE HYDROCHLORIDE 0.4 MG: 1 INJECTION, SOLUTION INTRAMUSCULAR; INTRAVENOUS; SUBCUTANEOUS at 21:19

## 2023-12-25 RX ADMIN — LEVOTHYROXINE SODIUM 200 MCG: 0.1 TABLET ORAL at 05:15

## 2023-12-25 RX ADMIN — DULOXETINE HYDROCHLORIDE 60 MG: 30 CAPSULE, DELAYED RELEASE ORAL at 08:19

## 2023-12-25 RX ADMIN — TICAGRELOR 90 MG: 90 TABLET ORAL at 08:19

## 2023-12-25 RX ADMIN — ASPIRIN 81 MG CHEWABLE TABLET 81 MG: 81 TABLET CHEWABLE at 08:20

## 2023-12-25 RX ADMIN — DOCUSATE SODIUM 100 MG: 100 CAPSULE, LIQUID FILLED ORAL at 21:19

## 2023-12-25 RX ADMIN — FLUTICASONE FUROATE AND VILANTEROL TRIFENATATE 1 PUFF: 200; 25 POWDER RESPIRATORY (INHALATION) at 08:23

## 2023-12-25 ASSESSMENT — PAIN - FUNCTIONAL ASSESSMENT
PAIN_FUNCTIONAL_ASSESSMENT: 0-10

## 2023-12-25 ASSESSMENT — COGNITIVE AND FUNCTIONAL STATUS - GENERAL
WALKING IN HOSPITAL ROOM: A LITTLE
TURNING FROM BACK TO SIDE WHILE IN FLAT BAD: A LITTLE
MOVING FROM LYING ON BACK TO SITTING ON SIDE OF FLAT BED WITH BEDRAILS: A LITTLE
MOVING TO AND FROM BED TO CHAIR: A LITTLE
WALKING IN HOSPITAL ROOM: A LOT
STANDING UP FROM CHAIR USING ARMS: A LITTLE
PERSONAL GROOMING: A LITTLE
WALKING IN HOSPITAL ROOM: A LITTLE
DRESSING REGULAR UPPER BODY CLOTHING: A LITTLE
TURNING FROM BACK TO SIDE WHILE IN FLAT BAD: A LITTLE
TURNING FROM BACK TO SIDE WHILE IN FLAT BAD: A LITTLE
TOILETING: A LITTLE
MOBILITY SCORE: 16
EATING MEALS: A LITTLE
STANDING UP FROM CHAIR USING ARMS: A LITTLE
HELP NEEDED FOR BATHING: A LITTLE
HELP NEEDED FOR BATHING: A LITTLE
MOBILITY SCORE: 17
EATING MEALS: A LITTLE
DRESSING REGULAR LOWER BODY CLOTHING: A LITTLE
DRESSING REGULAR LOWER BODY CLOTHING: A LITTLE
DAILY ACTIVITIY SCORE: 18
PERSONAL GROOMING: A LITTLE
MOVING TO AND FROM BED TO CHAIR: A LITTLE
CLIMB 3 TO 5 STEPS WITH RAILING: A LITTLE
DAILY ACTIVITIY SCORE: 18
STANDING UP FROM CHAIR USING ARMS: A LITTLE
MOVING TO AND FROM BED TO CHAIR: A LITTLE
DRESSING REGULAR UPPER BODY CLOTHING: A LITTLE
MOVING FROM LYING ON BACK TO SITTING ON SIDE OF FLAT BED WITH BEDRAILS: A LITTLE
MOBILITY SCORE: 18
CLIMB 3 TO 5 STEPS WITH RAILING: A LOT
TOILETING: A LITTLE
MOVING FROM LYING ON BACK TO SITTING ON SIDE OF FLAT BED WITH BEDRAILS: A LITTLE
CLIMB 3 TO 5 STEPS WITH RAILING: A LOT

## 2023-12-25 ASSESSMENT — PAIN SCALES - GENERAL
PAINLEVEL_OUTOF10: 9
PAINLEVEL_OUTOF10: 8
PAINLEVEL_OUTOF10: 9
PAINLEVEL_OUTOF10: 0 - NO PAIN
PAINLEVEL_OUTOF10: 6
PAINLEVEL_OUTOF10: 3
PAINLEVEL_OUTOF10: 9
PAINLEVEL_OUTOF10: 4
PAINLEVEL_OUTOF10: 6
PAINLEVEL_OUTOF10: 0 - NO PAIN
PAINLEVEL_OUTOF10: 9
PAINLEVEL_OUTOF10: 5 - MODERATE PAIN
PAINLEVEL_OUTOF10: 2
PAINLEVEL_OUTOF10: 6
PAINLEVEL_OUTOF10: 9
PAINLEVEL_OUTOF10: 9

## 2023-12-25 ASSESSMENT — PAIN DESCRIPTION - DESCRIPTORS
DESCRIPTORS: ACHING;DISCOMFORT

## 2023-12-25 ASSESSMENT — PAIN DESCRIPTION - LOCATION: LOCATION: LEG

## 2023-12-25 NOTE — CARE PLAN
The patient's goals for the shift include  feel better    The clinical goals for the shift include decrease need for pain  medication

## 2023-12-25 NOTE — PROGRESS NOTES
Physical Therapy  Physical Therapy Treatment    Patient Name: Philip Barfield  MRN: [id number]  Today's Date: 12/25/2023  Time Calculation  Start Time: 0901  Stop Time: 0917  Time Calculation (min): 16 min     Assessment/Plan   PT Plan  Treatment/Interventions: Bed mobility, Transfer training, Gait training, Stair training, Balance training, Strengthening, Endurance training, Therapeutic exercise, Therapeutic activity, Home exercise program  PT Plan: Skilled PT  PT Frequency: 3 times per week  PT Discharge Recommendations: Low intensity level of continued care  Equipment Recommended upon Discharge: Wheeled walker (Patient to check with son to make sure son did not obtain FWW already)  PT - OK to Discharge: Yes (when medically cleared)    General Visit Information:   PT  Visit  PT Received On: 12/25/23  Response to Previous Treatment: Patient with no complaints from previous session.  Reason for Referral: impaired ADls and functional mobility  Prior to Session Communication: Bedside nurse  Patient Position Received: Bed, 3 rail up  Room: 2030    Subjective   Precautions:  C-collar  spinal precautions     Objective   Pain:  9/10 neck and R shoulder pain  Nursing aware, medicating pt for pain as able    Cognition:  Oriented X4    Activity Tolerance:  Activity Tolerance  Endurance: Tolerates 10 - 20 min exercise with multiple rests    Treatments:  Bed Mobility  Supine to sitting: Supervision  Sitting to supine: Supervision  Log roll: Supervision  Scooting: Supervision    Ambulation/Gait Training  2 feet fwd/bkwd with FWW, CGA  Pt discontinued ambulation d/t severe R neck/shoulder pain    Transfers  Sit to stand: CGA  Stand to sit: CGA  Transfer Device: Rolling Walker    Pt received supine in bed. 1st attempt pt requesting pain medication prior to tx. Nurse notified and medicated pt for pain and therapist returned approx 45 minutes later. Pt sat up to EOB with Supervision. Pt then stood and took 3-4 steps forward, at which  time pt abruptly stepped backwards toward the EOB. Pt reports severe neck and R shoulder pain. Pt returned to supine, nurse notified of pt pain and request for more pain medication.     Outcome Measures:  [facility] Basic Mobility  Turning from your back to your side while in a flat bed without using bedrails: A little  Moving from lying on your back to sitting on the side of a flat bed without using bedrails: A little  Moving to and from bed to chair (including a wheelchair): A little  Standing up from a chair using your arms (e.g. wheelchair or bedside chair): A little  To walk in hospital room: A lot  Climbing 3-5 steps with railing: A lot  Basic Mobility - Total Score: 16    Education Documentation  Precautions, taught by José Miguel Crocker PTA at 12/25/2023  9:59 AM.  Learner: Patient  Readiness: Acceptance  Method: Demonstration, Explanation  Response: Needs Reinforcement    Education Comments  No comments found.        OP EDUCATION:       Encounter Problems       Encounter Problems (Active)       PT Problem       transfers (Progressing)       Start:  12/22/23    Expected End:  01/05/24       Patient will perform all transfers with SBA x1; Maintaining spinal precautions           gait (Progressing)       Start:  12/22/23    Expected End:  01/05/24       Patient will amb 100+ feet with SBA x1, FWW, rest breaks prn for energy conservation              Pain - Adult

## 2023-12-25 NOTE — PROGRESS NOTES
Philip Barfield is a 58 y.o. male on day 3 of admission presenting with Sepsis, due to unspecified organism, unspecified whether acute organ dysfunction present (CMS/[facility]).    Subjective   Interval History:   Remains afebrile, after a LG temp of 100F . Saturating well on room air.   Wbc improved   Blood Cx NG x 3 day   Patient reports that he had abd pain and nausea this morning which has resolved.   Complains of weakness and body aches today.   Continues to have dysuria and urgency reporting he hasn't been able to make it to the bathroom.  Denies any further complaints.       Review of Systems  10 point ROS completed, negative unless otherwise mentioned in HPI.    Objective   Range of Vitals (last 24 hours)  Heart Rate:  [54-81]   Temp:  [35.8 °C (96.5 °F)-36.7 °C (98 °F)]   Resp:  [16-20]   BP: ()/(50-79)   Weight:  [101 kg (222 lb 14.2 oz)]   SpO2:  [96 %-98 %]   Daily Weight  23 : 101 kg (222 lb 14.2 oz)    Body mass index is 38.24 kg/m².    Physical Exam  Temp (24hrs), Av.2 °C (97.2 °F), Min:35.8 °C (96.5 °F), Max:36.7 °C (98 °F)    GEN: Alert, NAD  HEENT: Moist mucous membranes  NECK: Supple  LUNGS: CTAB  CV: RRR  GI: Soft, NT, ND, BS+  , No dow, No CVAT  SKIN: Warm, Dry  EXT: No edema, No joint inflammation  PIV: C/D/I      Antibiotics  morphine injection 4 mg  ondansetron (Zofran) injection 4 mg  cefepime (Maxipime) IV 2 g  vancomycin (Vancocin) in dextrose 5 % water (D5W) 500 mL IV 1,500 mg  sodium chloride 0.9 % bolus 1,000 mL  morphine injection 4 mg  iohexol (OMNIPaque) 350 mg iodine/mL solution 75 mL  metoprolol tartrate (Lopressor) injection 5 mg  acetaminophen (Tylenol) tablet 975 mg  fentaNYL PF (Sublimaze) injection 25 mcg  lactated Ringer's bolus 1,000 mL  metoprolol tartrate (Lopressor) injection 5 mg  acetaminophen (Tylenol) tablet 650 mg  ondansetron (Zofran) injection 4 mg  cefepime (Maxipime) IV 1 g  albuterol 90 mcg/actuation inhaler 2 puff  apixaban (Eliquis) tablet 5  mg  aspirin chewable tablet 81 mg  atorvastatin (Lipitor) tablet 80 mg  DULoxetine (Cymbalta) DR capsule 60 mg  fluticasone furoate-vilanteroL (Breo Ellipta) 200-25 mcg/dose inhaler 1 puff  levothyroxine (Synthroid, Levoxyl) tablet 200 mcg  metoprolol tartrate (Lopressor) tablet 100 mg  oxyCODONE (Roxicodone) immediate release tablet 10 mg  pantoprazole (ProtoNix) EC tablet 40 mg  polyethylene glycol (Glycolax, Miralax) packet 17 g  ranolazine (Ranexa) 12 hr tablet 1,000 mg  tamsulosin (Flomax) 24 hr capsule 0.4 mg  ticagrelor (Brilinta) tablet 90 mg  clopidogrel (Plavix) tablet 75 mg  melatonin tablet 9 mg  HYDROmorphone (Dilaudid) injection 0.2 mg  HYDROmorphone (Dilaudid) injection 0.5 mg  HYDROmorphone (Dilaudid) injection 0.5 mg  orphenadrine (Norflex) injection 60 mg  lidocaine 4 % patch 2 patch  docusate sodium (Colace) capsule 100 mg  polyethylene glycol (Glycolax, Miralax) packet 17 g  bisacodyl (Dulcolax) suppository 10 mg  vancomycin (Vancocin) in dextrose 5 % water (D5W) 250 mL IV 1,250 mg  magnesium sulfate IV 2 g  HYDROmorphone (Dilaudid) injection 0.6 mg  metroNIDAZOLE (Flagyl) tablet 500 mg  HYDROmorphone (Dilaudid) injection 1 mg      Relevant Results  Labs  Results from last 72 hours   Lab Units 12/25/23  0515   WBC AUTO x10*3/uL 5.8   HEMOGLOBIN g/dL 12.1*   HEMATOCRIT % 37.1*   PLATELETS AUTO x10*3/uL 261       Results from last 72 hours   Lab Units 12/25/23  0515 12/24/23  0816   SODIUM mmol/L 133* 131*   POTASSIUM mmol/L 4.0 3.8   CHLORIDE mmol/L 103 101   CO2 mmol/L 23 23   BUN mg/dL 11 12   CREATININE mg/dL 0.72 0.80   GLUCOSE mg/dL 130* 125*   CALCIUM mg/dL 8.7 8.9   ANION GAP mmol/L 11 11   EGFR mL/min/1.73m*2 >90 >90             Estimated Creatinine Clearance: 120.1 mL/min (by C-G formula based on SCr of 0.72 mg/dL).  C-Reactive Protein   Date Value Ref Range Status   12/22/2023 18.08 (H) <1.00 mg/dL Final   12/21/2023 10.76 (H) <1.00 mg/dL Final     CRP   Date Value Ref Range Status    08/06/2018 3.73 (A) mg/dL Final     Comment:     REF VALUE  < 1.00       Microbiology  No results found for the last 14 days.    Results from last 7 days   Lab Units 12/22/23  0515   VANCOMYCIN RM ug/mL 8.5         Imaging  CT thoracic spine wo IV contrast    Result Date: 12/21/2023  STUDY: CT Thoracic Spine, and Lumbar Spine without IV Contrast; 12/21/2023 10:03 pm INDICATION: Post operative fever. COMPARISON: None available. ACCESSION NUMBER(S): [id number], [id number] ORDERING CLINICIAN: WESLEY WILEY TECHNIQUE:  CT of the thoracic spine, and lumbar spine was performed without intravenous or intrathecal contrast.  Sagittal and coronal reconstructions were generated.  Automated mA/kV exposure control was utilized and patient examination was performed in strict accordance with principles of ALARA. FINDINGS: Thoracic spine: The alignment is anatomic.  There is no fracture or traumatic subluxation. The vertebral body heights are well maintained.  There is disc space narrowing and mild endplate degenerative changes with anterior marginal osteophytes extending T5-T11.  There is mild facet arthrosis.  There is narrowing of the neural foramina greater at T10-11. The paravertebral soft tissues are within normal limits.  There are postsurgical changes in the cervical spine.   Lumbar spine: The alignment is anatomic.  There is no fracture or traumatic subluxation. The vertebral body heights are well maintained.  There is disc space narrowing at L5-S1.  There is mild facet arthrosis. At L3-4, there is a mild annular disc bulge and mild facet arthrosis. There is mild narrowing of the central canal and bilateral neural foramina. At L4-5 there is a broad-based disc bulge and left foraminal disc protrusion with moderate facet arthrosis.  There is mild narrowing of the central canal and moderate right and moderate to advanced left neural foraminal stenosis. At L5-S1 there is a mild annular disc bulge and moderate facet  arthrosis.  Central canal is patent with mild right and moderate left neural foraminal stenosis. The paravertebral soft tissues are within normal limits.  Gallstones are demonstrated within the partially imaged gallbladder.      Thoracic Spine:  No acute fracture or malalignment.  Degenerative changes as described. Cholelithiasis. Lumbar Spine:  No acute fracture or malalignment.  Degenerative changes as described. Signed by Alfred Snyder, DO    CT lumbar spine wo IV contrast    Result Date: 12/21/2023  STUDY: CT Thoracic Spine, and Lumbar Spine without IV Contrast; 12/21/2023 10:03 pm INDICATION: Post operative fever. COMPARISON: None available. ACCESSION NUMBER(S): [id number], [id number] ORDERING CLINICIAN: WESLEY WILEY TECHNIQUE:  CT of the thoracic spine, and lumbar spine was performed without intravenous or intrathecal contrast.  Sagittal and coronal reconstructions were generated.  Automated mA/kV exposure control was utilized and patient examination was performed in strict accordance with principles of ALARA. FINDINGS: Thoracic spine: The alignment is anatomic.  There is no fracture or traumatic subluxation. The vertebral body heights are well maintained.  There is disc space narrowing and mild endplate degenerative changes with anterior marginal osteophytes extending T5-T11.  There is mild facet arthrosis.  There is narrowing of the neural foramina greater at T10-11. The paravertebral soft tissues are within normal limits.  There are postsurgical changes in the cervical spine.   Lumbar spine: The alignment is anatomic.  There is no fracture or traumatic subluxation. The vertebral body heights are well maintained.  There is disc space narrowing at L5-S1.  There is mild facet arthrosis. At L3-4, there is a mild annular disc bulge and mild facet arthrosis. There is mild narrowing of the central canal and bilateral neural foramina. At L4-5 there is a broad-based disc bulge and left foraminal disc protrusion  with moderate facet arthrosis.  There is mild narrowing of the central canal and moderate right and moderate to advanced left neural foraminal stenosis. At L5-S1 there is a mild annular disc bulge and moderate facet arthrosis.  Central canal is patent with mild right and moderate left neural foraminal stenosis. The paravertebral soft tissues are within normal limits.  Gallstones are demonstrated within the partially imaged gallbladder.      Thoracic Spine:  No acute fracture or malalignment.  Degenerative changes as described. Cholelithiasis. Lumbar Spine:  No acute fracture or malalignment.  Degenerative changes as described. Signed by Alfred Snyder DO    US gallbladder    Result Date: 12/21/2023  STUDY: Abdominal Ultrasound; 12/21/2023 9:40 PM. INDICATION: Sepsis. Abnormal CT. COMPARISON: CT A/P 12/21/2023. ACCESSION NUMBER(S): [id number] ORDERING CLINICIAN: WESLEY WILEY TECHNIQUE: Ultrasound of the Right Upper Quadrant.  Multiple images of the abdomen were obtained. FINDINGS: LIVER: The liver demonstrates increased echogenicity.   GALLBLADDER: The gallbladder contains a stone within the neck. There is no pericholecystic fluid or wall thickening. Sonographic Herman's sign is negative.   BILE DUCTS: The common bile duct measures 0.3 cm. There is no intrahepatic biliary dilatation.   PANCREAS: The pancreas is not seen due to overlying bowel gas.  RIGHT KIDNEY: The right kidney measures 10.2 cm in length. Renal cortical echotexture is normal. There is no hydronephrosis. There are no stones. There are no cysts.    Diffuse hepatic steatosis. Cholelithiasis.  No gallbladder wall thickening or biliary dilatation is appreciated. Signed by Alfred Snyder DO    CT head wo IV contrast    Result Date: 12/21/2023  Interpreted By:  Liudmila Vasquez, STUDY: CT HEAD WO IV CONTRAST; CT CERVICAL SPINE WO IV CONTRAST;  12/21/2023 8:39 pm   INDICATION: Signs/Symptoms:fall; Signs/Symptoms:fall recent spiinal fusion.    COMPARISON: 12/18/2023   ACCESSION NUMBER(S): [id number]; [id number]   ORDERING CLINICIAN: MARY MYERS   TECHNIQUE: Noncontrast CT images of head. Axial noncontrast CT images of the cervical spine with coronal and sagittal reconstructed images.   FINDINGS: BRAIN PARENCHYMA: Encephalomalacia in the region of the right head of the caudate measuring up to 2.1 cm which extends to the anterior horn of the right lateral ventricle, similar to prior imaging. Mild-to-moderate generalized parenchymal volume loss and mild nonspecific white matter changes. Atherosclerotic calcifications noted. No mass effect or midline shift.   HEMORRHAGE: No acute intracranial hemorrhage. VENTRICLES and EXTRA-AXIAL SPACES: The ventricles, sulci and basal cisterns enlarged, concordant with parenchymal volume loss. EXTRACRANIAL SOFT TISSUES: Within normal limits. PARANASAL SINUSES/MASTOIDS: Trace fluid in the mastoid air cells. Partial opacification of the ethmoid air cells with scattered mucosal thickening including the maxillary sinuses. The CALVARIUM: No depressed skull fracture. No destructive osseous lesion.   OTHER FINDINGS: Partially imaged periapical lucency noted along the right frontal maxilla.   CERVICAL SPINE:   ALIGNMENT: Postsurgical changes of an ACDF and corpectomy from C4 through C6. Hardware appears intact without periprosthetic lucency and similar in position to prior imaging. Mild straightening of the upper cervical spine. Facet joint and craniocervical alignment maintained. VERTEBRAE: Postsurgical changes of the vertebral bodies with mild vertebral body height loss again noted at C4. No acute fracture identified. SPINAL CANAL: Degenerative changes facet and uncinate arthropathy, as well as disc space narrowing and end plate hypertrophy. Moderate canal and foraminal narrowing at C3-C4. Moderate to severe canal stenosis at C4-C5 with at least moderate foraminal narrowing. Moderate foraminal narrowing at C5-C6.  PREVERTEBRAL SOFT TISSUES: No prevertebral soft tissue swelling. LUNG APICES: Not imaged.   OTHER FINDINGS: Soft tissue stranding in the ventral left cervical region likely related to prior intervention..       No acute intracranial hemorrhage or mass effect.   Encephalomalacia along the right anterior lateral ventricle similar prior imaging and possibly developmental or sequela of prior insult.   No acute fracture or traumatic subluxation of the cervical spine.   Postsurgical changes of the cervical spine with degenerative changes. Moderate canal stenosis again noted at C3-C4 and moderate to severe canal stenosis at C4-C5.   MACRO: None.   Signed by: Liudmila Vasquez 12/21/2023 9:31 PM Dictation workstation:   [id number]    CT cervical spine wo IV contrast    Result Date: 12/21/2023  Interpreted By:  Liudmila Vasquez, STUDY: CT HEAD WO IV CONTRAST; CT CERVICAL SPINE WO IV CONTRAST;  12/21/2023 8:39 pm   INDICATION: Signs/Symptoms:fall; Signs/Symptoms:fall recent spiinal fusion.   COMPARISON: 12/18/2023   ACCESSION NUMBER(S): [id number]; [id number]   ORDERING CLINICIAN: MARY MYERS   TECHNIQUE: Noncontrast CT images of head. Axial noncontrast CT images of the cervical spine with coronal and sagittal reconstructed images.   FINDINGS: BRAIN PARENCHYMA: Encephalomalacia in the region of the right head of the caudate measuring up to 2.1 cm which extends to the anterior horn of the right lateral ventricle, similar to prior imaging. Mild-to-moderate generalized parenchymal volume loss and mild nonspecific white matter changes. Atherosclerotic calcifications noted. No mass effect or midline shift.   HEMORRHAGE: No acute intracranial hemorrhage. VENTRICLES and EXTRA-AXIAL SPACES: The ventricles, sulci and basal cisterns enlarged, concordant with parenchymal volume loss. EXTRACRANIAL SOFT TISSUES: Within normal limits. PARANASAL SINUSES/MASTOIDS: Trace fluid in the mastoid air cells. Partial opacification of the  ethmoid air cells with scattered mucosal thickening including the maxillary sinuses. The CALVARIUM: No depressed skull fracture. No destructive osseous lesion.   OTHER FINDINGS: Partially imaged periapical lucency noted along the right frontal maxilla.   CERVICAL SPINE:   ALIGNMENT: Postsurgical changes of an ACDF and corpectomy from C4 through C6. Hardware appears intact without periprosthetic lucency and similar in position to prior imaging. Mild straightening of the upper cervical spine. Facet joint and craniocervical alignment maintained. VERTEBRAE: Postsurgical changes of the vertebral bodies with mild vertebral body height loss again noted at C4. No acute fracture identified. SPINAL CANAL: Degenerative changes facet and uncinate arthropathy, as well as disc space narrowing and end plate hypertrophy. Moderate canal and foraminal narrowing at C3-C4. Moderate to severe canal stenosis at C4-C5 with at least moderate foraminal narrowing. Moderate foraminal narrowing at C5-C6. PREVERTEBRAL SOFT TISSUES: No prevertebral soft tissue swelling. LUNG APICES: Not imaged.   OTHER FINDINGS: Soft tissue stranding in the ventral left cervical region likely related to prior intervention..       No acute intracranial hemorrhage or mass effect.   Encephalomalacia along the right anterior lateral ventricle similar prior imaging and possibly developmental or sequela of prior insult.   No acute fracture or traumatic subluxation of the cervical spine.   Postsurgical changes of the cervical spine with degenerative changes. Moderate canal stenosis again noted at C3-C4 and moderate to severe canal stenosis at C4-C5.   MACRO: None.   Signed by: Liudmila Vasquez 12/21/2023 9:31 PM Dictation workstation:   [id number]    CT chest abdomen pelvis w IV contrast    Result Date: 12/21/2023  STUDY: CT Chest, Abdomen, and Pelvis with IV Contrast; 12/21/2023 at 8:42 p.m. INDICATION: Sepsis. COMPARISON: One-view CXR 12/21/2023.  XR pelvis  10/30/2023. ACCESSION NUMBER(S): [id number] ORDERING CLINICIAN: MARY MYERS TECHNIQUE: CT of the chest, abdomen, and pelvis was performed.  Contiguous axial images were obtained at 3 mm slice thickness through the chest, abdomen, and pelvis.  Coronal and sagittal reconstructions at 3 mm slice thickness were performed.  Omnipaque 350 75 mL was administered intravenously.  FINDINGS: CHEST: MEDIASTINUM: The heart is normal in size without pericardial effusion.  Central vascular structures opacify normally.  Patient is status post median sternotomy. LUNGS/PLEURA: There is no pleural effusion, pleural thickening, or pneumothorax. The airways are patent. Lungs are clear without consolidation, interstitial disease, or suspicious nodules. LYMPH NODES: Thoracic lymph nodes are not enlarged. ABDOMEN:  LIVER: No hepatomegaly.  Smooth surface contour.  There is fatty liver morphology.  BILE DUCTS: No intrahepatic or extrahepatic biliary ductal dilatation.  GALLBLADDER: The gallbladder is prominent with luminal gallstones. STOMACH: No abnormalities identified.  PANCREAS: No masses or ductal dilatation.  SPLEEN: No splenomegaly or focal splenic lesion.  ADRENAL GLANDS: No thickening or nodules.  KIDNEYS AND URETERS: Kidneys are normal in size and location.  No renal or ureteral calculi.  PELVIS:  BLADDER: No abnormalities identified.  REPRODUCTIVE ORGANS: No abnormalities identified.  BOWEL: No abnormalities identified.  There is moderate colonic stool.  VESSELS: No abnormalities identified.  Abdominal aorta is normal in caliber.  PERITONEUM/RETROPERITONEUM/LYMPH NODES: No free fluid.  No pneumoperitoneum. No lymphadenopathy.  ABDOMINAL WALL: No abnormalities identified. SOFT TISSUES: No abnormalities identified.  BONES: No acute fracture or aggressive osseous lesion.    Chest: No acute cardiopulmonary disease. Abdomen: Distended gallbladder with luminal gallstones.  Findings concerning for acute cholecystitis.  Recommend  ultrasound for further evaluation. Pelvis: No acute inflammatory changes. Moderate colonic stool. Signed by Alfred Snyder DO    XR shoulder right 2+ views    Result Date: 12/21/2023  STUDY: Shoulder Radiographs; 12/21/2023 at 4:44 PM. INDICATION: Fall. COMPARISON: XR right shoulder 12/18/2023. ACCESSION NUMBER(S): [id number] ORDERING CLINICIAN: MARY MYERS TECHNIQUE:  Two view(s) of the right shoulder. FINDINGS:  There is no displaced fracture.  There are degenerative changes most pronounced the acromioclavicular joint.  No soft tissue abnormality is seen.    No acute osseous abnormalities. Signed by Kedar Shepard MD    XR chest 1 view    Result Date: 12/21/2023  STUDY: Chest Radiograph;  12/21/2023 at 4:44 PM. INDICATION: Fall. COMPARISON: Cardiac cath 12/6/2023; XR chest 11/29/2023, 4/20/2022. ACCESSION NUMBER(S): [id number] ORDERING CLINICIAN: MARY MYERS TECHNIQUE:  Frontal chest was obtained at 16:44 hours. FINDINGS: CARDIOMEDIASTINAL SILHOUETTE: The patient status post median sternotomy.  Cardiomediastinal silhouette is normal in size and configuration.  LUNGS: Lungs are clear.  ABDOMEN: No remarkable upper abdominal findings.  BONES: No acute osseous changes.    No evidence consolidating infiltrate or effusion. Signed by Kedar Shepard MD     Assessment/Plan   Sepsis  ? Intraabdominal source  HTN  HLD  CAD  COPD    Recommendations:  Follow blood cx  Ordered Renal US, still pending  UA negative for infection but patient does report dysuria and urgency   CT chest no consolidation   CT ap- distended GB  USG GB_- negative for cholecystitis   Monitor clinical course - may consider HIDA scan if any more fevers  Stop vancomycin   Stop cefepime   Start ceftriaxone  C/w flagyl pending blood cx results  Trend wbc and temps           Yong Wood MD

## 2023-12-25 NOTE — PROGRESS NOTES
Subjective   Philip Barfield is a 58 y.o. male with a past medical history significant for HTN, HLD, CAD hx w/ CABG x 3 (LIMA-LAD, (left) Radial artery - diag, Vein graft-OM) in 2003 and subsequent PCI of distal LAD via LIMA graft (2 overlapped JACE), multiple RCA overlap stents, paroxysmal Afib (on home eliquis), HLD, HTN, COPD, asthma, TIAs, vestibular neuritis, vertigo, ARISTEO on CPAP, BPH, anxiety, depression and GERD.  Patient states that ever since he returned home he has had persistent right upper extremity pain with movement of the right shoulder which was has not gotten any better since his last discharge.  He also stated that he fell on 12/18/2023 while walking into the kitchen when he fell and hit his head and lost consciousness for couple seconds.  He states that he landed on his right shoulder but it should be noted that the patient stated that he was having pain in that right shoulder prior to this fall but it has been much more significant since the fall.  He then fell again prior to presentation on 12/21/2023 as he was about to use the restroom when he fell in between the bathtub and bathroom cabinet.  He states that his son was able to assist him up but then when his traveling nurse came to visit they had called EMS for him to be brought in for further evaluation and management.  He states that he did feel dizzy throughout the morning otherwise was in his usual state of health.  He states that he was supposed to get a walker after he was discharged from his most recent hospitalization but has not received this yet.  He is also complaining of generalized weakness, occasional lightheadedness, dizziness, feeling unwell.  He is also admitted that he has not been taking his medications as recommended with regards to the cardiac medications especially the antiplatelets and anticoagulation.  He is also noted constipation which was in the hospital but then also persistent upon discharge.  He is also admitted to  very foul-smelling urine and dark urine with some burning at the end of urination.  He denies any recent sick contacts, chemical/environmental exposures, changes in dietary habits or any recent traumatic events/falls other than noted above.  He denies any fevers, chills, night sweats, vision changes, auditory changes, change in taste/smell, loss of bowel/bladder control, vertigo, syncope, seizure-like activity, chest pain, palpitations, shortness of breath, cough, wheezing, congestion, hemoptysis, hematemesis, abdominal pain, nausea, vomiting, diarrhea, hematuria, dyschezia, hematochezia any lateralizing motor/sensory deficits other than noted above.     ED course:  1.  Vital signs on presentation: Temperature of 37 °C, heart rate of 101, respirations 20, blood pressure 119/86, pulse ox at 99% on room air  2.  EKG: Atrial flutter/fibrillation with rapid ventricular response with nonspecific ST segment changes  3.  Patient initially was not in A-fib with RVR but then developed it later on in his ED visit with his rates being as high as 150s at which time he was given several doses of IV Lopressor, IV morphine 4 mg x 2 and 25 mcg of fentanyl with slow improvement in his heart rates now in sinus rhythm.  4.  WBC = 27.9 -->  5.  TSH = <0.01  6.  Free T4 = 2.48  7.  HSTI = 9 --> 10  8.  Glucose = 149 -->  9.  Sodium = 134 -->  10.  BUN/Creatinine = 12/0.95  11.  Alk Phos = 125  12.  Magnesium = 1.49 -->  13.  CRP = 10.76 -->  14.  ESR = 34  15.  BNP = 225  16.  Blood Culture x2 = pending  17.  VBG: pH 7.43, pCO2 43, pO2 17, SO2 of 17, [place] bicarb of 28.5, lactate of 1.4  18.  UA: 2 urobilinogen, trace ketones, trace leukocyte Estrace, 11-20 urine WBCs  19.  CT head/cervical spine without contrast: No acute intracranial processes, encephalomalacia along the right anterior lateral ventricle similar to prior imaging, no acute fracture/traumatic subluxation of cervical spine, postsurgical changes of cervical spine with  degenerative changes, moderate canal stenosis again noted at C3-C4 and moderate to severe canal stenosis at C4-C5.  20.  CT thoracic/lumbar spine without contrast: Thoracic and lumbar spine was without any acute fracture/malalignment but there is diffuse degenerative changes,  21.  CXR: Noted no acute cardiopulmonary processes  22.  XR right shoulder noted no acute osseous abnormalities.  23.  CT chest/abdomen/pelvis with IV contrast: No acute cardiopulmonary processes, distended gallbladder with luminal gallstones and findings concerning for acute cholecystitis, moderate colonic stool  24.  Ultrasound gallbladder noted diffuse hepatic steatosis, cholelithiasis without any gallbladder wall thickening or biliary dilatation appreciated.    Patient was admitted with a diagnosis of sepsis and is started on IV vancomycin/cefepime and ID was consulted.  Patient was evaluated by ID and initially recommended continuing on IV vancomycin/cefepime and recommended adding Flagyl but on 12/25 he was reevaluated by ID and recommended stopping vancomycin/cefepime and starting on ceftriaxone.  Blood cultures and urine cultures has been negative so far.  Patient was also evaluated by cardiology for chest pain with recent stent placement.  Cardiology recommends continuing on triple therapy for a month and then dual therapy.    Intake/Output last 3 shifts:  I/O last 3 completed shifts:  In: 2405 (23.8 mL/kg) [P.O.:960; I.V.:95 (0.9 mL/kg); IV Piggyback:1350]  Out: 675 (6.7 mL/kg) [Urine:675 (0.2 mL/kg/hr)]  Weight: 101.1 kg     Intake/Output this shift:  I/O this shift:  In: 300 [IV Piggyback:300]  Out: -     Recent Results (from the past 48 hour(s))   Basic metabolic panel    Collection Time: 12/24/23  8:16 AM   Result Value Ref Range    Glucose 125 (H) 74 - 99 mg/dL    Sodium 131 (L) 136 - 145 mmol/L    Potassium 3.8 3.5 - 5.3 mmol/L    Chloride 101 98 - 107 mmol/L    Bicarbonate 23 21 - 32 mmol/L    Anion Gap 11 10 - 20 mmol/L     Urea Nitrogen 12 6 - 23 mg/dL    Creatinine 0.80 0.50 - 1.30 mg/dL    eGFR >90 >60 mL/min/1.73m*2    Calcium 8.9 8.6 - 10.3 mg/dL   Magnesium    Collection Time: 12/24/23  8:16 AM   Result Value Ref Range    Magnesium 1.50 (L) 1.60 - 2.40 mg/dL   Magnesium    Collection Time: 12/25/23  5:15 AM   Result Value Ref Range    Magnesium 1.94 1.60 - 2.40 mg/dL   CBC    Collection Time: 12/25/23  5:15 AM   Result Value Ref Range    WBC 5.8 4.4 - 11.3 x10*3/uL    nRBC 0.0 0.0 - 0.0 /100 WBCs    RBC 4.34 (L) 4.50 - 5.90 x10*6/uL    Hemoglobin 12.1 (L) 13.5 - 17.5 g/dL    Hematocrit 37.1 (L) 41.0 - 52.0 %    MCV 86 80 - 100 fL    MCH 27.9 26.0 - 34.0 pg    MCHC 32.6 32.0 - 36.0 g/dL    RDW 13.2 11.5 - 14.5 %    Platelets 261 150 - 450 x10*3/uL   Basic Metabolic Panel    Collection Time: 12/25/23  5:15 AM   Result Value Ref Range    Glucose 130 (H) 74 - 99 mg/dL    Sodium 133 (L) 136 - 145 mmol/L    Potassium 4.0 3.5 - 5.3 mmol/L    Chloride 103 98 - 107 mmol/L    Bicarbonate 23 21 - 32 mmol/L    Anion Gap 11 10 - 20 mmol/L    Urea Nitrogen 11 6 - 23 mg/dL    Creatinine 0.72 0.50 - 1.30 mg/dL    eGFR >90 >60 mL/min/1.73m*2    Calcium 8.7 8.6 - 10.3 mg/dL        US renal complete  Narrative: Interpreted By:  Sotero Barkley,   STUDY:  US RENAL COMPLETE;  12/23/2023 3:26 pm      INDICATION:  Signs/Symptoms:c/o dysuria and urinary frequency r/o abscess or  obstuction;.      COMPARISON:  Right upper quadrant ultrasound performed 12/21/2023. CT chest,  abdomen, and pelvis dated 12/21/2023.      ACCESSION NUMBER(S):  [id number]      ORDERING CLINICIAN:  RAVI LEY      TECHNIQUE:  Grayscale and color Doppler sonographic images of the kidneys were  obtained  .      FINDINGS:  RIGHT KIDNEY:  The right kidney measures 9.7 cm in length. The renal cortical  echogenicity and thickness are within normal limits. No  hydronephrosis. No sonographic evidence of nephrolithiasis.      LEFT KIDNEY:  The left kidney measures 10.4 cm in  length. The renal cortical  echogenicity and thickness are within normal limits. No  hydronephrosis. No sonographic evidence of nephrolithiasis.      BLADDER:  Urinary bladder is decompressed, limiting assessment.      Impression: Unremarkable renal ultrasound. No hydronephrosis.      Signed by: Sotero Barkley 12/25/2023 10:02 AM  Dictation workstation:   [id number]       Vital signs in last 24 hours:  Temp:  [35.9 °C (96.7 °F)-36.7 °C (98 °F)] 36.2 °C (97.2 °F)  Heart Rate:  [54-81] 64  Resp:  [16-18] 16  BP: ()/(50-79) 105/65    Physical Exam  General: No distress  Neck: Supple.  HEENT: Normocephalic atraumatic pupils equal right lip  Heart: S1-S2 heard no murmurs gallops regurgitation  Lungs: Clear to auscultation bilaterally wheezing rales rhonchi  Abdomen: Nondistended nontender bowel sounds are present  Neuro: No focal deficits    Assessment/Plan   Severe sepsis.  Hypokalemia.  Hypomagnesemia.  Atrial fibrillation with RVR  Right shoulder pain.    History of:  Coronary artery disease s/p CABG LIMA to LAD, left radial artery to diagonal, vein graft to OM in 2003.  Subsequently patient had PCI of distal LAD with 2 overlapping drug-eluting stents.  S/p thrombectomy and balloon angioplasty 12/6/2023.  Gastroesophageal reflux disease  Anxiety/depression.  BPH.  COPD.  Hypothyroidism.  Severe cervical stenosis and myelopathy.  Generalized weakness with recurrent falls.  Chronic headache    Plan:  Signs of sepsis resolved.  Source of infection unknown suspecting intra-abdominal.  Started on IV vancomycin/cefepime, switched to IV Rocephin and Flagyl as per ID recommendation.  Blood cultures no growth so far.  Cardiology evaluated the patient recommended to continue triple therapy with dual antiplatelets and Eliquis for 1 month and then later transition to dual therapy-no further recommendations from cardiology standpoint, signed off.  Hypokalemia, hypomagnesemia-replaced recheck with a.m. labs.  Heart rate is  well-controlled-patient is anticoagulated and rate controlled.  Right shoulder pain persists, imaging workup is negative.  Other chronic conditions are close to baseline.  Home medications reviewed and resumed appropriately.  Continue PT OT evaluation.  ID follow-up for antibiotic recommendation   Patient is on Eliquis         LOS: 3 days

## 2023-12-25 NOTE — PROGRESS NOTES
Vancomycin Dosing by Pharmacy- Cessation of Therapy    Consult to pharmacy for vancomycin dosing has been discontinued by the prescriber, pharmacy will sign off at this time.    Please call pharmacy if there are further questions or re-enter a consult if vancomycin is resumed.     Clarita Elkins, PharmD

## 2023-12-25 NOTE — CARE PLAN
The clinical goals for the shift include Patient will report some level of pain control for the length of my shift.    Over the shift, the patient did make progress toward the following goals.     Problem: Pain - Adult  Goal: Verbalizes/displays adequate comfort level or baseline comfort level  Outcome: Progressing     Problem: Safety - Adult  Goal: Free from fall injury  Outcome: Progressing     Problem: Discharge Planning  Goal: Discharge to home or other facility with appropriate resources  Outcome: Progressing     Problem: Chronic Conditions and Co-morbidities  Goal: Patient's chronic conditions and co-morbidity symptoms are monitored and maintained or improved  Outcome: Progressing     Problem: Fall/Injury  Goal: Not fall by end of shift  Outcome: Progressing  Goal: Be free from injury by end of the shift  Outcome: Progressing  Goal: Verbalize understanding of personal risk factors for fall in the hospital  Outcome: Progressing  Goal: Verbalize understanding of risk factor reduction measures to prevent injury from fall in the home  Outcome: Progressing  Goal: Use assistive devices by end of the shift  Outcome: Progressing  Goal: Pace activities to prevent fatigue by end of the shift  Outcome: Progressing     Problem: Diabetes  Goal: Achieve decreasing blood glucose levels by end of shift  Outcome: Progressing  Goal: Increase stability of blood glucose readings by end of shift  Outcome: Progressing  Goal: Decrease in ketones present in urine by end of shift  Outcome: Progressing  Goal: Maintain electrolyte levels within acceptable range throughout shift  Outcome: Progressing  Goal: Maintain glucose levels >70mg/dl to <250mg/dl throughout shift  Outcome: Progressing  Goal: No changes in neurological exam by end of shift  Outcome: Progressing  Goal: Learn about and adhere to nutrition recommendations by end of shift  Outcome: Progressing  Goal: Vital signs within normal range for age by end of shift  Outcome:  Progressing  Goal: Increase self care and/or family involovement by end of shift  Outcome: Progressing  Goal: Receive DSME education by end of shift  Outcome: Progressing     Problem: Pain  Goal: Takes deep breaths with improved pain control throughout the shift  Outcome: Progressing  Goal: Turns in bed with improved pain control throughout the shift  Outcome: Progressing  Goal: Walks with improved pain control throughout the shift  Outcome: Progressing  Goal: Performs ADL's with improved pain control throughout shift  Outcome: Progressing  Goal: Participates in PT with improved pain control throughout the shift  Outcome: Progressing  Goal: Free from opioid side effects throughout the shift  Outcome: Progressing  Goal: Free from acute confusion related to pain meds throughout the shift  Outcome: Progressing

## 2023-12-26 ENCOUNTER — HOME CARE VISIT (OUTPATIENT)
Dept: HOME HEALTH SERVICES | Facility: HOME HEALTH | Age: 58
End: 2023-12-26
Payer: MEDICARE

## 2023-12-26 LAB
ANION GAP SERPL CALC-SCNC: 10 MMOL/L (ref 10–20)
BACTERIA BLD CULT: NORMAL
BACTERIA BLD CULT: NORMAL
BUN SERPL-MCNC: 14 MG/DL (ref 6–23)
CALCIUM SERPL-MCNC: 8.8 MG/DL (ref 8.6–10.3)
CHLORIDE SERPL-SCNC: 103 MMOL/L (ref 98–107)
CO2 SERPL-SCNC: 25 MMOL/L (ref 21–32)
CREAT SERPL-MCNC: 0.74 MG/DL (ref 0.5–1.3)
ERYTHROCYTE [DISTWIDTH] IN BLOOD BY AUTOMATED COUNT: 13.2 % (ref 11.5–14.5)
GFR SERPL CREATININE-BSD FRML MDRD: >90 ML/MIN/1.73M*2
GLUCOSE SERPL-MCNC: 151 MG/DL (ref 74–99)
HCT VFR BLD AUTO: 36.8 % (ref 41–52)
HGB BLD-MCNC: 12.3 G/DL (ref 13.5–17.5)
MAGNESIUM SERPL-MCNC: 1.69 MG/DL (ref 1.6–2.4)
MCH RBC QN AUTO: 28.1 PG (ref 26–34)
MCHC RBC AUTO-ENTMCNC: 33.4 G/DL (ref 32–36)
MCV RBC AUTO: 84 FL (ref 80–100)
NRBC BLD-RTO: 0 /100 WBCS (ref 0–0)
PLATELET # BLD AUTO: 288 X10*3/UL (ref 150–450)
POTASSIUM SERPL-SCNC: 4 MMOL/L (ref 3.5–5.3)
RBC # BLD AUTO: 4.38 X10*6/UL (ref 4.5–5.9)
SODIUM SERPL-SCNC: 134 MMOL/L (ref 136–145)
WBC # BLD AUTO: 9.6 X10*3/UL (ref 4.4–11.3)

## 2023-12-26 PROCEDURE — 2500000004 HC RX 250 GENERAL PHARMACY W/ HCPCS (ALT 636 FOR OP/ED): Performed by: INTERNAL MEDICINE

## 2023-12-26 PROCEDURE — 2500000005 HC RX 250 GENERAL PHARMACY W/O HCPCS: Performed by: INTERNAL MEDICINE

## 2023-12-26 PROCEDURE — 2500000001 HC RX 250 WO HCPCS SELF ADMINISTERED DRUGS (ALT 637 FOR MEDICARE OP): Performed by: INTERNAL MEDICINE

## 2023-12-26 PROCEDURE — 2500000001 HC RX 250 WO HCPCS SELF ADMINISTERED DRUGS (ALT 637 FOR MEDICARE OP): Performed by: STUDENT IN AN ORGANIZED HEALTH CARE EDUCATION/TRAINING PROGRAM

## 2023-12-26 PROCEDURE — 2500000004 HC RX 250 GENERAL PHARMACY W/ HCPCS (ALT 636 FOR OP/ED): Performed by: STUDENT IN AN ORGANIZED HEALTH CARE EDUCATION/TRAINING PROGRAM

## 2023-12-26 PROCEDURE — 80048 BASIC METABOLIC PNL TOTAL CA: CPT | Performed by: INTERNAL MEDICINE

## 2023-12-26 PROCEDURE — 83735 ASSAY OF MAGNESIUM: CPT | Performed by: INTERNAL MEDICINE

## 2023-12-26 PROCEDURE — 2500000004 HC RX 250 GENERAL PHARMACY W/ HCPCS (ALT 636 FOR OP/ED): Performed by: FAMILY MEDICINE

## 2023-12-26 PROCEDURE — 1090000002 HH PPS REVENUE DEBIT

## 2023-12-26 PROCEDURE — 99231 SBSQ HOSP IP/OBS SF/LOW 25: CPT | Performed by: SURGERY

## 2023-12-26 PROCEDURE — 36415 COLL VENOUS BLD VENIPUNCTURE: CPT | Performed by: INTERNAL MEDICINE

## 2023-12-26 PROCEDURE — 85027 COMPLETE CBC AUTOMATED: CPT | Performed by: INTERNAL MEDICINE

## 2023-12-26 PROCEDURE — 1090000001 HH PPS REVENUE CREDIT

## 2023-12-26 PROCEDURE — 99233 SBSQ HOSP IP/OBS HIGH 50: CPT | Performed by: INTERNAL MEDICINE

## 2023-12-26 PROCEDURE — 1200000002 HC GENERAL ROOM WITH TELEMETRY DAILY

## 2023-12-26 PROCEDURE — 97116 GAIT TRAINING THERAPY: CPT | Mod: CQ,GP

## 2023-12-26 RX ADMIN — HYDROMORPHONE HYDROCHLORIDE 0.4 MG: 1 INJECTION, SOLUTION INTRAMUSCULAR; INTRAVENOUS; SUBCUTANEOUS at 11:54

## 2023-12-26 RX ADMIN — RANOLAZINE 1000 MG: 500 TABLET, EXTENDED RELEASE ORAL at 22:25

## 2023-12-26 RX ADMIN — OXYCODONE HYDROCHLORIDE 10 MG: 10 TABLET ORAL at 09:11

## 2023-12-26 RX ADMIN — RANOLAZINE 1000 MG: 500 TABLET, EXTENDED RELEASE ORAL at 11:52

## 2023-12-26 RX ADMIN — LIDOCAINE 2 PATCH: 4 PATCH TOPICAL at 09:12

## 2023-12-26 RX ADMIN — POLYETHYLENE GLYCOL 3350 17 G: 17 POWDER, FOR SOLUTION ORAL at 09:11

## 2023-12-26 RX ADMIN — METOPROLOL TARTRATE 100 MG: 50 TABLET, FILM COATED ORAL at 20:06

## 2023-12-26 RX ADMIN — ASPIRIN 81 MG CHEWABLE TABLET 81 MG: 81 TABLET CHEWABLE at 09:10

## 2023-12-26 RX ADMIN — OXYCODONE HYDROCHLORIDE 10 MG: 10 TABLET ORAL at 15:07

## 2023-12-26 RX ADMIN — PANTOPRAZOLE SODIUM 40 MG: 40 TABLET, DELAYED RELEASE ORAL at 06:06

## 2023-12-26 RX ADMIN — DULOXETINE HYDROCHLORIDE 60 MG: 30 CAPSULE, DELAYED RELEASE ORAL at 09:11

## 2023-12-26 RX ADMIN — OXYCODONE HYDROCHLORIDE 10 MG: 10 TABLET ORAL at 00:12

## 2023-12-26 RX ADMIN — DOCUSATE SODIUM 100 MG: 100 CAPSULE, LIQUID FILLED ORAL at 20:06

## 2023-12-26 RX ADMIN — METRONIDAZOLE 500 MG: 500 TABLET ORAL at 15:09

## 2023-12-26 RX ADMIN — LEVOTHYROXINE SODIUM 200 MCG: 0.1 TABLET ORAL at 06:06

## 2023-12-26 RX ADMIN — TAMSULOSIN HYDROCHLORIDE 0.4 MG: 0.4 CAPSULE ORAL at 09:11

## 2023-12-26 RX ADMIN — TICAGRELOR 90 MG: 90 TABLET ORAL at 20:06

## 2023-12-26 RX ADMIN — CEFTRIAXONE SODIUM 2 G: 2 INJECTION, SOLUTION INTRAVENOUS at 11:52

## 2023-12-26 RX ADMIN — ORPHENADRINE CITRATE 60 MG: 60 INJECTION INTRAMUSCULAR; INTRAVENOUS at 22:31

## 2023-12-26 RX ADMIN — HYDROMORPHONE HYDROCHLORIDE 0.4 MG: 1 INJECTION, SOLUTION INTRAMUSCULAR; INTRAVENOUS; SUBCUTANEOUS at 05:07

## 2023-12-26 RX ADMIN — ATORVASTATIN CALCIUM 80 MG: 40 TABLET, FILM COATED ORAL at 20:06

## 2023-12-26 RX ADMIN — APIXABAN 5 MG: 5 TABLET, FILM COATED ORAL at 20:06

## 2023-12-26 RX ADMIN — TICAGRELOR 90 MG: 90 TABLET ORAL at 09:11

## 2023-12-26 RX ADMIN — DOCUSATE SODIUM 100 MG: 100 CAPSULE, LIQUID FILLED ORAL at 09:11

## 2023-12-26 RX ADMIN — APIXABAN 5 MG: 5 TABLET, FILM COATED ORAL at 09:10

## 2023-12-26 RX ADMIN — METOPROLOL TARTRATE 100 MG: 50 TABLET, FILM COATED ORAL at 09:10

## 2023-12-26 RX ADMIN — METRONIDAZOLE 500 MG: 500 TABLET ORAL at 05:07

## 2023-12-26 RX ADMIN — OXYCODONE HYDROCHLORIDE 10 MG: 10 TABLET ORAL at 21:03

## 2023-12-26 RX ADMIN — METRONIDAZOLE 500 MG: 500 TABLET ORAL at 21:03

## 2023-12-26 RX ADMIN — HYDROMORPHONE HYDROCHLORIDE 0.4 MG: 1 INJECTION, SOLUTION INTRAMUSCULAR; INTRAVENOUS; SUBCUTANEOUS at 18:47

## 2023-12-26 ASSESSMENT — PAIN - FUNCTIONAL ASSESSMENT
PAIN_FUNCTIONAL_ASSESSMENT: 0-10

## 2023-12-26 ASSESSMENT — COGNITIVE AND FUNCTIONAL STATUS - GENERAL
MOVING FROM LYING ON BACK TO SITTING ON SIDE OF FLAT BED WITH BEDRAILS: A LITTLE
DAILY ACTIVITIY SCORE: 21
TURNING FROM BACK TO SIDE WHILE IN FLAT BAD: A LITTLE
TURNING FROM BACK TO SIDE WHILE IN FLAT BAD: A LITTLE
DRESSING REGULAR UPPER BODY CLOTHING: A LITTLE
MOBILITY SCORE: 19
DRESSING REGULAR UPPER BODY CLOTHING: A LITTLE
WALKING IN HOSPITAL ROOM: A LITTLE
WALKING IN HOSPITAL ROOM: A LITTLE
STANDING UP FROM CHAIR USING ARMS: A LITTLE
DAILY ACTIVITIY SCORE: 21
STANDING UP FROM CHAIR USING ARMS: A LITTLE
MOVING TO AND FROM BED TO CHAIR: A LITTLE
HELP NEEDED FOR BATHING: A LITTLE
CLIMB 3 TO 5 STEPS WITH RAILING: A LITTLE
MOBILITY SCORE: 18
DRESSING REGULAR LOWER BODY CLOTHING: A LITTLE
HELP NEEDED FOR BATHING: A LITTLE
DRESSING REGULAR LOWER BODY CLOTHING: A LITTLE
CLIMB 3 TO 5 STEPS WITH RAILING: A LITTLE
MOVING TO AND FROM BED TO CHAIR: A LITTLE

## 2023-12-26 ASSESSMENT — ENCOUNTER SYMPTOMS
WOUND: 0
CONSTIPATION: 1
CHILLS: 0
DYSURIA: 1
NAUSEA: 0
RHINORRHEA: 0
CHEST TIGHTNESS: 0
DIARRHEA: 0
FEVER: 1
ABDOMINAL PAIN: 1
SHORTNESS OF BREATH: 0
LIGHT-HEADEDNESS: 0
BACK PAIN: 0
DIZZINESS: 0
FREQUENCY: 0
HEMATURIA: 0
SINUS PRESSURE: 0

## 2023-12-26 ASSESSMENT — PAIN SCALES - GENERAL
PAINLEVEL_OUTOF10: 9
PAINLEVEL_OUTOF10: 0 - NO PAIN
PAINLEVEL_OUTOF10: 9
PAINLEVEL_OUTOF10: 7
PAINLEVEL_OUTOF10: 10 - WORST POSSIBLE PAIN
PAINLEVEL_OUTOF10: 9
PAINLEVEL_OUTOF10: 8
PAINLEVEL_OUTOF10: 10 - WORST POSSIBLE PAIN
PAINLEVEL_OUTOF10: 0 - NO PAIN
PAINLEVEL_OUTOF10: 9

## 2023-12-26 ASSESSMENT — PAIN DESCRIPTION - LOCATION
LOCATION: SHOULDER

## 2023-12-26 ASSESSMENT — PAIN DESCRIPTION - ORIENTATION
ORIENTATION: RIGHT
ORIENTATION: RIGHT

## 2023-12-26 NOTE — PROGRESS NOTES
Subjective   Philip Barfield is a 58 y.o. male with a past medical history significant for HTN, HLD, CAD hx w/ CABG x 3 (LIMA-LAD, (left) Radial artery - diag, Vein graft-OM) in 2003 and subsequent PCI of distal LAD via LIMA graft (2 overlapped JACE), multiple RCA overlap stents, paroxysmal Afib (on home eliquis), HLD, HTN, COPD, asthma, TIAs, vestibular neuritis, vertigo, ARISTEO on CPAP, BPH, anxiety, depression and GERD.  Patient states that ever since he returned home he has had persistent right upper extremity pain with movement of the right shoulder which was has not gotten any better since his last discharge.  He also stated that he fell on 12/18/2023 while walking into the kitchen when he fell and hit his head and lost consciousness for couple seconds.  He states that he landed on his right shoulder but it should be noted that the patient stated that he was having pain in that right shoulder prior to this fall but it has been much more significant since the fall.  He then fell again prior to presentation on 12/21/2023 as he was about to use the restroom when he fell in between the bathtub and bathroom cabinet.  He states that his son was able to assist him up but then when his traveling nurse came to visit they had called EMS for him to be brought in for further evaluation and management.  He states that he did feel dizzy throughout the morning otherwise was in his usual state of health.  He states that he was supposed to get a walker after he was discharged from his most recent hospitalization but has not received this yet.  He is also complaining of generalized weakness, occasional lightheadedness, dizziness, feeling unwell.  He is also admitted that he has not been taking his medications as recommended with regards to the cardiac medications especially the antiplatelets and anticoagulation.  He is also noted constipation which was in the hospital but then also persistent upon discharge.  He is also admitted to  very foul-smelling urine and dark urine with some burning at the end of urination.  He denies any recent sick contacts, chemical/environmental exposures, changes in dietary habits or any recent traumatic events/falls other than noted above.  He denies any fevers, chills, night sweats, vision changes, auditory changes, change in taste/smell, loss of bowel/bladder control, vertigo, syncope, seizure-like activity, chest pain, palpitations, shortness of breath, cough, wheezing, congestion, hemoptysis, hematemesis, abdominal pain, nausea, vomiting, diarrhea, hematuria, dyschezia, hematochezia any lateralizing motor/sensory deficits other than noted above.     ED course:  1.  Vital signs on presentation: Temperature of 37 °C, heart rate of 101, respirations 20, blood pressure 119/86, pulse ox at 99% on room air  2.  EKG: Atrial flutter/fibrillation with rapid ventricular response with nonspecific ST segment changes  3.  Patient initially was not in A-fib with RVR but then developed it later on in his ED visit with his rates being as high as 150s at which time he was given several doses of IV Lopressor, IV morphine 4 mg x 2 and 25 mcg of fentanyl with slow improvement in his heart rates now in sinus rhythm.  4.  WBC = 27.9 -->  5.  TSH = <0.01  6.  Free T4 = 2.48  7.  HSTI = 9 --> 10  8.  Glucose = 149 -->  9.  Sodium = 134 -->  10.  BUN/Creatinine = 12/0.95  11.  Alk Phos = 125  12.  Magnesium = 1.49 -->  13.  CRP = 10.76 -->  14.  ESR = 34  15.  BNP = 225  16.  Blood Culture x2 = pending  17.  VBG: pH 7.43, pCO2 43, pO2 17, SO2 of 17, [place] bicarb of 28.5, lactate of 1.4  18.  UA: 2 urobilinogen, trace ketones, trace leukocyte Estrace, 11-20 urine WBCs  19.  CT head/cervical spine without contrast: No acute intracranial processes, encephalomalacia along the right anterior lateral ventricle similar to prior imaging, no acute fracture/traumatic subluxation of cervical spine, postsurgical changes of cervical spine with  degenerative changes, moderate canal stenosis again noted at C3-C4 and moderate to severe canal stenosis at C4-C5.  20.  CT thoracic/lumbar spine without contrast: Thoracic and lumbar spine was without any acute fracture/malalignment but there is diffuse degenerative changes,  21.  CXR: Noted no acute cardiopulmonary processes  22.  XR right shoulder noted no acute osseous abnormalities.  23.  CT chest/abdomen/pelvis with IV contrast: No acute cardiopulmonary processes, distended gallbladder with luminal gallstones and findings concerning for acute cholecystitis, moderate colonic stool  24.  Ultrasound gallbladder noted diffuse hepatic steatosis, cholelithiasis without any gallbladder wall thickening or biliary dilatation appreciated.    Patient was admitted with a diagnosis of sepsis and is started on IV vancomycin/cefepime and ID was consulted.  Patient was evaluated by ID and initially recommended continuing on IV vancomycin/cefepime and recommended adding Flagyl but on 12/25 he was reevaluated by ID and recommended stopping vancomycin/cefepime and starting on ceftriaxone.  Blood cultures and urine cultures has been negative so far.  Renal ultrasound negative.  Patient was also evaluated by cardiology for chest pain with recent stent placement.  Cardiology recommends continuing on triple therapy for a month and then dual therapy.  Awaiting ID final recommendation regarding antibiotics management.  Patient was evaluated this morning, denies having fever or chills, no nausea or vomiting, complains of right shoulder pain which is chronic.    Intake/Output last 3 shifts:  I/O last 3 completed shifts:  In: 2687 (28.6 mL/kg) [P.O.:942; I.V.:95 (1 mL/kg); IV Piggyback:1650]  Out: 275 (2.9 mL/kg) [Urine:275 (0.1 mL/kg/hr)]  Weight: 94 kg     Intake/Output this shift:  No intake/output data recorded.    Recent Results (from the past 48 hour(s))   Magnesium    Collection Time: 12/25/23  5:15 AM   Result Value Ref Range     Magnesium 1.94 1.60 - 2.40 mg/dL   CBC    Collection Time: 12/25/23  5:15 AM   Result Value Ref Range    WBC 5.8 4.4 - 11.3 x10*3/uL    nRBC 0.0 0.0 - 0.0 /100 WBCs    RBC 4.34 (L) 4.50 - 5.90 x10*6/uL    Hemoglobin 12.1 (L) 13.5 - 17.5 g/dL    Hematocrit 37.1 (L) 41.0 - 52.0 %    MCV 86 80 - 100 fL    MCH 27.9 26.0 - 34.0 pg    MCHC 32.6 32.0 - 36.0 g/dL    RDW 13.2 11.5 - 14.5 %    Platelets 261 150 - 450 x10*3/uL   Basic Metabolic Panel    Collection Time: 12/25/23  5:15 AM   Result Value Ref Range    Glucose 130 (H) 74 - 99 mg/dL    Sodium 133 (L) 136 - 145 mmol/L    Potassium 4.0 3.5 - 5.3 mmol/L    Chloride 103 98 - 107 mmol/L    Bicarbonate 23 21 - 32 mmol/L    Anion Gap 11 10 - 20 mmol/L    Urea Nitrogen 11 6 - 23 mg/dL    Creatinine 0.72 0.50 - 1.30 mg/dL    eGFR >90 >60 mL/min/1.73m*2    Calcium 8.7 8.6 - 10.3 mg/dL   CBC    Collection Time: 12/26/23  5:07 AM   Result Value Ref Range    WBC 9.6 4.4 - 11.3 x10*3/uL    nRBC 0.0 0.0 - 0.0 /100 WBCs    RBC 4.38 (L) 4.50 - 5.90 x10*6/uL    Hemoglobin 12.3 (L) 13.5 - 17.5 g/dL    Hematocrit 36.8 (L) 41.0 - 52.0 %    MCV 84 80 - 100 fL    MCH 28.1 26.0 - 34.0 pg    MCHC 33.4 32.0 - 36.0 g/dL    RDW 13.2 11.5 - 14.5 %    Platelets 288 150 - 450 x10*3/uL   Basic Metabolic Panel    Collection Time: 12/26/23  5:07 AM   Result Value Ref Range    Glucose 151 (H) 74 - 99 mg/dL    Sodium 134 (L) 136 - 145 mmol/L    Potassium 4.0 3.5 - 5.3 mmol/L    Chloride 103 98 - 107 mmol/L    Bicarbonate 25 21 - 32 mmol/L    Anion Gap 10 10 - 20 mmol/L    Urea Nitrogen 14 6 - 23 mg/dL    Creatinine 0.74 0.50 - 1.30 mg/dL    eGFR >90 >60 mL/min/1.73m*2    Calcium 8.8 8.6 - 10.3 mg/dL   Magnesium    Collection Time: 12/26/23  5:07 AM   Result Value Ref Range    Magnesium 1.69 1.60 - 2.40 mg/dL        US renal complete  Narrative: Interpreted By:  Sotero Barkley,   STUDY:  US RENAL COMPLETE;  12/23/2023 3:26 pm      INDICATION:  Signs/Symptoms:c/o dysuria and urinary frequency r/o  abscess or  obstuction;.      COMPARISON:  Right upper quadrant ultrasound performed 12/21/2023. CT chest,  abdomen, and pelvis dated 12/21/2023.      ACCESSION NUMBER(S):  [id number]      ORDERING CLINICIAN:  RAVI LEY      TECHNIQUE:  Grayscale and color Doppler sonographic images of the kidneys were  obtained  .      FINDINGS:  RIGHT KIDNEY:  The right kidney measures 9.7 cm in length. The renal cortical  echogenicity and thickness are within normal limits. No  hydronephrosis. No sonographic evidence of nephrolithiasis.      LEFT KIDNEY:  The left kidney measures 10.4 cm in length. The renal cortical  echogenicity and thickness are within normal limits. No  hydronephrosis. No sonographic evidence of nephrolithiasis.      BLADDER:  Urinary bladder is decompressed, limiting assessment.      Impression: Unremarkable renal ultrasound. No hydronephrosis.      Signed by: Sotero Barkley 12/25/2023 10:02 AM  Dictation workstation:   [id number]       Vital signs in last 24 hours:  Temp:  [36.2 °C (97.2 °F)-36.5 °C (97.7 °F)] 36.3 °C (97.3 °F)  Heart Rate:  [50-63] 50  Resp:  [18] 18  BP: ()/(58-68) 101/61    Physical Exam  General: No distress  Neck: Supple.  HEENT: Normocephalic atraumatic pupils equal right lip  Heart: S1-S2 heard no murmurs gallops regurgitation  Lungs: Clear to auscultation bilaterally wheezing rales rhonchi  Abdomen: Nondistended nontender bowel sounds are present  Neuro: No focal deficits    Assessment/Plan   Severe sepsis.  Hypokalemia.  Hypomagnesemia.  Atrial fibrillation with RVR  Right shoulder pain.    History of:  Coronary artery disease s/p CABG LIMA to LAD, left radial artery to diagonal, vein graft to OM in 2003.  Subsequently patient had PCI of distal LAD with 2 overlapping drug-eluting stents.  S/p thrombectomy and balloon angioplasty 12/6/2023.  Gastroesophageal reflux disease  Anxiety/depression.  BPH.  COPD.  Hypothyroidism.  Severe cervical stenosis and  myelopathy.  Generalized weakness with recurrent falls.  Chronic headache    Plan:  Signs of sepsis resolved.  Source of infection unknown suspecting intra-abdominal.  Started on IV vancomycin/cefepime, switched to IV Rocephin and Flagyl as per ID recommendation.  Blood cultures no growth so far.  Cardiology evaluated the patient recommended to continue triple therapy with dual antiplatelets and Eliquis for 1 month and then later transition to dual therapy-no further recommendations from cardiology standpoint, signed off.  Hypokalemia, hypomagnesemia-replaced recheck with a.m. labs.  Heart rate is well-controlled-patient is anticoagulated and rate controlled.  Right shoulder pain persists, imaging workup is negative.  Other chronic conditions are close to baseline.  Home medications reviewed and resumed appropriately.  Continue PT OT evaluation.  ID follow-up for antibiotic recommendation-discharge plan  Patient is on Eliquis         LOS: 4 days

## 2023-12-26 NOTE — PROGRESS NOTES
Occupational Therapy                 Therapy Communication Note    Patient Name: Philip Barfield  MRN: [id number]  Today's Date: 12/26/2023     Discipline: Occupational Therapy    Missed Visit Reason: Missed Visit Reason: Patient refused    Missed Time: Attempt    Comment:

## 2023-12-26 NOTE — CARE PLAN
Jemima Ivory is a 60 year old female presenting with c/o vaginal issues.    Medications reviewed and updated.  Denies known Latex allergy or symptoms of Latex sensitivity.  Social History     Tobacco Use   Smoking Status Current Every Day Smoker   • Packs/day: 0.50   • Years: 34.00   • Pack years: 17.00   • Types: Cigarettes   • Start date: 1/1/1982   Smokeless Tobacco Never Used   Tobacco Comment    1 pack / 3 days     There are no preventive care reminders to display for this patient.    Patient is up to date, no discussion needed.    Advance Directives:  not discussed                   The patient's goals for the shift include      The clinical goals for the shift include decrease need for pain  medication

## 2023-12-26 NOTE — PROGRESS NOTES
Philip Barfield is a 58 y.o. male on day 4 of admission presenting with Sepsis, due to unspecified organism, unspecified whether acute organ dysfunction present (CMS/HCC).    Subjective   Interval History:   No new complaints   Has remained afebrile    Review of Systems  10 point ROS completed, negative unless otherwise mentioned in HPI.    Objective   Range of Vitals (last 24 hours)  Heart Rate:  [50-63]   Temp:  [36.2 °C (97.2 °F)-36.5 °C (97.7 °F)]   Resp:  [18]   BP: ()/(58-68)   Weight:  [94 kg (207 lb 3.7 oz)]   SpO2:  [95 %-96 %]   Daily Weight  23 : 94 kg (207 lb 3.7 oz)    Body mass index is 35.55 kg/m².    Physical Exam  Temp (24hrs), Av.3 °C (97.4 °F), Min:36.2 °C (97.2 °F), Max:36.5 °C (97.7 °F)    GEN: Alert, NAD  HEENT: Moist mucous membranes  NECK: Supple  LUNGS: CTAB  CV: RRR  GI: Soft, NT, ND, BS+  , No dow, No CVAT  SKIN: Warm, Dry  EXT: No edema, No joint inflammation  PIV: C/D/I      Antibiotics  morphine injection 4 mg  ondansetron (Zofran) injection 4 mg  cefepime (Maxipime) IV 2 g  vancomycin (Vancocin) in dextrose 5 % water (D5W) 500 mL IV 1,500 mg  sodium chloride 0.9 % bolus 1,000 mL  morphine injection 4 mg  iohexol (OMNIPaque) 350 mg iodine/mL solution 75 mL  metoprolol tartrate (Lopressor) injection 5 mg  acetaminophen (Tylenol) tablet 975 mg  fentaNYL PF (Sublimaze) injection 25 mcg  lactated Ringer's bolus 1,000 mL  metoprolol tartrate (Lopressor) injection 5 mg  acetaminophen (Tylenol) tablet 650 mg  ondansetron (Zofran) injection 4 mg  cefepime (Maxipime) IV 1 g  albuterol 90 mcg/actuation inhaler 2 puff  apixaban (Eliquis) tablet 5 mg  aspirin chewable tablet 81 mg  atorvastatin (Lipitor) tablet 80 mg  DULoxetine (Cymbalta) DR capsule 60 mg  fluticasone furoate-vilanteroL (Breo Ellipta) 200-25 mcg/dose inhaler 1 puff  levothyroxine (Synthroid, Levoxyl) tablet 200 mcg  metoprolol tartrate (Lopressor) tablet 100 mg  oxyCODONE (Roxicodone) immediate release tablet 10  mg  pantoprazole (ProtoNix) EC tablet 40 mg  polyethylene glycol (Glycolax, Miralax) packet 17 g  ranolazine (Ranexa) 12 hr tablet 1,000 mg  tamsulosin (Flomax) 24 hr capsule 0.4 mg  ticagrelor (Brilinta) tablet 90 mg  clopidogrel (Plavix) tablet 75 mg  melatonin tablet 9 mg  HYDROmorphone (Dilaudid) injection 0.2 mg  HYDROmorphone (Dilaudid) injection 0.5 mg  HYDROmorphone (Dilaudid) injection 0.5 mg  orphenadrine (Norflex) injection 60 mg  lidocaine 4 % patch 2 patch  docusate sodium (Colace) capsule 100 mg  polyethylene glycol (Glycolax, Miralax) packet 17 g  bisacodyl (Dulcolax) suppository 10 mg  vancomycin (Vancocin) in dextrose 5 % water (D5W) 250 mL IV 1,250 mg  magnesium sulfate IV 2 g  HYDROmorphone (Dilaudid) injection 0.6 mg  metroNIDAZOLE (Flagyl) tablet 500 mg  HYDROmorphone (Dilaudid) injection 1 mg      Relevant Results  Labs  Results from last 72 hours   Lab Units 12/26/23  0507 12/25/23  0515   WBC AUTO x10*3/uL 9.6 5.8   HEMOGLOBIN g/dL 12.3* 12.1*   HEMATOCRIT % 36.8* 37.1*   PLATELETS AUTO x10*3/uL 288 261       Results from last 72 hours   Lab Units 12/26/23  0507 12/25/23  0515 12/24/23  0816   SODIUM mmol/L 134* 133* 131*   POTASSIUM mmol/L 4.0 4.0 3.8   CHLORIDE mmol/L 103 103 101   CO2 mmol/L 25 23 23   BUN mg/dL 14 11 12   CREATININE mg/dL 0.74 0.72 0.80   GLUCOSE mg/dL 151* 130* 125*   CALCIUM mg/dL 8.8 8.7 8.9   ANION GAP mmol/L 10 11 11   EGFR mL/min/1.73m*2 >90 >90 >90             Estimated Creatinine Clearance: 112.5 mL/min (by C-G formula based on SCr of 0.74 mg/dL).  C-Reactive Protein   Date Value Ref Range Status   12/22/2023 18.08 (H) <1.00 mg/dL Final   12/21/2023 10.76 (H) <1.00 mg/dL Final     CRP   Date Value Ref Range Status   08/06/2018 3.73 (A) mg/dL Final     Comment:     REF VALUE  < 1.00       Microbiology  No results found for the last 14 days.    Results from last 7 days   Lab Units 12/22/23  0515   VANCOMYCIN RM ug/mL 8.5         Imaging  CT thoracic spine wo IV  contrast    Result Date: 12/21/2023  STUDY: CT Thoracic Spine, and Lumbar Spine without IV Contrast; 12/21/2023 10:03 pm INDICATION: Post operative fever. COMPARISON: None available. ACCESSION NUMBER(S): [id number], [id number] ORDERING CLINICIAN: WESLEY WILEY TECHNIQUE:  CT of the thoracic spine, and lumbar spine was performed without intravenous or intrathecal contrast.  Sagittal and coronal reconstructions were generated.  Automated mA/kV exposure control was utilized and patient examination was performed in strict accordance with principles of ALARA. FINDINGS: Thoracic spine: The alignment is anatomic.  There is no fracture or traumatic subluxation. The vertebral body heights are well maintained.  There is disc space narrowing and mild endplate degenerative changes with anterior marginal osteophytes extending T5-T11.  There is mild facet arthrosis.  There is narrowing of the neural foramina greater at T10-11. The paravertebral soft tissues are within normal limits.  There are postsurgical changes in the cervical spine.   Lumbar spine: The alignment is anatomic.  There is no fracture or traumatic subluxation. The vertebral body heights are well maintained.  There is disc space narrowing at L5-S1.  There is mild facet arthrosis. At L3-4, there is a mild annular disc bulge and mild facet arthrosis. There is mild narrowing of the central canal and bilateral neural foramina. At L4-5 there is a broad-based disc bulge and left foraminal disc protrusion with moderate facet arthrosis.  There is mild narrowing of the central canal and moderate right and moderate to advanced left neural foraminal stenosis. At L5-S1 there is a mild annular disc bulge and moderate facet arthrosis.  Central canal is patent with mild right and moderate left neural foraminal stenosis. The paravertebral soft tissues are within normal limits.  Gallstones are demonstrated within the partially imaged gallbladder.      Thoracic Spine:  No acute  fracture or malalignment.  Degenerative changes as described. Cholelithiasis. Lumbar Spine:  No acute fracture or malalignment.  Degenerative changes as described. Signed by Alfred Snyder, DO    CT lumbar spine wo IV contrast    Result Date: 12/21/2023  STUDY: CT Thoracic Spine, and Lumbar Spine without IV Contrast; 12/21/2023 10:03 pm INDICATION: Post operative fever. COMPARISON: None available. ACCESSION NUMBER(S): [id number], [id number] ORDERING CLINICIAN: WESLEY WILEY TECHNIQUE:  CT of the thoracic spine, and lumbar spine was performed without intravenous or intrathecal contrast.  Sagittal and coronal reconstructions were generated.  Automated mA/kV exposure control was utilized and patient examination was performed in strict accordance with principles of ALARA. FINDINGS: Thoracic spine: The alignment is anatomic.  There is no fracture or traumatic subluxation. The vertebral body heights are well maintained.  There is disc space narrowing and mild endplate degenerative changes with anterior marginal osteophytes extending T5-T11.  There is mild facet arthrosis.  There is narrowing of the neural foramina greater at T10-11. The paravertebral soft tissues are within normal limits.  There are postsurgical changes in the cervical spine.   Lumbar spine: The alignment is anatomic.  There is no fracture or traumatic subluxation. The vertebral body heights are well maintained.  There is disc space narrowing at L5-S1.  There is mild facet arthrosis. At L3-4, there is a mild annular disc bulge and mild facet arthrosis. There is mild narrowing of the central canal and bilateral neural foramina. At L4-5 there is a broad-based disc bulge and left foraminal disc protrusion with moderate facet arthrosis.  There is mild narrowing of the central canal and moderate right and moderate to advanced left neural foraminal stenosis. At L5-S1 there is a mild annular disc bulge and moderate facet arthrosis.  Central canal is patent  with mild right and moderate left neural foraminal stenosis. The paravertebral soft tissues are within normal limits.  Gallstones are demonstrated within the partially imaged gallbladder.      Thoracic Spine:  No acute fracture or malalignment.  Degenerative changes as described. Cholelithiasis. Lumbar Spine:  No acute fracture or malalignment.  Degenerative changes as described. Signed by Alfred Snyder DO    US gallbladder    Result Date: 12/21/2023  STUDY: Abdominal Ultrasound; 12/21/2023 9:40 PM. INDICATION: Sepsis. Abnormal CT. COMPARISON: CT A/P 12/21/2023. ACCESSION NUMBER(S): [id number] ORDERING CLINICIAN: WESLEY WILEY TECHNIQUE: Ultrasound of the Right Upper Quadrant.  Multiple images of the abdomen were obtained. FINDINGS: LIVER: The liver demonstrates increased echogenicity.   GALLBLADDER: The gallbladder contains a stone within the neck. There is no pericholecystic fluid or wall thickening. Sonographic Herman's sign is negative.   BILE DUCTS: The common bile duct measures 0.3 cm. There is no intrahepatic biliary dilatation.   PANCREAS: The pancreas is not seen due to overlying bowel gas.  RIGHT KIDNEY: The right kidney measures 10.2 cm in length. Renal cortical echotexture is normal. There is no hydronephrosis. There are no stones. There are no cysts.    Diffuse hepatic steatosis. Cholelithiasis.  No gallbladder wall thickening or biliary dilatation is appreciated. Signed by Alfred Snyder DO    CT head wo IV contrast    Result Date: 12/21/2023  Interpreted By:  Liudmila Vasquez, STUDY: CT HEAD WO IV CONTRAST; CT CERVICAL SPINE WO IV CONTRAST;  12/21/2023 8:39 pm   INDICATION: Signs/Symptoms:fall; Signs/Symptoms:fall recent spiinal fusion.   COMPARISON: 12/18/2023   ACCESSION NUMBER(S): [id number]; [id number]   ORDERING CLINICIAN: MARY MYERS   TECHNIQUE: Noncontrast CT images of head. Axial noncontrast CT images of the cervical spine with coronal and sagittal reconstructed images.    FINDINGS: BRAIN PARENCHYMA: Encephalomalacia in the region of the right head of the caudate measuring up to 2.1 cm which extends to the anterior horn of the right lateral ventricle, similar to prior imaging. Mild-to-moderate generalized parenchymal volume loss and mild nonspecific white matter changes. Atherosclerotic calcifications noted. No mass effect or midline shift.   HEMORRHAGE: No acute intracranial hemorrhage. VENTRICLES and EXTRA-AXIAL SPACES: The ventricles, sulci and basal cisterns enlarged, concordant with parenchymal volume loss. EXTRACRANIAL SOFT TISSUES: Within normal limits. PARANASAL SINUSES/MASTOIDS: Trace fluid in the mastoid air cells. Partial opacification of the ethmoid air cells with scattered mucosal thickening including the maxillary sinuses. The CALVARIUM: No depressed skull fracture. No destructive osseous lesion.   OTHER FINDINGS: Partially imaged periapical lucency noted along the right frontal maxilla.   CERVICAL SPINE:   ALIGNMENT: Postsurgical changes of an ACDF and corpectomy from C4 through C6. Hardware appears intact without periprosthetic lucency and similar in position to prior imaging. Mild straightening of the upper cervical spine. Facet joint and craniocervical alignment maintained. VERTEBRAE: Postsurgical changes of the vertebral bodies with mild vertebral body height loss again noted at C4. No acute fracture identified. SPINAL CANAL: Degenerative changes facet and uncinate arthropathy, as well as disc space narrowing and end plate hypertrophy. Moderate canal and foraminal narrowing at C3-C4. Moderate to severe canal stenosis at C4-C5 with at least moderate foraminal narrowing. Moderate foraminal narrowing at C5-C6. PREVERTEBRAL SOFT TISSUES: No prevertebral soft tissue swelling. LUNG APICES: Not imaged.   OTHER FINDINGS: Soft tissue stranding in the ventral left cervical region likely related to prior intervention..       No acute intracranial hemorrhage or mass effect.    Encephalomalacia along the right anterior lateral ventricle similar prior imaging and possibly developmental or sequela of prior insult.   No acute fracture or traumatic subluxation of the cervical spine.   Postsurgical changes of the cervical spine with degenerative changes. Moderate canal stenosis again noted at C3-C4 and moderate to severe canal stenosis at C4-C5.   MACRO: None.   Signed by: Liudmila Vasquez 12/21/2023 9:31 PM Dictation workstation:   [id number]    CT cervical spine wo IV contrast    Result Date: 12/21/2023  Interpreted By:  Liudmila Vasquez, STUDY: CT HEAD WO IV CONTRAST; CT CERVICAL SPINE WO IV CONTRAST;  12/21/2023 8:39 pm   INDICATION: Signs/Symptoms:fall; Signs/Symptoms:fall recent spiinal fusion.   COMPARISON: 12/18/2023   ACCESSION NUMBER(S): [id number]; [id number]   ORDERING CLINICIAN: MARY MYERS   TECHNIQUE: Noncontrast CT images of head. Axial noncontrast CT images of the cervical spine with coronal and sagittal reconstructed images.   FINDINGS: BRAIN PARENCHYMA: Encephalomalacia in the region of the right head of the caudate measuring up to 2.1 cm which extends to the anterior horn of the right lateral ventricle, similar to prior imaging. Mild-to-moderate generalized parenchymal volume loss and mild nonspecific white matter changes. Atherosclerotic calcifications noted. No mass effect or midline shift.   HEMORRHAGE: No acute intracranial hemorrhage. VENTRICLES and EXTRA-AXIAL SPACES: The ventricles, sulci and basal cisterns enlarged, concordant with parenchymal volume loss. EXTRACRANIAL SOFT TISSUES: Within normal limits. PARANASAL SINUSES/MASTOIDS: Trace fluid in the mastoid air cells. Partial opacification of the ethmoid air cells with scattered mucosal thickening including the maxillary sinuses. The CALVARIUM: No depressed skull fracture. No destructive osseous lesion.   OTHER FINDINGS: Partially imaged periapical lucency noted along the right frontal maxilla.   CERVICAL  SPINE:   ALIGNMENT: Postsurgical changes of an ACDF and corpectomy from C4 through C6. Hardware appears intact without periprosthetic lucency and similar in position to prior imaging. Mild straightening of the upper cervical spine. Facet joint and craniocervical alignment maintained. VERTEBRAE: Postsurgical changes of the vertebral bodies with mild vertebral body height loss again noted at C4. No acute fracture identified. SPINAL CANAL: Degenerative changes facet and uncinate arthropathy, as well as disc space narrowing and end plate hypertrophy. Moderate canal and foraminal narrowing at C3-C4. Moderate to severe canal stenosis at C4-C5 with at least moderate foraminal narrowing. Moderate foraminal narrowing at C5-C6. PREVERTEBRAL SOFT TISSUES: No prevertebral soft tissue swelling. LUNG APICES: Not imaged.   OTHER FINDINGS: Soft tissue stranding in the ventral left cervical region likely related to prior intervention..       No acute intracranial hemorrhage or mass effect.   Encephalomalacia along the right anterior lateral ventricle similar prior imaging and possibly developmental or sequela of prior insult.   No acute fracture or traumatic subluxation of the cervical spine.   Postsurgical changes of the cervical spine with degenerative changes. Moderate canal stenosis again noted at C3-C4 and moderate to severe canal stenosis at C4-C5.   MACRO: None.   Signed by: Liudmila Vasquez 12/21/2023 9:31 PM Dictation workstation:   [id number]    CT chest abdomen pelvis w IV contrast    Result Date: 12/21/2023  STUDY: CT Chest, Abdomen, and Pelvis with IV Contrast; 12/21/2023 at 8:42 p.m. INDICATION: Sepsis. COMPARISON: One-view CXR 12/21/2023.  XR pelvis 10/30/2023. ACCESSION NUMBER(S): [id number] ORDERING CLINICIAN: MARY MYERS TECHNIQUE: CT of the chest, abdomen, and pelvis was performed.  Contiguous axial images were obtained at 3 mm slice thickness through the chest, abdomen, and pelvis.  Coronal and sagittal  reconstructions at 3 mm slice thickness were performed.  Omnipaque 350 75 mL was administered intravenously.  FINDINGS: CHEST: MEDIASTINUM: The heart is normal in size without pericardial effusion.  Central vascular structures opacify normally.  Patient is status post median sternotomy. LUNGS/PLEURA: There is no pleural effusion, pleural thickening, or pneumothorax. The airways are patent. Lungs are clear without consolidation, interstitial disease, or suspicious nodules. LYMPH NODES: Thoracic lymph nodes are not enlarged. ABDOMEN:  LIVER: No hepatomegaly.  Smooth surface contour.  There is fatty liver morphology.  BILE DUCTS: No intrahepatic or extrahepatic biliary ductal dilatation.  GALLBLADDER: The gallbladder is prominent with luminal gallstones. STOMACH: No abnormalities identified.  PANCREAS: No masses or ductal dilatation.  SPLEEN: No splenomegaly or focal splenic lesion.  ADRENAL GLANDS: No thickening or nodules.  KIDNEYS AND URETERS: Kidneys are normal in size and location.  No renal or ureteral calculi.  PELVIS:  BLADDER: No abnormalities identified.  REPRODUCTIVE ORGANS: No abnormalities identified.  BOWEL: No abnormalities identified.  There is moderate colonic stool.  VESSELS: No abnormalities identified.  Abdominal aorta is normal in caliber.  PERITONEUM/RETROPERITONEUM/LYMPH NODES: No free fluid.  No pneumoperitoneum. No lymphadenopathy.  ABDOMINAL WALL: No abnormalities identified. SOFT TISSUES: No abnormalities identified.  BONES: No acute fracture or aggressive osseous lesion.    Chest: No acute cardiopulmonary disease. Abdomen: Distended gallbladder with luminal gallstones.  Findings concerning for acute cholecystitis.  Recommend ultrasound for further evaluation. Pelvis: No acute inflammatory changes. Moderate colonic stool. Signed by Alfred Snyder DO    XR shoulder right 2+ views    Result Date: 12/21/2023  STUDY: Shoulder Radiographs; 12/21/2023 at 4:44 PM. INDICATION: Fall. COMPARISON:  XR right shoulder 12/18/2023. ACCESSION NUMBER(S): [id number] ORDERING CLINICIAN: MARY MYERS TECHNIQUE:  Two view(s) of the right shoulder. FINDINGS:  There is no displaced fracture.  There are degenerative changes most pronounced the acromioclavicular joint.  No soft tissue abnormality is seen.    No acute osseous abnormalities. Signed by Kedar Shepard MD    XR chest 1 view    Result Date: 12/21/2023  STUDY: Chest Radiograph;  12/21/2023 at 4:44 PM. INDICATION: Fall. COMPARISON: Cardiac cath 12/6/2023; XR chest 11/29/2023, 4/20/2022. ACCESSION NUMBER(S): [id number] ORDERING CLINICIAN: MARY MYERS TECHNIQUE:  Frontal chest was obtained at 16:44 hours. FINDINGS: CARDIOMEDIASTINAL SILHOUETTE: The patient status post median sternotomy.  Cardiomediastinal silhouette is normal in size and configuration.  LUNGS: Lungs are clear.  ABDOMEN: No remarkable upper abdominal findings.  BONES: No acute osseous changes.    No evidence consolidating infiltrate or effusion. Signed by Kedar Shepard MD     Assessment/Plan   Sepsis  ? Intraabdominal source  HTN  HLD  CAD  COPD    Recommendations:  Blood cx ngtd  Renal USG- no hydronephrosis, no abscess noted  CT chest no consolidation   CT ap- distended GB  USG GB_- negative for cholecystitis  C/w ceftriaxone  C/w flagyl pending blood cx results    Patient spiked fevers during the course of admission but no clear etiology was found. Differentials include pyelonephritis (even though urine cx were negative), cholecystitis vs appendicitis. Patient reports he has had conservative management for appendicitis before. He has right lower quadrant tenderness. No appendiceal abnormality reported on CT.  Consider general surgery consult.       Discussed with team         Frannie Pedersen MD

## 2023-12-26 NOTE — PROGRESS NOTES
50-year-old male with history of HTN, diabetes on metformin presents to the ED with left arm pain X 1 day.   He had a CT calcium score that was markedly elevated      #ACS  Hx DM Tabacco use  No evidence for acute cardiac injury  High CAD risk factors  Ischemic work up-cardiac cath,patient requesting Dr Hans Archer    #T2DM      #HTN Physical Therapy  Physical Therapy Treatment    Patient Name: Philip Barfield  MRN: [id number]  Today's Date: 12/26/2023  Time Calculation  Start Time: 0947  Stop Time: 1010  Time Calculation (min): 23 min    Assessment/Plan   PT Plan  Treatment/Interventions: Bed mobility, Transfer training, Gait training, Stair training, Balance training, Strengthening, Endurance training, Therapeutic exercise, Therapeutic activity, Home exercise program  PT Plan: Skilled PT  PT Frequency: 3 times per week  PT Discharge Recommendations: Low intensity level of continued care  Equipment Recommended upon Discharge: Wheeled walker (Patient to check with son to make sure son did not obtain FWW already)  PT - OK to Discharge: Yes (when medically cleared)    General Visit Information:   PT  Visit  PT Received On: 12/26/23  Response to Previous Treatment: Patient with no complaints from previous session.    Reason for Referral: impaired ADls and functional mobility d/t  Prior to Session Communication: Bedside nurse  Room: 2030    Subjective   Precautions:  C-collar  spinal precautions     Objective   Pain:  7/10 neck and RUE pain.  pt medicated for pain prior to tx  Pt willing to work with therapy despite pain.    Cognition:  Oriented X4    Activity Tolerance:  Activity Tolerance  Endurance: Tolerates 10 - 20 min exercise with multiple rests    Treatments:  Bed Mobility  Supine to sitting: Supervision  Sitting to supine: Supervision  Scooting: Supervision   Rolling: Supervision    Ambulation/Gait Training  60 feet x2 reps with FWW, SBA  Slightly unsteady with turns, however is able to self correct    Transfers  Sit to stand: Supervision  Stand to sit: Supervision    Other Activity  Other Activity Performed:  (RTB following tx)    Outcome Measures:  [facility] Basic Mobility  Turning from your back to your side while in a flat bed without using bedrails: A little  Moving from lying on your back to sitting on the side of a flat bed without using  bedrails: A little  Moving to and from bed to chair (including a wheelchair): A little  Standing up from a chair using your arms (e.g. wheelchair or bedside chair): A little  To walk in hospital room: A little  Climbing 3-5 steps with railing: A little  Basic Mobility - Total Score: 18    Education Documentation  Mobility Training, taught by José Miguel Crocker PTA at 12/26/2023 11:10 AM.  Learner: Patient  Readiness: Acceptance  Method: Explanation, Demonstration  Response: Needs Reinforcement    Education Comments  No comments found.        OP EDUCATION:       Encounter Problems       Encounter Problems (Active)       PT Problem       transfers (Progressing)       Start:  12/22/23    Expected End:  12/26/23       Patient will perform all transfers with SBA x1; Maintaining spinal precautions           gait (Progressing)       Start:  12/22/23    Expected End:  12/26/23       Patient will amb 100+ feet with SBA x1, FWW, rest breaks prn for energy conservation              Pain - Adult               50-year-old male with history of HTN, diabetes on metformin presents to the ED with left arm pain X 1 day.   He had a CT calcium score that was markedly elevated      #ACS  Hx DM Tabacco use  No evidence for acute cardiac injury  High CAD risk factors  Ischemic work up-cardiac cath,patient requesting Dr Hans Archer  Cath-Moderate-severe stenosis in proximal OM1 (iFR 0.87)-     Successful PCI to proximal OM1 with FROY for treatment of CAD (post-PCI iFR 0.97).  Continue DAPT    #T2DM  Resume home meds      #HTN  On Losartan/HCTZ and amlodipine  BP acceptable  Will initiate BBlocker as outpatient

## 2023-12-26 NOTE — PROGRESS NOTES
12/26/23  8321  Spoke with patient, IMM signed, plan is to return home upon discharge with [facility] services resumed. Patient denies any additional home going needs.

## 2023-12-26 NOTE — PROGRESS NOTES
GENERAL SURGERY PROGRESS NOTE    Philip Barfield   1965   [id number]     Philip Barfield is a 58 y.o. male on day 4 of admission presenting with Sepsis, due to unspecified organism, unspecified whether acute organ dysfunction present (CMS/[facility]).      Subjective  Requested by IMS team to evaluate patient again today.  Per IMS and infectious disease, concern was for patient having right lower quadrant pain.  On discussion with patient, he states that overall over the past 5 days he feels improved.  He does note that he has had significant dysuria even prior to arrival to the ED this admission, and that his dysuria has slightly improved with antibiotics over the past few days but that he continues to have it.  Patient states that his pain in his abdomen appears somewhat in the center lower area of his belly.  He denies any current nausea or vomiting.  Patient does note that he often times will go up to 4 to 6 days without having a bowel movement, and that he does need bowel regimen.  Reportedly, patient states that he did have a fever noted about 2 days ago, however on chart review there does not appear to be any fever documented over the past couple days.    Review of Systems   Constitutional:  Positive for fever (2 days ago per patient). Negative for chills.   HENT:  Negative for rhinorrhea, sinus pressure and tinnitus.    Respiratory:  Negative for chest tightness and shortness of breath.    Cardiovascular:  Negative for chest pain.   Gastrointestinal:  Positive for abdominal pain (Suprapubic) and constipation. Negative for diarrhea and nausea.   Genitourinary:  Positive for dysuria. Negative for frequency and hematuria.   Musculoskeletal:  Negative for back pain.   Skin:  Negative for wound.   Neurological:  Negative for dizziness and light-headedness.       Objective    Last Recorded Vitals  Blood pressure 94/62, pulse 61, temperature 36.4 °C (97.6 °F), temperature source Temporal, resp. rate 18, height 1.626 m  "(5' 4.02\"), weight 94 kg (207 lb 3.7 oz), SpO2 96 %.    Intake/Output last 3 Shifts:  I/O last 3 completed shifts:  In: 2687 (28.6 mL/kg) [P.O.:942; I.V.:95 (1 mL/kg); IV Piggyback:1650]  Out: 275 (2.9 mL/kg) [Urine:275 (0.1 mL/kg/hr)]  Weight: 94 kg     Gen: NAD.  A&Ox3  HEENT: NC/AT.  Moist mucous membranes.  Neck: Normal range of motion.  CV: Regular rate.  Chest: Normal chest rise.  Normal respiratory effort.  Abdomen: Soft.  Mild discomfort to deep palpation in the left upper quadrant.  Significant tenderness to palpation in the suprapubic region.  Minimal tenderness to palpation in the right lower quadrant.  Non-distended.  No rigidity or guarding.  Extremities: No edema.  Moving all extremities.      Assessment and Plan  Principal Problem:    Sepsis, due to unspecified organism, unspecified whether acute organ dysfunction present (CMS/[facility])    58 y.o. male with resolved sepsis with unknown infectious etiology    Plan  -Patient does not demonstrate significant right lower quadrant pain at this time.  It is unlikely that patient would have appendicitis or cholecystitis given that his pain has not worsened over the past several days and in the right side actually is somewhat improved per patient.  Given the fact the patient does not have a rising leukocytosis, his exam is relatively benign, and he does not demonstrate any signs of hemodynamic compromise, there is no further further surgical intervention at this time.  -Continue antibiotics per ID and primary team  -Although patient's UA was negative, patient has pretty significant suprapubic tenderness and complaints of dysuria that is somewhat improved since antibiotics likely antibiotics have been treating this process over the past several days.  It is likely that patient actually has/had UTI and cystitis as the etiology of his infectious process.  Of note, urine culture was not sent from his initial UA that was performed on 12/21.  -Would recommend decreasing " opiate use as much as possible to minimize constipation. Continue bowel regimen. Pt may require outpt GI evaluation given his degree of constipation.   -Discussed with Dr. Po Malhotra, PGY4  General Surgery

## 2023-12-27 ENCOUNTER — APPOINTMENT (OUTPATIENT)
Dept: CARDIOLOGY | Facility: HOSPITAL | Age: 58
DRG: 871 | End: 2023-12-27
Payer: MEDICARE

## 2023-12-27 ENCOUNTER — PHARMACY VISIT (OUTPATIENT)
Dept: PHARMACY | Facility: CLINIC | Age: 58
End: 2023-12-27
Payer: COMMERCIAL

## 2023-12-27 VITALS
BODY MASS INDEX: 35.38 KG/M2 | DIASTOLIC BLOOD PRESSURE: 56 MMHG | HEART RATE: 52 BPM | OXYGEN SATURATION: 94 % | RESPIRATION RATE: 18 BRPM | TEMPERATURE: 96.7 F | SYSTOLIC BLOOD PRESSURE: 106 MMHG | WEIGHT: 207.23 LBS | HEIGHT: 64 IN

## 2023-12-27 PROBLEM — A41.9 SEPSIS, DUE TO UNSPECIFIED ORGANISM, UNSPECIFIED WHETHER ACUTE ORGAN DYSFUNCTION PRESENT (MULTI): Status: RESOLVED | Noted: 2023-12-21 | Resolved: 2023-12-27

## 2023-12-27 PROCEDURE — 93005 ELECTROCARDIOGRAM TRACING: CPT

## 2023-12-27 PROCEDURE — 2500000001 HC RX 250 WO HCPCS SELF ADMINISTERED DRUGS (ALT 637 FOR MEDICARE OP): Performed by: INTERNAL MEDICINE

## 2023-12-27 PROCEDURE — 1090000001 HH PPS REVENUE CREDIT

## 2023-12-27 PROCEDURE — 99239 HOSP IP/OBS DSCHRG MGMT >30: CPT | Performed by: INTERNAL MEDICINE

## 2023-12-27 PROCEDURE — 2500000004 HC RX 250 GENERAL PHARMACY W/ HCPCS (ALT 636 FOR OP/ED): Performed by: INTERNAL MEDICINE

## 2023-12-27 PROCEDURE — RXMED WILLOW AMBULATORY MEDICATION CHARGE

## 2023-12-27 PROCEDURE — 1090000002 HH PPS REVENUE DEBIT

## 2023-12-27 RX ORDER — OXYCODONE HYDROCHLORIDE 5 MG/1
5 TABLET ORAL EVERY 6 HOURS PRN
Qty: 12 TABLET | Refills: 0 | Status: SHIPPED | OUTPATIENT
Start: 2023-12-27 | End: 2023-12-30

## 2023-12-27 RX ORDER — CEFDINIR 300 MG/1
300 CAPSULE ORAL 2 TIMES DAILY
Qty: 10 CAPSULE | Refills: 0 | Status: SHIPPED | OUTPATIENT
Start: 2023-12-27 | End: 2024-01-01

## 2023-12-27 RX ORDER — METRONIDAZOLE 500 MG/1
500 TABLET ORAL 3 TIMES DAILY
Qty: 15 TABLET | Refills: 0 | Status: SHIPPED | OUTPATIENT
Start: 2023-12-27 | End: 2024-01-01

## 2023-12-27 RX ADMIN — ASPIRIN 81 MG CHEWABLE TABLET 81 MG: 81 TABLET CHEWABLE at 11:07

## 2023-12-27 RX ADMIN — TICAGRELOR 90 MG: 90 TABLET ORAL at 11:07

## 2023-12-27 RX ADMIN — DOCUSATE SODIUM 100 MG: 100 CAPSULE, LIQUID FILLED ORAL at 11:07

## 2023-12-27 RX ADMIN — OXYCODONE HYDROCHLORIDE 10 MG: 10 TABLET ORAL at 02:55

## 2023-12-27 RX ADMIN — HYDROMORPHONE HYDROCHLORIDE 0.4 MG: 1 INJECTION, SOLUTION INTRAMUSCULAR; INTRAVENOUS; SUBCUTANEOUS at 00:01

## 2023-12-27 RX ADMIN — LEVOTHYROXINE SODIUM 200 MCG: 0.1 TABLET ORAL at 06:10

## 2023-12-27 RX ADMIN — DULOXETINE HYDROCHLORIDE 60 MG: 30 CAPSULE, DELAYED RELEASE ORAL at 11:07

## 2023-12-27 RX ADMIN — PANTOPRAZOLE SODIUM 40 MG: 40 TABLET, DELAYED RELEASE ORAL at 06:10

## 2023-12-27 RX ADMIN — APIXABAN 5 MG: 5 TABLET, FILM COATED ORAL at 11:07

## 2023-12-27 RX ADMIN — HYDROMORPHONE HYDROCHLORIDE 0.4 MG: 1 INJECTION, SOLUTION INTRAMUSCULAR; INTRAVENOUS; SUBCUTANEOUS at 06:11

## 2023-12-27 RX ADMIN — METRONIDAZOLE 500 MG: 500 TABLET ORAL at 06:11

## 2023-12-27 ASSESSMENT — COGNITIVE AND FUNCTIONAL STATUS - GENERAL
DRESSING REGULAR LOWER BODY CLOTHING: A LITTLE
DAILY ACTIVITIY SCORE: 21
TURNING FROM BACK TO SIDE WHILE IN FLAT BAD: A LITTLE
HELP NEEDED FOR BATHING: A LITTLE
DRESSING REGULAR UPPER BODY CLOTHING: A LITTLE
CLIMB 3 TO 5 STEPS WITH RAILING: A LITTLE
MOVING TO AND FROM BED TO CHAIR: A LITTLE
STANDING UP FROM CHAIR USING ARMS: A LITTLE
WALKING IN HOSPITAL ROOM: A LITTLE
MOBILITY SCORE: 19

## 2023-12-27 ASSESSMENT — PAIN SCALES - GENERAL
PAINLEVEL_OUTOF10: 9

## 2023-12-27 ASSESSMENT — PAIN - FUNCTIONAL ASSESSMENT
PAIN_FUNCTIONAL_ASSESSMENT: UNABLE TO SELF-REPORT
PAIN_FUNCTIONAL_ASSESSMENT: 0-10

## 2023-12-27 NOTE — NURSING NOTE
Pt transferred from SDU to room 2325 in stable condition. Report given by SDU nurse prior to pts arrival. Per RN, new consult for gen surgery was completed. VS's and physical assessment completed. Pt oriented to room and equipment, as well as plan of care reviewed w/ pt. Pt educated on fall precautions and pts bed alarm placed on and functioning. Bedside table and call button w/i reach and bed in lowest position. Pt verbalized understanding of all

## 2023-12-27 NOTE — CARE PLAN
Problem: Pain - Adult  Goal: Verbalizes/displays adequate comfort level or baseline comfort level  Outcome: Progressing     Problem: Safety - Adult  Goal: Free from fall injury  Outcome: Progressing     Problem: Discharge Planning  Goal: Discharge to home or other facility with appropriate resources  Outcome: Progressing     Problem: Chronic Conditions and Co-morbidities  Goal: Patient's chronic conditions and co-morbidity symptoms are monitored and maintained or improved  Outcome: Progressing     Problem: Fall/Injury  Goal: Not fall by end of shift  Outcome: Progressing  Goal: Be free from injury by end of the shift  Outcome: Progressing  Goal: Verbalize understanding of personal risk factors for fall in the hospital  Outcome: Progressing  Goal: Verbalize understanding of risk factor reduction measures to prevent injury from fall in the home  Outcome: Progressing  Goal: Use assistive devices by end of the shift  Outcome: Progressing  Goal: Pace activities to prevent fatigue by end of the shift  Outcome: Progressing     Problem: Diabetes  Goal: Achieve decreasing blood glucose levels by end of shift  Outcome: Progressing  Goal: Increase stability of blood glucose readings by end of shift  Outcome: Progressing  Goal: Decrease in ketones present in urine by end of shift  Outcome: Progressing  Goal: Maintain electrolyte levels within acceptable range throughout shift  Outcome: Progressing  Goal: Maintain glucose levels >70mg/dl to <250mg/dl throughout shift  Outcome: Progressing  Goal: No changes in neurological exam by end of shift  Outcome: Progressing  Goal: Learn about and adhere to nutrition recommendations by end of shift  Outcome: Progressing  Goal: Vital signs within normal range for age by end of shift  Outcome: Progressing  Goal: Increase self care and/or family involovement by end of shift  Outcome: Progressing  Goal: Receive DSME education by end of shift  Outcome: Progressing     Problem: Pain  Goal:  Takes deep breaths with improved pain control throughout the shift  Outcome: Progressing  Goal: Turns in bed with improved pain control throughout the shift  Outcome: Progressing  Goal: Walks with improved pain control throughout the shift  Outcome: Progressing  Goal: Performs ADL's with improved pain control throughout shift  Outcome: Progressing  Goal: Participates in PT with improved pain control throughout the shift  Outcome: Progressing  Goal: Free from opioid side effects throughout the shift  Outcome: Progressing  Goal: Free from acute confusion related to pain meds throughout the shift  Outcome: Progressing   The patient's goals for the shift include      The clinical goals for the shift include pt will maintain adequate pain control this shift    Over the shift, the patient did not make progress toward the following goals. Barriers to progression include n/a. Recommendations to address these barriers include prn pain meds as needed and pain assessments/reassessments.

## 2023-12-27 NOTE — CARE PLAN
The patient's goals for the shift include      The clinical goals for the shift include pt will maintain adequate pain control this shift      Problem: Pain - Adult  Goal: Verbalizes/displays adequate comfort level or baseline comfort level  Outcome: Progressing     Problem: Safety - Adult  Goal: Free from fall injury  Outcome: Progressing     Problem: Discharge Planning  Goal: Discharge to home or other facility with appropriate resources  Outcome: Progressing     Problem: Chronic Conditions and Co-morbidities  Goal: Patient's chronic conditions and co-morbidity symptoms are monitored and maintained or improved  Outcome: Progressing     Problem: Fall/Injury  Goal: Not fall by end of shift  Outcome: Progressing  Goal: Be free from injury by end of the shift  Outcome: Progressing  Goal: Verbalize understanding of personal risk factors for fall in the hospital  Outcome: Progressing  Goal: Verbalize understanding of risk factor reduction measures to prevent injury from fall in the home  Outcome: Progressing  Goal: Use assistive devices by end of the shift  Outcome: Progressing  Goal: Pace activities to prevent fatigue by end of the shift  Outcome: Progressing     Problem: Diabetes  Goal: Achieve decreasing blood glucose levels by end of shift  Outcome: Progressing  Goal: Increase stability of blood glucose readings by end of shift  Outcome: Progressing  Goal: Decrease in ketones present in urine by end of shift  Outcome: Progressing  Goal: Maintain electrolyte levels within acceptable range throughout shift  Outcome: Progressing  Goal: Maintain glucose levels >70mg/dl to <250mg/dl throughout shift  Outcome: Progressing  Goal: No changes in neurological exam by end of shift  Outcome: Progressing  Goal: Learn about and adhere to nutrition recommendations by end of shift  Outcome: Progressing  Goal: Vital signs within normal range for age by end of shift  Outcome: Progressing  Goal: Increase self care and/or family  involovement by end of shift  Outcome: Progressing  Goal: Receive DSME education by end of shift  Outcome: Progressing     Problem: Pain  Goal: Takes deep breaths with improved pain control throughout the shift  Outcome: Progressing  Goal: Turns in bed with improved pain control throughout the shift  Outcome: Progressing  Goal: Walks with improved pain control throughout the shift  Outcome: Progressing  Goal: Performs ADL's with improved pain control throughout shift  Outcome: Progressing  Goal: Participates in PT with improved pain control throughout the shift  Outcome: Progressing  Goal: Free from opioid side effects throughout the shift  Outcome: Progressing  Goal: Free from acute confusion related to pain meds throughout the shift  Outcome: Progressing

## 2023-12-27 NOTE — DISCHARGE SUMMARY
Discharge Diagnosis  Sepsis, due to unspecified organism, unspecified whether acute organ dysfunction present (CMS/[facility])      This discharge took greater than 35 minutes.    Test Results Pending At Discharge  Pending Labs       No current pending labs.            Hospital Course     Philip Barfield is a 58 y.o. male with a past medical history significant for HTN, HLD, CAD hx w/ CABG x 3 (LIMA-LAD, (left) Radial artery - diag, Vein graft-OM) in 2003 and subsequent PCI of distal LAD via LIMA graft (2 overlapped JACE), multiple RCA overlap stents, paroxysmal Afib (on home eliquis), HLD, HTN, COPD, asthma, TIAs, vestibular neuritis, vertigo, ARISTEO on CPAP, BPH, anxiety, depression and GERD.  Patient states that ever since he returned home he has had persistent right upper extremity pain with movement of the right shoulder which was has not gotten any better since his last discharge.  He also stated that he fell on 12/18/2023 while walking into the kitchen when he fell and hit his head and lost consciousness for couple seconds.  He states that he landed on his right shoulder but it should be noted that the patient stated that he was having pain in that right shoulder prior to this fall but it has been much more significant since the fall.  He then fell again prior to presentation on 12/21/2023 as he was about to use the restroom when he fell in between the bathtub and bathroom cabinet.  He states that his son was able to assist him up but then when his traveling nurse came to visit they had called EMS for him to be brought in for further evaluation and management.  He states that he did feel dizzy throughout the morning otherwise was in his usual state of health.  He states that he was supposed to get a walker after he was discharged from his most recent hospitalization but has not received this yet.  He is also complaining of generalized weakness, occasional lightheadedness, dizziness, feeling unwell.  He is also admitted  that he has not been taking his medications as recommended with regards to the cardiac medications especially the antiplatelets and anticoagulation.  He is also noted constipation which was in the hospital but then also persistent upon discharge.  He is also admitted to very foul-smelling urine and dark urine with some burning at the end of urination.  He denies any recent sick contacts, chemical/environmental exposures, changes in dietary habits or any recent traumatic events/falls other than noted above.  He denies any fevers, chills, night sweats, vision changes, auditory changes, change in taste/smell, loss of bowel/bladder control, vertigo, syncope, seizure-like activity, chest pain, palpitations, shortness of breath, cough, wheezing, congestion, hemoptysis, hematemesis, abdominal pain, nausea, vomiting, diarrhea, hematuria, dyschezia, hematochezia any lateralizing motor/sensory deficits other than noted above.     ED course:  1.  Vital signs on presentation: Temperature of 37 °C, heart rate of 101, respirations 20, blood pressure 119/86, pulse ox at 99% on room air  2.  EKG: Atrial flutter/fibrillation with rapid ventricular response with nonspecific ST segment changes  3.  Patient initially was not in A-fib with RVR but then developed it later on in his ED visit with his rates being as high as 150s at which time he was given several doses of IV Lopressor, IV morphine 4 mg x 2 and 25 mcg of fentanyl with slow improvement in his heart rates now in sinus rhythm.  4.  WBC = 27.9 -->  5.  TSH = <0.01  6.  Free T4 = 2.48  7.  HSTI = 9 --> 10  8.  Glucose = 149 -->  9.  Sodium = 134 -->  10.  BUN/Creatinine = 12/0.95  11.  Alk Phos = 125  12.  Magnesium = 1.49 -->  13.  CRP = 10.76 -->  14.  ESR = 34  15.  BNP = 225  16.  Blood Culture x2 = pending  17.  VBG: pH 7.43, pCO2 43, pO2 17, SO2 of 17, [place] bicarb of 28.5, lactate of 1.4  18.  UA: 2 urobilinogen, trace ketones, trace leukocyte Estrace, 11-20 urine  WBCs  19.  CT head/cervical spine without contrast: No acute intracranial processes, encephalomalacia along the right anterior lateral ventricle similar to prior imaging, no acute fracture/traumatic subluxation of cervical spine, postsurgical changes of cervical spine with degenerative changes, moderate canal stenosis again noted at C3-C4 and moderate to severe canal stenosis at C4-C5.  20.  CT thoracic/lumbar spine without contrast: Thoracic and lumbar spine was without any acute fracture/malalignment but there is diffuse degenerative changes,  21.  CXR: Noted no acute cardiopulmonary processes  22.  XR right shoulder noted no acute osseous abnormalities.  23.  CT chest/abdomen/pelvis with IV contrast: No acute cardiopulmonary processes, distended gallbladder with luminal gallstones and findings concerning for acute cholecystitis, moderate colonic stool  24.  Ultrasound gallbladder noted diffuse hepatic steatosis, cholelithiasis without any gallbladder wall thickening or biliary dilatation appreciated.     Patient was admitted with a diagnosis of sepsis and is started on IV vancomycin/cefepime and ID was consulted.  Patient was evaluated by ID and initially recommended continuing on IV vancomycin/cefepime and recommended adding Flagyl but on 12/25 he was reevaluated by ID and recommended stopping vancomycin/cefepime and starting on ceftriaxone.  Blood cultures and urine cultures has been negative so far.  Renal ultrasound negative.  Patient was also evaluated by cardiology for chest pain with recent stent placement.  Cardiology recommends continuing on triple therapy for a month and then dual therapy. Patient continues to have lower abdominal pain, ID evaluated by surgery and does not feel that patient has acute abdomen and cleared for discharge.  Patient was also evaluated by ID and recommended oral cefdinir/Flagyl for 5 more days.  Patient will be discharged home on cefdinir/Flagyl as well as pain medication.  He  was instructed to follow-up with his PCP/orthopedic surgery as an outpatient.       Pertinent Physical Exam At Time of Discharge  Physical Exam  Patient is awake and orient, not in apparent distress  Eyes: PERRLA, no conjunctival congestion  Chest: Bilateral Air entry, no crackles or wheezing  Heart: s1S2 regular, no murmur  Abdomen: Soft, non tender, BS present  Ext:    Home Medications     Medication List      START taking these medications     cefdinir 300 mg capsule; Commonly known as: Omnicef; Take 1 capsule (300   mg) by mouth 2 times a day for 5 days.   metroNIDAZOLE 500 mg tablet; Commonly known as: Flagyl; Take 1 tablet   (500 mg) by mouth 3 times a day for 5 days.     CHANGE how you take these medications     oxyCODONE 5 mg immediate release tablet; Commonly known as: Roxicodone;   Take 1 tablet (5 mg) by mouth every 6 hours if needed for severe pain (7 -   10) for up to 3 days. Only take for severe pain; What changed: Another   medication with the same name was removed. Continue taking this   medication, and follow the directions you see here.     CONTINUE taking these medications     acetaminophen 325 mg tablet; Commonly known as: Tylenol; Take 3 tablets   (975 mg) by mouth every 8 hours if needed for moderate pain (4 - 6).   albuterol 90 mcg/actuation inhaler   aspirin 81 mg chewable tablet; Chew 1 tablet (81 mg) once daily. Do not   start before December 12, 2023.   atorvastatin 80 mg tablet; Commonly known as: Lipitor; Take 1 tablet (80   mg) by mouth once daily.   azelastine 137 mcg (0.1 %) nasal spray; Commonly known as: Astelin   Breo Ellipta 200-25 mcg/dose inhaler; Generic drug: fluticasone   furoate-vilanteroL   Brilinta 90 mg tablet; Generic drug: ticagrelor; Take 1 tablet (90 mg)   by mouth 2 times a day for 24 days. Take as directed until 1/3/2024  then   STOP and start taking plavix as directed   Claritin 10 mg tablet; Generic drug: loratadine   cyclobenzaprine 10 mg tablet; Commonly known  as: Flexeril   Cymbalta 60 mg DR capsule; Generic drug: DULoxetine   Eliquis 5 mg tablet; Generic drug: apixaban   evolocumab 140 mg/mL injection; Commonly known as: Repatha SureClick   levothyroxine 200 mcg tablet; Commonly known as: Synthroid, Levoxyl;   Take 1 tablet (200 mcg) by mouth once daily in the morning. Take before   meals.   lidocaine 5 % patch; Commonly known as: Lidoderm; Place 1 patch over 12   hours on the skin once daily. Remove & discard patch within 12 hours or as   directed by MD.   Lopressor 50 mg tablet; Generic drug: metoprolol tartrate   melatonin 3 mg tablet; Take 1 tablet (3 mg) by mouth once daily at   bedtime.   Miralax 17 gram/dose powder; Generic drug: polyethylene glycol   Nitrostat 0.4 mg SL tablet; Generic drug: nitroglycerin   * pantoprazole 40 mg EC tablet; Commonly known as: ProtoNix   * pantoprazole 40 mg EC tablet; Commonly known as: ProtoNix; Take 1   tablet (40 mg) by mouth once daily in the morning. Take before meals. Do   not crush, chew, or split. Do not start before December 12, 2023.   Plavix 75 mg tablet; Generic drug: clopidogrel   * ranolazine 1,000 mg 12 hr tablet; Commonly known as: Ranexa   * ranolazine 1,000 mg 12 hr tablet; Commonly known as: Ranexa; Take 1   tablet (1,000 mg) by mouth 2 times a day. Do not crush, chew, or split.   tamsulosin 0.4 mg 24 hr capsule; Commonly known as: Flomax; Take 1   capsule (0.4 mg) by mouth once daily. Do not start before December 12, 2023.  * This list has 4 medication(s) that are the same as other medications   prescribed for you. Read the directions carefully, and ask your doctor or   other care provider to review them with you.       Outpatient Follow-Up  No follow-ups on file.     Bayron Minaya MD  12/27/2023  10:46 AM

## 2023-12-28 LAB
ATRIAL RATE: 138 BPM
Q ONSET: 215 MS
QRS COUNT: 21 BEATS
QRS DURATION: 90 MS
QT INTERVAL: 330 MS
QTC CALCULATION(BAZETT): 492 MS
QTC FREDERICIA: 431 MS
R AXIS: 95 DEGREES
T AXIS: 23 DEGREES
T OFFSET: 380 MS
VENTRICULAR RATE: 134 BPM

## 2023-12-28 PROCEDURE — 1090000001 HH PPS REVENUE CREDIT

## 2023-12-28 PROCEDURE — 1090000002 HH PPS REVENUE DEBIT

## 2023-12-28 SDOH — ECONOMIC STABILITY: HOUSING INSECURITY: EVIDENCE OF SMOKING MATERIAL: 0

## 2023-12-28 SDOH — HEALTH STABILITY: MENTAL HEALTH: SMOKING IN HOME: 1

## 2023-12-28 ASSESSMENT — ACTIVITIES OF DAILY LIVING (ADL): OASIS_M1830: 03

## 2023-12-29 ENCOUNTER — HOME CARE VISIT (OUTPATIENT)
Dept: HOME HEALTH SERVICES | Facility: HOME HEALTH | Age: 58
End: 2023-12-29
Payer: MEDICARE

## 2023-12-29 PROCEDURE — G0299 HHS/HOSPICE OF RN EA 15 MIN: HCPCS | Mod: HHH

## 2023-12-29 PROCEDURE — 1090000001 HH PPS REVENUE CREDIT

## 2023-12-29 PROCEDURE — 1090000002 HH PPS REVENUE DEBIT

## 2023-12-30 VITALS
RESPIRATION RATE: 12 BRPM | DIASTOLIC BLOOD PRESSURE: 82 MMHG | HEART RATE: 78 BPM | TEMPERATURE: 97.6 F | SYSTOLIC BLOOD PRESSURE: 140 MMHG

## 2023-12-30 PROCEDURE — 1090000002 HH PPS REVENUE DEBIT

## 2023-12-30 PROCEDURE — 1090000001 HH PPS REVENUE CREDIT

## 2023-12-30 ASSESSMENT — ENCOUNTER SYMPTOMS
SUBJECTIVE PAIN PROGRESSION: GRADUALLY IMPROVING
CHANGE IN APPETITE: UNCHANGED
OCCASIONAL FEELINGS OF UNSTEADINESS: 1
HIGHEST PAIN SEVERITY IN PAST 24 HOURS: 6/10
LOSS OF SENSATION IN FEET: 0
PERSON REPORTING PAIN: PATIENT
PAIN LOCATION - PAIN QUALITY: ACHING, THROBBING
PAIN LOCATION - PAIN SEVERITY: 7/10
APPETITE LEVEL: GOOD
LOWEST PAIN SEVERITY IN PAST 24 HOURS: 2/10
DEPRESSION: 0
PAIN LOCATION: RIGHT SHOULDER
PAIN LOCATION - PAIN DURATION: VARIES
MUSCLE WEAKNESS: 1
PAIN LOCATION - RELIEVING FACTORS: MEDICATION REST
DIZZINESS: 1
FATIGUE: 1
PAIN: 1
PAIN SEVERITY GOAL: 1/10
PAIN LOCATION - PAIN FREQUENCY: CONSTANT

## 2023-12-30 ASSESSMENT — ACTIVITIES OF DAILY LIVING (ADL)
AMBULATION ASSISTANCE: INDEPENDENT
OASIS_M1830: 03
AMBULATION ASSISTANCE: 1

## 2023-12-31 PROCEDURE — 1090000001 HH PPS REVENUE CREDIT

## 2023-12-31 PROCEDURE — 1090000002 HH PPS REVENUE DEBIT

## 2024-01-01 PROCEDURE — 1090000001 HH PPS REVENUE CREDIT

## 2024-01-01 PROCEDURE — 1090000002 HH PPS REVENUE DEBIT

## 2024-01-02 ENCOUNTER — HOME CARE VISIT (OUTPATIENT)
Dept: HOME HEALTH SERVICES | Facility: HOME HEALTH | Age: 59
End: 2024-01-02
Payer: MEDICARE

## 2024-01-02 VITALS — DIASTOLIC BLOOD PRESSURE: 84 MMHG | OXYGEN SATURATION: 99 % | HEART RATE: 74 BPM | SYSTOLIC BLOOD PRESSURE: 118 MMHG

## 2024-01-02 PROCEDURE — 1090000002 HH PPS REVENUE DEBIT

## 2024-01-02 PROCEDURE — 1090000001 HH PPS REVENUE CREDIT

## 2024-01-02 PROCEDURE — G0151 HHCP-SERV OF PT,EA 15 MIN: HCPCS | Mod: HHH

## 2024-01-02 ASSESSMENT — ENCOUNTER SYMPTOMS
SUBJECTIVE PAIN PROGRESSION: GRADUALLY IMPROVING
PAIN: 1
PAIN LOCATION - PAIN SEVERITY: 6/10
PAIN SEVERITY GOAL: 0/10
LOWEST PAIN SEVERITY IN PAST 24 HOURS: 3/10
PAIN LOCATION: RIGHT SHOULDER
HIGHEST PAIN SEVERITY IN PAST 24 HOURS: 10/10

## 2024-01-02 NOTE — HOME HEALTH
Subjective:    Patient presents to his first home health PT visit since his recent hospitalization.  Patient was previously treated by this PT following his cervical fusion and then patient was hospitalized for an infection.  Patient states he was given antibiotics and is feeling much better now, although still having pain.  Vertigo symptoms are not present.  He adds that he was supposed to have a FWW delivered to his home and he did not receive because he went back to the hospital before they could deliver it.  He adds that he out of a couple difference medications and has no way to get to the pharmacy to refill them.    Objective:    See PT evaluation for details.    Assessment:    PT evaluation completed. Patient agreeable to POC.  PT assisted patient with calling MD office regarding problems with obtaining his medications.  An appointment was set up for this week with his PCP to have this addressed.  PT also leant patient FWW to utilize until his own FWW is delivered, given patient h/o falls.  Patient demonstrated ability to use FWW safely.  Patient will benefit from skilled PT services to improve on his LE strength, transfers, gait, balance, and activity tolerance.  (-) Dayami's sign DEEJAY.    Plan:    Progress as tolerated.

## 2024-01-03 ENCOUNTER — OFFICE VISIT (OUTPATIENT)
Dept: ORTHOPEDIC SURGERY | Facility: CLINIC | Age: 59
End: 2024-01-03
Payer: MEDICARE

## 2024-01-03 ENCOUNTER — ANCILLARY PROCEDURE (OUTPATIENT)
Dept: RADIOLOGY | Facility: CLINIC | Age: 59
End: 2024-01-03
Payer: MEDICARE

## 2024-01-03 VITALS — HEIGHT: 64 IN | WEIGHT: 207 LBS | BODY MASS INDEX: 35.34 KG/M2

## 2024-01-03 DIAGNOSIS — M47.12 OTHER SPONDYLOSIS WITH MYELOPATHY, CERVICAL REGION: Primary | ICD-10-CM

## 2024-01-03 DIAGNOSIS — M47.12 OTHER SPONDYLOSIS WITH MYELOPATHY, CERVICAL REGION: ICD-10-CM

## 2024-01-03 PROCEDURE — 1090000002 HH PPS REVENUE DEBIT

## 2024-01-03 PROCEDURE — 72040 X-RAY EXAM NECK SPINE 2-3 VW: CPT | Performed by: RADIOLOGY

## 2024-01-03 PROCEDURE — 72040 X-RAY EXAM NECK SPINE 2-3 VW: CPT

## 2024-01-03 PROCEDURE — 1036F TOBACCO NON-USER: CPT | Performed by: ORTHOPAEDIC SURGERY

## 2024-01-03 PROCEDURE — 1090000001 HH PPS REVENUE CREDIT

## 2024-01-03 PROCEDURE — 99024 POSTOP FOLLOW-UP VISIT: CPT | Performed by: ORTHOPAEDIC SURGERY

## 2024-01-03 RX ORDER — METHOCARBAMOL 500 MG/1
750 TABLET, FILM COATED ORAL EVERY 8 HOURS PRN
COMMUNITY
Start: 2023-12-04

## 2024-01-03 RX ORDER — OLANZAPINE 10 MG/2ML
2.5 INJECTION, POWDER, FOR SOLUTION INTRAMUSCULAR EVERY 8 HOURS PRN
COMMUNITY
Start: 2023-12-06

## 2024-01-03 RX ORDER — QUETIAPINE FUMARATE 25 MG/1
12.5 TABLET, FILM COATED ORAL EVERY 8 HOURS PRN
COMMUNITY
Start: 2023-12-06

## 2024-01-03 RX ORDER — SEMAGLUTIDE 1.34 MG/ML
INJECTION, SOLUTION SUBCUTANEOUS
COMMUNITY
Start: 2023-10-10

## 2024-01-03 ASSESSMENT — PAIN - FUNCTIONAL ASSESSMENT: PAIN_FUNCTIONAL_ASSESSMENT: 0-10

## 2024-01-03 ASSESSMENT — PAIN SCALES - GENERAL: PAINLEVEL_OUTOF10: 7

## 2024-01-03 NOTE — PROGRESS NOTES
This is the first postoperative visit for this 58-year-old man.  He was transferred from an outside institution for definitive care of pre-existing cervical stenosis and spinal cord compression.  He was noted to be significantly myelopathic.  He was admitted to the hospital.  He had preoperative evaluation from anesthesia and the cardiology service with regard to his status is a candidate for urgent surgery.  He underwent an anterior cervical decompression and fusion.    Postoperatively he did well initially with significant improvement in his preoperative radicular symptoms.    He was subsequently readmitted to [facility] following surgery with what sounds like  A severe urinary tract infection.  He now is at home.    He is doing well.  He does note significant right shoulder pain.  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  He notes significant improvement in his preoperative radicular symptoms and weakness and gait abnormality.    No dysphagia.    On exam today he looks good.  His gait is stable.  His incision is healed.  He does have limited forward flexion and internal rotation of the right shoulder.  His strength is intact.    Prior films of his right shoulder shows mild glenohumeral arthrosis.    Plain film today shows good position of the structural graft and anterior plate.    Stable course following urgent surgery for severe myelopathy.  Neurologically he is improved.  He is quite bothered by right shoulder pain.    After discussing the risks and benefits and alternatives, with his permission under sterile condition, the right subacromial space is injected with a combination of 3 cc of lidocaine and 3 cc (30 mg) of Kenalog.    He will give me an update in 7 days following the injection.    We will see him back roughly in 5 weeks with plain film of the cervical spine.  He will continue with a home physical therapy program.

## 2024-01-03 NOTE — LETTER
January 3, 2024     Ivy Pollock MD  [address] [place] [id number]    Patient: Philip Barfield   YOB: 1965   Date of Visit: 1/3/2024       Dear Dr. Ivy Pollock MD:    Thank you for referring Philip Barfield to me for evaluation. Below are my notes for this consultation.  If you have questions, please do not hesitate to call me. I look forward to following your patient along with you.       Sincerely,     Balaji Ruelas MD      CC: No Recipients  ______________________________________________________________________________________    This is the first postoperative visit for this 58-year-old man.  He was transferred from an outside institution for definitive care of pre-existing cervical stenosis and spinal cord compression.  He was noted to be significantly myelopathic.  He was admitted to the hospital.  He had preoperative evaluation from anesthesia and the cardiology service with regard to his status is a candidate for urgent surgery.  He underwent an anterior cervical decompression and fusion.    Postoperatively he did well initially with significant improvement in his preoperative radicular symptoms.    He was subsequently readmitted to [facility] following surgery with what sounds like  A severe urinary tract infection.  He now is at home.    He is doing well.  He does note significant right shoulder pain.  He has returned to playing the guitar.  He notes significant improvement in his preoperative radicular symptoms and weakness and gait abnormality.    No dysphagia.    On exam today he looks good.  His gait is stable.  His incision is healed.  He does have limited forward flexion and internal rotation of the right shoulder.  His strength is intact.    Prior films of his right shoulder shows mild glenohumeral arthrosis.    Plain film today shows good position of the structural graft and anterior plate.    Stable course following urgent surgery for severe  myelopathy.  Neurologically he is improved.  He is quite bothered by right shoulder pain.    After discussing the risks and benefits and alternatives, with his permission under sterile condition, the right subacromial space is injected with a combination of 3 cc of lidocaine and 3 cc (30 mg) of Kenalog.    He will give me an update in 7 days following the injection.    We will see him back roughly in 5 weeks with plain film of the cervical spine.  He will continue with a home physical therapy program.

## 2024-01-04 ENCOUNTER — HOME CARE VISIT (OUTPATIENT)
Dept: HOME HEALTH SERVICES | Facility: HOME HEALTH | Age: 59
End: 2024-01-04
Payer: MEDICARE

## 2024-01-04 PROCEDURE — 1090000002 HH PPS REVENUE DEBIT

## 2024-01-04 PROCEDURE — 1090000001 HH PPS REVENUE CREDIT

## 2024-01-05 PROCEDURE — 1090000002 HH PPS REVENUE DEBIT

## 2024-01-05 PROCEDURE — 1090000001 HH PPS REVENUE CREDIT

## 2024-01-06 ENCOUNTER — HOME CARE VISIT (OUTPATIENT)
Dept: HOME HEALTH SERVICES | Facility: HOME HEALTH | Age: 59
End: 2024-01-06
Payer: MEDICARE

## 2024-01-06 PROCEDURE — 1090000002 HH PPS REVENUE DEBIT

## 2024-01-06 PROCEDURE — 1090000001 HH PPS REVENUE CREDIT

## 2024-01-07 PROCEDURE — 1090000002 HH PPS REVENUE DEBIT

## 2024-01-07 PROCEDURE — 1090000001 HH PPS REVENUE CREDIT

## 2024-01-08 ENCOUNTER — HOME CARE VISIT (OUTPATIENT)
Dept: HOME HEALTH SERVICES | Facility: HOME HEALTH | Age: 59
End: 2024-01-08
Payer: MEDICARE

## 2024-01-08 PROCEDURE — 1090000002 HH PPS REVENUE DEBIT

## 2024-01-08 PROCEDURE — 1090000001 HH PPS REVENUE CREDIT

## 2024-01-09 ENCOUNTER — HOME CARE VISIT (OUTPATIENT)
Dept: HOME HEALTH SERVICES | Facility: HOME HEALTH | Age: 59
End: 2024-01-09
Payer: MEDICARE

## 2024-01-09 PROCEDURE — 1090000002 HH PPS REVENUE DEBIT

## 2024-01-09 PROCEDURE — 1090000001 HH PPS REVENUE CREDIT

## 2024-01-09 PROCEDURE — G0151 HHCP-SERV OF PT,EA 15 MIN: HCPCS | Mod: HHH

## 2024-01-10 ENCOUNTER — HOME CARE VISIT (OUTPATIENT)
Dept: HOME HEALTH SERVICES | Facility: HOME HEALTH | Age: 59
End: 2024-01-10
Payer: MEDICARE

## 2024-01-10 VITALS — SYSTOLIC BLOOD PRESSURE: 106 MMHG | HEART RATE: 82 BPM | DIASTOLIC BLOOD PRESSURE: 71 MMHG | OXYGEN SATURATION: 98 %

## 2024-01-10 LAB
ATRIAL RATE: 153 BPM
ATRIAL RATE: 94 BPM
P AXIS: 162 DEGREES
P AXIS: 89 DEGREES
PR INTERVAL: 156 MS
PR INTERVAL: 58 MS
Q ONSET: 249 MS
Q ONSET: 249 MS
QRS COUNT: 15 BEATS
QRS COUNT: 25 BEATS
QRS DURATION: 118 MS
QRS DURATION: 96 MS
QT INTERVAL: 358 MS
QT INTERVAL: 367 MS
QTC CALCULATION(BAZETT): 448 MS
QTC CALCULATION(BAZETT): 570 MS
QTC FREDERICIA: 415 MS
QTC FREDERICIA: 492 MS
R AXIS: 68 DEGREES
R AXIS: 79 DEGREES
T AXIS: -79 DEGREES
T AXIS: 36 DEGREES
T OFFSET: 428 MS
T OFFSET: 433 MS
VENTRICULAR RATE: 145 BPM
VENTRICULAR RATE: 94 BPM

## 2024-01-10 PROCEDURE — 1090000002 HH PPS REVENUE DEBIT

## 2024-01-10 PROCEDURE — 1090000001 HH PPS REVENUE CREDIT

## 2024-01-10 ASSESSMENT — ENCOUNTER SYMPTOMS: DENIES PAIN: 1

## 2024-01-10 ASSESSMENT — ACTIVITIES OF DAILY LIVING (ADL): OASIS_M1830: 03

## 2024-01-10 NOTE — HOME HEALTH
Patient presents to his first home health PT for first time since 1.2.2024.    Resumption of care completed.

## 2024-01-10 NOTE — DOCUMENTATION CLARIFICATION NOTE
PATIENT:               BRENDA RENAE  ACCT #:                  [id number]  MRN:                       [id number]  :                       1965  ADMIT DATE:       2023 3:26 PM  DISCH DATE:        2023 2:20 PM  RESPONDING PROVIDER #:        [id number]          PROVIDER RESPONSE TEXT:    Sepsis with associated acute organ dysfunction of septic shock    CDI QUERY TEXT:    [facility]_Sepsis Link Organ Dysfunction        Instruction:    Based on your assessment of the patient and the clinical information, please provide the requested documentation by clicking on the appropriate radio button and enter any additional information if prompted.    Question: Please further clarify if a relationship exists between the Sepsis and acute organ dysfunction    When answering this query, please exercise your independent professional judgment. The fact that a question is being asked, does not imply that any particular answer is desired or expected.    The patient's clinical indicators include:  Clinical Information: 59 yo female with sepsis , cystitis    Clinical Indicators:   Vital signs T37  R20 /89 99 percent on RA   T36.2 HR70 R 7 BP 97/76, 98/62   WBC 27.9 Lactate 0.7   EKG on my interpretation shows atrial flutter/fibrillation with rapid ventricular rate about 140 with nonspecific ST and T wave changes. ,   note, Admitted for septic shock to ICU.,    Treatment:   NS bolus 1000 ml x2   NS bolus 1000 ml  - cefepime 1 gm Q8hrs  - Vancomycin IV Q12hrs      Risk Factors: Sepsis , cystitis, atrial fib  Options provided:  -- Sepsis with associated acute organ dysfunction of septic shock  -- Sepsis with associated acute  circulatory organ dysfunction of hypotension  -- Sepsis with associated acute  cardiac organ dysfunction of atrial fib  -- Sepsis with other associated acute organ dysfunction, Please specify additional information below  -- Other - I will add my  own diagnosis  -- Refer to Clinical Documentation Reviewer    Query created by: Zainab Landa on 1/4/2024 1:50 PM      Electronically signed by:  MICAELA LOCKE MD 1/10/2024 3:08 PM

## 2024-01-11 PROCEDURE — 1090000001 HH PPS REVENUE CREDIT

## 2024-01-11 PROCEDURE — 1090000002 HH PPS REVENUE DEBIT

## 2024-01-12 ENCOUNTER — HOME CARE VISIT (OUTPATIENT)
Dept: HOME HEALTH SERVICES | Facility: HOME HEALTH | Age: 59
End: 2024-01-12
Payer: MEDICARE

## 2024-01-12 VITALS — DIASTOLIC BLOOD PRESSURE: 82 MMHG | SYSTOLIC BLOOD PRESSURE: 118 MMHG | HEART RATE: 62 BPM | OXYGEN SATURATION: 99 %

## 2024-01-12 PROCEDURE — 0023 HH SOC

## 2024-01-12 PROCEDURE — 1090000001 HH PPS REVENUE CREDIT

## 2024-01-12 PROCEDURE — G0299 HHS/HOSPICE OF RN EA 15 MIN: HCPCS | Mod: HHH

## 2024-01-12 PROCEDURE — G0151 HHCP-SERV OF PT,EA 15 MIN: HCPCS | Mod: HHH

## 2024-01-12 PROCEDURE — 1090000002 HH PPS REVENUE DEBIT

## 2024-01-12 SDOH — HEALTH STABILITY: PHYSICAL HEALTH: RESISTANCE: 0#

## 2024-01-12 SDOH — HEALTH STABILITY: PHYSICAL HEALTH: EXERCISE ACTIVITY: SIDESTEPPING

## 2024-01-12 SDOH — HEALTH STABILITY: PHYSICAL HEALTH: EXERCISE ACTIVITIES SETS: 2

## 2024-01-12 SDOH — HEALTH STABILITY: PHYSICAL HEALTH: PHYSICAL EXERCISE: 15

## 2024-01-12 SDOH — HEALTH STABILITY: PHYSICAL HEALTH: PHYSICAL EXERCISE: SEATED

## 2024-01-12 SDOH — HEALTH STABILITY: PHYSICAL HEALTH: PHYSICAL EXERCISE: 10

## 2024-01-12 SDOH — HEALTH STABILITY: PHYSICAL HEALTH: PHYSICAL EXERCISE: STANDING

## 2024-01-12 SDOH — HEALTH STABILITY: PHYSICAL HEALTH: EXERCISE ACTIVITY: LAQ

## 2024-01-12 SDOH — HEALTH STABILITY: PHYSICAL HEALTH

## 2024-01-12 SDOH — HEALTH STABILITY: PHYSICAL HEALTH: EXERCISE ACTIVITIES SETS: 3

## 2024-01-12 SDOH — HEALTH STABILITY: PHYSICAL HEALTH: EXERCISE ACTIVITY: HAMSTRING CURLS

## 2024-01-12 SDOH — HEALTH STABILITY: PHYSICAL HEALTH: RESISTANCE: NA - 5 LAPS AT COUNTER

## 2024-01-12 SDOH — HEALTH STABILITY: PHYSICAL HEALTH: EXERCISE ACTIVITY: SCAPULAR RETRACTION

## 2024-01-12 SDOH — HEALTH STABILITY: PHYSICAL HEALTH: EXERCISE ACTIVITY: HIP FLEXION

## 2024-01-12 ASSESSMENT — ENCOUNTER SYMPTOMS
PAIN LOCATION: RIGHT SHOULDER
PAIN LOCATION - PAIN SEVERITY: 5/10
SUBJECTIVE PAIN PROGRESSION: WAXING AND WANING
HIGHEST PAIN SEVERITY IN PAST 24 HOURS: 8/10
LOWEST PAIN SEVERITY IN PAST 24 HOURS: 0/10
PAIN SEVERITY GOAL: 0/10
PAIN: 1

## 2024-01-12 NOTE — HOME HEALTH
Subjective:    Patient up ad jocelynn with FWW upon arrival.  Patient states that cortisone injection to right shoulder appears to have worn off and his shoulder pain has returned.  Otherwise, no other changes.    Objective:    See note.    Assessment:    Patient with good tolerance of initiation of gentle LE AROM exercises.  No complaints of SOB, dizziness, or pain.  PT recommended ongoing use of FWW due to intermittent bouts of unsteadiness.  Further skilled PT services are justified to greater improve on his balance, gait, strength, activity tolerance.    Plan:    Progress as able.

## 2024-01-13 VITALS
SYSTOLIC BLOOD PRESSURE: 140 MMHG | TEMPERATURE: 97.7 F | HEART RATE: 78 BPM | RESPIRATION RATE: 12 BRPM | DIASTOLIC BLOOD PRESSURE: 78 MMHG

## 2024-01-13 PROCEDURE — 1090000002 HH PPS REVENUE DEBIT

## 2024-01-13 PROCEDURE — 1090000001 HH PPS REVENUE CREDIT

## 2024-01-13 ASSESSMENT — ENCOUNTER SYMPTOMS
PAIN LOCATION - PAIN QUALITY: ACHING THROBBING
PAIN SEVERITY GOAL: 0/10
PAIN LOCATION - EXACERBATING FACTORS: USE OF EXTREMITY
PAIN LOCATION - PAIN SEVERITY: 6/10
PAIN LOCATION: RIGHT SHOULDER
PAIN LOCATION - PAIN FREQUENCY: INTERMITTENT
FATIGUE: 1
CHANGE IN APPETITE: UNCHANGED
MUSCLE WEAKNESS: 1
PAIN: 1
APPETITE LEVEL: GOOD
PERSON REPORTING PAIN: PATIENT
PAIN LOCATION - RELIEVING FACTORS: MEDICATION, REST
HIGHEST PAIN SEVERITY IN PAST 24 HOURS: 3/10
SUBJECTIVE PAIN PROGRESSION: UNCHANGED
PAIN LOCATION - PAIN DURATION: VARIES
LOWEST PAIN SEVERITY IN PAST 24 HOURS: 1/10

## 2024-01-13 ASSESSMENT — ACTIVITIES OF DAILY LIVING (ADL)
AMBULATION ASSISTANCE: INDEPENDENT
AMBULATION ASSISTANCE: 1

## 2024-01-14 PROCEDURE — 1090000002 HH PPS REVENUE DEBIT

## 2024-01-14 PROCEDURE — 1090000001 HH PPS REVENUE CREDIT

## 2024-01-15 ENCOUNTER — HOME CARE VISIT (OUTPATIENT)
Dept: HOME HEALTH SERVICES | Facility: HOME HEALTH | Age: 59
End: 2024-01-15
Payer: MEDICARE

## 2024-01-15 ENCOUNTER — HOME CARE VISIT (OUTPATIENT)
Dept: HOME HEALTH SERVICES | Facility: HOME HEALTH | Age: 59
End: 2024-01-15

## 2024-01-15 PROCEDURE — 1090000002 HH PPS REVENUE DEBIT

## 2024-01-15 PROCEDURE — 1090000001 HH PPS REVENUE CREDIT

## 2024-01-16 PROCEDURE — 1090000001 HH PPS REVENUE CREDIT

## 2024-01-16 PROCEDURE — 1090000002 HH PPS REVENUE DEBIT

## 2024-01-17 PROCEDURE — 1090000001 HH PPS REVENUE CREDIT

## 2024-01-17 PROCEDURE — 1090000002 HH PPS REVENUE DEBIT

## 2024-01-18 ENCOUNTER — HOME CARE VISIT (OUTPATIENT)
Dept: HOME HEALTH SERVICES | Facility: HOME HEALTH | Age: 59
End: 2024-01-18
Payer: MEDICARE

## 2024-01-18 VITALS — SYSTOLIC BLOOD PRESSURE: 105 MMHG | DIASTOLIC BLOOD PRESSURE: 68 MMHG | HEART RATE: 65 BPM | OXYGEN SATURATION: 95 %

## 2024-01-18 VITALS
HEART RATE: 70 BPM | TEMPERATURE: 98 F | SYSTOLIC BLOOD PRESSURE: 138 MMHG | DIASTOLIC BLOOD PRESSURE: 83 MMHG | OXYGEN SATURATION: 99 % | RESPIRATION RATE: 18 BRPM

## 2024-01-18 PROCEDURE — 1090000002 HH PPS REVENUE DEBIT

## 2024-01-18 PROCEDURE — G0300 HHS/HOSPICE OF LPN EA 15 MIN: HCPCS | Mod: HHH

## 2024-01-18 PROCEDURE — 1090000001 HH PPS REVENUE CREDIT

## 2024-01-18 PROCEDURE — G0151 HHCP-SERV OF PT,EA 15 MIN: HCPCS | Mod: HHH

## 2024-01-18 SDOH — HEALTH STABILITY: PHYSICAL HEALTH: EXERCISE ACTIVITY: SLS

## 2024-01-18 SDOH — HEALTH STABILITY: PHYSICAL HEALTH

## 2024-01-18 SDOH — HEALTH STABILITY: PHYSICAL HEALTH: RESISTANCE: NA-30"X1 BIL

## 2024-01-18 SDOH — HEALTH STABILITY: PHYSICAL HEALTH: PHYSICAL EXERCISE: STANDING

## 2024-01-18 SDOH — HEALTH STABILITY: PHYSICAL HEALTH: EXERCISE ACTIVITY: LATERAL STEP OVER HURDLES

## 2024-01-18 SDOH — HEALTH STABILITY: PHYSICAL HEALTH: PHYSICAL EXERCISE: STANDING-X5 LAPS

## 2024-01-18 SDOH — HEALTH STABILITY: PHYSICAL HEALTH: EXERCISE ACTIVITIES SETS: 2

## 2024-01-18 SDOH — HEALTH STABILITY: PHYSICAL HEALTH: EXERCISE ACTIVITY: MINI SQUATS

## 2024-01-18 SDOH — HEALTH STABILITY: PHYSICAL HEALTH: PHYSICAL EXERCISE: 10

## 2024-01-18 ASSESSMENT — ENCOUNTER SYMPTOMS
APPETITE LEVEL: FAIR
OCCASIONAL FEELINGS OF UNSTEADINESS: 0
DENIES PAIN: 1
DENIES PAIN: 1
SUBJECTIVE PAIN PROGRESSION: GRADUALLY IMPROVING
HIGHEST PAIN SEVERITY IN PAST 24 HOURS: 5/10
CHANGE IN APPETITE: UNCHANGED
PAIN LOCATION: RIGHT SHOULDER
PAIN SEVERITY GOAL: 0/10
LOWEST PAIN SEVERITY IN PAST 24 HOURS: 0/10
PERSON REPORTING PAIN: PATIENT
PAIN LOCATION - PAIN SEVERITY: 8/10

## 2024-01-18 NOTE — HOME HEALTH
Patient is alert and oriented times three, vital signs are stable, lungs are clear, patient denies pain, SOB or dizziness. Patient recieved a cortisone shot in his right shoulder pain has subsided,  1/10 pain today.  Educated on pain relieving measures in the event the pain comes back, patient  verbalized understanding.

## 2024-01-18 NOTE — HOME HEALTH
Subjective:    Patient states that his shoulder is feeling much better and does not hurt as much.  However, his legs still feel very weak and has a hard time negotiating stairs.    Objective:    See note for details.    Assessment:    Patient with limited activity tolerance as he required seated rest break after just 2.5 minutes of continuous gait - his O2 sat level remained above 96% on room air.  No LOB noted throughout session.  PT recommended that patient have handrails added to stairwell entering his home from driveway up into his kitchen - patient states he can request that his landloard install them.  Patient did have his own FWW delivered and uses intermittently.  PT issued patient updated written HEP as patient lost previous printout. Consideration taken for patient cardiac precautions when prescribing exercises.    Plan:    Progress as tolerated.

## 2024-01-19 PROCEDURE — 1090000001 HH PPS REVENUE CREDIT

## 2024-01-19 PROCEDURE — 1090000002 HH PPS REVENUE DEBIT

## 2024-01-19 ASSESSMENT — ACTIVITIES OF DAILY LIVING (ADL): ENTERING_EXITING_HOME: ONE PERSON

## 2024-01-20 PROCEDURE — 1090000002 HH PPS REVENUE DEBIT

## 2024-01-20 PROCEDURE — 1090000001 HH PPS REVENUE CREDIT

## 2024-01-21 PROCEDURE — 1090000001 HH PPS REVENUE CREDIT

## 2024-01-21 PROCEDURE — 1090000002 HH PPS REVENUE DEBIT

## 2024-01-22 PROCEDURE — 1090000001 HH PPS REVENUE CREDIT

## 2024-01-22 PROCEDURE — 1090000002 HH PPS REVENUE DEBIT

## 2024-01-22 ASSESSMENT — ACTIVITIES OF DAILY LIVING (ADL): ENTERING_EXITING_HOME: INDEPENDENT

## 2024-01-23 PROCEDURE — 1090000001 HH PPS REVENUE CREDIT

## 2024-01-23 PROCEDURE — 1090000002 HH PPS REVENUE DEBIT

## 2024-01-24 PROCEDURE — 1090000001 HH PPS REVENUE CREDIT

## 2024-01-24 PROCEDURE — 1090000002 HH PPS REVENUE DEBIT

## 2024-01-25 ENCOUNTER — HOME CARE VISIT (OUTPATIENT)
Dept: HOME HEALTH SERVICES | Facility: HOME HEALTH | Age: 59
End: 2024-01-25
Payer: MEDICARE

## 2024-01-25 VITALS
OXYGEN SATURATION: 99 % | RESPIRATION RATE: 18 BRPM | TEMPERATURE: 97.6 F | DIASTOLIC BLOOD PRESSURE: 67 MMHG | HEART RATE: 60 BPM | SYSTOLIC BLOOD PRESSURE: 130 MMHG

## 2024-01-25 PROCEDURE — 1090000002 HH PPS REVENUE DEBIT

## 2024-01-25 PROCEDURE — G0300 HHS/HOSPICE OF LPN EA 15 MIN: HCPCS | Mod: HHH

## 2024-01-25 PROCEDURE — 1090000001 HH PPS REVENUE CREDIT

## 2024-01-25 ASSESSMENT — ENCOUNTER SYMPTOMS
PERSON REPORTING PAIN: PATIENT
DENIES PAIN: 1
OCCASIONAL FEELINGS OF UNSTEADINESS: 0
SUBJECTIVE PAIN PROGRESSION: UNCHANGED
PAIN SEVERITY GOAL: 0/10
HIGHEST PAIN SEVERITY IN PAST 24 HOURS: 0/10
LOWEST PAIN SEVERITY IN PAST 24 HOURS: 0/10
CHANGE IN APPETITE: UNCHANGED
APPETITE LEVEL: GOOD

## 2024-01-25 NOTE — HOME HEALTH
Patient is alert and oriented times three, vital signs are stable, lungs are clear, patient denies pain, SOB or dizziness.Nutrition and Hydration education provided. Patient stating cortizone shot is still effective for pain in his shoulder.

## 2024-01-26 ENCOUNTER — HOME CARE VISIT (OUTPATIENT)
Dept: HOME HEALTH SERVICES | Facility: HOME HEALTH | Age: 59
End: 2024-01-26
Payer: MEDICARE

## 2024-01-26 PROCEDURE — 1090000001 HH PPS REVENUE CREDIT

## 2024-01-26 PROCEDURE — 1090000002 HH PPS REVENUE DEBIT

## 2024-01-27 PROCEDURE — 1090000001 HH PPS REVENUE CREDIT

## 2024-01-27 PROCEDURE — 1090000002 HH PPS REVENUE DEBIT

## 2024-01-28 PROCEDURE — 1090000002 HH PPS REVENUE DEBIT

## 2024-01-28 PROCEDURE — 1090000001 HH PPS REVENUE CREDIT

## 2024-01-29 PROCEDURE — 1090000002 HH PPS REVENUE DEBIT

## 2024-01-29 PROCEDURE — 1090000001 HH PPS REVENUE CREDIT

## 2024-01-30 ENCOUNTER — HOME CARE VISIT (OUTPATIENT)
Dept: HOME HEALTH SERVICES | Facility: HOME HEALTH | Age: 59
End: 2024-01-30
Payer: MEDICARE

## 2024-01-30 VITALS — HEART RATE: 60 BPM | DIASTOLIC BLOOD PRESSURE: 63 MMHG | OXYGEN SATURATION: 98 % | SYSTOLIC BLOOD PRESSURE: 100 MMHG

## 2024-01-30 PROCEDURE — G0151 HHCP-SERV OF PT,EA 15 MIN: HCPCS | Mod: HHH

## 2024-01-30 PROCEDURE — 1090000002 HH PPS REVENUE DEBIT

## 2024-01-30 PROCEDURE — 1090000001 HH PPS REVENUE CREDIT

## 2024-01-30 ASSESSMENT — ENCOUNTER SYMPTOMS
PAIN LOCATION - PAIN SEVERITY: 5/10
PAIN SEVERITY GOAL: 0/10
SUBJECTIVE PAIN PROGRESSION: GRADUALLY IMPROVING
PAIN LOCATION: RIGHT SHOULDER
HIGHEST PAIN SEVERITY IN PAST 24 HOURS: 8/10
LOWEST PAIN SEVERITY IN PAST 24 HOURS: 0/10
PAIN: 1

## 2024-01-31 ENCOUNTER — HOME CARE VISIT (OUTPATIENT)
Dept: HOME HEALTH SERVICES | Facility: HOME HEALTH | Age: 59
End: 2024-01-31
Payer: MEDICARE

## 2024-01-31 PROCEDURE — 1090000002 HH PPS REVENUE DEBIT

## 2024-01-31 PROCEDURE — 1090000001 HH PPS REVENUE CREDIT

## 2024-01-31 NOTE — HOME HEALTH
Subjective:    Philip states that he is feeling much better overall and feels that his health seems to be stabilizing overall.  He is ambulating the majority of the time without a device, but does use his FWW at times when his vertigo is exacerbated.      Objective:    See discharge for details.    Assessment:    Philip is doing much better.  He is still bothered by right shoulder pain, but much more mild.  He is independent with his home exercise program and walking regularly.  We discussed his goals, POC, and HEP.  Patient is appropriate and agreeable with plan to discharge physical therapy at this time.    Plan:    Discharge PT at this time.

## 2024-02-01 ENCOUNTER — HOME CARE VISIT (OUTPATIENT)
Dept: HOME HEALTH SERVICES | Facility: HOME HEALTH | Age: 59
End: 2024-02-01
Payer: MEDICARE

## 2024-02-01 PROCEDURE — 1090000002 HH PPS REVENUE DEBIT

## 2024-02-01 PROCEDURE — 1090000001 HH PPS REVENUE CREDIT

## 2024-02-02 ENCOUNTER — HOME CARE VISIT (OUTPATIENT)
Dept: HOME HEALTH SERVICES | Facility: HOME HEALTH | Age: 59
End: 2024-02-02
Payer: MEDICARE

## 2024-02-02 PROCEDURE — 1090000002 HH PPS REVENUE DEBIT

## 2024-02-02 PROCEDURE — 1090000001 HH PPS REVENUE CREDIT

## 2024-02-03 PROCEDURE — 1090000001 HH PPS REVENUE CREDIT

## 2024-02-03 PROCEDURE — 1090000002 HH PPS REVENUE DEBIT

## 2024-02-04 PROCEDURE — 1090000001 HH PPS REVENUE CREDIT

## 2024-02-04 PROCEDURE — 1090000002 HH PPS REVENUE DEBIT

## 2024-02-05 PROCEDURE — 1090000001 HH PPS REVENUE CREDIT

## 2024-02-05 PROCEDURE — 1090000002 HH PPS REVENUE DEBIT

## 2024-02-06 PROCEDURE — 1090000001 HH PPS REVENUE CREDIT

## 2024-02-06 PROCEDURE — 1090000002 HH PPS REVENUE DEBIT

## 2024-02-07 PROCEDURE — 1090000001 HH PPS REVENUE CREDIT

## 2024-02-07 PROCEDURE — 1090000002 HH PPS REVENUE DEBIT

## 2024-02-08 ENCOUNTER — HOME CARE VISIT (OUTPATIENT)
Dept: HOME HEALTH SERVICES | Facility: HOME HEALTH | Age: 59
End: 2024-02-08
Payer: MEDICARE

## 2024-02-08 PROCEDURE — 1090000001 HH PPS REVENUE CREDIT

## 2024-02-08 PROCEDURE — 1090000002 HH PPS REVENUE DEBIT

## 2024-02-09 PROCEDURE — 1090000002 HH PPS REVENUE DEBIT

## 2024-02-09 PROCEDURE — 1090000001 HH PPS REVENUE CREDIT

## 2024-02-10 PROCEDURE — 1090000002 HH PPS REVENUE DEBIT

## 2024-02-10 PROCEDURE — 1090000001 HH PPS REVENUE CREDIT

## 2024-02-10 SDOH — HEALTH STABILITY: MENTAL HEALTH: SMOKING IN HOME: 0

## 2024-02-10 SDOH — ECONOMIC STABILITY: HOUSING INSECURITY: EVIDENCE OF SMOKING MATERIAL: 0

## 2024-02-10 ASSESSMENT — ACTIVITIES OF DAILY LIVING (ADL)
AMBULATION ASSISTANCE: 1
AMBULATION ASSISTANCE: INDEPENDENT
OASIS_M1830: 01
HOME_HEALTH_OASIS: 00

## 2024-04-25 ENCOUNTER — TELEPHONE (OUTPATIENT)
Dept: GASTROENTEROLOGY | Facility: CLINIC | Age: 59
End: 2024-04-25
Payer: MEDICARE

## 2024-04-25 NOTE — TELEPHONE ENCOUNTER
----- Message from Mason Paul sent at 4/25/2024  1:14 PM EDT -----  Regarding: RE: Office Visit  Scheduled with Serenity Rodriguez for 6/14/24     ----- Message -----  From: Jemma Richards RN  Sent: 4/24/2024   2:36 PM EDT  To: Do [id number]er[facility]  Subject: Office Visit                                     Office Visit BMI, Eliquis

## 2024-05-30 NOTE — ADDENDUM NOTE
Encounter addended by: Leni Aceves RN on: 5/30/2024 7:17 PM   Actions taken: Imaging Exam ended, Charge Capture section accepted

## 2024-06-24 ENCOUNTER — APPOINTMENT (OUTPATIENT)
Dept: GASTROENTEROLOGY | Facility: CLINIC | Age: 59
End: 2024-06-24
Payer: MEDICARE

## 2025-06-17 ENCOUNTER — TELEPHONE (OUTPATIENT)
Facility: CLINIC | Age: 60
End: 2025-06-17
Payer: MEDICARE

## 2025-06-17 NOTE — TELEPHONE ENCOUNTER
Received an outside referral for a Screening Colonoscopy [Z12.11] by Dr Pollock    Patient's med list has Eliquis listed. He will need to be seen in the office.     Left message on machine to return call

## (undated) DEVICE — GUIDEWIRE, J-TIP, AMPLATZ SUPER STIFF, 0.035 IN X 180 CM

## (undated) DEVICE — LEGEND, LUB DIFFUSER PACK

## (undated) DEVICE — SUTURE, VICRYL, 4-0, 18 IN, PS2, UNDYED

## (undated) DEVICE — SHEATH, PINNACLE, 10 CM,  6FR INTRODUCER, 6FR DIA, 2.5 CM DIALATOR

## (undated) DEVICE — REST, HEAD, BAGEL, 9 IN

## (undated) DEVICE — Device

## (undated) DEVICE — PIN, SAFETY, MEDIUM, 1.5 IN, STERILE

## (undated) DEVICE — SPONGE, DISSECTOR, PEANUT, 3/8, STERILE 5 FOAM HOLDER"

## (undated) DEVICE — CLIPPER, SURGICAL BLADE ASSEMBLY, GENERAL PURPOSE, SINGLE USE

## (undated) DEVICE — CATHETER KIT, ASPIRATION, 044 CORONARY SYSTEM, 140CM

## (undated) DEVICE — SPONGE, GAUZE, XRAY DECT, 16 PLY, 4 X 4, W/MASTER DMT,STERILE

## (undated) DEVICE — DRAIN, PENROSE, 0.25 X 18 IN, LATEX, STERILE

## (undated) DEVICE — TIP, SUCTION, FRAZIER, W/CONTROL VENT, 12 FR

## (undated) DEVICE — LEGEND, BALL FLUTED, 9 CM, 3 MM

## (undated) DEVICE — CATHETER, BALLOON, NC EUPHORA NONCOMPLIANT 3.5 X 15 X 142CM

## (undated) DEVICE — TIP, SUCTION, FRAZIER, W/CONTROL VENT, 10 FR

## (undated) DEVICE — KIT, GUIDEWIRE, .009 - .018 IN W COPILOT BLEEDBACK VALVE

## (undated) DEVICE — NEEDLE, ELECTRODE, SUBDERMAL, PAIRED, 2.0 LEAD, DISP

## (undated) DEVICE — SUTURE, VICRYL, 4-0, 18 IN, UNDYED BR PS-2

## (undated) DEVICE — SURVIVAL KIT, EVEREST 30

## (undated) DEVICE — ELECTRODE, GROUND PLATE

## (undated) DEVICE — LEGEND, BALL FLUTED, 9 CM, 5MM

## (undated) DEVICE — CATHETER, ANGIO, IMPULSE, FL4, 6 FR X 100 CM

## (undated) DEVICE — ACCESS KIT, S-MAK MINI, 4FR 10CM 0.018IN 40CM, NT/PT, ECHO ENHANCE NEEDLE

## (undated) DEVICE — COVER, TABLE, UHC

## (undated) DEVICE — CLOSURE DEVICE, VASCULAR, ANGIO-SEAL, VIP, 6FR, LF

## (undated) DEVICE — SUTURE, VICRYL, 2-0, 27 IN, FSL, UNDYED

## (undated) DEVICE — CATHETER, ANGIO, IMPULSE, FR4, 6 FR X 100 CM

## (undated) DEVICE — BANDAGE, GAUZE, CONFORMING, KERLIX, 6 PLY, 4.5 IN X 4.1 YD

## (undated) DEVICE — STAPLER, SKIN PROXIMATE, 35 WIDE

## (undated) DEVICE — COVER, CART, 45 X 27 X 48 IN, CLEAR

## (undated) DEVICE — GUIDEWIRE, INQWIRE, 3MM J, .035, 150

## (undated) DEVICE — PAD, GROUNDING, ELECTROSURGICAL, W/9 FT CABLE, POLYHESIVE II, ADULT, LF

## (undated) DEVICE — DRAPE, SHEET, THYROID, W/ARMBOARD COVER, 100 X 121 IN, DISPOSABLE, LF, STERILE

## (undated) DEVICE — MANIFOLD, 4 PORT NEPTUNE STANDARD

## (undated) DEVICE — CATHETER, ANGIO, IMPULSE, IM, 6 FR X 100 CM

## (undated) DEVICE — TRAY, MINOR, SINGLE BASIN, STERILE

## (undated) DEVICE — DRESSING, NON-ADHERENT, OIL EMULSION, CURITY, 3 X 8 IN, STERILE

## (undated) DEVICE — CATHETER, EAGLE EYE, PLATNIUM (IVUS)

## (undated) DEVICE — ELECTRODE, CORKSCREW NEEDLE 1.5M LENGTH

## (undated) DEVICE — CATHETER, GUIDING, LAUNCHER, 6FR, AR 1.0

## (undated) DEVICE — STRIP, SKIN CLOSURE, STERI STRIP, REINFORCED, 0.5 X 4 IN

## (undated) DEVICE — DRESSING, GAUZE, WASHED FLUFF, LARGE, STERILE

## (undated) DEVICE — SUTURE, VICRYL, 3-0, 18 IN, PS2, UND BR

## (undated) DEVICE — GUIDEWIRE, STRAIGHT, HI-TORQUE BALANCE MIDDLEWEIGHT, 0.014 IN X 190 CM, HYDROCOAT

## (undated) DEVICE — CANISTER, INDIGO MAX, FOR PMXENGN, NON-STERILE